# Patient Record
Sex: MALE | Employment: OTHER | URBAN - METROPOLITAN AREA
[De-identification: names, ages, dates, MRNs, and addresses within clinical notes are randomized per-mention and may not be internally consistent; named-entity substitution may affect disease eponyms.]

---

## 2015-06-23 LAB — COLONOSCOPY, EXTERNAL: NORMAL

## 2017-06-28 ENCOUNTER — OFFICE VISIT (OUTPATIENT)
Dept: SURGERY | Age: 67
End: 2017-06-28

## 2017-06-28 VITALS
WEIGHT: 153 LBS | DIASTOLIC BLOOD PRESSURE: 89 MMHG | BODY MASS INDEX: 23.19 KG/M2 | SYSTOLIC BLOOD PRESSURE: 129 MMHG | HEART RATE: 69 BPM | RESPIRATION RATE: 20 BRPM | HEIGHT: 68 IN | TEMPERATURE: 97.5 F

## 2017-06-28 DIAGNOSIS — K40.90 LEFT INGUINAL HERNIA: Primary | ICD-10-CM

## 2017-06-28 RX ORDER — INSULIN GLARGINE 100 [IU]/ML
25 INJECTION, SOLUTION SUBCUTANEOUS DAILY
COMMUNITY
End: 2017-09-24

## 2017-06-28 RX ORDER — INSULIN LISPRO 100 [IU]/ML
15 INJECTION, SOLUTION INTRAVENOUS; SUBCUTANEOUS
COMMUNITY
End: 2017-09-24

## 2017-06-28 NOTE — PROGRESS NOTES
Logan Barajas is a 79 y.o. male who presents today with   Chief Complaint   Patient presents with    Inguinal Hernia     Pt presents today c/o left inguinal hernia. Pt says he has 2 areas of herniation of the lower left side of abd/pelvis area. Pt has had hx of RIHR with right testicle removal in 2006. He is here for surgical intervention. 1. Have you been to the ER, urgent care clinic since your last visit? Hospitalized since your last visit? No    2. Have you seen or consulted any other health care providers outside of the [facility] since your last visit? Include any pap smears or colon screening.  No

## 2017-06-28 NOTE — MR AVS SNAPSHOT
Visit Information Date & Time Provider Department Dept. Phone Encounter #  
 6/28/2017  9:30 AM Neyda Stern [facility] [phone] [id number] Your Appointments 6/28/2017  9:30 AM  
New Patient with Donald Osorio MD  
[facility]) Appt Note: Left Inguinal Hernia/ PCP called in Dr. Ramila Nolasco; $45 cp/ pwk/ photo Id/ ins card. .../mjh  
 [address] [facility] [address]  
  
   
 [address] [place]per[place] [address]  
  
    
 7/24/2017  9:00 AM  
POST OP with Donald Osorio MD  
[address] [facility] Appt Note: 2 week post op [address] [place]  
  
   
 [address] [place] De Gasperi [address] Upcoming Health Maintenance Date Due Hepatitis C Screening 1950 DTaP/Tdap/Td series (1 - Tdap) 2/9/1971 FOBT Q 1 YEAR AGE 50-75 2/9/2000 ZOSTER VACCINE AGE 60> 2/9/2010 GLAUCOMA SCREENING Q2Y 2/9/2015 Pneumococcal 65+ Low/Medium Risk (1 of 2 - PCV13) 2/9/2015 MEDICARE YEARLY EXAM 2/9/2015 INFLUENZA AGE 9 TO ADULT 8/1/2017 Allergies as of 6/28/2017  Review Complete On: 8/15/2016 By: Romana Bah Severity Noted Reaction Type Reactions Fentanyl  08/15/2016    Other (comments) Blurred vision Neurontin [Gabapentin]  08/15/2016    Other (comments) Blurred Vision Penicillins  06/20/2012    Other (comments) Valium [Diazepam]  06/20/2012    Other (comments) Current Immunizations  Never Reviewed No immunizations on file. Not reviewed this visit Vitals BP Pulse Temp Resp Height(growth percentile) Weight(growth percentile)  129/89 (BP 1 Location: Right arm, BP Patient Position: At rest) 69 97.5 °F (36.4 °C) (Oral) 20 5' 8\" (1.727 m) 153 lb (69.4 kg) BMI Smoking Status 23.26 kg/m2 Former Smoker Vitals History BMI and BSA Data Body Mass Index Body Surface Area  
 23.26 kg/m 2 1.82 m 2 Preferred Pharmacy Pharmacy Name Phone [address] Maite Salas [phone] Your Updated Medication List  
  
   
This list is accurate as of: 6/28/17  9:08 AM.  Always use your most recent med list.  
  
  
  
  
 butalbital-acetaminophen-caffeine -40 mg per tablet Commonly known as:  ESGIC PLUS Take 2 Tabs by mouth every six (6) hours as needed for Headache.  
  
 cloNIDine HCl 0.1 mg tablet Commonly known as:  CATAPRES Take 0.1 mg by mouth. HumaLOG 100 unit/mL injection Generic drug:  insulin lispro  
by SubCUTAneous route. LANTUS 100 unit/mL injection Generic drug:  insulin glargine  
by SubCUTAneous route nightly. metFORMIN 500 mg tablet Commonly known as:  GLUCOPHAGE Take  by mouth two (2) times daily (with meals). OTHER Takes 2 medications for HTN, does not know the names  
  
 traMADol 50 mg tablet Commonly known as:  ULTRAM  
Take 50 mg by mouth every six (6) hours as needed. triamterene-hydroCHLOROthiazide 37.5-25 mg per tablet Commonly known as:  Drucilla Hones Take  by mouth daily. Patient Instructions If you have any questions or concerns about today's appointment, the verbal and/or written instructions you were given for follow up care, please call our office at [phone]. [facility] - [address], [address] 
 
[phone] office [phone] fax Judy Perez PATIENT PRE AND POST OPERATIVE INSTRUCTIONS [address] Before Surgery Instructions:  
1) You must have someone available to drive you to and from your procedure and stay with you for the first 24 hours. 2) It is very important that you have nothing to eat or drink after midnight the night before your surgery. This includes chewing gum or sucking on hard candy. Take only heart, blood pressure and cholesterol medications the morning of surgery with only a sip of water. 3) Please stop taking Plavix 10 days prior to your surgery. Stop taking Coumadin 5 days prior to your surgery. Stop taking all Aspirin or Aspirin containing products 7 days prior to your surgery. Stop taking Advil, Motrin, Aleve, and etc. 3 days prior to your surgery. 4) If you take any diabetic medications please consult with your primary care physician on how to take them on the day of your surgery 5) Please stop all Herbal products 2 weeks prior to your surgery. 6) Please arrive at the hospital 1 ½ hours prior to your surgery, unless you have been otherwise instructed. 7) Patients having an operation on their colon will be given a separate instruction sheet on their Bowel Prep. 8) For any pre-operative work up check in at the main entrance to [facility], and then go to Patient Registration. These studies are done on a walk in basis they are open from 7:00am to 5:00pm Monday through Friday. 9) Please wash your surgical site the morning of your surgery with soap and water. 10) If you are of child bearing age you will have pregnancy test done the morning of your surgery as soon as you arrive. After Surgery Instructions: You will need to be seen in the office for a follow-up visit 7-14 days after your surgery. Please call after you have had the procedure to make this appointment. Unless otherwise instructed, you may remove your outer bandage and shower 48 hours after your surgery. If you develop a fever greater than 101, have any significant drainage, bleeding, swelling and/or pus of the wound. Please call our office immediately. Surgery Date and Time:  Tuesday, July 11, 2017 at 10:15am 
 
Please check in at [facility], enter through the Emergency Room entrance and go up to the second floor. Please check in by 8:45am the day of your surgery. You may contact Fabio Garcia with any questions at [phone]. Introducing [facility] SERVICES! Trixie Mckeon introduces [employer] patient portal. Now you can access parts of your medical record, email your doctor's office, and request medication refills online. 1. In your internet browser, go to [email or web address] 2. Click on the First Time User? Click Here link in the Sign In box. You will see the New Member Sign Up page. 3. Enter your AetherPal Access Code exactly as it appears below. You will not need to use this code after youve completed the sign-up process. If you do not sign up before the expiration date, you must request a new code. · [employer]etherPal Access Code: [email or web address] Expires: 9/26/2017  8:46 AM 
 
4. Enter the last four digits of your Social Security Number (xxxx) and Date of Birth (mm/dd/yyyy) as indicated and click Submit. You will be taken to the next sign-up page. 5. Create a AetherPal ID. This will be your AetherPal login ID and cannot be changed, so think of one that is secure and easy to remember. 6. Create a AetherPal password. You can change your password at any time. 7. Enter your Password Reset Question and Answer. This can be used at a later time if you forget your password. 8. Enter your e-mail address. You will receive e-mail notification when new information is available in [address] 9. Click Sign Up. You can now view and download portions of your medical record. 10. Click the Download Summary menu link to download a portable copy of your medical information. If you have questions, please visit the Frequently Asked Questions section of the [employer] website.  Remember, [employer]etherPal is NOT to be used for urgent needs. For medical emergencies, dial 911. Now available from your iPhone and Android! Please provide this summary of care documentation to your next provider. Your primary care clinician is listed as MARCO CHAVEZ. If you have any questions after today's visit, please call [phone].

## 2017-06-28 NOTE — PATIENT INSTRUCTIONS
If you have any questions or concerns about today's appointment, the verbal and/or written instructions you were given for follow up care, please call our office at [phone]. [facility] - [address]    [phone] office  [phone] fax    . PATIENT PRE AND POST OPERATIVE INSTRUCTIONS     [address] [place] [address]     Before Surgery Instructions:   1) You must have someone available to drive you to and from your procedure and stay with you for the first 24 hours. 2) It is very important that you have nothing to eat or drink after midnight the night before your surgery. This includes chewing gum or sucking on hard candy. Take only heart, blood pressure and cholesterol medications the morning of surgery with only a sip of water. 3) Please stop taking Plavix 10 days prior to your surgery. Stop taking Coumadin 5 days prior to your surgery. Stop taking all Aspirin or Aspirin containing products 7 days prior to your surgery. Stop taking Advil, Motrin, Aleve, and etc. 3 days prior to your surgery. 4) If you take any diabetic medications please consult with your primary care physician on how to take them on the day of your surgery  5) Please stop all Herbal products 2 weeks prior to your surgery. 6) Please arrive at the hospital 1 ½ hours prior to your surgery, unless you have been otherwise instructed. 7) Patients having an operation on their colon will be given a separate instruction sheet on their Bowel Prep. 8) For any pre-operative work up check in at the main entrance to [address] [place] [address], and then go to Patient Registration. These studies are done on a walk in basis they are open from 7:00am to 5:00pm Monday through Friday. 9) Please wash your surgical site the morning of your surgery with soap and water.   10) If you are of child bearing age you will have pregnancy test done the morning of your surgery as soon as you arrive. After Surgery Instructions: You will need to be seen in the office for a follow-up visit 7-14 days after your surgery. Please call after you have had the procedure to make this appointment. Unless otherwise instructed, you may remove your outer bandage and shower 48 hours after your surgery. If you develop a fever greater than 101, have any significant drainage, bleeding, swelling and/or pus of the wound. Please call our office immediately. Surgery Date and Time:  Tuesday, July 11, 2017 at 10:15am    Please check in at [facility], enter through the Emergency Room entrance and go up to the second floor. Please check in by 8:45am the day of your surgery. You may contact Lisa Guerra with any questions at [phone].

## 2017-06-28 NOTE — PROGRESS NOTES
General Surgery Consult      Stella Warren  Admit date: (Not on file)    MRN: [id number]     : 1950     Age: 79 y.o. Attending Physician: Kristal Sam MD, FACS      History of Present Illness:     Stella Warren is a 79 y.o. male was referred for evaluation of left inguinal hernia. Symptoms were first noted a few months ago. Pain is dull, intermittent. The mass is  reducible. He does not have a history of incarceration or obstruction. The patient also gives a history of constipation. Contributing symptoms - Patient does not have chronic cough. Patient has a history of  previous hernia surgery. There are no active problems to display for this patient. Past Medical History:   Diagnosis Date    Diabetes ([address]     Hypertension       Past Surgical History:   Procedure Laterality Date    HX HERNIA REPAIR Right     Right inguinal hernia repair     HX ORTHOPAEDIC      HX OTHER SURGICAL      removed testicle      Social History   Substance Use Topics    Smoking status: Former Smoker    Smokeless tobacco: Never Used    Alcohol use No      History   Smoking Status    Former Smoker   Smokeless Tobacco    Never Used     Family History   Problem Relation Age of Onset    Diabetes Mother       Current Outpatient Prescriptions   Medication Sig    insulin glargine (LANTUS) 100 unit/mL injection by SubCUTAneous route nightly.  insulin lispro (HUMALOG) 100 unit/mL injection by SubCUTAneous route.  traMADol (ULTRAM) 50 mg tablet Take 50 mg by mouth every six (6) hours as needed.  triamterene-hydrochlorothiazide (MAXZIDE) 37.5-25 mg per tablet Take  by mouth daily.  cloNIDine (CATAPRES) 0.1 mg tablet Take 0.1 mg by mouth.  butalbital-acetaminophen-caffeine (ESGIC PLUS) -40 mg per tablet Take 2 Tabs by mouth every six (6) hours as needed for Headache.  metFORMIN (GLUCOPHAGE) 500 mg tablet Take  by mouth two (2) times daily (with meals).       OTHER Takes 2 medications for HTN, does not know the names      No current facility-administered medications for this visit. Allergies   Allergen Reactions    Fentanyl Other (comments)     Blurred vision    Neurontin [Gabapentin] Other (comments)     Blurred Vision      Penicillins Other (comments)    Valium [Diazepam] Other (comments)        Review of Systems:  Constitutional: negative  Eyes: negative  Ears, Nose, Mouth, Throat, and Face: negative  Respiratory: negative  Cardiovascular: negative  Gastrointestinal: positive for abdominal pain  Genitourinary:negative  Integument/Breast: negative  Hematologic/Lymphatic: negative  Musculoskeletal:negative  Neurological: negative  Behavioral/Psychiatric: negative    Objective:     Visit Vitals    /89 (BP 1 Location: Right arm, BP Patient Position: At rest)    Pulse 69    Temp 97.5 °F (36.4 °C) (Oral)    Resp 20    Ht 5' 8\" (1.727 m)    Wt 69.4 kg (153 lb)    BMI 23.26 kg/m2       Physical Exam:      General:  in no apparent distress   Eyes:  conjunctivae and sclerae normal, pupils equal, round, reactive to light   Throat & Neck: no erythema or exudates noted and neck supple and symmetrical; no palpable masses   Lungs:   clear to auscultation bilaterally   Heart:  Regular rate and rhythm   Abdomen:   rounded, soft, nontender, nondistended, no masses or organomegaly. Midline scar from previous gastric surgery. Left inguinal hernia, that is tender to palpation.     Extremities: extremities normal, atraumatic, no cyanosis or edema   Skin: Normal.       Imaging and Lab Review:     CBC:   Lab Results   Component Value Date/Time    WBC 4.1 12/03/2012 12:45 PM    RBC 4.91 12/03/2012 12:45 PM    HGB 12.0 12/03/2012 12:45 PM    HCT 36.6 12/03/2012 12:45 PM    PLATELET 82 28/52/8316 12:45 PM     BMP:   Lab Results   Component Value Date/Time    Glucose 136 12/03/2012 12:45 PM    Sodium 137 12/03/2012 12:45 PM    Potassium 4.3 12/03/2012 12:45 PM    Chloride 102 12/03/2012 12:45 PM    CO2 27 12/03/2012 12:45 PM    BUN 27 12/03/2012 12:45 PM    Creatinine 1.70 12/03/2012 12:45 PM    Calcium 9.5 12/03/2012 12:45 PM     CMP:  Lab Results   Component Value Date/Time    Glucose 136 12/03/2012 12:45 PM    Sodium 137 12/03/2012 12:45 PM    Potassium 4.3 12/03/2012 12:45 PM    Chloride 102 12/03/2012 12:45 PM    CO2 27 12/03/2012 12:45 PM    BUN 27 12/03/2012 12:45 PM    Creatinine 1.70 12/03/2012 12:45 PM    Calcium 9.5 12/03/2012 12:45 PM    Anion gap 8 12/03/2012 12:45 PM    BUN/Creatinine ratio 16 12/03/2012 12:45 PM    Alk. phosphatase 122 06/20/2010 08:00 PM    Protein, total 8.3 06/20/2010 08:00 PM    Albumin 4.1 06/20/2010 08:00 PM    Globulin 4.2 06/20/2010 08:00 PM    A-G Ratio 1.0 06/20/2010 08:00 PM       No results found for this or any previous visit (from the past 24 hour(s)). images and reports reviewed    Assessment:   Gypsy Reich is a 79 y.o. male is presenting with a picture of symptomatic left inguinal hernia. I Discussed the possibility of incarceration, strangulation, enlargement in size over time, and the risk of emergency surgery in the face of strangulation. I also discussed the use of prosthetic materials (mesh), including the risk of infection. Also discussed the risk of surgery including recurrence and the possible need for reoperation and removal of mesh if used, possibility of postoperative small bowel injury, obstruction or ileus, and the risks of general anesthetic. I explained to the the patient about the robotic hernia repair procedure. Plan:     1. Schedule for robotic left inguinal hernia repair with placement of mesh. 2. No heavy lifting for 2 weeks after the surgery (More than 15 pounds)  3. Avoid constipation by taking stool softener.      Please call me if you have any questions (cell phone: [phone])     Signed By: Krish Vizcarra MD     June 28, 2017

## 2017-07-10 ENCOUNTER — ANESTHESIA EVENT (OUTPATIENT)
Dept: SURGERY | Age: 67
End: 2017-07-10
Payer: MEDICARE

## 2017-07-10 RX ORDER — SERTRALINE HYDROCHLORIDE 50 MG/1
50 TABLET, FILM COATED ORAL DAILY
COMMUNITY
End: 2017-09-24

## 2017-07-10 RX ORDER — BACLOFEN 10 MG/1
10 TABLET ORAL
COMMUNITY
End: 2020-01-23

## 2017-07-11 ENCOUNTER — ANESTHESIA (OUTPATIENT)
Dept: SURGERY | Age: 67
End: 2017-07-11
Payer: MEDICARE

## 2017-07-11 ENCOUNTER — HOSPITAL ENCOUNTER (OUTPATIENT)
Age: 67
Setting detail: OUTPATIENT SURGERY
Discharge: HOME | End: 2017-07-11
Attending: SURGERY | Admitting: SURGERY
Payer: MEDICARE

## 2017-07-11 VITALS
TEMPERATURE: 97.4 F | HEART RATE: 60 BPM | BODY MASS INDEX: 23.49 KG/M2 | HEIGHT: 68 IN | WEIGHT: 155 LBS | DIASTOLIC BLOOD PRESSURE: 108 MMHG | OXYGEN SATURATION: 96 % | RESPIRATION RATE: 16 BRPM | SYSTOLIC BLOOD PRESSURE: 197 MMHG

## 2017-07-11 LAB
GLUCOSE BLD STRIP.AUTO-MCNC: 132 MG/DL (ref 70–110)
GLUCOSE BLD STRIP.AUTO-MCNC: 165 MG/DL (ref 70–110)

## 2017-07-11 PROCEDURE — 76210000017 HC OR PH I REC 1.5 TO 2 HR: Performed by: SURGERY

## 2017-07-11 PROCEDURE — 77030031139 HC SUT VCRL2 J&J -A: Performed by: SURGERY

## 2017-07-11 PROCEDURE — 74011000250 HC RX REV CODE- 250: Performed by: SURGERY

## 2017-07-11 PROCEDURE — 74011250636 HC RX REV CODE- 250/636: Performed by: NURSE ANESTHETIST, CERTIFIED REGISTERED

## 2017-07-11 PROCEDURE — 74011250637 HC RX REV CODE- 250/637: Performed by: NURSE ANESTHETIST, CERTIFIED REGISTERED

## 2017-07-11 PROCEDURE — 77030018842 HC SOL IRR SOD CL 9% BAXT -A: Performed by: SURGERY

## 2017-07-11 PROCEDURE — 82962 GLUCOSE BLOOD TEST: CPT

## 2017-07-11 PROCEDURE — 77030009848 HC PASSR SUT SET COOP -C: Performed by: SURGERY

## 2017-07-11 PROCEDURE — 76060000033 HC ANESTHESIA 1 TO 1.5 HR: Performed by: SURGERY

## 2017-07-11 PROCEDURE — 77030002933 HC SUT MCRYL J&J -A: Performed by: SURGERY

## 2017-07-11 PROCEDURE — 77030022704 HC SUT VLOC COVD -B: Performed by: SURGERY

## 2017-07-11 PROCEDURE — 74011250636 HC RX REV CODE- 250/636

## 2017-07-11 PROCEDURE — 74011000250 HC RX REV CODE- 250

## 2017-07-11 PROCEDURE — 76210000022 HC REC RM PH II 1.5 TO 2 HR: Performed by: SURGERY

## 2017-07-11 PROCEDURE — 77030011640 HC PAD GRND REM COVD -A: Performed by: SURGERY

## 2017-07-11 PROCEDURE — 77030010507 HC ADH SKN DERMBND J&J -B: Performed by: SURGERY

## 2017-07-11 PROCEDURE — C1781 MESH (IMPLANTABLE): HCPCS | Performed by: SURGERY

## 2017-07-11 PROCEDURE — 93005 ELECTROCARDIOGRAM TRACING: CPT

## 2017-07-11 PROCEDURE — 77030020703 HC SEAL CANN DISP INTU -B: Performed by: SURGERY

## 2017-07-11 PROCEDURE — 76010000934 HC OR TIME 1 TO 1.5HR INTENSV - TIER 2: Performed by: SURGERY

## 2017-07-11 PROCEDURE — 77030035277 HC OBTRTR BLDELSS DISP INTU -B: Performed by: SURGERY

## 2017-07-11 PROCEDURE — 74011000258 HC RX REV CODE- 258: Performed by: SURGERY

## 2017-07-11 PROCEDURE — 74011250636 HC RX REV CODE- 250/636: Performed by: SURGERY

## 2017-07-11 PROCEDURE — 77030032490 HC SLV COMPR SCD KNE COVD -B: Performed by: SURGERY

## 2017-07-11 DEVICE — LAPAROSCOPIC SELF-FIXATING MESH POLYESTER WITH POLYLACTIC ACID GRIPS AND COLLAGEN FILM
Type: IMPLANTABLE DEVICE | Site: INGUINAL | Status: FUNCTIONAL
Brand: PROGRIP

## 2017-07-11 RX ORDER — FENTANYL CITRATE 50 UG/ML
INJECTION, SOLUTION INTRAMUSCULAR; INTRAVENOUS AS NEEDED
Status: DISCONTINUED | OUTPATIENT
Start: 2017-07-11 | End: 2017-07-11 | Stop reason: HOSPADM

## 2017-07-11 RX ORDER — HYDROMORPHONE HYDROCHLORIDE 1 MG/ML
INJECTION, SOLUTION INTRAMUSCULAR; INTRAVENOUS; SUBCUTANEOUS
Status: DISCONTINUED
Start: 2017-07-11 | End: 2017-07-11 | Stop reason: HOSPADM

## 2017-07-11 RX ORDER — ONDANSETRON 2 MG/ML
4 INJECTION INTRAMUSCULAR; INTRAVENOUS ONCE
Status: DISCONTINUED | OUTPATIENT
Start: 2017-07-11 | End: 2017-07-11 | Stop reason: HOSPADM

## 2017-07-11 RX ORDER — FAMOTIDINE 20 MG/1
TABLET, FILM COATED ORAL
Status: DISCONTINUED
Start: 2017-07-11 | End: 2017-07-11 | Stop reason: HOSPADM

## 2017-07-11 RX ORDER — INSULIN LISPRO 100 [IU]/ML
INJECTION, SOLUTION INTRAVENOUS; SUBCUTANEOUS ONCE
Status: DISCONTINUED | OUTPATIENT
Start: 2017-07-12 | End: 2017-07-11 | Stop reason: HOSPADM

## 2017-07-11 RX ORDER — GLYCOPYRROLATE 0.2 MG/ML
INJECTION INTRAMUSCULAR; INTRAVENOUS AS NEEDED
Status: DISCONTINUED | OUTPATIENT
Start: 2017-07-11 | End: 2017-07-11 | Stop reason: HOSPADM

## 2017-07-11 RX ORDER — MIDAZOLAM HYDROCHLORIDE 1 MG/ML
INJECTION, SOLUTION INTRAMUSCULAR; INTRAVENOUS AS NEEDED
Status: DISCONTINUED | OUTPATIENT
Start: 2017-07-11 | End: 2017-07-11 | Stop reason: HOSPADM

## 2017-07-11 RX ORDER — SODIUM CHLORIDE 0.9 % (FLUSH) 0.9 %
5-10 SYRINGE (ML) INJECTION AS NEEDED
Status: DISCONTINUED | OUTPATIENT
Start: 2017-07-11 | End: 2017-07-11 | Stop reason: HOSPADM

## 2017-07-11 RX ORDER — KETOROLAC TROMETHAMINE 30 MG/ML
INJECTION, SOLUTION INTRAMUSCULAR; INTRAVENOUS AS NEEDED
Status: DISCONTINUED | OUTPATIENT
Start: 2017-07-11 | End: 2017-07-11 | Stop reason: HOSPADM

## 2017-07-11 RX ORDER — SODIUM CHLORIDE, SODIUM LACTATE, POTASSIUM CHLORIDE, CALCIUM CHLORIDE 600; 310; 30; 20 MG/100ML; MG/100ML; MG/100ML; MG/100ML
100 INJECTION, SOLUTION INTRAVENOUS CONTINUOUS
Status: DISCONTINUED | OUTPATIENT
Start: 2017-07-11 | End: 2017-07-11 | Stop reason: HOSPADM

## 2017-07-11 RX ORDER — DEXAMETHASONE SODIUM PHOSPHATE 4 MG/ML
INJECTION, SOLUTION INTRA-ARTICULAR; INTRALESIONAL; INTRAMUSCULAR; INTRAVENOUS; SOFT TISSUE AS NEEDED
Status: DISCONTINUED | OUTPATIENT
Start: 2017-07-11 | End: 2017-07-11 | Stop reason: HOSPADM

## 2017-07-11 RX ORDER — HYDROMORPHONE HYDROCHLORIDE 2 MG/ML
0.2 INJECTION, SOLUTION INTRAMUSCULAR; INTRAVENOUS; SUBCUTANEOUS AS NEEDED
Status: DISCONTINUED | OUTPATIENT
Start: 2017-07-11 | End: 2017-07-11 | Stop reason: HOSPADM

## 2017-07-11 RX ORDER — FAMOTIDINE 20 MG/1
20 TABLET, FILM COATED ORAL ONCE
Status: COMPLETED | OUTPATIENT
Start: 2017-07-12 | End: 2017-07-11

## 2017-07-11 RX ORDER — DEXTROSE MONOHYDRATE 25 G/50ML
25-50 INJECTION, SOLUTION INTRAVENOUS AS NEEDED
Status: DISCONTINUED | OUTPATIENT
Start: 2017-07-11 | End: 2017-07-11 | Stop reason: HOSPADM

## 2017-07-11 RX ORDER — LIDOCAINE HYDROCHLORIDE 20 MG/ML
INJECTION, SOLUTION EPIDURAL; INFILTRATION; INTRACAUDAL; PERINEURAL AS NEEDED
Status: DISCONTINUED | OUTPATIENT
Start: 2017-07-11 | End: 2017-07-11 | Stop reason: HOSPADM

## 2017-07-11 RX ORDER — DIPHENHYDRAMINE HYDROCHLORIDE 50 MG/ML
12.5 INJECTION, SOLUTION INTRAMUSCULAR; INTRAVENOUS
Status: DISCONTINUED | OUTPATIENT
Start: 2017-07-11 | End: 2017-07-11 | Stop reason: HOSPADM

## 2017-07-11 RX ORDER — HYDROMORPHONE HYDROCHLORIDE 2 MG/ML
0.5 INJECTION, SOLUTION INTRAMUSCULAR; INTRAVENOUS; SUBCUTANEOUS
Status: DISCONTINUED | OUTPATIENT
Start: 2017-07-11 | End: 2017-07-11 | Stop reason: HOSPADM

## 2017-07-11 RX ORDER — VECURONIUM BROMIDE FOR INJECTION 1 MG/ML
INJECTION, POWDER, LYOPHILIZED, FOR SOLUTION INTRAVENOUS AS NEEDED
Status: DISCONTINUED | OUTPATIENT
Start: 2017-07-11 | End: 2017-07-11 | Stop reason: HOSPADM

## 2017-07-11 RX ORDER — SODIUM CHLORIDE 0.9 % (FLUSH) 0.9 %
5-10 SYRINGE (ML) INJECTION EVERY 8 HOURS
Status: DISCONTINUED | OUTPATIENT
Start: 2017-07-11 | End: 2017-07-11 | Stop reason: HOSPADM

## 2017-07-11 RX ORDER — SODIUM CHLORIDE, SODIUM LACTATE, POTASSIUM CHLORIDE, CALCIUM CHLORIDE 600; 310; 30; 20 MG/100ML; MG/100ML; MG/100ML; MG/100ML
50 INJECTION, SOLUTION INTRAVENOUS CONTINUOUS
Status: DISCONTINUED | OUTPATIENT
Start: 2017-07-12 | End: 2017-07-11 | Stop reason: HOSPADM

## 2017-07-11 RX ORDER — SUCCINYLCHOLINE CHLORIDE 20 MG/ML
INJECTION INTRAMUSCULAR; INTRAVENOUS AS NEEDED
Status: DISCONTINUED | OUTPATIENT
Start: 2017-07-11 | End: 2017-07-11 | Stop reason: HOSPADM

## 2017-07-11 RX ORDER — NEOSTIGMINE METHYLSULFATE 5 MG/5 ML
SYRINGE (ML) INTRAVENOUS AS NEEDED
Status: DISCONTINUED | OUTPATIENT
Start: 2017-07-11 | End: 2017-07-11 | Stop reason: HOSPADM

## 2017-07-11 RX ORDER — PROPOFOL 10 MG/ML
INJECTION, EMULSION INTRAVENOUS AS NEEDED
Status: DISCONTINUED | OUTPATIENT
Start: 2017-07-11 | End: 2017-07-11 | Stop reason: HOSPADM

## 2017-07-11 RX ORDER — INSULIN LISPRO 100 [IU]/ML
INJECTION, SOLUTION INTRAVENOUS; SUBCUTANEOUS ONCE
Status: DISCONTINUED | OUTPATIENT
Start: 2017-07-11 | End: 2017-07-11 | Stop reason: HOSPADM

## 2017-07-11 RX ORDER — OXYCODONE AND ACETAMINOPHEN 5; 325 MG/1; MG/1
1 TABLET ORAL AS NEEDED
Status: DISCONTINUED | OUTPATIENT
Start: 2017-07-11 | End: 2017-07-11 | Stop reason: HOSPADM

## 2017-07-11 RX ORDER — MAGNESIUM SULFATE 100 %
4 CRYSTALS MISCELLANEOUS AS NEEDED
Status: DISCONTINUED | OUTPATIENT
Start: 2017-07-11 | End: 2017-07-11 | Stop reason: HOSPADM

## 2017-07-11 RX ORDER — OXYCODONE AND ACETAMINOPHEN 5; 325 MG/1; MG/1
1 TABLET ORAL
Qty: 24 TAB | Refills: 0 | Status: SHIPPED | OUTPATIENT
Start: 2017-07-11 | End: 2017-09-24

## 2017-07-11 RX ORDER — ONDANSETRON 2 MG/ML
INJECTION INTRAMUSCULAR; INTRAVENOUS AS NEEDED
Status: DISCONTINUED | OUTPATIENT
Start: 2017-07-11 | End: 2017-07-11 | Stop reason: HOSPADM

## 2017-07-11 RX ADMIN — MIDAZOLAM HYDROCHLORIDE 2 MG: 1 INJECTION, SOLUTION INTRAMUSCULAR; INTRAVENOUS at 08:50

## 2017-07-11 RX ADMIN — OXYCODONE HYDROCHLORIDE AND ACETAMINOPHEN 1 TABLET: 5; 325 TABLET ORAL at 12:16

## 2017-07-11 RX ADMIN — SODIUM CHLORIDE 600 MG: 900 INJECTION, SOLUTION INTRAVENOUS at 08:59

## 2017-07-11 RX ADMIN — HYDROMORPHONE HYDROCHLORIDE 0.5 MG: 2 INJECTION, SOLUTION INTRAMUSCULAR; INTRAVENOUS; SUBCUTANEOUS at 10:40

## 2017-07-11 RX ADMIN — Medication 3 MG: at 09:33

## 2017-07-11 RX ADMIN — FENTANYL CITRATE 50 MCG: 50 INJECTION, SOLUTION INTRAMUSCULAR; INTRAVENOUS at 08:56

## 2017-07-11 RX ADMIN — FAMOTIDINE 20 MG: 20 TABLET ORAL at 08:39

## 2017-07-11 RX ADMIN — VECURONIUM BROMIDE FOR INJECTION 3 MG: 1 INJECTION, POWDER, LYOPHILIZED, FOR SOLUTION INTRAVENOUS at 08:59

## 2017-07-11 RX ADMIN — HYDROMORPHONE HYDROCHLORIDE 0.5 MG: 2 INJECTION, SOLUTION INTRAMUSCULAR; INTRAVENOUS; SUBCUTANEOUS at 10:50

## 2017-07-11 RX ADMIN — LIDOCAINE HYDROCHLORIDE 60 MG: 20 INJECTION, SOLUTION EPIDURAL; INFILTRATION; INTRACAUDAL; PERINEURAL at 08:56

## 2017-07-11 RX ADMIN — SODIUM CHLORIDE, SODIUM LACTATE, POTASSIUM CHLORIDE, AND CALCIUM CHLORIDE: 600; 310; 30; 20 INJECTION, SOLUTION INTRAVENOUS at 08:37

## 2017-07-11 RX ADMIN — KETOROLAC TROMETHAMINE 15 MG: 30 INJECTION, SOLUTION INTRAMUSCULAR; INTRAVENOUS at 09:35

## 2017-07-11 RX ADMIN — DEXAMETHASONE SODIUM PHOSPHATE 4 MG: 4 INJECTION, SOLUTION INTRA-ARTICULAR; INTRALESIONAL; INTRAMUSCULAR; INTRAVENOUS; SOFT TISSUE at 09:33

## 2017-07-11 RX ADMIN — ONDANSETRON 4 MG: 2 INJECTION INTRAMUSCULAR; INTRAVENOUS at 09:33

## 2017-07-11 RX ADMIN — SUCCINYLCHOLINE CHLORIDE 100 MG: 20 INJECTION INTRAMUSCULAR; INTRAVENOUS at 08:56

## 2017-07-11 RX ADMIN — PROPOFOL 150 MG: 10 INJECTION, EMULSION INTRAVENOUS at 08:56

## 2017-07-11 RX ADMIN — PROPOFOL 50 MG: 10 INJECTION, EMULSION INTRAVENOUS at 09:38

## 2017-07-11 RX ADMIN — GLYCOPYRROLATE 0.4 MG: 0.2 INJECTION INTRAMUSCULAR; INTRAVENOUS at 09:33

## 2017-07-11 NOTE — ANESTHESIA PREPROCEDURE EVALUATION
Anesthetic History   No history of anesthetic complications            Review of Systems / Medical History  Patient summary reviewed and pertinent labs reviewed    Pulmonary  Within defined limits                 Neuro/Psych   Within defined limits           Cardiovascular    Hypertension: well controlled              Exercise tolerance: >4 METS     GI/Hepatic/Renal  Within defined limits              Endo/Other    Diabetes: well controlled, type 2         Other Findings   Comments:   Risk Factors for Postoperative nausea/vomiting:       History of postoperative nausea/vomiting? NO       Female? NO       Motion sickness? NO       Intended opioid administration for postoperative analgesia? YES      Smoking Abstinence  Current Smoker? NO  Elective Surgery? YES  Seen preoperatively by anesthesiologist or proxy prior to day of surgery? YES  Pt abstained from smoking 24 hours prior to anesthesia?  N/A           Physical Exam    Airway  Mallampati: II  TM Distance: 4 - 6 cm  Neck ROM: normal range of motion   Mouth opening: Normal     Cardiovascular    Rhythm: regular  Rate: normal         Dental  No notable dental hx       Pulmonary  Breath sounds clear to auscultation               Abdominal  GI exam deferred       Other Findings            Anesthetic Plan    ASA: 3  Anesthesia type: general          Induction: Intravenous  Anesthetic plan and risks discussed with: Patient

## 2017-07-11 NOTE — PERIOP NOTES
Patient sister took his jewerly:  2 silver tone necklaces; 2 silver tone bracelets; and 2 silver tone rings. He has 2 rings taped to his right middle finger. Camryn's sister has his eye glasses.

## 2017-07-11 NOTE — PROGRESS NOTES
Date of Surgery Update:  Liana Moreira was seen and examined. History and physical has been reviewed. The patient has been examined. There have been no significant clinical changes since the completion of the originally dated History and Physical. Will proceed with robotic left inguinal hernia repair with mesh.        Signed By: Joe Chávez MD     July 11, 2017 8:26 AM

## 2017-07-11 NOTE — ANESTHESIA POSTPROCEDURE EVALUATION
Post-Anesthesia Evaluation and Assessment    Patient: Michelle Price MRN: [id number]  SSN: [id number]    YOB: 1950  Age: 79 y.o. Sex: male      Data from PACU flowsheet    Cardiovascular Function/Vital Signs  Visit Vitals    BP (!) 168/96    Pulse 63    Temp 36.3 °C (97.4 °F)    Resp 12    Ht 5' 8\" (1.727 m)    Wt 70.3 kg (155 lb)    SpO2 97%    BMI 23.57 kg/m2       Patient is status post anesthesia    Nausea/Vomiting: controlled    Postoperative hydration reviewed and adequate. Pain:  Managed 5/10    Neurological Status: At baseline    Mental Status and Level of Consciousness: Alert and oriented     Pulmonary Status:   Adequate oxygenation and airway patent    Complications related to anesthesia: None    Post-anesthesia assessment completed.  No concerns    Signed By: Rubén Shane CRNA     July 11, 2017

## 2017-07-11 NOTE — IP AVS SNAPSHOT
[address] 
[phone] Patient: Hali Logan MRN: [id number] WTB:[id number] You are allergic to the following Allergen Reactions Fentanyl Other (comments) Blurred vision Neurontin (Gabapentin) Other (comments) Blurred Vision Penicillins Swelling Valium (Diazepam) Swelling Recent Documentation Height Weight BMI Smoking Status 1.727 m 70.3 kg 23.57 kg/m2 Former Smoker Emergency Contacts Name Discharge Info Relation Home Work Mobile Pili Villa  Parent [1] [phone] Richy Zamarripa  Child [2] [phone] About your hospitalization You were admitted on:  July 11, 2017 You last received care in the:  [facility] PHASE 2 RECOVERY You were discharged on:  July 11, 2017 Unit phone number:  [phone] Why you were hospitalized Your primary diagnosis was:  Not on File Providers Seen During Your Hospitalizations Provider Role Specialty Primary office phone Aidan Rosa MD Attending Provider Surgery [phone] Your Primary Care Physician (PCP) Primary Care Physician Office Phone Office Fax [place], [address] [facility] [phone] Follow-up Information Follow up With Details Comments Contact Info Kahlil Hancock MD   [address] [place] [address] 
[phone] Your Appointments Monday July 24, 2017  9:00 AM EDT  
POST OP with Aidan Rosa MD  
[address] [address]  
[phone] Current Discharge Medication List  
  
START taking these medications Dose & Instructions Dispensing Information Comments Morning Noon Evening Bedtime  
 oxyCODONE-acetaminophen 5-325 mg per tablet Commonly known as:  PERCOCET Your last dose was: Your next dose is:    
   
   
 Dose:  1 Tab Take 1 Tab by mouth every four (4) hours as needed for Pain. Max Daily Amount: 6 Tabs. Quantity:  24 Tab Refills:  0 CONTINUE these medications which have NOT CHANGED Dose & Instructions Dispensing Information Comments Morning Noon Evening Bedtime  
 baclofen 10 mg tablet Commonly known as:  LIORESAL Your last dose was: Your next dose is:    
   
   
 Dose:  10 mg Take 10 mg by mouth three (3) times daily as needed. Refills:  0  
     
   
   
   
  
 butalbital-acetaminophen-caffeine -40 mg per tablet Commonly known as:  ESGIC PLUS Your last dose was: Your next dose is:    
   
   
 Dose:  2 Tab Take 2 Tabs by mouth every six (6) hours as needed for Headache. Quantity:  20 Tab Refills:  0  
     
   
   
   
  
 cloNIDine HCl 0.1 mg tablet Commonly known as:  CATAPRES Your last dose was: Your next dose is:    
   
   
 Dose:  0.1 mg Take 0.1 mg by mouth. Refills:  0 HumaLOG 100 unit/mL injection Generic drug:  insulin lispro Your last dose was: Your next dose is:    
   
   
 Dose:  15 Units 15 Units by SubCUTAneous route three (3) times daily (after meals). Refills:  0  
     
   
   
   
  
 LANTUS 100 unit/mL injection Generic drug:  insulin glargine Your last dose was: Your next dose is:    
   
   
 Dose:  25 Units 25 Units by SubCUTAneous route daily. Refills:  0  
     
   
   
   
  
 sertraline 50 mg tablet Commonly known as:  ZOLOFT Your last dose was: Your next dose is:    
   
   
 Dose:  50 mg Take 50 mg by mouth daily. Refills:  0  
     
   
   
   
  
 traMADol 50 mg tablet Commonly known as:  ULTRAM  
   
Your last dose was:     
   
Your next dose is:    
   
   
 Dose:  50 mg  
 Take 50 mg by mouth every six (6) hours as needed. Refills:  0 Where to Get Your Medications Information on where to get these meds will be given to you by the nurse or doctor. ! Ask your nurse or doctor about these medications  
  oxyCODONE-acetaminophen 5-325 mg per tablet Discharge Instructions Instructions Following Ambulatory Surgery Patient: Cheryl Hinkle MRN: [id number]  SSN: [id number] YOB: 1950  Age: 79 y.o. Sex: male Activity · As tolerated, walking encourage, stairs are okay · Avoid strenuous activities - no lifting anything heavier than 15 pounds. · You may shower at home after 48 hours. Diet · Regular diet after nausea from the anesthetic has passed. Pain · Take pain medication as directed by your doctor ·  Call your doctor if pain is NOT relieved by medication Dressing Care · Remove outer dressing (if any) after 48 hours. Leave steri-strips (if any) in place until they fall off. After Anesthesia · For the first 24 hours: DO NOT Drive, Drink alcoholic beverages, or Make important decisions · Be aware of dizziness following anesthesia and while taking pain medication Call your doctor if 
· Excessive bleeding that does not stop after holding mild pressure over the area · Temperature of 101 degrees F or above · Redness,excessive swelling or bruising, and/or green or yellow, smelly discharge from incision · If nausea and vomiting continues Follow-Up Phone Calls · Call the office at [phone] to make your follow-up appointment Appointment date/time: 2 weeks after the surgery. Dr. Charley Alvarez cell phone number is [phone]. Please call me if you have any concerns or questions. Inguinal Hernia Repair: What to Expect at Home Your Recovery After surgery to repair a hernia, you're likely to have pain for a few days. You may also feel like you have the flu. And you may have a low fever and feel tired and nauseated. This is common. You should start to feel better after a few days. And you'll probably feel much better in 7 days. For a few weeks you may feel twinges or pulling in the groin area when you move. Men may have some bruising on the scrotum and along the penis. This is normal. 
This care sheet gives you a general idea about how long it will take for you to recover. But each person recovers at a different pace. Follow the steps below to get better as quickly as possible. How can you care for yourself at home? Activity · Rest when you feel tired. · You may shower 24 to 48 hours after surgery, if your doctor okays it. Pat the incision dry. Do not take a bath for the first 2 weeks, or until your doctor tells you it is okay. · Allow the area to heal. Don't move quickly or lift anything heavy until you are feeling better. · Be active. Walking is a good choice. · You most likely can return to light activity after 1 to 3 weeks, depending on the type of surgery you had. Diet · You can eat your normal diet. If your stomach is upset, try bland, low-fat foods like plain rice, broiled chicken, toast, and yogurt. · If your bowel movements are not regular right after surgery, try to avoid constipation and straining. Drink plenty of water. Your doctor may suggest fiber, a stool softener, or a mild laxative. Medicines · Be safe with medicines. Read and follow all instructions on the label. ¨ If the doctor gave you a prescription medicine for pain, take it as prescribed. ¨ If you are not taking a prescription pain medicine, ask your doctor if you can take an over-the-counter medicine. · Your doctor will tell you if and when you can restart your medicines. He or she will also give you instructions about taking any new medicines. Incision care · You will have a dressing over the cut (incision).  A dressing helps the cut heal and protects it. Your doctor will tell you how to take care of this. · If you have skin adhesive on the cut, leave it on until it falls off. Skin adhesive is also called liquid stitches or glue. · If you have strips of tape on the cut, leave the tape on for a week or until it falls off. · If you had stitches, your doctor will tell you when to come back to have them removed. · Wash the area daily with warm, soapy water, and pat it dry. Don't use hydrogen peroxide or alcohol. They can slow healing. Ice · Put ice or a cold pack on the area for 10 to 20 minutes at a time. Try to do this every 1 to 2 hours for the next 3 days (when you are awake) or until the swelling goes down. Put a thin cloth between the ice and your skin. Follow-up care is a key part of your treatment and safety. Be sure to make and go to all appointments, and call your doctor if you are having problems. It's also a good idea to know your test results and keep a list of the medicines you take. When should you call for help? Call 911 anytime you think you may need emergency care. For example, call if: 
· You passed out (lost consciousness). · You have severe trouble breathing. · You have symptoms of a blood clot in your lung (called a pulmonary embolism). These may include: 
¨ Sudden chest pain. ¨ Trouble breathing. ¨ Coughing up blood. Call your doctor now or seek immediate medical care if: 
· You have pain that does not get better after you take pain medicine. · You have loose stitches, or your incision comes open. · You are bleeding from the incision. · You have symptoms of infection, such as: 
¨ Increased pain, swelling, warmth, or redness. ¨ Red streaks leading from the incision. ¨ Pus draining from the incision. ¨ A fever. Watch closely for changes in your health, and be sure to contact your doctor if: 
· You do not have a bowel movement after taking a laxative. Where can you learn more? Go to http://kourtney-juna.info/. Enter A467 in the search box to learn more about \"Inguinal Hernia Repair: What to Expect at Home. \" Current as of: August 9, 2016 Content Version: 11.3 © 9828-4111 [employer]. Care instructions adapted under license by Anna Lozabai (which disclaims liability or warranty for this information). If you have questions about a medical condition or this instruction, always ask your healthcare professional. [place] [address] any warranty or liability for your use of this information. DISCHARGE SUMMARY from Nurse The following personal items are in your possession at time of discharge: 
 
Dental Appliances: None Visual Aid: Glasses PATIENT INSTRUCTIONS: 
 
After general anesthesia or intravenous sedation, for 24 hours or while taking prescription Narcotics: · Limit your activities · Do not drive and operate hazardous machinery · Do not make important personal or business decisions · Do  not drink alcoholic beverages · If you have not urinated within 8 hours after discharge, please contact your surgeon on call. Report the following to your surgeon: 
· Excessive pain, swelling, redness or odor of or around the surgical area · Temperature over 100.5 · Nausea and vomiting lasting longer than 4 hours or if unable to take medications · Any signs of decreased circulation or nerve impairment to extremity: change in color, persistent  numbness, tingling, coldness or increase pain · Any questions What to do at Home: 
Recommended activity:  
 
These are general instructions for a healthy lifestyle: No smoking/ No tobacco products/ Avoid exposure to second hand smoke Surgeon General's Warning:  Quitting smoking now greatly reduces serious risk to your health. Obesity, smoking, and sedentary lifestyle greatly increases your risk for illness A healthy diet, regular physical exercise & weight monitoring are important for maintaining a healthy lifestyle You may be retaining fluid if you have a history of heart failure or if you experience any of the following symptoms:  Weight gain of 3 pounds or more overnight or 5 pounds in a week, increased swelling in our hands or feet or shortness of breath while lying flat in bed. Please call your doctor as soon as you notice any of these symptoms; do not wait until your next office visit. Recognize signs and symptoms of STROKE: 
 
F-face looks uneven A-arms unable to move or move unevenly S-speech slurred or non-existent T-time-call 911 as soon as signs and symptoms begin-DO NOT go Back to bed or wait to see if you get better-TIME IS BRAIN. Warning Signs of HEART ATTACK Call 911 if you have these symptoms: 
? Chest discomfort. Most heart attacks involve discomfort in the center of the chest that lasts more than a few minutes, or that goes away and comes back. It can feel like uncomfortable pressure, squeezing, fullness, or pain. ? Discomfort in other areas of the upper body. Symptoms can include pain or discomfort in one or both arms, the back, neck, jaw, or stomach. ? Shortness of breath with or without chest discomfort. ? Other signs may include breaking out in a cold sweat, nausea, or lightheadedness. Don't wait more than five minutes to call [address]! Fast action can save your life. Calling 911 is almost always the fastest way to get lifesaving treatment. Emergency Medical Services staff can begin treatment when they arrive  up to an hour sooner than if someone gets to the hospital by car. The discharge information has been reviewed with the daughter. The daughter verbalized understanding. Discharge medications reviewed with the caregiver and appropriate educational materials and side effects teaching were provided. Patient armband removed and given to patient to take home. Patient was informed of the privacy risks if armband lost or stolen Discharge Orders None Introducing [facility]! Ervin Avila introduces "[employer]" patient portal. Now you can access parts of your medical record, email your doctor's office, and request medication refills online. 1. In your internet browser, go to [email or web address] 2. Click on the First Time User? Click Here link in the Sign In box. You will see the New Member Sign Up page. 3. Enter your "[employer]" Access Code exactly as it appears below. You will not need to use this code after youve completed the sign-up process. If you do not sign up before the expiration date, you must request a new code. · "[employer]" Access Code: [email or web address] Expires: 9/26/2017  8:46 AM 
 
4. Enter the last four digits of your Social Security Number (xxxx) and Date of Birth (mm/dd/yyyy) as indicated and click Submit. You will be taken to the next sign-up page. 5. Create a "[employer]" ID. This will be your "[employer]" login ID and cannot be changed, so think of one that is secure and easy to remember. 6. Create a "[employer]" password. You can change your password at any time. 7. Enter your Password Reset Question and Answer. This can be used at a later time if you forget your password. 8. Enter your e-mail address. You will receive e-mail notification when new information is available in [address]. 9. Click Sign Up. You can now view and download portions of your medical record. 10. Click the Download Summary menu link to download a portable copy of your medical information. If you have questions, please visit the Frequently Asked Questions section of the "[employer]" website. Remember, "[employer]" is NOT to be used for urgent needs. For medical emergencies, dial 911. Now available from your iPhone and Android! General Information Please provide this summary of care documentation to your next provider. Patient Signature:  ____________________________________________________________ Date:  ____________________________________________________________  
  
[employer] Provider Signature:  ____________________________________________________________ Date:  ____________________________________________________________

## 2017-07-11 NOTE — DISCHARGE INSTRUCTIONS
Instructions Following Ambulatory Surgery    Patient: Sonam Fuchs MRN: [id number]  SSN: [id number]    YOB: 1950  Age: 79 y.o. Sex: male      Activity  · As tolerated, walking encourage, stairs are okay  · Avoid strenuous activities - no lifting anything heavier than 15 pounds. · You may shower at home after 48 hours. Diet  · Regular diet after nausea from the anesthetic has passed. Pain  · Take pain medication as directed by your doctor  ·  Call your doctor if pain is NOT relieved by medication    Dressing Care  · Remove outer dressing (if any) after 48 hours. Leave steri-strips (if any) in place until they fall off. After Anesthesia  · For the first 24 hours: DO NOT Drive, Drink alcoholic beverages, or Make important decisions  · Be aware of dizziness following anesthesia and while taking pain medication    Call your doctor if  · Excessive bleeding that does not stop after holding mild pressure over the area  · Temperature of 101 degrees F or above  · Redness,excessive swelling or bruising, and/or green or yellow, smelly discharge from incision  · If nausea and vomiting continues    Follow-Up Phone Calls  · Call the office at [phone] to make your follow-up appointment    Appointment date/time: 2 weeks after the surgery. Dr. Grace Johnson cell phone number is [phone]. Please call me if you have any concerns or questions. Inguinal Hernia Repair: What to Expect at Home  Your Recovery    After surgery to repair a hernia, you're likely to have pain for a few days. You may also feel like you have the flu. And you may have a low fever and feel tired and nauseated. This is common. You should start to feel better after a few days. And you'll probably feel much better in 7 days. For a few weeks you may feel twinges or pulling in the groin area when you move. Men may have some bruising on the scrotum and along the penis.  This is normal.  This care sheet gives you a general idea about how long it will take for you to recover. But each person recovers at a different pace. Follow the steps below to get better as quickly as possible. How can you care for yourself at home? Activity  · Rest when you feel tired. · You may shower 24 to 48 hours after surgery, if your doctor okays it. Pat the incision dry. Do not take a bath for the first 2 weeks, or until your doctor tells you it is okay. · Allow the area to heal. Don't move quickly or lift anything heavy until you are feeling better. · Be active. Walking is a good choice. · You most likely can return to light activity after 1 to 3 weeks, depending on the type of surgery you had. Diet  · You can eat your normal diet. If your stomach is upset, try bland, low-fat foods like plain rice, broiled chicken, toast, and yogurt. · If your bowel movements are not regular right after surgery, try to avoid constipation and straining. Drink plenty of water. Your doctor may suggest fiber, a stool softener, or a mild laxative. Medicines  · Be safe with medicines. Read and follow all instructions on the label. ¨ If the doctor gave you a prescription medicine for pain, take it as prescribed. ¨ If you are not taking a prescription pain medicine, ask your doctor if you can take an over-the-counter medicine. · Your doctor will tell you if and when you can restart your medicines. He or she will also give you instructions about taking any new medicines. Incision care  · You will have a dressing over the cut (incision). A dressing helps the cut heal and protects it. Your doctor will tell you how to take care of this. · If you have skin adhesive on the cut, leave it on until it falls off. Skin adhesive is also called liquid stitches or glue. · If you have strips of tape on the cut, leave the tape on for a week or until it falls off. · If you had stitches, your doctor will tell you when to come back to have them removed.   · Wash the area daily with warm, soapy water, and pat it dry. Don't use hydrogen peroxide or alcohol. They can slow healing. Ice  · Put ice or a cold pack on the area for 10 to 20 minutes at a time. Try to do this every 1 to 2 hours for the next 3 days (when you are awake) or until the swelling goes down. Put a thin cloth between the ice and your skin. Follow-up care is a key part of your treatment and safety. Be sure to make and go to all appointments, and call your doctor if you are having problems. It's also a good idea to know your test results and keep a list of the medicines you take. When should you call for help? Call 911 anytime you think you may need emergency care. For example, call if:  · You passed out (lost consciousness). · You have severe trouble breathing. · You have symptoms of a blood clot in your lung (called a pulmonary embolism). These may include:  ¨ Sudden chest pain. ¨ Trouble breathing. ¨ Coughing up blood. Call your doctor now or seek immediate medical care if:  · You have pain that does not get better after you take pain medicine. · You have loose stitches, or your incision comes open. · You are bleeding from the incision. · You have symptoms of infection, such as:  ¨ Increased pain, swelling, warmth, or redness. ¨ Red streaks leading from the incision. ¨ Pus draining from the incision. ¨ A fever. Watch closely for changes in your health, and be sure to contact your doctor if:  · You do not have a bowel movement after taking a laxative. Where can you learn more? Go to http://kourtney-juan.info/. Enter M741 in the search box to learn more about \"Inguinal Hernia Repair: What to Expect at Home. \"  Current as of: August 9, 2016  Content Version: 11.3  © 4602-3920 [employer]. Care instructions adapted under license by [employer] (which disclaims liability or warranty for this information).  If you have questions about a medical condition or this instruction, always ask your healthcare professional. [place] [address] any warranty or liability for your use of this information. DISCHARGE SUMMARY from Nurse    The following personal items are in your possession at time of discharge:    Dental Appliances: None  Visual Aid: Glasses                            PATIENT INSTRUCTIONS:    After general anesthesia or intravenous sedation, for 24 hours or while taking prescription Narcotics:  · Limit your activities  · Do not drive and operate hazardous machinery  · Do not make important personal or business decisions  · Do  not drink alcoholic beverages  · If you have not urinated within 8 hours after discharge, please contact your surgeon on call. Report the following to your surgeon:  · Excessive pain, swelling, redness or odor of or around the surgical area  · Temperature over 100.5  · Nausea and vomiting lasting longer than 4 hours or if unable to take medications  · Any signs of decreased circulation or nerve impairment to extremity: change in color, persistent  numbness, tingling, coldness or increase pain  · Any questions        What to do at Home:  Recommended activity:     These are general instructions for a healthy lifestyle:    No smoking/ No tobacco products/ Avoid exposure to second hand smoke    Surgeon General's Warning:  Quitting smoking now greatly reduces serious risk to your health. Obesity, smoking, and sedentary lifestyle greatly increases your risk for illness    A healthy diet, regular physical exercise & weight monitoring are important for maintaining a healthy lifestyle    You may be retaining fluid if you have a history of heart failure or if you experience any of the following symptoms:  Weight gain of 3 pounds or more overnight or 5 pounds in a week, increased swelling in our hands or feet or shortness of breath while lying flat in bed.   Please call your doctor as soon as you notice any of these symptoms; do not wait until your next office visit. Recognize signs and symptoms of STROKE:    F-face looks uneven    A-arms unable to move or move unevenly    S-speech slurred or non-existent    T-time-call 911 as soon as signs and symptoms begin-DO NOT go       Back to bed or wait to see if you get better-TIME IS BRAIN. Warning Signs of HEART ATTACK     Call 911 if you have these symptoms:   Chest discomfort. Most heart attacks involve discomfort in the center of the chest that lasts more than a few minutes, or that goes away and comes back. It can feel like uncomfortable pressure, squeezing, fullness, or pain.  Discomfort in other areas of the upper body. Symptoms can include pain or discomfort in one or both arms, the back, neck, jaw, or stomach.  Shortness of breath with or without chest discomfort.  Other signs may include breaking out in a cold sweat, nausea, or lightheadedness. Don't wait more than five minutes to call 911 - MINUTES MATTER! Fast action can save your life. Calling 911 is almost always the fastest way to get lifesaving treatment. Emergency Medical Services staff can begin treatment when they arrive -- up to an hour sooner than if someone gets to the hospital by car. The discharge information has been reviewed with the daughter. The daughter verbalized understanding. Discharge medications reviewed with the caregiver and appropriate educational materials and side effects teaching were provided. Patient armband removed and given to patient to take home.   Patient was informed of the privacy risks if armband lost or stolen

## 2017-07-12 LAB
ATRIAL RATE: 63 BPM
CALCULATED P AXIS, ECG09: 61 DEGREES
CALCULATED R AXIS, ECG10: 32 DEGREES
CALCULATED T AXIS, ECG11: 35 DEGREES
DIAGNOSIS, 93000: NORMAL
P-R INTERVAL, ECG05: 156 MS
Q-T INTERVAL, ECG07: 420 MS
QRS DURATION, ECG06: 78 MS
QTC CALCULATION (BEZET), ECG08: 429 MS
VENTRICULAR RATE, ECG03: 63 BPM

## 2017-07-12 NOTE — OP NOTES
Efrain Bobby    Name:  Anette Mcgee  MR#:  [id number]  :  1950  Account #:  [de-identified]  Date of Adm:  2017  Date of Surgery:  2017      PREOPERATIVE DIAGNOSIS:  Left inguinal hernia. POSTOPERATIVE DIAGNOSIS:  Left indirect inguinal hernia. PROCEDURES PERFORMED:  Robotic repair of left indirect inguinal  hernia with placement of mesh. ESTIMATED BLOOD LOSS: minimal    SPECIMENS REMOVED:  None. ANESTHESIA:  General.    COMPLICATIONS:  None. DESCRIPTION OF PROCEDURE:  The patient was brought to the  operating room. Anesthesia was induced. Scrubbing and draping of the  abdomen was done in the usual manner. A timeout was performed. A  skin incision in the supraumbilical area was performed. Veress needle  was inserted. The abdomen was insufflated. An 8 mm port was  inserted. The abdomen was explored. There was a significant bowel  adhesion on the left upper quadrant. Another 8 mm port was inserted  on the right side of the abdominal wall. The camera was placed  through this one and then the 8 mm on the left side was placed under  visualization to make sure we did not injure the bowel. At this point,  exploration revealed a very small left indirect inguinal hernia. At this  point, the peritoneum was opened on the left groin. The preperitoneal  space was  dissected. The hernia sac was reduced and the inferior epigastric vessels and the  cord structure were identified and protected. A 15 x 10 ProGrip  Covidien mesh was placed. It was fixed with interrupted 2-0 Vicryl  sutures. Then, the peritoneum was closed on top of the mesh with 2-0  V-Loc suture. Hemostasis was secured. The instruments were  removed. The robot was undocked and the skin incisions were closed  with 4-0 Monocryl.         MD Shavonne Ya / Elaine Tobar  D:  2017   09:47  T:  2017   22:24  Job #:  [id number]

## 2017-07-19 ENCOUNTER — OFFICE VISIT (OUTPATIENT)
Dept: SURGERY | Age: 67
End: 2017-07-19

## 2017-07-19 VITALS
HEART RATE: 98 BPM | TEMPERATURE: 97.9 F | WEIGHT: 153 LBS | DIASTOLIC BLOOD PRESSURE: 89 MMHG | SYSTOLIC BLOOD PRESSURE: 139 MMHG | HEIGHT: 68 IN | BODY MASS INDEX: 23.19 KG/M2 | RESPIRATION RATE: 20 BRPM

## 2017-07-19 DIAGNOSIS — Z09 POSTOPERATIVE EXAMINATION: Primary | ICD-10-CM

## 2017-07-19 RX ORDER — OXYCODONE AND ACETAMINOPHEN 5; 325 MG/1; MG/1
1 TABLET ORAL
Qty: 24 TAB | Refills: 0 | Status: SHIPPED | OUTPATIENT
Start: 2017-07-19 | End: 2017-09-24

## 2017-07-19 NOTE — PROGRESS NOTES
Liana Moreira is a 79 y.o. male who presents today with   Chief Complaint   Patient presents with    Surgical Follow-up     S/p Robotic repair of Left indirect  inguinal hernia with placement of mesh 7/11/2017                1. Have you been to the ER, urgent care clinic since your last visit? Hospitalized since your last visit? No    2. Have you seen or consulted any other health care providers outside of the [address] [place] [address] since your last visit? Include any pap smears or colon screening.  No

## 2017-07-19 NOTE — PROGRESS NOTES
Patient seen and examined. Doing well. Photic regular diet. Has some bruising around the incisions and some pain. Abdomen is soft and nontender. No around the trocar sites.   Left groin area is normal.  Plan:  Continue with Percocet for one more time  Followup as needed

## 2017-07-19 NOTE — MR AVS SNAPSHOT
Visit Information Date & Time Provider Department Dept. Phone Encounter #  
 7/19/2017  9:30 AM Neyda Olivas [facility] [phone] [id number] Your Appointments 7/19/2017  9:30 AM  
POST OP with Kylah Warner MD  
[address] [facility]) Appt Note: Post op check up; $0 cp/ 90 day globe surgery 07-. ..[address] [facility] [facility] [address] [facility] [address] [place]  
  
    
 7/24/2017  9:00 AM  
POST OP with Kylah Warner MD  
[address] [facility]) Appt Note: 2 week post op [facility] [address] [id number]  
[phone] Upcoming Health Maintenance Date Due Hepatitis C Screening 1950 DTaP/Tdap/Td series (1 - Tdap) 2/9/1971 FOBT Q 1 YEAR AGE 50-75 2/9/2000 ZOSTER VACCINE AGE 60> 2/9/2010 GLAUCOMA SCREENING Q2Y 2/9/2015 Pneumococcal 65+ Low/Medium Risk (1 of 2 - PCV13) 2/9/2015 MEDICARE YEARLY EXAM 2/9/2015 INFLUENZA AGE 9 TO ADULT 8/1/2017 Allergies as of 7/19/2017  Review Complete On: 7/11/2017 By: Josh Sandoval Severity Noted Reaction Type Reactions Ibuprofen High 07/17/2017    Anaphylaxis Fentanyl  08/15/2016    Other (comments) Blurred vision Neurontin [Gabapentin]  08/15/2016    Other (comments) Blurred Vision Penicillins  06/20/2012    Swelling Valium [Diazepam]  06/20/2012    Swelling Current Immunizations  Never Reviewed No immunizations on file. Not reviewed this visit You Were Diagnosed With   
  
 Codes Comments Postoperative examination    -  Primary ICD-10-CM: P28 ICD-9-CM: V67.00 Vitals BP Pulse Temp Resp Height(growth percentile) Weight(growth percentile) 139/89 (BP 1 Location: Left arm, BP Patient Position: At rest) 98 97.9 °F (36.6 °C) (Oral) 20 5' 8\" (1.727 m) 153 lb (69.4 kg) BMI Smoking Status 23.26 kg/m2 Former Smoker Vitals History BMI and BSA Data Body Mass Index Body Surface Area  
 23.26 kg/m 2 1.82 m 2 [facility] [address] [phone] Your Updated Medication List  
  
   
This list is accurate as of: 7/19/17  8:42 AM.  Always use your most recent med list.  
  
  
  
  
 baclofen 10 mg tablet Commonly known as:  LIORESAL Take 10 mg by mouth three (3) times daily as needed. butalbital-acetaminophen-caffeine -40 mg per tablet Commonly known as:  ESGIC PLUS Take 2 Tabs by mouth every six (6) hours as needed for Headache.  
  
 cloNIDine HCl 0.1 mg tablet Commonly known as:  CATAPRES Take 0.1 mg by mouth. HumaLOG 100 unit/mL injection Generic drug:  insulin lispro 15 Units by SubCUTAneous route three (3) times daily (after meals). LANTUS 100 unit/mL injection Generic drug:  insulin glargine 25 Units by SubCUTAneous route daily. * oxyCODONE-acetaminophen 5-325 mg per tablet Commonly known as:  PERCOCET Take 1 Tab by mouth every four (4) hours as needed for Pain. Max Daily Amount: 6 Tabs. * oxyCODONE-acetaminophen 5-325 mg per tablet Commonly known as:  PERCOCET Take 1 Tab by mouth every four (4) hours as needed for Pain. Max Daily Amount: 6 Tabs. sertraline 50 mg tablet Commonly known as:  ZOLOFT Take 50 mg by mouth daily. traMADol 50 mg tablet Commonly known as:  ULTRAM  
Take 50 mg by mouth every six (6) hours as needed. * Notice: This list has 2 medication(s) that are the same as other medications prescribed for you. Read the directions carefully, and ask your doctor or other care provider to review them with you. Prescriptions Printed Refills  
 oxyCODONE-acetaminophen (PERCOCET) 5-325 mg per tablet 0 Sig: Take 1 Tab by mouth every four (4) hours as needed for Pain. Max Daily Amount: 6 Tabs. Class: Print Route: Oral  
  
Introducing [facility]! [employer] introduces [facility] patient portal. Now you can access parts of your medical record, email your doctor's office, and request medication refills online. 1. In your internet browser, go to [email or web address] 2. Click on the First Time User? Click Here link in the Sign In box. You will see the New Member Sign Up page. 3. Enter your [facility] Access Code exactly as it appears below. You will not need to use this code after youve completed the sign-up process. If you do not sign up before the expiration date, you must request a new code. · [facility] Access Code: [email or web address] Expires: 9/26/2017  8:46 AM 
 
4. Enter the last four digits of your Social Security Number (xxxx) and Date of Birth (mm/dd/yyyy) as indicated and click Submit. You will be taken to the next sign-up page. 5. Create a [facility] ID. This will be your [facility] login ID and cannot be changed, so think of one that is secure and easy to remember. 6. Create a [facility] password. You can change your password at any time. 7. Enter your Password Reset Question and Answer. This can be used at a later time if you forget your password. 8. Enter your e-mail address. You will receive e-mail notification when new information is available in [address] 9. Click Sign Up. You can now view and download portions of your medical record. 10. Click the Download Summary menu link to download a portable copy of your medical information. If you have questions, please visit the Frequently Asked Questions section of the [facility] website. Remember, [facility] is NOT to be used for urgent needs. For medical emergencies, dial 911. Now available from your iPhone and Android! Please provide this summary of care documentation to your next provider. Your primary care clinician is listed as MARCO CHAVEZ. If you have any questions after today's visit, please call [phone].

## 2017-09-24 ENCOUNTER — HOSPITAL ENCOUNTER (EMERGENCY)
Age: 67
Discharge: PSYCHIATRIC OR SUBSTANCE USE FACILITY | End: 2017-09-25
Attending: EMERGENCY MEDICINE
Payer: MEDICARE

## 2017-09-24 DIAGNOSIS — R45.851 SUICIDAL IDEATION: Primary | ICD-10-CM

## 2017-09-24 DIAGNOSIS — R03.0 ELEVATED BLOOD PRESSURE READING: ICD-10-CM

## 2017-09-24 DIAGNOSIS — R44.3 HALLUCINATION: ICD-10-CM

## 2017-09-24 LAB
AMPHET UR QL SCN: NEGATIVE
ANION GAP SERPL CALC-SCNC: 11 MMOL/L (ref 3–18)
APAP SERPL-MCNC: <2 UG/ML (ref 10–30)
BARBITURATES UR QL SCN: NEGATIVE
BASOPHILS # BLD: 0 K/UL (ref 0–0.06)
BASOPHILS NFR BLD: 0 % (ref 0–2)
BENZODIAZ UR QL: POSITIVE
BUN SERPL-MCNC: 42 MG/DL (ref 7–18)
BUN/CREAT SERPL: 24 (ref 12–20)
CALCIUM SERPL-MCNC: 9.5 MG/DL (ref 8.5–10.1)
CANNABINOIDS UR QL SCN: NEGATIVE
CHLORIDE SERPL-SCNC: 102 MMOL/L (ref 100–108)
CO2 SERPL-SCNC: 23 MMOL/L (ref 21–32)
COCAINE UR QL SCN: NEGATIVE
CREAT SERPL-MCNC: 1.76 MG/DL (ref 0.6–1.3)
DIFFERENTIAL METHOD BLD: ABNORMAL
EOSINOPHIL # BLD: 0 K/UL (ref 0–0.4)
EOSINOPHIL NFR BLD: 1 % (ref 0–5)
ERYTHROCYTE [DISTWIDTH] IN BLOOD BY AUTOMATED COUNT: 14.2 % (ref 11.6–14.5)
ETHANOL SERPL-MCNC: <3 MG/DL (ref 0–3)
GLUCOSE BLD STRIP.AUTO-MCNC: 223 MG/DL (ref 70–110)
GLUCOSE SERPL-MCNC: 164 MG/DL (ref 74–99)
HCT VFR BLD AUTO: 36.9 % (ref 36–48)
HDSCOM,HDSCOM: ABNORMAL
HGB BLD-MCNC: 12.3 G/DL (ref 13–16)
LYMPHOCYTES # BLD: 2.1 K/UL (ref 0.9–3.6)
LYMPHOCYTES NFR BLD: 39 % (ref 21–52)
MCH RBC QN AUTO: 23.8 PG (ref 24–34)
MCHC RBC AUTO-ENTMCNC: 33.3 G/DL (ref 31–37)
MCV RBC AUTO: 71.4 FL (ref 74–97)
METHADONE UR QL: NEGATIVE
MONOCYTES # BLD: 0.5 K/UL (ref 0.05–1.2)
MONOCYTES NFR BLD: 9 % (ref 3–10)
NEUTS SEG # BLD: 2.7 K/UL (ref 1.8–8)
NEUTS SEG NFR BLD: 51 % (ref 40–73)
OPIATES UR QL: NEGATIVE
PCP UR QL: NEGATIVE
PLATELET # BLD AUTO: 117 K/UL (ref 135–420)
POTASSIUM SERPL-SCNC: 4.4 MMOL/L (ref 3.5–5.5)
RBC # BLD AUTO: 5.17 M/UL (ref 4.7–5.5)
SALICYLATES SERPL-MCNC: <2.8 MG/DL (ref 2.8–20)
SODIUM SERPL-SCNC: 136 MMOL/L (ref 136–145)
WBC # BLD AUTO: 5.4 K/UL (ref 4.6–13.2)

## 2017-09-24 PROCEDURE — 99285 EMERGENCY DEPT VISIT HI MDM: CPT

## 2017-09-24 PROCEDURE — 74011250637 HC RX REV CODE- 250/637

## 2017-09-24 PROCEDURE — 80048 BASIC METABOLIC PNL TOTAL CA: CPT | Performed by: EMERGENCY MEDICINE

## 2017-09-24 PROCEDURE — 80307 DRUG TEST PRSMV CHEM ANLYZR: CPT | Performed by: NURSE PRACTITIONER

## 2017-09-24 PROCEDURE — 82962 GLUCOSE BLOOD TEST: CPT

## 2017-09-24 PROCEDURE — 80307 DRUG TEST PRSMV CHEM ANLYZR: CPT | Performed by: EMERGENCY MEDICINE

## 2017-09-24 PROCEDURE — 85025 COMPLETE CBC W/AUTO DIFF WBC: CPT | Performed by: EMERGENCY MEDICINE

## 2017-09-24 PROCEDURE — 74011250637 HC RX REV CODE- 250/637: Performed by: PHYSICIAN ASSISTANT

## 2017-09-24 PROCEDURE — 74011250637 HC RX REV CODE- 250/637: Performed by: NURSE PRACTITIONER

## 2017-09-24 PROCEDURE — 74011636637 HC RX REV CODE- 636/637: Performed by: NURSE PRACTITIONER

## 2017-09-24 RX ORDER — LISINOPRIL 20 MG/1
20 TABLET ORAL DAILY
COMMUNITY
End: 2020-03-05 | Stop reason: SDUPTHER

## 2017-09-24 RX ORDER — ACETAMINOPHEN 500 MG
500 TABLET ORAL
Status: COMPLETED | OUTPATIENT
Start: 2017-09-24 | End: 2017-09-24

## 2017-09-24 RX ORDER — MAGNESIUM SULFATE 100 %
16 CRYSTALS MISCELLANEOUS AS NEEDED
Status: DISCONTINUED | OUTPATIENT
Start: 2017-09-24 | End: 2017-09-25 | Stop reason: HOSPADM

## 2017-09-24 RX ORDER — AMLODIPINE BESYLATE 5 MG/1
10 TABLET ORAL DAILY
Status: DISCONTINUED | OUTPATIENT
Start: 2017-09-25 | End: 2017-09-25 | Stop reason: HOSPADM

## 2017-09-24 RX ORDER — TERAZOSIN 1 MG/1
1 CAPSULE ORAL
Status: DISCONTINUED | OUTPATIENT
Start: 2017-09-24 | End: 2017-09-25 | Stop reason: HOSPADM

## 2017-09-24 RX ORDER — INSULIN ASPART 100 [IU]/ML
15 INJECTION, SUSPENSION SUBCUTANEOUS
Status: DISCONTINUED | OUTPATIENT
Start: 2017-09-24 | End: 2017-09-24 | Stop reason: CLARIF

## 2017-09-24 RX ORDER — TRAMADOL HYDROCHLORIDE 50 MG/1
50 TABLET ORAL
Status: DISCONTINUED | OUTPATIENT
Start: 2017-09-24 | End: 2017-09-25 | Stop reason: HOSPADM

## 2017-09-24 RX ORDER — QUETIAPINE FUMARATE 25 MG/1
50 TABLET, FILM COATED ORAL
Status: DISCONTINUED | OUTPATIENT
Start: 2017-09-24 | End: 2017-09-25 | Stop reason: HOSPADM

## 2017-09-24 RX ORDER — BACLOFEN 10 MG/1
10 TABLET ORAL
Status: DISCONTINUED | OUTPATIENT
Start: 2017-09-24 | End: 2017-09-25 | Stop reason: HOSPADM

## 2017-09-24 RX ORDER — TRAMADOL HYDROCHLORIDE 50 MG/1
50 TABLET ORAL
COMMUNITY
End: 2020-01-07 | Stop reason: ALTCHOICE

## 2017-09-24 RX ORDER — HYDROCHLOROTHIAZIDE 25 MG/1
25 TABLET ORAL DAILY
Status: DISCONTINUED | OUTPATIENT
Start: 2017-09-25 | End: 2017-09-25 | Stop reason: HOSPADM

## 2017-09-24 RX ORDER — QUETIAPINE FUMARATE 25 MG/1
TABLET, FILM COATED ORAL
Status: COMPLETED
Start: 2017-09-24 | End: 2017-09-24

## 2017-09-24 RX ORDER — LISINOPRIL 20 MG/1
20 TABLET ORAL DAILY
Status: DISCONTINUED | OUTPATIENT
Start: 2017-09-25 | End: 2017-09-25 | Stop reason: HOSPADM

## 2017-09-24 RX ORDER — INSULIN ASPART 100 [IU]/ML
15 INJECTION, SUSPENSION SUBCUTANEOUS
COMMUNITY
End: 2020-06-23 | Stop reason: ALTCHOICE

## 2017-09-24 RX ORDER — INSULIN LISPRO 100 [IU]/ML
INJECTION, SOLUTION INTRAVENOUS; SUBCUTANEOUS
Status: DISCONTINUED | OUTPATIENT
Start: 2017-09-24 | End: 2017-09-25 | Stop reason: HOSPADM

## 2017-09-24 RX ORDER — DULOXETIN HYDROCHLORIDE 60 MG/1
60 CAPSULE, DELAYED RELEASE ORAL DAILY
COMMUNITY
End: 2020-02-06 | Stop reason: SDUPTHER

## 2017-09-24 RX ORDER — AMLODIPINE BESYLATE 10 MG/1
10 TABLET ORAL DAILY
COMMUNITY
End: 2020-01-23 | Stop reason: SDUPTHER

## 2017-09-24 RX ORDER — HYDROCHLOROTHIAZIDE 25 MG/1
25 TABLET ORAL DAILY
COMMUNITY
End: 2020-01-23

## 2017-09-24 RX ORDER — TRAMADOL HYDROCHLORIDE 50 MG/1
50 TABLET ORAL
Status: COMPLETED | OUTPATIENT
Start: 2017-09-24 | End: 2017-09-24

## 2017-09-24 RX ORDER — TERAZOSIN 1 MG/1
1 CAPSULE ORAL
COMMUNITY
End: 2020-03-05 | Stop reason: SDUPTHER

## 2017-09-24 RX ORDER — DULOXETIN HYDROCHLORIDE 60 MG/1
60 CAPSULE, DELAYED RELEASE ORAL DAILY
Status: DISCONTINUED | OUTPATIENT
Start: 2017-09-25 | End: 2017-09-25 | Stop reason: HOSPADM

## 2017-09-24 RX ADMIN — TERAZOSIN HYDROCHLORIDE 1 MG: 1 CAPSULE ORAL at 21:01

## 2017-09-24 RX ADMIN — QUETIAPINE FUMARATE 50 MG: 25 TABLET, FILM COATED ORAL at 21:00

## 2017-09-24 RX ADMIN — TRAMADOL HYDROCHLORIDE 50 MG: 50 TABLET, FILM COATED ORAL at 14:09

## 2017-09-24 RX ADMIN — TRAMADOL HYDROCHLORIDE 50 MG: 50 TABLET, FILM COATED ORAL at 20:36

## 2017-09-24 RX ADMIN — INSULIN LISPRO 4 UNITS: 100 INJECTION, SOLUTION INTRAVENOUS; SUBCUTANEOUS at 20:35

## 2017-09-24 RX ADMIN — BACLOFEN 10 MG: 10 TABLET ORAL at 23:04

## 2017-09-24 RX ADMIN — ACETAMINOPHEN 500 MG: 500 TABLET ORAL at 23:02

## 2017-09-24 NOTE — BSMART NOTE
Mr. Neo Almanza was seen for a crisis consult at the request of the [facility] medical staff due to presenting to the ER being suicidal.  Mr. Ashlee Cobb reports that he was at home when he felt that he was going over the \"deep end. \"  He reports that he lives with his 80year old mother and 36year old nephew and has difficulty getting along with them at the residence. There is a lot of verbal altercations but no violence. He called a crisis line and the crisis line sent the police to the home and he was brought to the ER by the police voluntarily. Mr. Ashlee Cobb reports that he has had depression and anxiety for many years. He reports that depression runs in his family as his father and mother both suffered from depression. There is no family history of suicide; however, one month ago, he took an overdose of about 20 pills including his blood pressure medication, diabetes medication and oxycodone. At that time, he was hearing voices telling him to overdose and he did so. He was taken to [facility] for the overdose and was transferred to [facility] for a one week psychiatric admission. That was his first and only psychiatric admission. He states that he did not follow up with any psychiatrist or doctor regarding his depression and anxiety since his admission one month ago. He states that tensions are \"high\" in the household and that he started hearing voices again that told him to hurt himself. Prior to last month's episode and his current episode, he had never heard voices before. He does have visual hallucinations of demons. He does report that since he has been in the ER, he has continued to hear some voices, specifically, they are telling him there is no need to be here and to get up and leave.  However, he reports that he knows better than to leave and wants to be here and go into the hospital.    Mr. Ashlee Cobb reports that he is  from his wife who lives in [place], [place]. He  from her on 10/31/2016 and that is when he came to live with his mother and nephew. He states that the strain of his marriage was making him suicidal at the time and that is why he left. He did not act on his suicidal thoughts last year. A current mental status reveals that Mr. Denver Reyes appears to be a well kept in hospital attire, he has some pressured speech and talks fast with an angry tone and affect, he is not currently oriented to date as he thinks it is October 2012, he is oriented to place, person and situation. He is goal directed with fair insight. He has auditory hallucinations with some delusional thoughts. Mr. Denver Reyes desires to be voluntarily psychiatrically hospitalized. Discussed his case with the attending ER physician and attempts will be made to find him a voluntary inpatient psychiatric hospital bed. Carie Hebert. Emilie Moya [place]  Crisis Intervention     ADDENDUM:  Patient's paperwork was faxed to [address] at [facility]. [address] became full before his paperwork could be considered. [facility] and they indicated that the patient was not appropriate for that unit. [address] the [address] are full and [facility] was full today. Patient understands that he will need to stay in the ER until a bed opens up tomorrow and he is o.k.with that decision. Staff aware of the full status of all the surrounding hospitals today and that patient will wait until a hospital opens up tomorrow. Carie Hebert.  Emilie Moya [place]  Crisis Intervention

## 2017-09-24 NOTE — ED TRIAGE NOTES
Patient reports he is seeing demons and is telling him he needs to end his life\"stop being a burden\".

## 2017-09-24 NOTE — CONSULTS
Name: Michelle Price  Date: 2017  Time: 2:49 PM via telepsychiatry  : 1950  Reason for consult: command [address] [place] [address] for self harm, recent OD   History of Present Illness: Michelle Price is a 79 y.o. male with recent psychiatric hospitalization after an overdose attempt presenting to the ED for command [facility] for self harm. Patient is both hearing and seeing demons telling him to hurt himself. He denies any commands for harm to others and has no desire to harm others. Patient does have a stressful situation at home and he wants to return to inpatient psychiatry for additional help. He has been taking cymbalta started at the the hospital as directed but says it has not helped with the hallucinations. He is not sleeping and has lost weight due to loss of appetite. SI/HI/Self harm/Violence: SI with command AVH & recent overdose attempt, no HI or past violence    Collateral: EMR, hospital staff: BEN Seo  Psychiatric History/Treatment History: inpatient psych for the first time ~3 weeks ago     Drug/Alcohol History: UDS: + benzo; denies any drug or alcohol use  Medical History:   Past Medical History:   Diagnosis Date    Diabetes ([facility] [address]     Hypertension      Medications & Freq:   Prior to Admission medications    Medication Sig Start Date End Date Taking? Authorizing Provider   insulin aspart protamine/insulin aspart (NOVOLOG MIX 70/30) 100 unit/mL (70-30) injection 15 Units by SubCUTAneous route three (3) times daily (with meals). Yes Phys Other, MD   lisinopril (PRINIVIL, ZESTRIL) 20 mg tablet Take 20 mg by mouth daily. Yes Phys Other, MD   DULoxetine (CYMBALTA) 60 mg capsule Take 60 mg by mouth daily. Yes Phys Other, MD   hydroCHLOROthiazide (HYDRODIURIL) 25 mg tablet Take 25 mg by mouth daily. Yes Phys Other, MD   amLODIPine (NORVASC) 10 mg tablet Take 10 mg by mouth daily. Yes Phys Other, MD   traMADol (ULTRAM) 50 mg tablet Take 50 mg by mouth three (3) times daily (with meals).    Yes Phys Other, MD   baclofen (LIORESAL) 10 mg tablet Take 10 mg by mouth three (3) times daily as needed. Historical Provider   butalbital-acetaminophen-caffeine (ESGIC PLUS) -40 mg per tablet Take 2 Tabs by mouth every six (6) hours as needed for Headache. 12/3/12   Hitesh Tello MD     Allergies: Allergies   Allergen Reactions    Ibuprofen Anaphylaxis    Fentanyl Other (comments)     Blurred vision    Neurontin [Gabapentin] Other (comments)     Blurred Vision      Penicillins Swelling    Valium [Diazepam] Swelling     Family Psych History/History of suicide: no suicide but both parents with depression  Family History   Problem Relation Age of Onset    Diabetes Mother      Social History:    Employment: on disability after a work injury in 2011   Living situation: with elderly mother   Stressors: family strife    Mental Status Exam:   Appearance and attire: appropriate to setting  Attitude and behavior: cooperative, polite  Speech: normal rate/volume/tone  Affect and mood: restricted to dysphoria, \"depressed\"  Association and thought processes: goal direceted  Thought content: SI: current thoughts to throw self down stairs; HI: denies  Perception: AVH: demons tell him to hurt himself; Delusions: paranoia  Sensorium and orientation: alert and oriented to situation  Insight and judgment: fair    Impression/Risk Assessment:   Mercy Maguire is a 79 y.o. man with depression seeking readmission to inpatient psychiatry due to ongoing command AVH for self harm. He does not feel safe outside the hospital with thoughts to throw himself down stairs. Patient tried to overdose ~3 weeks ago. No HI or past violence. He is amenable to medication management while waiting for placement. 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 Disposition: vol inpatient psychiatry  2.  Psychiatric medications: continue cymbalta 60mg daily, add seroquel 50mg nightly    The above were discussed with the patient and the referring provider; able parties stated understanding and agreement with the recommendations.

## 2017-09-24 NOTE — ED PROVIDER NOTES
HPI Comments: 1:02 PM   79 y.o. male presents to ED C/O SI, hallucinations. Patient has a HX of HTN, diabetes, hernia repair, chronic pain. patient arrived via police. CC of hearing demons telling him to end his life. patient had a suicide attempt 2 weeks ago after an argument with family member, admitted to  psych with PCP follow-up. I spoke to PCP Dr Vikas Sung, patient has followed up with her for depression, not seeing therapist.  Patient reports wanting a new living situation, believe home life causing stressors, is looking to live in assisted living because he is also having trouble keeping his home medications straight. Patient is a former smoker. Patient denies CP, SOB, abdominal pain. patient reports chronic back pain without change or injury  Patient denies any other symptoms or complaints. The history is provided by the patient. History limited by: NO language barrier. Past Medical History:   Diagnosis Date    Diabetes ([facility] [address]     Hypertension        Past Surgical History:   Procedure Laterality Date    HX HERNIA REPAIR Right     Right inguinal hernia repair 2006    HX HERNIA REPAIR  07/11/2017    Left indirect inguinal hernia repair    HX ORTHOPAEDIC  2012    Numerous surgeries on rt. foot    HX OTHER SURGICAL      removed testicle         Family History:   Problem Relation Age of Onset    Diabetes Mother        Social History     Social History    Marital status: UNKNOWN     Spouse name: N/A    Number of children: N/A    Years of education: N/A     Occupational History    Not on file. Social History Main Topics    Smoking status: Former Smoker     Quit date: 7/10/1997    Smokeless tobacco: Never Used    Alcohol use No    Drug use: No    Sexual activity: Not on file     Other Topics Concern    Not on file     Social History Narrative         ALLERGIES: Ibuprofen; Fentanyl; Neurontin [gabapentin];  Penicillins; and Valium [diazepam]    Review of Systems   Constitutional: Negative for activity change, appetite change, chills, fatigue and fever. HENT: Negative for congestion, ear pain, rhinorrhea and sore throat. Eyes: Negative for pain, discharge, redness and itching. Respiratory: Negative for cough, chest tightness, shortness of breath and wheezing. Cardiovascular: Negative for chest pain and palpitations. Gastrointestinal: Negative for abdominal pain, blood in stool, constipation, diarrhea, nausea and vomiting. Endocrine: Negative for polyuria. Genitourinary: Negative for discharge, dysuria, flank pain, hematuria, penile pain and testicular pain. Musculoskeletal: Positive for back pain. Negative for joint swelling and neck pain. Skin: Negative for rash and wound. Allergic/Immunologic: Negative for immunocompromised state. Neurological: Negative for dizziness, weakness, light-headedness, numbness and headaches. Hematological: Negative for adenopathy. Psychiatric/Behavioral: Positive for hallucinations and suicidal ideas. Negative for agitation and confusion. The patient is not nervous/anxious. Vitals:    09/24/17 1235 09/24/17 1307 09/24/17 1448   BP: (!) 122/92  (!) 137/91   Pulse: 99  82   Resp: 16  18   Temp: 98.6 °F (37 °C)  97.7 °F (36.5 °C)   SpO2: 98% 100%    Weight: 63.5 kg (140 lb)     Height: 5' 9\" (1.753 m)              Physical Exam   Constitutional: He is oriented to person, place, and time. He appears well-developed and well-nourished. No distress. Cardiovascular: Normal rate and regular rhythm. Pulmonary/Chest: Effort normal and breath sounds normal. No respiratory distress. He has no wheezes. He has no rales. Abdominal: Soft. Bowel sounds are normal. He exhibits no distension. There is no tenderness. There is no rebound and no guarding. Musculoskeletal: Normal range of motion. Neurological: He is alert and oriented to person, place, and time. He exhibits normal muscle tone.  Coordination normal.   Skin: Skin is warm and dry. He is not diaphoretic. Psychiatric: His speech is normal. He is actively hallucinating. He exhibits a depressed mood. He expresses suicidal ideation. He expresses suicidal plans. Nursing note and vitals reviewed. MDM  Number of Diagnoses or Management Options  Elevated blood pressure reading:   Hallucination:   Suicidal ideation:   Diagnosis management comments: 1:39 PM Discussed case with Dr Daquan Nails, PCP, she can see patient tomorrow if discharge is appropraite. 3:39 - Dr Michelle Strong agrees in patient admission appropriate, patient voluntary, sandra crisis worker is looking for bed placement    Progress - chronic renal failure consistent with previous, UDS positive for BZOT, negative salic and actmin  5:45 PM : Pt care transferred to [facility]  ,ED provider. History of patient complaint(s), available diagnostic reports and current treatment plan has been discussed thoroughly. Bedside rounding on patient occured : yes .   Intended disposition of patient : TBD  Pending diagnostics reports and/or labs (please list): crisis placement         Amount and/or Complexity of Data Reviewed  Clinical lab tests: ordered and reviewed      ED Course       Procedures                     RESULTS:    No orders to display       Labs Reviewed   CBC WITH AUTOMATED DIFF - Abnormal; Notable for the following:        Result Value    HGB 12.3 (*)     MCV 71.4 (*)     MCH 23.8 (*)     PLATELET 808 (*)     All other components within normal limits   METABOLIC PANEL, BASIC - Abnormal; Notable for the following:     Glucose 164 (*)     BUN 42 (*)     Creatinine 1.76 (*)     BUN/Creatinine ratio 24 (*)     GFR est AA 47 (*)     GFR est non-AA 39 (*)     All other components within normal limits   DRUG SCREEN, URINE - Abnormal; Notable for the following:     BENZODIAZEPINES POSITIVE (*)     All other components within normal limits   SALICYLATE - Abnormal; Notable for the following:     Salicylate level <5.0 (*)     All other components within normal limits   ACETAMINOPHEN - Abnormal; Notable for the following:     Acetaminophen level <2 (*)     All other components within normal limits   ETHYL ALCOHOL       Recent Results (from the past 12 hour(s))   CBC WITH AUTOMATED DIFF    Collection Time: 09/24/17 12:50 PM   Result Value Ref Range    WBC 5.4 4.6 - 13.2 K/uL    RBC 5.17 4.70 - 5.50 M/uL    HGB 12.3 (L) 13.0 - 16.0 g/dL    HCT 36.9 36.0 - 48.0 %    MCV 71.4 (L) 74.0 - 97.0 FL    MCH 23.8 (L) 24.0 - 34.0 PG    MCHC 33.3 31.0 - 37.0 g/dL    RDW 14.2 11.6 - 14.5 %    PLATELET 766 (L) 912 - 420 K/uL    NEUTROPHILS 51 40 - 73 %    LYMPHOCYTES 39 21 - 52 %    MONOCYTES 9 3 - 10 %    EOSINOPHILS 1 0 - 5 %    BASOPHILS 0 0 - 2 %    ABS. NEUTROPHILS 2.7 1.8 - 8.0 K/UL    ABS. LYMPHOCYTES 2.1 0.9 - 3.6 K/UL    ABS. MONOCYTES 0.5 0.05 - 1.2 K/UL    ABS. EOSINOPHILS 0.0 0.0 - 0.4 K/UL    ABS.  BASOPHILS 0.0 0.0 - 0.06 K/UL    DF AUTOMATED     METABOLIC PANEL, BASIC    Collection Time: 09/24/17 12:50 PM   Result Value Ref Range    Sodium 136 136 - 145 mmol/L    Potassium 4.4 3.5 - 5.5 mmol/L    Chloride 102 100 - 108 mmol/L    CO2 23 21 - 32 mmol/L    Anion gap 11 3.0 - 18 mmol/L    Glucose 164 (H) 74 - 99 mg/dL    BUN 42 (H) 7.0 - 18 MG/DL    Creatinine 1.76 (H) 0.6 - 1.3 MG/DL    BUN/Creatinine ratio 24 (H) 12 - 20      GFR est AA 47 (L) >60 ml/min/1.73m2    GFR est non-AA 39 (L) >60 ml/min/1.73m2    Calcium 9.5 8.5 - 10.1 MG/DL   ETHYL ALCOHOL    Collection Time: 09/24/17 12:50 PM   Result Value Ref Range    ALCOHOL(ETHYL),SERUM <3 0 - 3 MG/DL   DRUG SCREEN, URINE    Collection Time: 09/24/17 12:50 PM   Result Value Ref Range    BENZODIAZEPINES POSITIVE (A) NEG      BARBITURATES NEGATIVE  NEG      THC (TH-CANNABINOL) NEGATIVE  NEG      OPIATES NEGATIVE  NEG      PCP(PHENCYCLIDINE) NEGATIVE  NEG      COCAINE NEGATIVE  NEG      AMPHETAMINES NEGATIVE  NEG      METHADONE NEGATIVE       HDSCOM (NOTE)    SALICYLATE    Collection Time: 09/24/17 12:50 PM   Result Value Ref Range    Salicylate level <0.1 (L) 2.8 - 20.0 MG/DL   ACETAMINOPHEN    Collection Time: 09/24/17 12:50 PM   Result Value Ref Range    Acetaminophen level <2 (L) 10 - 30 ug/mL       PROGRESS NOTE:   1:02 PM   Initial assessment completed. Written by Huang HURT    10:20 PM  PT resting comfortably. Pending placement in AM.    10:41 PM  Pt  Has HA.  Will order Tylenol     3:00AM  Signed out to Dr. Sandra Umanzor pending placement

## 2017-09-24 NOTE — ED NOTES
[facility] EMERGENCY DEPT      79 y.o. male with noted past medical history who presents to the emergency department for suicidal ideations with plan to \"throw himself down the stairs\" with reported visual hallucinations, picked up by Police after patient called dispatch line and taken voluntarily to the hospital.     I performed a brief evaluation, including history and physical, of the patient here in triage and I have determined that pt will need further treatment and evaluation from the main side ER physician. I have placed initial orders to help in expediting patients care.      Erin Rodriguez DO

## 2017-09-25 VITALS
SYSTOLIC BLOOD PRESSURE: 134 MMHG | TEMPERATURE: 98.1 F | WEIGHT: 140 LBS | OXYGEN SATURATION: 100 % | HEART RATE: 91 BPM | HEIGHT: 69 IN | BODY MASS INDEX: 20.73 KG/M2 | DIASTOLIC BLOOD PRESSURE: 89 MMHG | RESPIRATION RATE: 18 BRPM

## 2017-09-25 LAB
GLUCOSE BLD STRIP.AUTO-MCNC: 210 MG/DL (ref 70–110)
GLUCOSE BLD STRIP.AUTO-MCNC: 380 MG/DL (ref 70–110)

## 2017-09-25 PROCEDURE — 74011250637 HC RX REV CODE- 250/637: Performed by: NURSE PRACTITIONER

## 2017-09-25 PROCEDURE — 82962 GLUCOSE BLOOD TEST: CPT

## 2017-09-25 PROCEDURE — 74011636637 HC RX REV CODE- 636/637: Performed by: NURSE PRACTITIONER

## 2017-09-25 RX ADMIN — INSULIN LISPRO 4 UNITS: 100 INJECTION, SOLUTION INTRAVENOUS; SUBCUTANEOUS at 09:37

## 2017-09-25 RX ADMIN — TRAMADOL HYDROCHLORIDE 50 MG: 50 TABLET, FILM COATED ORAL at 16:27

## 2017-09-25 RX ADMIN — INSULIN LISPRO 10 UNITS: 100 INJECTION, SOLUTION INTRAVENOUS; SUBCUTANEOUS at 16:27

## 2017-09-25 RX ADMIN — TRAMADOL HYDROCHLORIDE 50 MG: 50 TABLET, FILM COATED ORAL at 08:40

## 2017-09-25 RX ADMIN — DULOXETINE HYDROCHLORIDE 60 MG: 60 CAPSULE, DELAYED RELEASE ORAL at 08:40

## 2017-09-25 NOTE — PROGRESS NOTES
Spoke with Goldie Lui from [facility] and states will accept pt but will talk to pt first. Pt provided with phone and spoke with Goldie Lui.

## 2017-09-25 NOTE — PROGRESS NOTES
Pt accepted at [facility] by Dr Geoff Layne. Nursing to call report to [phone]. ED MD, ED RN and pt made aware.

## 2017-09-25 NOTE — ED NOTES
PT medicated per MAR. Blood pressure medications held due to current blood pressure. ED attending aware.

## 2017-09-25 NOTE — ED NOTES
TRANSFER - OUT REPORT:    Verbal report given to Adriane Dodd LPN(name) on Michelle Price  being transferred to [facility]) for routine progression of care       Report consisted of patients Situation, Background, Assessment and   Recommendations(SBAR). Information from the following report(s) SBAR, ED Summary and MAR was reviewed with the receiving nurse. Lines:       Opportunity for questions and clarification was provided.       Patient awaiting medical transport

## 2017-09-25 NOTE — PROGRESS NOTES
Referred to the General acute hospital at [facility] and spoke with Koko Noyola. Chart faxed.

## 2018-02-22 ENCOUNTER — HOSPITAL ENCOUNTER (EMERGENCY)
Age: 68
Discharge: PSYCHIATRIC OR SUBSTANCE USE FACILITY | End: 2018-02-24
Attending: EMERGENCY MEDICINE
Payer: MEDICARE

## 2018-02-22 ENCOUNTER — APPOINTMENT (OUTPATIENT)
Dept: GENERAL RADIOLOGY | Age: 68
End: 2018-02-22
Attending: NURSE PRACTITIONER
Payer: MEDICARE

## 2018-02-22 DIAGNOSIS — B34.9 VIRAL ILLNESS: ICD-10-CM

## 2018-02-22 DIAGNOSIS — R44.0 AUDITORY HALLUCINATIONS: Primary | ICD-10-CM

## 2018-02-22 DIAGNOSIS — R50.9 FEVER IN ADULT: ICD-10-CM

## 2018-02-22 DIAGNOSIS — R45.851 SUICIDAL IDEATION: ICD-10-CM

## 2018-02-22 LAB
ALBUMIN SERPL-MCNC: 4.3 G/DL (ref 3.4–5)
ALBUMIN/GLOB SERPL: 1.2 {RATIO} (ref 0.8–1.7)
ALP SERPL-CCNC: 100 U/L (ref 45–117)
ALT SERPL-CCNC: 23 U/L (ref 16–61)
AMPHET UR QL SCN: NEGATIVE
ANION GAP SERPL CALC-SCNC: 9 MMOL/L (ref 3–18)
ANION GAP SERPL CALC-SCNC: 9 MMOL/L (ref 3–18)
APPEARANCE UR: CLEAR
AST SERPL-CCNC: 20 U/L (ref 15–37)
BACTERIA URNS QL MICRO: NEGATIVE /HPF
BARBITURATES UR QL SCN: NEGATIVE
BASOPHILS # BLD: 0 K/UL (ref 0–0.06)
BASOPHILS # BLD: 0 K/UL (ref 0–0.06)
BASOPHILS NFR BLD: 0 % (ref 0–2)
BASOPHILS NFR BLD: 0 % (ref 0–2)
BENZODIAZ UR QL: NEGATIVE
BILIRUB SERPL-MCNC: 0.9 MG/DL (ref 0.2–1)
BILIRUB UR QL: NEGATIVE
BUN SERPL-MCNC: 15 MG/DL (ref 7–18)
BUN SERPL-MCNC: 16 MG/DL (ref 7–18)
BUN/CREAT SERPL: 12 (ref 12–20)
BUN/CREAT SERPL: 13 (ref 12–20)
CALCIUM SERPL-MCNC: 9.3 MG/DL (ref 8.5–10.1)
CALCIUM SERPL-MCNC: 9.6 MG/DL (ref 8.5–10.1)
CANNABINOIDS UR QL SCN: NEGATIVE
CHLORIDE SERPL-SCNC: 106 MMOL/L (ref 100–108)
CHLORIDE SERPL-SCNC: 106 MMOL/L (ref 100–108)
CO2 SERPL-SCNC: 23 MMOL/L (ref 21–32)
CO2 SERPL-SCNC: 24 MMOL/L (ref 21–32)
COCAINE UR QL SCN: NEGATIVE
COLOR UR: YELLOW
CREAT SERPL-MCNC: 1.22 MG/DL (ref 0.6–1.3)
CREAT SERPL-MCNC: 1.24 MG/DL (ref 0.6–1.3)
DIFFERENTIAL METHOD BLD: ABNORMAL
DIFFERENTIAL METHOD BLD: ABNORMAL
EOSINOPHIL # BLD: 0 K/UL (ref 0–0.4)
EOSINOPHIL # BLD: 0 K/UL (ref 0–0.4)
EOSINOPHIL NFR BLD: 0 % (ref 0–5)
EOSINOPHIL NFR BLD: 0 % (ref 0–5)
EPITH CASTS URNS QL MICRO: NORMAL /LPF (ref 0–5)
ERYTHROCYTE [DISTWIDTH] IN BLOOD BY AUTOMATED COUNT: 16.7 % (ref 11.6–14.5)
ERYTHROCYTE [DISTWIDTH] IN BLOOD BY AUTOMATED COUNT: 16.8 % (ref 11.6–14.5)
ETHANOL SERPL-MCNC: <3 MG/DL (ref 0–3)
FLUAV AG NPH QL IA: NEGATIVE
FLUBV AG NOSE QL IA: NEGATIVE
GLOBULIN SER CALC-MCNC: 3.6 G/DL (ref 2–4)
GLUCOSE SERPL-MCNC: 138 MG/DL (ref 74–99)
GLUCOSE SERPL-MCNC: 147 MG/DL (ref 74–99)
GLUCOSE UR STRIP.AUTO-MCNC: NEGATIVE MG/DL
HCT VFR BLD AUTO: 35.1 % (ref 36–48)
HCT VFR BLD AUTO: 36.1 % (ref 36–48)
HDSCOM,HDSCOM: NORMAL
HGB BLD-MCNC: 10.9 G/DL (ref 13–16)
HGB BLD-MCNC: 11.3 G/DL (ref 13–16)
HGB UR QL STRIP: NEGATIVE
KETONES UR QL STRIP.AUTO: NEGATIVE MG/DL
LACTATE BLD-SCNC: 0.8 MMOL/L (ref 0.4–2)
LEUKOCYTE ESTERASE UR QL STRIP.AUTO: ABNORMAL
LIPASE SERPL-CCNC: 124 U/L (ref 73–393)
LYMPHOCYTES # BLD: 0.5 K/UL (ref 0.9–3.6)
LYMPHOCYTES # BLD: 0.7 K/UL (ref 0.9–3.6)
LYMPHOCYTES NFR BLD: 7 % (ref 21–52)
LYMPHOCYTES NFR BLD: 9 % (ref 21–52)
MCH RBC QN AUTO: 20.7 PG (ref 24–34)
MCH RBC QN AUTO: 20.8 PG (ref 24–34)
MCHC RBC AUTO-ENTMCNC: 31.1 G/DL (ref 31–37)
MCHC RBC AUTO-ENTMCNC: 31.3 G/DL (ref 31–37)
MCV RBC AUTO: 66.5 FL (ref 74–97)
MCV RBC AUTO: 66.6 FL (ref 74–97)
METHADONE UR QL: NEGATIVE
MONOCYTES # BLD: 0.7 K/UL (ref 0.05–1.2)
MONOCYTES # BLD: 0.7 K/UL (ref 0.05–1.2)
MONOCYTES NFR BLD: 10 % (ref 3–10)
MONOCYTES NFR BLD: 10 % (ref 3–10)
NEUTS SEG # BLD: 5.5 K/UL (ref 1.8–8)
NEUTS SEG # BLD: 5.9 K/UL (ref 1.8–8)
NEUTS SEG NFR BLD: 81 % (ref 40–73)
NEUTS SEG NFR BLD: 83 % (ref 40–73)
NITRITE UR QL STRIP.AUTO: NEGATIVE
OPIATES UR QL: NEGATIVE
PCP UR QL: NEGATIVE
PH UR STRIP: 6 [PH] (ref 5–8)
PLATELET # BLD AUTO: 111 K/UL (ref 135–420)
PLATELET # BLD AUTO: 128 K/UL (ref 135–420)
POTASSIUM SERPL-SCNC: 3.8 MMOL/L (ref 3.5–5.5)
POTASSIUM SERPL-SCNC: 3.8 MMOL/L (ref 3.5–5.5)
PROT SERPL-MCNC: 7.9 G/DL (ref 6.4–8.2)
PROT UR STRIP-MCNC: ABNORMAL MG/DL
RBC # BLD AUTO: 5.27 M/UL (ref 4.7–5.5)
RBC # BLD AUTO: 5.43 M/UL (ref 4.7–5.5)
RBC #/AREA URNS HPF: NORMAL /HPF (ref 0–5)
SODIUM SERPL-SCNC: 138 MMOL/L (ref 136–145)
SODIUM SERPL-SCNC: 139 MMOL/L (ref 136–145)
SP GR UR REFRACTOMETRY: 1.02 (ref 1–1.03)
UROBILINOGEN UR QL STRIP.AUTO: 0.2 EU/DL (ref 0.2–1)
WBC # BLD AUTO: 6.7 K/UL (ref 4.6–13.2)
WBC # BLD AUTO: 7.3 K/UL (ref 4.6–13.2)
WBC URNS QL MICRO: NORMAL /HPF (ref 0–4)

## 2018-02-22 PROCEDURE — 81001 URINALYSIS AUTO W/SCOPE: CPT | Performed by: NURSE PRACTITIONER

## 2018-02-22 PROCEDURE — 83036 HEMOGLOBIN GLYCOSYLATED A1C: CPT

## 2018-02-22 PROCEDURE — 80307 DRUG TEST PRSMV CHEM ANLYZR: CPT | Performed by: NURSE PRACTITIONER

## 2018-02-22 PROCEDURE — 74011250637 HC RX REV CODE- 250/637: Performed by: NURSE PRACTITIONER

## 2018-02-22 PROCEDURE — 80053 COMPREHEN METABOLIC PANEL: CPT | Performed by: NURSE PRACTITIONER

## 2018-02-22 PROCEDURE — 93005 ELECTROCARDIOGRAM TRACING: CPT

## 2018-02-22 PROCEDURE — 99285 EMERGENCY DEPT VISIT HI MDM: CPT

## 2018-02-22 PROCEDURE — 80048 BASIC METABOLIC PNL TOTAL CA: CPT | Performed by: NURSE PRACTITIONER

## 2018-02-22 PROCEDURE — 83605 ASSAY OF LACTIC ACID: CPT

## 2018-02-22 PROCEDURE — 74011250636 HC RX REV CODE- 250/636: Performed by: NURSE PRACTITIONER

## 2018-02-22 PROCEDURE — 85025 COMPLETE CBC W/AUTO DIFF WBC: CPT | Performed by: NURSE PRACTITIONER

## 2018-02-22 PROCEDURE — 87804 INFLUENZA ASSAY W/OPTIC: CPT | Performed by: NURSE PRACTITIONER

## 2018-02-22 PROCEDURE — 87040 BLOOD CULTURE FOR BACTERIA: CPT | Performed by: NURSE PRACTITIONER

## 2018-02-22 PROCEDURE — 83690 ASSAY OF LIPASE: CPT | Performed by: NURSE PRACTITIONER

## 2018-02-22 PROCEDURE — 71045 X-RAY EXAM CHEST 1 VIEW: CPT

## 2018-02-22 PROCEDURE — 96361 HYDRATE IV INFUSION ADD-ON: CPT

## 2018-02-22 PROCEDURE — 96374 THER/PROPH/DIAG INJ IV PUSH: CPT

## 2018-02-22 PROCEDURE — 87086 URINE CULTURE/COLONY COUNT: CPT | Performed by: NURSE PRACTITIONER

## 2018-02-22 RX ORDER — ONDANSETRON 2 MG/ML
4 INJECTION INTRAMUSCULAR; INTRAVENOUS
Status: COMPLETED | OUTPATIENT
Start: 2018-02-22 | End: 2018-02-22

## 2018-02-22 RX ORDER — ACETAMINOPHEN 500 MG
1000 TABLET ORAL
Status: COMPLETED | OUTPATIENT
Start: 2018-02-22 | End: 2018-02-22

## 2018-02-22 RX ORDER — SODIUM CHLORIDE 0.9 % (FLUSH) 0.9 %
5-10 SYRINGE (ML) INJECTION AS NEEDED
Status: DISCONTINUED | OUTPATIENT
Start: 2018-02-22 | End: 2018-02-24 | Stop reason: HOSPADM

## 2018-02-22 RX ADMIN — ACETAMINOPHEN 1000 MG: 500 TABLET ORAL at 22:34

## 2018-02-22 RX ADMIN — ONDANSETRON 4 MG: 2 INJECTION INTRAMUSCULAR; INTRAVENOUS at 22:35

## 2018-02-22 RX ADMIN — SODIUM CHLORIDE 1000 ML: 900 INJECTION, SOLUTION INTRAVENOUS at 22:57

## 2018-02-23 LAB
EST. AVERAGE GLUCOSE BLD GHB EST-MCNC: 243 MG/DL
GLUCOSE BLD STRIP.AUTO-MCNC: 124 MG/DL (ref 70–110)
GLUCOSE BLD STRIP.AUTO-MCNC: 192 MG/DL (ref 70–110)
GLUCOSE BLD STRIP.AUTO-MCNC: 212 MG/DL (ref 70–110)
GLUCOSE BLD STRIP.AUTO-MCNC: 244 MG/DL (ref 70–110)
HBA1C MFR BLD: 10.1 % (ref 4.2–5.6)

## 2018-02-23 PROCEDURE — 74011250637 HC RX REV CODE- 250/637: Performed by: EMERGENCY MEDICINE

## 2018-02-23 PROCEDURE — 96361 HYDRATE IV INFUSION ADD-ON: CPT

## 2018-02-23 PROCEDURE — 74011636637 HC RX REV CODE- 636/637: Performed by: EMERGENCY MEDICINE

## 2018-02-23 PROCEDURE — 82962 GLUCOSE BLOOD TEST: CPT

## 2018-02-23 RX ORDER — INSULIN GLARGINE 100 [IU]/ML
10 INJECTION, SOLUTION SUBCUTANEOUS DAILY
Status: DISCONTINUED | OUTPATIENT
Start: 2018-02-23 | End: 2018-02-23

## 2018-02-23 RX ORDER — INSULIN GLARGINE 100 [IU]/ML
10 INJECTION, SOLUTION SUBCUTANEOUS DAILY
Status: DISCONTINUED | OUTPATIENT
Start: 2018-02-23 | End: 2018-02-24 | Stop reason: HOSPADM

## 2018-02-23 RX ORDER — ACETAMINOPHEN 500 MG
1000 TABLET ORAL
Status: DISCONTINUED | OUTPATIENT
Start: 2018-02-23 | End: 2018-02-24 | Stop reason: HOSPADM

## 2018-02-23 RX ORDER — DULOXETIN HYDROCHLORIDE 30 MG/1
30 CAPSULE, DELAYED RELEASE ORAL DAILY
Status: DISCONTINUED | OUTPATIENT
Start: 2018-02-23 | End: 2018-02-24 | Stop reason: HOSPADM

## 2018-02-23 RX ORDER — AMLODIPINE BESYLATE 5 MG/1
10 TABLET ORAL DAILY
Status: DISCONTINUED | OUTPATIENT
Start: 2018-02-23 | End: 2018-02-24 | Stop reason: HOSPADM

## 2018-02-23 RX ORDER — DEXTROSE 50 % IN WATER (D50W) INTRAVENOUS SYRINGE
25-50 AS NEEDED
Status: DISCONTINUED | OUTPATIENT
Start: 2018-02-23 | End: 2018-02-24 | Stop reason: HOSPADM

## 2018-02-23 RX ORDER — INSULIN LISPRO 100 [IU]/ML
INJECTION, SOLUTION INTRAVENOUS; SUBCUTANEOUS
Status: DISCONTINUED | OUTPATIENT
Start: 2018-02-23 | End: 2018-02-24 | Stop reason: HOSPADM

## 2018-02-23 RX ORDER — MAGNESIUM SULFATE 100 %
4 CRYSTALS MISCELLANEOUS AS NEEDED
Status: DISCONTINUED | OUTPATIENT
Start: 2018-02-23 | End: 2018-02-24 | Stop reason: HOSPADM

## 2018-02-23 RX ADMIN — INSULIN GLARGINE 10 UNITS: 100 INJECTION, SOLUTION SUBCUTANEOUS at 10:44

## 2018-02-23 RX ADMIN — ACETAMINOPHEN 1000 MG: 500 TABLET ORAL at 21:37

## 2018-02-23 RX ADMIN — AMLODIPINE BESYLATE 10 MG: 5 TABLET ORAL at 10:22

## 2018-02-23 RX ADMIN — ACETAMINOPHEN 1000 MG: 500 TABLET ORAL at 05:20

## 2018-02-23 RX ADMIN — INSULIN LISPRO 4 UNITS: 100 INJECTION, SOLUTION INTRAVENOUS; SUBCUTANEOUS at 23:19

## 2018-02-23 RX ADMIN — ACETAMINOPHEN 1000 MG: 500 TABLET ORAL at 12:58

## 2018-02-23 RX ADMIN — DULOXETINE 30 MG: 30 CAPSULE, DELAYED RELEASE ORAL at 09:31

## 2018-02-23 RX ADMIN — INSULIN LISPRO 2 UNITS: 100 INJECTION, SOLUTION INTRAVENOUS; SUBCUTANEOUS at 18:20

## 2018-02-23 NOTE — PROGRESS NOTES
Spoke with Robbie Guerrero, crisis worker at [facility] and confirmed that they are not able to accept pt at this time due to staffing,     Resident paged at [facility] ([phone]). Received a return call from Dr. Lavell Fallon and informed that they may have available male beds later today. Dr. Lavell Fallon requested information to be faxed to her at [phone]     Spoke with Mrs. Dar Medina at [address] behavioral health unit. Mrs. Dar Medina did not indicate if she has bed availability. However, she requested pt's information be faxed to her for review. Information was faxed to [phone]. Attempted to contact [facility]. Multiple calls made to the main unit number as well as the charge nurse's phone ([phone] and [phone]). No answer. Call placed to [facility] ([phone]) and informed that there are no available voluntary male beds. Call placed to [address] [place] [address] ([phone]), and informed there are currently no voluntary male beds available.              Rafia Bliss RN, BSN, [facility]  ED Outcomes Manager  Thursdays & Fridays   [phone] (phone)  [phone] (pager)

## 2018-02-23 NOTE — ED PROVIDER NOTES
HPI Comments: 9:47 PM   76 y.o. male presents to ED C/O hearing voices, sick. Patient has a HX of HTN, diabetes, hernia repair, testicle removal.  Patient reports he has been hearing voices for a few days and they are telling him to hurt himself, denies plan. Patient has a HX of auditory hallucinations, well controlled previously. Patient reports he ran out of his psych medication 4 days ago, thinks it was Cymbalta. patient denies HI. Patient reports he also started to feel sick this afternoon with body aches and fever, cough minimally productive for sputum. Patient reports diarrhea for a few days. Patient denies CP, SOB, dysuria. patient is a former smoker. Patient denies any other symptoms or complaints. The history is provided by the patient. History limited by: no lanuage barrier. Past Medical History:   Diagnosis Date    Diabetes ([facility] [address]     Hypertension        Past Surgical History:   Procedure Laterality Date    HX HERNIA REPAIR Right     Right inguinal hernia repair 2006    HX HERNIA REPAIR  07/11/2017    Left indirect inguinal hernia repair    HX ORTHOPAEDIC  2012    Numerous surgeries on rt. foot    HX OTHER SURGICAL      removed testicle         Family History:   Problem Relation Age of Onset    Diabetes Mother        Social History     Social History    Marital status: SINGLE     Spouse name: N/A    Number of children: N/A    Years of education: N/A     Occupational History    Not on file. Social History Main Topics    Smoking status: Former Smoker     Quit date: 7/10/1997    Smokeless tobacco: Never Used    Alcohol use No    Drug use: No    Sexual activity: Not on file     Other Topics Concern    Not on file     Social History Narrative         ALLERGIES: Ibuprofen; Fentanyl; Neurontin [gabapentin]; Penicillins; and Valium [diazepam]    Review of Systems   Constitutional: Positive for fatigue and fever. Negative for activity change, appetite change and chills. HENT: Negative for congestion, ear pain, rhinorrhea and sore throat. Eyes: Negative for pain, discharge, redness and itching. Respiratory: Positive for cough. Negative for chest tightness, shortness of breath and wheezing. Cardiovascular: Negative for chest pain and palpitations. Gastrointestinal: Positive for diarrhea. Negative for abdominal pain, blood in stool, constipation, nausea and vomiting. Endocrine: Negative for polyuria. Genitourinary: Negative for discharge, dysuria, flank pain, hematuria, penile pain and testicular pain. Musculoskeletal: Negative for back pain, joint swelling and neck pain. Skin: Negative for rash and wound. Allergic/Immunologic: Negative for immunocompromised state. Neurological: Negative for dizziness, weakness, light-headedness, numbness and headaches. Hematological: Negative for adenopathy. Psychiatric/Behavioral: Positive for hallucinations and suicidal ideas. Negative for agitation and confusion. The patient is not nervous/anxious. All other systems reviewed and are negative. Vitals:    02/23/18 0115 02/23/18 0145 02/23/18 0200 02/23/18 0215   BP: 115/70 127/77 124/79 138/89   Pulse: 82 76 74 75   Resp: 13 16 15 15   Temp:       SpO2: 98% 96% 96% 98%   Weight:       Height:                Physical Exam   Constitutional: He is oriented to person, place, and time. He appears well-developed and well-nourished. No distress. HENT:   Head: Atraumatic. Mouth/Throat: Oropharynx is clear and moist.   Eyes: Conjunctivae and EOM are normal.   Cardiovascular: Normal rate, regular rhythm and normal heart sounds. Pulmonary/Chest: Effort normal and breath sounds normal. No respiratory distress. He has no wheezes. He has no rales. No cough noted during exam   Abdominal: Soft. Bowel sounds are normal. There is generalized tenderness. Musculoskeletal: Normal range of motion. Neurological: He is alert and oriented to person, place, and time.  He exhibits normal muscle tone. Coordination normal.   Skin: Skin is warm and dry. No rash noted. He is not diaphoretic. No erythema. No pallor. Nursing note and vitals reviewed. MDM  Number of Diagnoses or Management Options  Auditory hallucinations:   Fever in adult:   Suicidal ideation:   Viral illness:   Diagnosis management comments: 10:07 PM Patient brought back by radiology department due to feeling faint when he stood up, they report he started feeling sick and faint when standing, assisted to seated position. Upon arrival EKG ordered     10:35 PM Although patient is being seen in department for hallucinations his vitals, heart rate greater than 90 and fever rule him in for evaluation for sepsis, protocol started. Patient's symptoms more consistent with viral illness but will start sepsis protocol. 10:53 PM Patient's lactic acid is less than 1, D/C fluid challenge, low clinical concern for systemic infection. Symptoms consistent with viral illness  12:09 AM negative UDS, negative flu, no evidence of UTI, POC lactic is 0.8, mild anemia, no elevated WBC, CMP and lipase are unremarkable, chest xray clear of acute cardiopulmonary abnormality,  vitals are now WNL, will consult psych for further evaluation. 1:02 AM [facility] called, informed of patient case and has agreed to evaluate the patient. CONSULT NOTE:   2:12 AM  I spoke with Dr Hinds Eye   Specialty: psych  Discussed pt's hx, disposition, and available diagnostic and imaging results. Reviewed care plans. Consulting physician agrees with plans as outlined. Recommends inpatient treatment. 2:12 AM Minerva Huitron with [facility] took patient information, no beds at this time. Suggest calling [facility] to see if they have beds  2:25 AM Spoke with Karlee Solders at [employer], no appropriate beds for patient at this time. 2:25 AM : Pt care transferred to Dr. Elder Man  ,ED provider.  History of patient complaint(s), available diagnostic reports and current treatment plan has been discussed thoroughly. Bedside rounding on patient occured : yes . Intended disposition of patient : TBD  Pending diagnostics reports and/or labs (please list): placement            Amount and/or Complexity of Data Reviewed  Clinical lab tests: ordered and reviewed          ED Course       Procedures        RESULTS:    XR CHEST PORT    (Results Pending)       Labs Reviewed   CBC WITH AUTOMATED DIFF - Abnormal; Notable for the following:        Result Value    HGB 11.3 (*)     MCV 66.5 (*)     MCH 20.8 (*)     RDW 16.8 (*)     PLATELET 090 (*)     NEUTROPHILS 83 (*)     LYMPHOCYTES 7 (*)     ABS. LYMPHOCYTES 0.5 (*)     All other components within normal limits   METABOLIC PANEL, BASIC - Abnormal; Notable for the following:     Glucose 147 (*)     GFR est non-AA 58 (*)     All other components within normal limits   URINALYSIS W/ RFLX MICROSCOPIC - Abnormal; Notable for the following:     Protein TRACE (*)     Leukocyte Esterase SMALL (*)     All other components within normal limits   METABOLIC PANEL, COMPREHENSIVE - Abnormal; Notable for the following:     Glucose 138 (*)     GFR est non-AA 59 (*)     All other components within normal limits   CBC WITH AUTOMATED DIFF - Abnormal; Notable for the following:     HGB 10.9 (*)     HCT 35.1 (*)     MCV 66.6 (*)     MCH 20.7 (*)     RDW 16.7 (*)     PLATELET 253 (*)     NEUTROPHILS 81 (*)     LYMPHOCYTES 9 (*)     ABS.  LYMPHOCYTES 0.7 (*)     All other components within normal limits   INFLUENZA A & B AG (RAPID TEST)   CULTURE, BLOOD   CULTURE, BLOOD   CULTURE, URINE   DRUG SCREEN, URINE   ETHYL ALCOHOL   LIPASE   URINE MICROSCOPIC ONLY   HEPATIC FUNCTION PANEL   LACTIC ACID   POC LACTIC ACID       Recent Results (from the past 12 hour(s))   CBC WITH AUTOMATED DIFF    Collection Time: 02/22/18  9:30 PM   Result Value Ref Range    WBC 6.7 4.6 - 13.2 K/uL    RBC 5.43 4.70 - 5.50 M/uL    HGB 11.3 (L) 13.0 - 16.0 g/dL    HCT 36.1 36.0 - 48.0 %    MCV 66.5 (L) 74.0 - 97.0 FL    MCH 20.8 (L) 24.0 - 34.0 PG    MCHC 31.3 31.0 - 37.0 g/dL    RDW 16.8 (H) 11.6 - 14.5 %    PLATELET 716 (L) 699 - 420 K/uL    NEUTROPHILS 83 (H) 40 - 73 %    LYMPHOCYTES 7 (L) 21 - 52 %    MONOCYTES 10 3 - 10 %    EOSINOPHILS 0 0 - 5 %    BASOPHILS 0 0 - 2 %    ABS. NEUTROPHILS 5.5 1.8 - 8.0 K/UL    ABS. LYMPHOCYTES 0.5 (L) 0.9 - 3.6 K/UL    ABS. MONOCYTES 0.7 0.05 - 1.2 K/UL    ABS. EOSINOPHILS 0.0 0.0 - 0.4 K/UL    ABS. BASOPHILS 0.0 0.0 - 0.06 K/UL    DF AUTOMATED     METABOLIC PANEL, BASIC    Collection Time: 02/22/18  9:30 PM   Result Value Ref Range    Sodium 138 136 - 145 mmol/L    Potassium 3.8 3.5 - 5.5 mmol/L    Chloride 106 100 - 108 mmol/L    CO2 23 21 - 32 mmol/L    Anion gap 9 3.0 - 18 mmol/L    Glucose 147 (H) 74 - 99 mg/dL    BUN 16 7.0 - 18 MG/DL    Creatinine 1.24 0.6 - 1.3 MG/DL    BUN/Creatinine ratio 13 12 - 20      GFR est AA >60 >60 ml/min/1.73m2    GFR est non-AA 58 (L) >60 ml/min/1.73m2    Calcium 9.6 8.5 - 10.1 MG/DL   ETHYL ALCOHOL    Collection Time: 02/22/18  9:30 PM   Result Value Ref Range    ALCOHOL(ETHYL),SERUM <3 0 - 3 MG/DL   EKG, 12 LEAD, INITIAL    Collection Time: 02/22/18 10:08 PM   Result Value Ref Range    Ventricular Rate 102 BPM    Atrial Rate 102 BPM    P-R Interval 166 ms    QRS Duration 82 ms    Q-T Interval 362 ms    QTC Calculation (Bezet) 471 ms    Calculated P Axis 59 degrees    Calculated R Axis 27 degrees    Calculated T Axis 40 degrees    Diagnosis       Sinus tachycardia with fusion complexes  Possible Left atrial enlargement  ST elevation, consider early repolarization, pericarditis, or injury  Abnormal ECG  When compared with ECG of 11-JUL-2017 08:10,  fusion complexes are now present  Vent.  rate has increased BY  39 BPM     METABOLIC PANEL, COMPREHENSIVE    Collection Time: 02/22/18 10:43 PM   Result Value Ref Range    Sodium 139 136 - 145 mmol/L    Potassium 3.8 3.5 - 5.5 mmol/L    Chloride 106 100 - 108 mmol/L    CO2 24 21 - 32 mmol/L    Anion gap 9 3.0 - 18 mmol/L    Glucose 138 (H) 74 - 99 mg/dL    BUN 15 7.0 - 18 MG/DL    Creatinine 1.22 0.6 - 1.3 MG/DL    BUN/Creatinine ratio 12 12 - 20      GFR est AA >60 >60 ml/min/1.73m2    GFR est non-AA 59 (L) >60 ml/min/1.73m2    Calcium 9.3 8.5 - 10.1 MG/DL    Bilirubin, total 0.9 0.2 - 1.0 MG/DL    ALT (SGPT) 23 16 - 61 U/L    AST (SGOT) 20 15 - 37 U/L    Alk. phosphatase 100 45 - 117 U/L    Protein, total 7.9 6.4 - 8.2 g/dL    Albumin 4.3 3.4 - 5.0 g/dL    Globulin 3.6 2.0 - 4.0 g/dL    A-G Ratio 1.2 0.8 - 1.7     CBC WITH AUTOMATED DIFF    Collection Time: 02/22/18 10:43 PM   Result Value Ref Range    WBC 7.3 4.6 - 13.2 K/uL    RBC 5.27 4.70 - 5.50 M/uL    HGB 10.9 (L) 13.0 - 16.0 g/dL    HCT 35.1 (L) 36.0 - 48.0 %    MCV 66.6 (L) 74.0 - 97.0 FL    MCH 20.7 (L) 24.0 - 34.0 PG    MCHC 31.1 31.0 - 37.0 g/dL    RDW 16.7 (H) 11.6 - 14.5 %    PLATELET 388 (L) 095 - 420 K/uL    NEUTROPHILS 81 (H) 40 - 73 %    LYMPHOCYTES 9 (L) 21 - 52 %    MONOCYTES 10 3 - 10 %    EOSINOPHILS 0 0 - 5 %    BASOPHILS 0 0 - 2 %    ABS. NEUTROPHILS 5.9 1.8 - 8.0 K/UL    ABS. LYMPHOCYTES 0.7 (L) 0.9 - 3.6 K/UL    ABS. MONOCYTES 0.7 0.05 - 1.2 K/UL    ABS. EOSINOPHILS 0.0 0.0 - 0.4 K/UL    ABS.  BASOPHILS 0.0 0.0 - 0.06 K/UL    DF AUTOMATED     LIPASE    Collection Time: 02/22/18 10:43 PM   Result Value Ref Range    Lipase 124 73 - 393 U/L   POC LACTIC ACID    Collection Time: 02/22/18 10:49 PM   Result Value Ref Range    Lactic Acid (POC) 0.8 0.4 - 2.0 mmol/L   INFLUENZA A & B AG (RAPID TEST)    Collection Time: 02/22/18 10:52 PM   Result Value Ref Range    Influenza A Antigen NEGATIVE  NEG      Influenza B Antigen NEGATIVE  NEG     DRUG SCREEN, URINE    Collection Time: 02/22/18 11:17 PM   Result Value Ref Range    BENZODIAZEPINES NEGATIVE  NEG      BARBITURATES NEGATIVE  NEG      THC (TH-CANNABINOL) NEGATIVE  NEG      OPIATES NEGATIVE  NEG      PCP(PHENCYCLIDINE) NEGATIVE  NEG      COCAINE NEGATIVE  NEG      AMPHETAMINES NEGATIVE  NEG METHADONE NEGATIVE  NEG      HDSCOM (NOTE)    URINALYSIS W/ RFLX MICROSCOPIC    Collection Time: 02/22/18 11:17 PM   Result Value Ref Range    Color YELLOW      Appearance CLEAR      Specific gravity 1.021 1.005 - 1.030      pH (UA) 6.0 5.0 - 8.0      Protein TRACE (A) NEG mg/dL    Glucose NEGATIVE  NEG mg/dL    Ketone NEGATIVE  NEG mg/dL    Bilirubin NEGATIVE  NEG      Blood NEGATIVE  NEG      Urobilinogen 0.2 0.2 - 1.0 EU/dL    Nitrites NEGATIVE  NEG      Leukocyte Esterase SMALL (A) NEG     URINE MICROSCOPIC ONLY    Collection Time: 02/22/18 11:17 PM   Result Value Ref Range    WBC 4 to 10 0 - 4 /hpf    RBC NONE 0 - 5 /hpf    Epithelial cells 2+ 0 - 5 /lpf    Bacteria NEGATIVE  NEG /hpf       PROGRESS NOTE:   9:47 PM   Initial assessment completed.   Written by Stew HURT

## 2018-02-23 NOTE — ED TRIAGE NOTES
Alert male arrives to ED c/o auditory hallucinations. Pt reports voices are telling him to harm self, pt denies plan. Pt denies HI.

## 2018-02-23 NOTE — ED NOTES
Pt aware need for UA, unable to provide at this time. Pt also c/o headache and generalized discomfort, 5/10 aching pain, constant. Reports he was recently discharged from [facility] for psych, felt okay then became stressed out again, then auditory hallucinations began.

## 2018-02-23 NOTE — ED NOTES
Verbal shift change report given to Braxton Hope (oncoming nurse) by Liban Arroyo RN (offgoing nurse). Report included the following information SBAR and MAR.

## 2018-02-23 NOTE — ED NOTES
7:00 AM :Pt care assumed from Dr. Carolyn Baez , ED provider. Pt complaint(s), current treatment plan, progression and available diagnostic results have been discussed thoroughly. Rounding occurred: yes  Intended Disposition: TBD   Pending diagnostic reports and/or labs (please list): Awaiting placement     BP meds and insulin ordered (dietician entered insulin orders as pt's usual med Novolog 70/30 not available). 5:00 PM : Pt care transferred to Dr. Jona Perez  ,ED provider. History of patient complaint(s), available diagnostic reports and current treatment plan has been discussed thoroughly. Bedside rounding on patient occured : yes . Intended disposition of patient : TBD  Pending diagnostics reports and/or labs (please list): Awaiting Placement     Scribe Attestation     5 Nelli Burr acting as a scribe for and in the presence of Werner Ornelas MD      February 23, 2018 at 7:00 AM       Provider Attestation:      I personally performed the services described in the documentation, reviewed the documentation, as recorded by the scribe in my presence, and it accurately and completely records my words and actions.  February 23, 2018 at 7:00 AM - Werner Ornelas MD

## 2018-02-23 NOTE — ED NOTES
5:05 PM :Pt care assumed from Dr. Michelle Garcia , ED provider. Pt complaint(s), current treatment plan, progression and available diagnostic results have been discussed thoroughly. Rounding occurred: yes  Intended Disposition: ADMIT/TRANSFER  Pending diagnostic reports and/or labs (please list):pending placement    Scribe Alcides Rosasgage acting as a scribe for and in the presence of Rony Kwon MD      February 23, 2018 at 5:06 PM       Provider Attestation:      I personally performed the services described in the documentation, reviewed the documentation, as recorded by the scribe in my presence, and it accurately and completely records my words and actions. February 23, 2018 at 5:06 PM - Rony Kwon MD        7:41 PM : Pt care transferred to Dr. Deedee Harvey  ,ED provider. History of patient complaint(s), available diagnostic reports and current treatment plan has been discussed thoroughly. Bedside rounding on patient occured : yes .   Intended disposition of patient : Transfer

## 2018-02-23 NOTE — ED NOTES
Dr. Laila Ornelas notified patients current BS is 212. Verbal order with read back to give Lantus insulin at this time and humalog at 1130 with meals but to give patient a snack at this time.  Medication time will be adjusted at this time

## 2018-02-23 NOTE — ED NOTES
Pt found in waiting room in wheelchair stating he had accidentally urinated all on himself. Pt taken to bathroom to change into paper scrubs and belongings secured at this time.

## 2018-02-23 NOTE — PROGRESS NOTES
Follow up call placed to [facility] to inquire about bed availability. Spoke with Mrs. Stormy Dorsey who has informed me that they are declining pt at this time as they \"feel he is too medical\" at this time. Follow up call placed to [facility] and spoke with psych resident, Dr. Raj Gannon. Per Dr. Raj Gannon, he has no available male voluntary beds at this time. Call placed to [phone] [facility], [facility] [place], and [facility]'s admissions unit ([phone]). I spoke with Raul Felix, crisis worker and provided her with pt's MRN and need for a voluntary bed. Raul Felix stated that she will review pt as soon as she can (3 people to assess in her own ED), and call back with availability. Provided Jessica with main ED's number ([phone]), in the event that a bed comes available after hours. Call placed to [place] [facility] unit ([phone]). Spoke with charge nurse Mortimer Prime to inquire about bed availability. Mortimer Prime is requesting documents to be faxed to [phone]. Documents faxed.           Yolis Dawkins RN, BSN, [facility]  ED Outcomes Manager  Thursdays & Fridays   [phone] (phone)  [phone] (pager)

## 2018-02-23 NOTE — ED NOTES
Assume care of patient, patient resting at this time. Patient is waiting for placement at a crisis center.   Purposeful rounding completed:    Side rails up x 2:  YES  Bed in low position and wheels locked: YES  Call bell within reach: YES  Comfort addressed: YES    Toileting needs addressed: YES  Plan of care reviewed/updated with patient and or family members: YES  IV site assessed: NA  Pain assessed and addressed: YES, 0

## 2018-02-24 VITALS
TEMPERATURE: 98.2 F | BODY MASS INDEX: 25.18 KG/M2 | WEIGHT: 170 LBS | HEIGHT: 69 IN | SYSTOLIC BLOOD PRESSURE: 152 MMHG | RESPIRATION RATE: 12 BRPM | HEART RATE: 81 BPM | DIASTOLIC BLOOD PRESSURE: 87 MMHG | OXYGEN SATURATION: 100 %

## 2018-02-24 LAB
ATRIAL RATE: 102 BPM
BACTERIA SPEC CULT: NORMAL
CALCULATED P AXIS, ECG09: 59 DEGREES
CALCULATED R AXIS, ECG10: 27 DEGREES
CALCULATED T AXIS, ECG11: 40 DEGREES
DIAGNOSIS, 93000: NORMAL
GLUCOSE BLD STRIP.AUTO-MCNC: 167 MG/DL (ref 70–110)
GLUCOSE BLD STRIP.AUTO-MCNC: 181 MG/DL (ref 70–110)
GLUCOSE BLD STRIP.AUTO-MCNC: 198 MG/DL (ref 70–110)
P-R INTERVAL, ECG05: 166 MS
Q-T INTERVAL, ECG07: 362 MS
QRS DURATION, ECG06: 82 MS
QTC CALCULATION (BEZET), ECG08: 471 MS
SERVICE CMNT-IMP: NORMAL
VENTRICULAR RATE, ECG03: 102 BPM

## 2018-02-24 PROCEDURE — 82962 GLUCOSE BLOOD TEST: CPT

## 2018-02-24 PROCEDURE — 74011636637 HC RX REV CODE- 636/637: Performed by: EMERGENCY MEDICINE

## 2018-02-24 PROCEDURE — 74011250637 HC RX REV CODE- 250/637

## 2018-02-24 PROCEDURE — 74011250637 HC RX REV CODE- 250/637: Performed by: EMERGENCY MEDICINE

## 2018-02-24 RX ORDER — ACETAMINOPHEN 325 MG/1
975 TABLET ORAL
Status: COMPLETED | OUTPATIENT
Start: 2018-02-24 | End: 2018-02-24

## 2018-02-24 RX ORDER — ACETAMINOPHEN 325 MG/1
TABLET ORAL
Status: COMPLETED
Start: 2018-02-24 | End: 2018-02-24

## 2018-02-24 RX ADMIN — ACETAMINOPHEN 975 MG: 325 TABLET, FILM COATED ORAL at 06:49

## 2018-02-24 RX ADMIN — ACETAMINOPHEN 975 MG: 325 TABLET ORAL at 06:49

## 2018-02-24 RX ADMIN — ACETAMINOPHEN 1000 MG: 500 TABLET ORAL at 10:49

## 2018-02-24 RX ADMIN — DULOXETINE 30 MG: 30 CAPSULE, DELAYED RELEASE ORAL at 10:49

## 2018-02-24 RX ADMIN — INSULIN GLARGINE 10 UNITS: 100 INJECTION, SOLUTION SUBCUTANEOUS at 10:50

## 2018-02-24 RX ADMIN — INSULIN LISPRO 2 UNITS: 100 INJECTION, SOLUTION INTRAVENOUS; SUBCUTANEOUS at 14:55

## 2018-02-24 RX ADMIN — INSULIN LISPRO 2 UNITS: 100 INJECTION, SOLUTION INTRAVENOUS; SUBCUTANEOUS at 10:51

## 2018-02-24 RX ADMIN — AMLODIPINE BESYLATE 10 MG: 5 TABLET ORAL at 10:56

## 2018-02-24 NOTE — PROGRESS NOTES
Spoke with Milena Farley from [facility] [phone] to request voluntary placement and he will give us a call back. He asked me to confirm with patient if he would have a place to return too once d/c'd if accepted. Spoke with patient and he confirmed his address on the facesheet as correct and plans to return to his home once discharged from [facility] if accepted at any facility.

## 2018-02-24 NOTE — ROUTINE PROCESS
TRANSFER - OUT REPORT:    Verbal report given to [facility], JULIAN (name) on Chucho Lagunas  being transferred to [facility] (unit) for routine progression of care       Report consisted of patients Situation, Background, Assessment and   Recommendations(SBAR). Information from the following report(s) SBAR, ED Summary and MAR was reviewed with the receiving nurse. Lines:       Opportunity for questions and clarification was provided.       Patient awaiting transport

## 2018-02-24 NOTE — ED NOTES
Received patient report from Newton Zamora., RN at this time. Assuming care of patient at this time.  Sitter at bedside

## 2018-02-24 NOTE — PROGRESS NOTES
Jorge Mathis called back from [facility] and stated that there is no bed available at this time.

## 2018-02-24 NOTE — PROGRESS NOTES
Waiting on [address] [facility] [address] Miguelangel Pardo to  Review patient, called and left main ED number to call [phone] to contact us if will accept patient. to 76    Josefina/ marc for [facility] called back to state that patient has an accepting bed. Dr. Kenia Hsu will be the accepting Physician and he will be going to the ITP unit and to call report to [phone]. Dr. Cuate Darby, patient's nurse and patient made aware of accepting bed. ED will setup transportation.

## 2018-02-24 NOTE — PROGRESS NOTES
Spoke with Maki from [facility] and she stated that she will not have a bed d/t the fact she had to take someone from their ED.

## 2018-02-24 NOTE — PROGRESS NOTES
Contacted [facility] and spoke with Bandar Short and she stated that there is no male beds available.

## 2018-02-24 NOTE — PROGRESS NOTES
Spoke with Wyatt Guy from [address] [place] [address] [phone] and is requesting information to be faxed for review to [phone]. Information faxed as requested.

## 2018-02-24 NOTE — PROGRESS NOTES
Spoke with Ariadna Phelps from [facility] and she stated that she is unable to accept any patients outside of they're hospital at this time.

## 2018-02-24 NOTE — ED NOTES
Bedside shift report completed with TORIBIO RN  Safety measures were reviewed  Activity level, PO status and vital sign trends reviewed   Patient and or family participated in handoff   Clinical quality measures reviewed

## 2018-02-24 NOTE — PROGRESS NOTES
Spoke with Day at [facility], she requested info to be faxed to [phone]. Info faxed as requested and she will call back.

## 2018-02-24 NOTE — PROGRESS NOTES
Spoke with Arcenio Julian from [address] [place] [address] and she stated that she received the the faxed info from yesterday and with call me back to see if they can accept.

## 2018-02-24 NOTE — ED NOTES
Verbal shift change report given to Pauline Fuchs (oncoming nurse) by Fay Buchanan (offgoing nurse). Report included the following information SBAR, ED Summary and MAR.

## 2018-02-24 NOTE — ED NOTES
7:00 PM :Pt care assumed from Dr. Rhonda Tang , ED provider. Pt complaint(s), current treatment plan, progression and available diagnostic results have been discussed thoroughly. Rounding occurred: yes  Intended Disposition: ADMIT   Pending diagnostic reports and/or labs (please list): came in voluntary and is waiting for placement. It is now the end of my shift, I am still awaiting placement. Patient will be signed out to the oncoming physician Dr. Rhonda Tang at 0700. Disposition:    Pending      Portions of this chart were created with Dragon medical speech to text program.   Unrecognized errors may be present. Scribe Attestation     Adele Carballo acting as a scribe for and in the presence of Efren Weinstein MD      February 23, 2018 at 7:55 PM       Provider Attestation:      I personally performed the services described in the documentation, reviewed the documentation, as recorded by the scribe in my presence, and it accurately and completely records my words and actions.  February 23, 2018 at 7:55 PM - Efren Weinstein MD

## 2018-02-24 NOTE — PROGRESS NOTES
Spoke with Patricia Vazquez from [facility] to see if staffing issues have been resolved, she will review and let us know.

## 2018-02-24 NOTE — PROGRESS NOTES
Called [facility] and asked to reconsider referral and they requested me to refax the information that was sent yesterday to [phone]. Information faxed as requested.

## 2018-02-24 NOTE — ANCILLARY DISCHARGE INSTRUCTIONS
Call made to [facility]AN[facility] Coronado for an update on patient's  faxed information, they have not had a chance to look at it. Please call back and ask for Neville Orr in a couple of hours, she has a large stack to go through.

## 2018-02-24 NOTE — ED NOTES
Patient using phone and states he is trying to call his PCP on cell phone to have her verify trazadone prescription

## 2018-03-01 LAB
BACTERIA SPEC CULT: NORMAL
BACTERIA SPEC CULT: NORMAL
SERVICE CMNT-IMP: NORMAL
SERVICE CMNT-IMP: NORMAL

## 2019-12-25 ENCOUNTER — HOSPITAL ENCOUNTER (EMERGENCY)
Age: 69
Discharge: HOME | End: 2019-12-25
Attending: EMERGENCY MEDICINE | Admitting: EMERGENCY MEDICINE
Payer: MEDICARE

## 2019-12-25 VITALS
RESPIRATION RATE: 16 BRPM | HEART RATE: 97 BPM | OXYGEN SATURATION: 100 % | HEIGHT: 69 IN | SYSTOLIC BLOOD PRESSURE: 138 MMHG | BODY MASS INDEX: 17.77 KG/M2 | DIASTOLIC BLOOD PRESSURE: 86 MMHG | WEIGHT: 120 LBS | TEMPERATURE: 98.9 F

## 2019-12-25 DIAGNOSIS — R19.7 NAUSEA VOMITING AND DIARRHEA: ICD-10-CM

## 2019-12-25 DIAGNOSIS — R11.2 NAUSEA VOMITING AND DIARRHEA: ICD-10-CM

## 2019-12-25 DIAGNOSIS — R10.9 ABDOMINAL CRAMPING: Primary | ICD-10-CM

## 2019-12-25 LAB
ALBUMIN SERPL-MCNC: 3.8 G/DL (ref 3.4–5)
ALBUMIN/GLOB SERPL: 1.1 {RATIO} (ref 0.8–1.7)
ALP SERPL-CCNC: 114 U/L (ref 45–117)
ALT SERPL-CCNC: 54 U/L (ref 16–61)
AMPHET UR QL SCN: NEGATIVE
ANION GAP SERPL CALC-SCNC: 6 MMOL/L (ref 3–18)
APPEARANCE UR: CLEAR
AST SERPL-CCNC: 36 U/L (ref 10–38)
BACTERIA URNS QL MICRO: NEGATIVE /HPF
BARBITURATES UR QL SCN: NEGATIVE
BASOPHILS # BLD: 0 K/UL (ref 0–0.1)
BASOPHILS NFR BLD: 0 % (ref 0–2)
BENZODIAZ UR QL: NEGATIVE
BILIRUB SERPL-MCNC: 0.4 MG/DL (ref 0.2–1)
BILIRUB UR QL: NEGATIVE
BUN SERPL-MCNC: 10 MG/DL (ref 7–18)
BUN/CREAT SERPL: 9 (ref 12–20)
CALCIUM SERPL-MCNC: 8.7 MG/DL (ref 8.5–10.1)
CANNABINOIDS UR QL SCN: POSITIVE
CHLORIDE SERPL-SCNC: 102 MMOL/L (ref 100–111)
CO2 SERPL-SCNC: 28 MMOL/L (ref 21–32)
COCAINE UR QL SCN: NEGATIVE
COLOR UR: YELLOW
CREAT SERPL-MCNC: 1.08 MG/DL (ref 0.6–1.3)
DIFFERENTIAL METHOD BLD: ABNORMAL
EOSINOPHIL # BLD: 0 K/UL (ref 0–0.4)
EOSINOPHIL NFR BLD: 0 % (ref 0–5)
EPITH CASTS URNS QL MICRO: NORMAL /LPF (ref 0–5)
ERYTHROCYTE [DISTWIDTH] IN BLOOD BY AUTOMATED COUNT: 14.2 % (ref 11.6–14.5)
ETHANOL SERPL-MCNC: <3 MG/DL (ref 0–3)
GLOBULIN SER CALC-MCNC: 3.4 G/DL (ref 2–4)
GLUCOSE SERPL-MCNC: 227 MG/DL (ref 74–99)
GLUCOSE UR STRIP.AUTO-MCNC: NEGATIVE MG/DL
HCT VFR BLD AUTO: 38.5 % (ref 36–48)
HDSCOM,HDSCOM: ABNORMAL
HGB BLD-MCNC: 12.3 G/DL (ref 13–16)
HGB UR QL STRIP: NEGATIVE
KETONES UR QL STRIP.AUTO: NEGATIVE MG/DL
LEUKOCYTE ESTERASE UR QL STRIP.AUTO: ABNORMAL
LIPASE SERPL-CCNC: 94 U/L (ref 73–393)
LYMPHOCYTES # BLD: 1 K/UL (ref 0.9–3.6)
LYMPHOCYTES NFR BLD: 19 % (ref 21–52)
MCH RBC QN AUTO: 24.1 PG (ref 24–34)
MCHC RBC AUTO-ENTMCNC: 31.9 G/DL (ref 31–37)
MCV RBC AUTO: 75.3 FL (ref 74–97)
METHADONE UR QL: NEGATIVE
MONOCYTES # BLD: 0.5 K/UL (ref 0.05–1.2)
MONOCYTES NFR BLD: 9 % (ref 3–10)
NEUTS SEG # BLD: 3.5 K/UL (ref 1.8–8)
NEUTS SEG NFR BLD: 72 % (ref 40–73)
NITRITE UR QL STRIP.AUTO: NEGATIVE
OPIATES UR QL: NEGATIVE
PCP UR QL: NEGATIVE
PH UR STRIP: 6 [PH] (ref 5–8)
PLATELET # BLD AUTO: 94 K/UL (ref 135–420)
POTASSIUM SERPL-SCNC: 3.7 MMOL/L (ref 3.5–5.5)
PROT SERPL-MCNC: 7.2 G/DL (ref 6.4–8.2)
PROT UR STRIP-MCNC: NEGATIVE MG/DL
RBC # BLD AUTO: 5.11 M/UL (ref 4.7–5.5)
RBC #/AREA URNS HPF: NORMAL /HPF (ref 0–5)
SODIUM SERPL-SCNC: 136 MMOL/L (ref 136–145)
SP GR UR REFRACTOMETRY: 1.01 (ref 1–1.03)
UROBILINOGEN UR QL STRIP.AUTO: 0.2 EU/DL (ref 0.2–1)
WBC # BLD AUTO: 4.9 K/UL (ref 4.6–13.2)
WBC URNS QL MICRO: NORMAL /HPF (ref 0–4)

## 2019-12-25 PROCEDURE — 80307 DRUG TEST PRSMV CHEM ANLYZR: CPT

## 2019-12-25 PROCEDURE — 83690 ASSAY OF LIPASE: CPT

## 2019-12-25 PROCEDURE — 74011000250 HC RX REV CODE- 250: Performed by: EMERGENCY MEDICINE

## 2019-12-25 PROCEDURE — 81001 URINALYSIS AUTO W/SCOPE: CPT

## 2019-12-25 PROCEDURE — 74011250636 HC RX REV CODE- 250/636: Performed by: EMERGENCY MEDICINE

## 2019-12-25 PROCEDURE — 96375 TX/PRO/DX INJ NEW DRUG ADDON: CPT

## 2019-12-25 PROCEDURE — 74011250637 HC RX REV CODE- 250/637: Performed by: EMERGENCY MEDICINE

## 2019-12-25 PROCEDURE — 80053 COMPREHEN METABOLIC PANEL: CPT

## 2019-12-25 PROCEDURE — 99284 EMERGENCY DEPT VISIT MOD MDM: CPT

## 2019-12-25 PROCEDURE — 96374 THER/PROPH/DIAG INJ IV PUSH: CPT

## 2019-12-25 PROCEDURE — 85025 COMPLETE CBC W/AUTO DIFF WBC: CPT

## 2019-12-25 RX ORDER — ONDANSETRON 4 MG/1
TABLET, ORALLY DISINTEGRATING ORAL
Qty: 10 TAB | Refills: 0 | Status: SHIPPED | OUTPATIENT
Start: 2019-12-25 | End: 2020-01-07 | Stop reason: ALTCHOICE

## 2019-12-25 RX ORDER — LORAZEPAM 2 MG/ML
1 INJECTION INTRAMUSCULAR
Status: COMPLETED | OUTPATIENT
Start: 2019-12-25 | End: 2019-12-25

## 2019-12-25 RX ORDER — DIPHENHYDRAMINE HYDROCHLORIDE 50 MG/ML
25 INJECTION, SOLUTION INTRAMUSCULAR; INTRAVENOUS
Status: COMPLETED | OUTPATIENT
Start: 2019-12-25 | End: 2019-12-25

## 2019-12-25 RX ORDER — FAMOTIDINE 10 MG/ML
20 INJECTION INTRAVENOUS
Status: COMPLETED | OUTPATIENT
Start: 2019-12-25 | End: 2019-12-25

## 2019-12-25 RX ORDER — ONDANSETRON 2 MG/ML
4 INJECTION INTRAMUSCULAR; INTRAVENOUS
Status: COMPLETED | OUTPATIENT
Start: 2019-12-25 | End: 2019-12-25

## 2019-12-25 RX ORDER — HALOPERIDOL 5 MG/ML
2 INJECTION INTRAMUSCULAR
Status: COMPLETED | OUTPATIENT
Start: 2019-12-25 | End: 2019-12-25

## 2019-12-25 RX ADMIN — FAMOTIDINE 20 MG: 10 INJECTION, SOLUTION INTRAVENOUS at 05:30

## 2019-12-25 RX ADMIN — LORAZEPAM 1 MG: 2 INJECTION, SOLUTION INTRAMUSCULAR; INTRAVENOUS at 05:58

## 2019-12-25 RX ADMIN — HALOPERIDOL LACTATE 2 MG: 5 INJECTION, SOLUTION INTRAMUSCULAR at 05:50

## 2019-12-25 RX ADMIN — DIPHENHYDRAMINE HYDROCHLORIDE 25 MG: 50 INJECTION, SOLUTION INTRAMUSCULAR; INTRAVENOUS at 05:48

## 2019-12-25 RX ADMIN — LIDOCAINE HYDROCHLORIDE 40 ML: 20 SOLUTION ORAL; TOPICAL at 05:46

## 2019-12-25 RX ADMIN — SODIUM CHLORIDE 1000 ML: 900 INJECTION, SOLUTION INTRAVENOUS at 05:29

## 2019-12-25 RX ADMIN — ONDANSETRON 4 MG: 2 INJECTION INTRAMUSCULAR; INTRAVENOUS at 05:30

## 2019-12-25 NOTE — ED TRIAGE NOTES
Abdominal pain with n/v/d began around midnight. Patient reports several people in his apartment building that attended a [employer] dinner 2 days ago became ill as well.

## 2019-12-25 NOTE — ED NOTES
Pt is ambulatory to the bathroom without assist. He states he is ready to go home when he is supposed to, and he has a key to his home.

## 2019-12-25 NOTE — ED PROVIDER NOTES
HPI     69M c/o moderate abd cramping associated with n/v/d onset midnight with hx of sick contacts in apartment building as well after a [employer] dinner 2 nights ago.      Past Medical History:   Diagnosis Date    Arthropathy     Balanitis     Candida infection     recurrent     Diabetes ([facility] [address] 07/14/2013    Drug abuse ([facility] [address]     H/O Heroin addiction     ED (erectile dysfunction)     H/O epididymitis 2012    left    H/O: pneumonia 2011    Hep C w/o coma, chronic ([facility] [address] 01/2015    Hypertension     Microcytic anemia 05/29/2012    Pancytopenia ([facility] [address]     Status post partial gastrectomy 1990    Stomach ulcer    Uncircumcised male        Past Surgical History:   Procedure Laterality Date    HX CATARACT REMOVAL      HX ENDOSCOPY  06/23/2015    [address], EGD W/ BIOPSY , COLONOSCOPY , Surgeon: Lionel Kessler MD    HX GASTRECTOMY  1990    partial     HX HERNIA REPAIR Right     Right inguinal hernia repair 2006    HX HERNIA REPAIR  07/11/2017    Left indirect inguinal hernia repair    HX ORCHIECTOMY Left 2013    HX ORTHOPAEDIC  2012    Numerous surgeries on rt. foot    HX OTHER SURGICAL      removed testicle         Family History:   Problem Relation Age of Onset    Diabetes Mother     Hypertension Mother     Cancer Mother     Arthritis-osteo Maternal Grandmother     Diabetes Maternal Grandmother     Hypertension Maternal Grandmother     Diabetes Maternal Grandfather     Breast Cancer Daughter        Social History     Socioeconomic History    Marital status: SINGLE     Spouse name: Not on file    Number of children: Not on file    Years of education: Not on file    Highest education level: Not on file   Occupational History    Not on file   Social Needs    Financial resource strain: Not on file    Food insecurity:     Worry: Not on file     Inability: Not on file    Transportation needs:     Medical: Not on file     Non-medical: Not on file   Tobacco Use    Smoking status: Former Smoker Years: 1.00     Types: Cigarettes     Last attempt to quit: 2011     Years since quittin.5    Smokeless tobacco: Never Used   Substance and Sexual Activity    Alcohol use: No    Drug use: No     Comment: H/O Heroin addiction Abstinent    Sexual activity: Not on file   Lifestyle    Physical activity:     Days per week: Not on file     Minutes per session: Not on file    Stress: Not on file   Relationships    Social connections:     Talks on phone: Not on file     Gets together: Not on file     Attends [employer] service: Not on file     Active member of club or organization: Not on file     Attends meetings of clubs or organizations: Not on file     Relationship status: Not on file    Intimate partner violence:     Fear of current or ex partner: Not on file     Emotionally abused: Not on file     Physically abused: Not on file     Forced sexual activity: Not on file   Other Topics Concern    Not on file   Social History Narrative    Not on file         ALLERGIES: Ibuprofen; Fentanyl; Neurontin [gabapentin]; Penicillins; and Valium [diazepam]    Review of Systems   Constitutional: Negative for chills and fever. HENT: Negative for ear pain and sore throat. Eyes: Negative for pain and visual disturbance. Respiratory: Negative for cough and shortness of breath. Cardiovascular: Negative for chest pain and palpitations. Gastrointestinal: Positive for abdominal pain, diarrhea, nausea and vomiting. Genitourinary: Negative for flank pain. Musculoskeletal: Negative for back pain and neck pain. Neurological: Negative for syncope and headaches. Psychiatric/Behavioral: Negative for agitation. The patient is not nervous/anxious.         Vitals:    19 0630 19 0700 19 0830 19 0900   BP: (!) 152/91 (!) 175/95 (!) 167/103 148/90   Pulse:       Resp:       Temp:       SpO2: 100% 100% 99% 100%   Weight:       Height:                Physical Exam  Vitals signs and nursing note reviewed. Constitutional:       General: He is not in acute distress. Appearance: He is well-developed and normal weight. He is not ill-appearing, toxic-appearing or diaphoretic. HENT:      Head: Normocephalic and atraumatic. Eyes:      General: No scleral icterus. Pupils: Pupils are equal, round, and reactive to light. Neck:      Musculoskeletal: Neck supple. Trachea: No tracheal deviation. Cardiovascular:      Rate and Rhythm: Normal rate and regular rhythm. Heart sounds: No murmur. Pulmonary:      Effort: Pulmonary effort is normal. No respiratory distress. Breath sounds: Normal breath sounds. Abdominal:      General: Abdomen is flat. Palpations: Abdomen is soft. Tenderness: There is no tenderness. Musculoskeletal: Normal range of motion. General: No deformity. Skin:     General: Skin is warm and dry. Capillary Refill: Capillary refill takes less than 2 seconds. Findings: No rash. Neurological:      General: No focal deficit present. Mental Status: He is alert and oriented to person, place, and time.       Comments: No gross neuro deficit        Labs Reviewed   CBC WITH AUTOMATED DIFF - Abnormal; Notable for the following components:       Result Value    HGB 12.3 (*)     PLATELET 94 (*)     LYMPHOCYTES 19 (*)     All other components within normal limits   METABOLIC PANEL, COMPREHENSIVE - Abnormal; Notable for the following components:    Glucose 227 (*)     BUN/Creatinine ratio 9 (*)     All other components within normal limits   URINALYSIS W/ RFLX MICROSCOPIC - Abnormal; Notable for the following components:    Leukocyte Esterase TRACE (*)     All other components within normal limits   DRUG SCREEN, URINE - Abnormal; Notable for the following components:    THC (TH-CANNABINOL) POSITIVE (*)     All other components within normal limits   ETHYL ALCOHOL   LIPASE   URINE MICROSCOPIC ONLY     No orders to display       MDM  ED Course as of Dec 25 1013   Wed Dec 25, 2019   8625 To sedated for evaluation at this time. Abdominal exam is benign. [KG]      ED Course User Index  [KG] Gabriela DO Veronica     On reevaluation the patient is now awake. He does complain of abdominal cramping but his exam is benign. His work-up showed no significant abnormalities. Chart review shows a recent presentation with a negative work-up at a [facility] ED 6 days ago. Documentation states: \"Reviewed pt has multiple visits for similar complaints recently - is often somewhat combative and uncooperative with reassuring exam and negative CT scan\". I suspect malingering. Pt offered zofran Rx for home. She may be having nausea to marijuana use or gastroparesis. No episodes of n/v/d in ED. Patient has no new complaints, changes, or physical findings. Diagnostic studies if preformed were reviewed with the patient and/or family. All questions and concerns were addressed. Care plan was outlined, including follow-up with PCP/specialist and return precautions were discussed. Patient is felt to be stable for discharge at this time. ICD-10-CM ICD-9-CM    1. Abdominal cramping R10.9 789.00    2.  Nausea vomiting and diarrhea R11.2 787.91     R19.7 787.01      Dispo: Home     Procedures

## 2019-12-25 NOTE — ED NOTES
Pt was joking with staff, pt medicated with zofran and pepcid, pt now rolling in the bed yelling screaming he is in pain.

## 2019-12-25 NOTE — DISCHARGE INSTRUCTIONS

## 2020-01-07 ENCOUNTER — HOSPITAL ENCOUNTER (EMERGENCY)
Age: 70
Discharge: HOME | End: 2020-01-07
Attending: EMERGENCY MEDICINE | Admitting: EMERGENCY MEDICINE
Payer: MEDICARE

## 2020-01-07 VITALS
RESPIRATION RATE: 16 BRPM | HEART RATE: 68 BPM | HEIGHT: 69 IN | DIASTOLIC BLOOD PRESSURE: 90 MMHG | WEIGHT: 130 LBS | TEMPERATURE: 98 F | OXYGEN SATURATION: 100 % | BODY MASS INDEX: 19.26 KG/M2 | SYSTOLIC BLOOD PRESSURE: 154 MMHG

## 2020-01-07 DIAGNOSIS — R11.2 NON-INTRACTABLE VOMITING WITH NAUSEA, UNSPECIFIED VOMITING TYPE: ICD-10-CM

## 2020-01-07 DIAGNOSIS — M54.9 CHRONIC BILATERAL BACK PAIN, UNSPECIFIED BACK LOCATION: ICD-10-CM

## 2020-01-07 DIAGNOSIS — R19.7 DIARRHEA, UNSPECIFIED TYPE: ICD-10-CM

## 2020-01-07 DIAGNOSIS — R10.13 ABDOMINAL PAIN, EPIGASTRIC: Primary | ICD-10-CM

## 2020-01-07 DIAGNOSIS — R51.9 NONINTRACTABLE HEADACHE, UNSPECIFIED CHRONICITY PATTERN, UNSPECIFIED HEADACHE TYPE: ICD-10-CM

## 2020-01-07 DIAGNOSIS — G89.29 CHRONIC BILATERAL BACK PAIN, UNSPECIFIED BACK LOCATION: ICD-10-CM

## 2020-01-07 LAB
ALBUMIN SERPL-MCNC: 3.7 G/DL (ref 3.4–5)
ALBUMIN/GLOB SERPL: 1.1 {RATIO} (ref 0.8–1.7)
ALP SERPL-CCNC: 113 U/L (ref 45–117)
ALT SERPL-CCNC: 43 U/L (ref 16–61)
ANION GAP SERPL CALC-SCNC: 6 MMOL/L (ref 3–18)
AST SERPL-CCNC: 28 U/L (ref 10–38)
BASOPHILS # BLD: 0 K/UL (ref 0–0.1)
BASOPHILS NFR BLD: 0 % (ref 0–2)
BILIRUB SERPL-MCNC: 0.3 MG/DL (ref 0.2–1)
BUN SERPL-MCNC: 14 MG/DL (ref 7–18)
BUN/CREAT SERPL: 13 (ref 12–20)
CALCIUM SERPL-MCNC: 8.9 MG/DL (ref 8.5–10.1)
CHLORIDE SERPL-SCNC: 106 MMOL/L (ref 100–111)
CO2 SERPL-SCNC: 26 MMOL/L (ref 21–32)
CREAT SERPL-MCNC: 1.09 MG/DL (ref 0.6–1.3)
DIFFERENTIAL METHOD BLD: ABNORMAL
EOSINOPHIL # BLD: 0 K/UL (ref 0–0.4)
EOSINOPHIL NFR BLD: 0 % (ref 0–5)
ERYTHROCYTE [DISTWIDTH] IN BLOOD BY AUTOMATED COUNT: 14.1 % (ref 11.6–14.5)
GLOBULIN SER CALC-MCNC: 3.3 G/DL (ref 2–4)
GLUCOSE SERPL-MCNC: 313 MG/DL (ref 74–99)
HCT VFR BLD AUTO: 38.4 % (ref 36–48)
HGB BLD-MCNC: 12.3 G/DL (ref 13–16)
LIPASE SERPL-CCNC: 95 U/L (ref 73–393)
LYMPHOCYTES # BLD: 1.3 K/UL (ref 0.9–3.6)
LYMPHOCYTES NFR BLD: 28 % (ref 21–52)
MCH RBC QN AUTO: 24.1 PG (ref 24–34)
MCHC RBC AUTO-ENTMCNC: 32 G/DL (ref 31–37)
MCV RBC AUTO: 75.1 FL (ref 74–97)
MONOCYTES # BLD: 0.3 K/UL (ref 0.05–1.2)
MONOCYTES NFR BLD: 5 % (ref 3–10)
NEUTS SEG # BLD: 3 K/UL (ref 1.8–8)
NEUTS SEG NFR BLD: 67 % (ref 40–73)
PLATELET # BLD AUTO: 119 K/UL (ref 135–420)
POTASSIUM SERPL-SCNC: 4.1 MMOL/L (ref 3.5–5.5)
PROT SERPL-MCNC: 7 G/DL (ref 6.4–8.2)
RBC # BLD AUTO: 5.11 M/UL (ref 4.7–5.5)
SODIUM SERPL-SCNC: 138 MMOL/L (ref 136–145)
WBC # BLD AUTO: 4.6 K/UL (ref 4.6–13.2)

## 2020-01-07 PROCEDURE — 80053 COMPREHEN METABOLIC PANEL: CPT

## 2020-01-07 PROCEDURE — 85025 COMPLETE CBC W/AUTO DIFF WBC: CPT

## 2020-01-07 PROCEDURE — 74011250637 HC RX REV CODE- 250/637: Performed by: EMERGENCY MEDICINE

## 2020-01-07 PROCEDURE — 74011000250 HC RX REV CODE- 250: Performed by: EMERGENCY MEDICINE

## 2020-01-07 PROCEDURE — 99284 EMERGENCY DEPT VISIT MOD MDM: CPT

## 2020-01-07 PROCEDURE — 83690 ASSAY OF LIPASE: CPT

## 2020-01-07 RX ORDER — DICYCLOMINE HYDROCHLORIDE 10 MG/1
10 CAPSULE ORAL
Status: COMPLETED | OUTPATIENT
Start: 2020-01-07 | End: 2020-01-07

## 2020-01-07 RX ORDER — FAMOTIDINE 20 MG/1
20 TABLET, FILM COATED ORAL 2 TIMES DAILY
Qty: 20 TAB | Refills: 0 | Status: SHIPPED | OUTPATIENT
Start: 2020-01-07 | End: 2020-01-23 | Stop reason: ALTCHOICE

## 2020-01-07 RX ORDER — ACETAMINOPHEN 325 MG/1
650 TABLET ORAL
Qty: 20 TAB | Refills: 0 | Status: SHIPPED | OUTPATIENT
Start: 2020-01-07 | End: 2021-12-17

## 2020-01-07 RX ORDER — DICYCLOMINE HYDROCHLORIDE 10 MG/1
10 CAPSULE ORAL 3 TIMES DAILY
Qty: 12 CAP | Refills: 0 | Status: SHIPPED | OUTPATIENT
Start: 2020-01-07 | End: 2020-01-23

## 2020-01-07 RX ORDER — ONDANSETRON 4 MG/1
4 TABLET, ORALLY DISINTEGRATING ORAL
Status: COMPLETED | OUTPATIENT
Start: 2020-01-07 | End: 2020-01-07

## 2020-01-07 RX ORDER — FAMOTIDINE 20 MG/1
20 TABLET, FILM COATED ORAL
Status: COMPLETED | OUTPATIENT
Start: 2020-01-07 | End: 2020-01-07

## 2020-01-07 RX ORDER — ACETAMINOPHEN 500 MG
1000 TABLET ORAL
Status: COMPLETED | OUTPATIENT
Start: 2020-01-07 | End: 2020-01-07

## 2020-01-07 RX ADMIN — ONDANSETRON 4 MG: 4 TABLET, ORALLY DISINTEGRATING ORAL at 06:45

## 2020-01-07 RX ADMIN — FAMOTIDINE 20 MG: 20 TABLET ORAL at 06:44

## 2020-01-07 RX ADMIN — DICYCLOMINE HYDROCHLORIDE 10 MG: 10 CAPSULE ORAL at 06:45

## 2020-01-07 RX ADMIN — LIDOCAINE HYDROCHLORIDE 40 ML: 20 SOLUTION ORAL; TOPICAL at 05:15

## 2020-01-07 RX ADMIN — ACETAMINOPHEN 1000 MG: 500 TABLET, FILM COATED ORAL at 06:44

## 2020-01-07 NOTE — DISCHARGE INSTRUCTIONS
Patient Education        Abdominal Pain: Care Instructions  Your Care Instructions    Abdominal pain has many possible causes. Some aren't serious and get better on their own in a few days. Others need more testing and treatment. If your pain continues or gets worse, you need to be rechecked and may need more tests to find out what is wrong. You may need surgery to correct the problem. Don't ignore new symptoms, such as fever, nausea and vomiting, urination problems, pain that gets worse, and dizziness. These may be signs of a more serious problem. Your doctor may have recommended a follow-up visit in the next 8 to 12 hours. If you are not getting better, you may need more tests or treatment. The doctor has checked you carefully, but problems can develop later. If you notice any problems or new symptoms, get medical treatment right away. Follow-up care is a key part of your treatment and safety. Be sure to make and go to all appointments, and call your doctor if you are having problems. It's also a good idea to know your test results and keep a list of the medicines you take. How can you care for yourself at home? · Rest until you feel better. · To prevent dehydration, drink plenty of fluids, enough so that your urine is light yellow or clear like water. Choose water and other caffeine-free clear liquids until you feel better. If you have kidney, heart, or liver disease and have to limit fluids, talk with your doctor before you increase the amount of fluids you drink. · If your stomach is upset, eat mild foods, such as rice, dry toast or crackers, bananas, and applesauce. Try eating several small meals instead of two or three large ones. · Wait until 48 hours after all symptoms have gone away before you have spicy foods, alcohol, and drinks that contain caffeine. · Do not eat foods that are high in fat. · Avoid anti-inflammatory medicines such as aspirin, ibuprofen (Advil, Motrin), and naproxen (Aleve). These can cause stomach upset. Talk to your doctor if you take daily aspirin for another health problem. When should you call for help? Call 911 anytime you think you may need emergency care. For example, call if:    · You passed out (lost consciousness).     · You pass maroon or very bloody stools.     · You vomit blood or what looks like coffee grounds.     · You have new, severe belly pain.    Call your doctor now or seek immediate medical care if:    · Your pain gets worse, especially if it becomes focused in one area of your belly.     · You have a new or higher fever.     · Your stools are black and look like tar, or they have streaks of blood.     · You have unexpected vaginal bleeding.     · You have symptoms of a urinary tract infection. These may include:  ? Pain when you urinate. ? Urinating more often than usual.  ? Blood in your urine.     · You are dizzy or lightheaded, or you feel like you may faint.    Watch closely for changes in your health, and be sure to contact your doctor if:    · You are not getting better after 1 day (24 hours). Where can you learn more? Go to http://kourtney-juan.info/. Enter B188 in the search box to learn more about \"Abdominal Pain: Care Instructions. \"  Current as of: June 26, 2019  Content Version: 12.2  © 8530-3018 [employer]. Care instructions adapted under license by [employer] (which disclaims liability or warranty for this information). If you have questions about a medical condition or this instruction, always ask your healthcare professional. [place] [address] any warranty or liability for your use of this information. Patient Education        Back Pain: Care Instructions  Your Care Instructions    Back pain has many possible causes. It is often related to problems with muscles and ligaments of the back. It may also be related to problems with the nerves, discs, or bones of the back. Moving, lifting, standing, sitting, or sleeping in an awkward way can strain the back. Sometimes you don't notice the injury until later. Arthritis is another common cause of back pain. Although it may hurt a lot, back pain usually improves on its own within several weeks. Most people recover in 12 weeks or less. Using good home treatment and being careful not to stress your back can help you feel better sooner. Follow-up care is a key part of your treatment and safety. Be sure to make and go to all appointments, and call your doctor if you are having problems. It's also a good idea to know your test results and keep a list of the medicines you take. How can you care for yourself at home? · Sit or lie in positions that are most comfortable and reduce your pain. Try one of these positions when you lie down:  ? Lie on your back with your knees bent and supported by large pillows. ? Lie on the floor with your legs on the seat of a sofa or chair. ? Lie on your side with your knees and hips bent and a pillow between your legs. ? Lie on your stomach if it does not make pain worse. · Do not sit up in bed, and avoid soft couches and twisted positions. Bed rest can help relieve pain at first, but it delays healing. Avoid bed rest after the first day of back pain. · Change positions every 30 minutes. If you must sit for long periods of time, take breaks from sitting. Get up and walk around, or lie in a comfortable position. · Try using a heating pad on a low or medium setting for 15 to 20 minutes every 2 or 3 hours. Try a warm shower in place of one session with the heating pad. · You can also try an ice pack for 10 to 15 minutes every 2 to 3 hours. Put a thin cloth between the ice pack and your skin. · Take pain medicines exactly as directed. ? If the doctor gave you a prescription medicine for pain, take it as prescribed.   ? If you are not taking a prescription pain medicine, ask your doctor if you can take an over-the-counter medicine. · Take short walks several times a day. You can start with 5 to 10 minutes, 3 or 4 times a day, and work up to longer walks. Walk on level surfaces and avoid hills and stairs until your back is better. · Return to work and other activities as soon as you can. Continued rest without activity is usually not good for your back. · To prevent future back pain, do exercises to stretch and strengthen your back and stomach. Learn how to use good posture, safe lifting techniques, and proper body mechanics. When should you call for help? Call your doctor now or seek immediate medical care if:    · You have new or worsening numbness in your legs.     · You have new or worsening weakness in your legs. (This could make it hard to stand up.)     · You lose control of your bladder or bowels.    Watch closely for changes in your health, and be sure to contact your doctor if:    · You have a fever, lose weight, or don't feel well.     · You do not get better as expected. Where can you learn more? Go to http://kourtney-juan.info/. Enter Y991 in the search box to learn more about \"Back Pain: Care Instructions. \"  Current as of: June 26, 2019  Content Version: 12.2  © 6537-1352 [employer]. Care instructions adapted under license by [employer] (which disclaims liability or warranty for this information). If you have questions about a medical condition or this instruction, always ask your healthcare professional. [place] [address] any warranty or liability for your use of this information. Patient Education        Diarrhea: Care Instructions  Your Care Instructions    Diarrhea is loose, watery stools (bowel movements). The exact cause is often hard to find. Sometimes diarrhea is your body's way of getting rid of what caused an upset stomach. Viruses, food poisoning, and many medicines can cause diarrhea.  Some people get diarrhea in response to emotional stress, anxiety, or certain foods. Almost everyone has diarrhea now and then. It usually isn't serious, and your stools will return to normal soon. The important thing to do is replace the fluids you have lost, so you can prevent dehydration. The doctor has checked you carefully, but problems can develop later. If you notice any problems or new symptoms, get medical treatment right away. Follow-up care is a key part of your treatment and safety. Be sure to make and go to all appointments, and call your doctor if you are having problems. It's also a good idea to know your test results and keep a list of the medicines you take. How can you care for yourself at home? · Watch for signs of dehydration, which means your body has lost too much water. Dehydration is a serious condition and should be treated right away. Signs of dehydration are:  ? Increasing thirst and dry eyes and mouth. ? Feeling faint or lightheaded. ? A smaller amount of urine than normal.  · To prevent dehydration, drink plenty of fluids. Choose water and other caffeine-free clear liquids until you feel better. If you have kidney, heart, or liver disease and have to limit fluids, talk with your doctor before you increase the amount of fluids you drink. · Begin eating small amounts of mild foods the next day, if you feel like it. ? Try yogurt that has live cultures of Lactobacillus. (Check the label.)  ? Avoid spicy foods, fruits, alcohol, and caffeine until 48 hours after all symptoms are gone. ? Avoid chewing gum that contains sorbitol. ? Avoid dairy products (except for yogurt with Lactobacillus) while you have diarrhea and for 3 days after symptoms are gone. · The doctor may recommend that you take over-the-counter medicine, such as loperamide (Imodium), if you still have diarrhea after 6 hours. Read and follow all instructions on the label.  Do not use this medicine if you have bloody diarrhea, a high fever, or other signs of serious illness. Call your doctor if you think you are having a problem with your medicine. When should you call for help? Call 911 anytime you think you may need emergency care. For example, call if:    · You passed out (lost consciousness).     · Your stools are maroon or very bloody.    Call your doctor now or seek immediate medical care if:    · You are dizzy or lightheaded, or you feel like you may faint.     · Your stools are black and look like tar, or they have streaks of blood.     · You have new or worse belly pain.     · You have symptoms of dehydration, such as:  ? Dry eyes and a dry mouth. ? Passing only a little dark urine. ? Feeling thirstier than usual.     · You have a new or higher fever.    Watch closely for changes in your health, and be sure to contact your doctor if:    · Your diarrhea is getting worse.     · You see pus in the diarrhea.     · You are not getting better after 2 days (48 hours). Where can you learn more? Go to http://kourtney-juan.info/. Enter O244 in the search box to learn more about \"Diarrhea: Care Instructions. \"  Current as of: June 26, 2019  Content Version: 12.2  © 0379-9190 [employer]. Care instructions adapted under license by [employer] (which disclaims liability or warranty for this information). If you have questions about a medical condition or this instruction, always ask your healthcare professional. [place] [address] any warranty or liability for your use of this information. Patient Education        Headache: Care Instructions  Your Care Instructions    Headaches have many possible causes. Most headaches aren't a sign of a more serious problem, and they will get better on their own. Home treatment may help you feel better faster. The doctor has checked you carefully, but problems can develop later.  If you notice any problems or new symptoms, get medical treatment right away.  Follow-up care is a key part of your treatment and safety. Be sure to make and go to all appointments, and call your doctor if you are having problems. It's also a good idea to know your test results and keep a list of the medicines you take. How can you care for yourself at home? · Do not drive if you have taken a prescription pain medicine. · Rest in a quiet, dark room until your headache is gone. Close your eyes and try to relax or go to sleep. Don't watch TV or read. · Put a cold, moist cloth or cold pack on the painful area for 10 to 20 minutes at a time. Put a thin cloth between the cold pack and your skin. · Use a warm, moist towel or a heating pad set on low to relax tight shoulder and neck muscles. · Have someone gently massage your neck and shoulders. · Take pain medicines exactly as directed. ? If the doctor gave you a prescription medicine for pain, take it as prescribed. ? If you are not taking a prescription pain medicine, ask your doctor if you can take an over-the-counter medicine. · Be careful not to take pain medicine more often than the instructions allow, because you may get worse or more frequent headaches when the medicine wears off. · Do not ignore new symptoms that occur with a headache, such as a fever, weakness or numbness, vision changes, or confusion. These may be signs of a more serious problem. To prevent headaches  · Keep a headache diary so you can figure out what triggers your headaches. Avoiding triggers may help you prevent headaches. Record when each headache began, how long it lasted, and what the pain was like (throbbing, aching, stabbing, or dull). Write down any other symptoms you had with the headache, such as nausea, flashing lights or dark spots, or sensitivity to bright light or loud noise. Note if the headache occurred near your period.  List anything that might have triggered the headache, such as certain foods (chocolate, cheese, wine) or odors, smoke, bright light, stress, or lack of sleep. · Find healthy ways to deal with stress. Headaches are most common during or right after stressful times. Take time to relax before and after you do something that has caused a headache in the past.  · Try to keep your muscles relaxed by keeping good posture. Check your jaw, face, neck, and shoulder muscles for tension, and try relaxing them. When sitting at a desk, change positions often, and stretch for 30 seconds each hour. · Get plenty of sleep and exercise. · Eat regularly and well. Long periods without food can trigger a headache. · Treat yourself to a massage. Some people find that regular massages are very helpful in relieving tension. · Limit caffeine by not drinking too much coffee, tea, or soda. But don't quit caffeine suddenly, because that can also give you headaches. · Reduce eyestrain from computers by blinking frequently and looking away from the computer screen every so often. Make sure you have proper eyewear and that your monitor is set up properly, about an arm's length away. · Seek help if you have depression or anxiety. Your headaches may be linked to these conditions. Treatment can both prevent headaches and help with symptoms of anxiety or depression. When should you call for help? Call 911 anytime you think you may need emergency care. For example, call if:    · You have signs of a stroke. These may include:  ? Sudden numbness, paralysis, or weakness in your face, arm, or leg, especially on only one side of your body. ? Sudden vision changes. ? Sudden trouble speaking. ? Sudden confusion or trouble understanding simple statements. ? Sudden problems with walking or balance. ? A sudden, severe headache that is different from past headaches.    Call your doctor now or seek immediate medical care if:    · You have a new or worse headache.     · Your headache gets much worse. Where can you learn more?   Go to http://kourtney-juan.info/. Enter M271 in the search box to learn more about \"Headache: Care Instructions. \"  Current as of: March 28, 2019  Content Version: 12.2  © 5633-2558 [employer]. Care instructions adapted under license by [employer] (which disclaims liability or warranty for this information). If you have questions about a medical condition or this instruction, always ask your healthcare professional. [place] [address] any warranty or liability for your use of this information. Patient Education        Nausea and Vomiting: Care Instructions  Your Care Instructions    When you are nauseated, you may feel weak and sweaty and notice a lot of saliva in your mouth. Nausea often leads to vomiting. Most of the time you do not need to worry about nausea and vomiting, but they can be signs of other illnesses. Two common causes of nausea and vomiting are stomach flu and food poisoning. Nausea and vomiting from viral stomach flu will usually start to improve within 24 hours. Nausea and vomiting from food poisoning may last from 12 to 48 hours. The doctor has checked you carefully, but problems can develop later. If you notice any problems or new symptoms, get medical treatment right away. Follow-up care is a key part of your treatment and safety. Be sure to make and go to all appointments, and call your doctor if you are having problems. It's also a good idea to know your test results and keep a list of the medicines you take. How can you care for yourself at home? · To prevent dehydration, drink plenty of fluids, enough so that your urine is light yellow or clear like water. Choose water and other caffeine-free clear liquids until you feel better. If you have kidney, heart, or liver disease and have to limit fluids, talk with your doctor before you increase the amount of fluids you drink. · Rest in bed until you feel better.   · When you are able to eat, try clear soups, mild foods, and liquids until all symptoms are gone for 12 to 48 hours. Other good choices include dry toast, crackers, cooked cereal, and gelatin dessert, such as Jell-O. When should you call for help? Call 911 anytime you think you may need emergency care. For example, call if:    · You passed out (lost consciousness).    Call your doctor now or seek immediate medical care if:    · You have symptoms of dehydration, such as:  ? Dry eyes and a dry mouth. ? Passing only a little dark urine. ? Feeling thirstier than usual.     · You have new or worsening belly pain.     · You have a new or higher fever.     · You vomit blood or what looks like coffee grounds.    Watch closely for changes in your health, and be sure to contact your doctor if:    · You have ongoing nausea and vomiting.     · Your vomiting is getting worse.     · Your vomiting lasts longer than 2 days.     · You are not getting better as expected. Where can you learn more? Go to http://kourtney-juan.info/. Enter 25 [id number] in the search box to learn more about \"Nausea and Vomiting: Care Instructions. \"  Current as of: June 26, 2019  Content Version: 12.2  © 1353-9250 [employer]. Care instructions adapted under license by [employer] (which disclaims liability or warranty for this information). If you have questions about a medical condition or this instruction, always ask your healthcare professional. [place] [address] any warranty or liability for your use of this information.

## 2020-01-07 NOTE — ED TRIAGE NOTES
Patient ate fried greasy food 2000 started having abdominal cramps, nausea, vomiting  and diarrhea @0000

## 2020-01-07 NOTE — ED NOTES
Patient moaning stating having abdominal cramping. Patient when asked questions stop showing signs of discomfort - speaking calmly  Showing no s/s of pain.

## 2020-01-07 NOTE — ED PROVIDER NOTES
Date: 1/7/2020  Patient Name: Anna Florez    History of Presenting Illness     Chief Complaint   Patient presents with    Nausea    Vomiting    Diarrhea       History Provided By: Patient      HPI/Chief Complaint: (Context):who presents with chief complaint of abdominal pain diffuse intermittent cramps started midnight. Ear clogged patient ate some greasy, fried food patient states he does not settle with his abdomen. Patient states he has same reaction before by eating fried shrimp and fish. Patient states he does have back pain and headache as well  Patient denies any fever chills  Patient did have vomiting and loose stool last night nonbloody  Patient states he does have history of ulcers in the stomach but has not seen any bloody or black stool  Pain is been intermittent and crampy in the abdomen similar to prior episodes when he is these meals that does not settle with him. No radiation of symptoms patient does have frontal headache these headaches come with the other pains as well no focal arm or leg weakness no blurry vision noted chest pain no exertional chest pain or shortness of breath no problem pain no focal arm or leg weakness or rash    Patient symptoms are moderate  Patient's he has not had any vomiting or diarrhea in the emergency department.     ---  Except for blood pressure is elevated 158/90Only triage note is reviewed  Patient's MELISA at 3  Patient's nausea vomiting diarrhea  Patient's vitals are stable in the emergency department PA  Patient is with abdominal pain nausea vomiting diarrhea  Patient's pain scale is 10    Patient's allergy to ibuprofen and Neurontin penicillin Valium is appreciated Home medication include Norvasc insulin is appreciated  Past medical history hypertension diabetes are appreciated  Surgical history is including orthopedic surgery hernia left and right side surgery gastrectomy  Social history includes former smoker no alcohol marijuana use is present  Patient's prior visits are reviewed and patient with multiple visits for marijuana use abdominal pain diagnosis and DKA diagnosis as well          PCP: Naga Marroquin MD    Current Outpatient Medications   Medication Sig Dispense Refill    alum-mag hydroxide-simeth-lidocaine Take 40 mL by mouth every six (6) hours. 300 mL 0    famotidine (PEPCID) 20 mg tablet Take 1 Tab by mouth two (2) times a day. 20 Tab 0    dicyclomine (BENTYL) 10 mg capsule Take 1 Cap by mouth three (3) times daily. 12 Cap 0    acetaminophen (TYLENOL) 325 mg tablet Take 2 Tabs by mouth every six (6) hours as needed for Pain. 20 Tab 0    insulin aspart protamine/insulin aspart (NOVOLOG MIX 70/30) 100 unit/mL (70-30) injection 15 Units by SubCUTAneous route three (3) times daily (with meals).  lisinopril (PRINIVIL, ZESTRIL) 20 mg tablet Take 20 mg by mouth daily.  hydroCHLOROthiazide (HYDRODIURIL) 25 mg tablet Take 25 mg by mouth daily.  amLODIPine (NORVASC) 10 mg tablet Take 10 mg by mouth daily.  DULoxetine (CYMBALTA) 60 mg capsule Take 60 mg by mouth daily.  terazosin (HYTRIN) 1 mg capsule Take 1 mg by mouth nightly.  baclofen (LIORESAL) 10 mg tablet Take 10 mg by mouth three (3) times daily as needed.          Past History     Past Medical History:  Past Medical History:   Diagnosis Date    Arthropathy     Balanitis     Candida infection     recurrent     Diabetes ([facility] [address] 07/14/2013    Drug abuse ([facility] [address]     H/O Heroin addiction     ED (erectile dysfunction)     H/O epididymitis 2012    left    H/O: pneumonia 2011    Hep C w/o coma, chronic ([address] 01/2015    Hypertension     Microcytic anemia 05/29/2012    Pancytopenia ([facility] [address]     Status post partial gastrectomy 1990    Stomach ulcer    Uncircumcised male        Past Surgical History:  Past Surgical History:   Procedure Laterality Date    HX CATARACT REMOVAL      HX ENDOSCOPY  06/23/2015    [facility], EGD W/ BIOPSY , COLONOSCOPY , Surgeon: Cathy Levi MD    HX GASTRECTOMY  1990    partial     HX HERNIA REPAIR Right     Right inguinal hernia repair 2006    HX HERNIA REPAIR  2017    Left indirect inguinal hernia repair    HX ORCHIECTOMY Left 2013    HX ORTHOPAEDIC  2012    Numerous surgeries on rt. foot    HX OTHER SURGICAL      removed testicle       Family History:  Family History   Problem Relation Age of Onset    Diabetes Mother     Hypertension Mother     Cancer Mother     Arthritis-osteo Maternal Grandmother     Diabetes Maternal Grandmother     Hypertension Maternal Grandmother     Diabetes Maternal Grandfather     Breast Cancer Daughter        Social History:  Social History     Tobacco Use    Smoking status: Former Smoker     Packs/day: 0.25     Years: 1.00     Pack years: 0.25     Types: Cigarettes     Last attempt to quit: 2011     Years since quittin.6    Smokeless tobacco: Never Used   Substance Use Topics    Alcohol use: No    Drug use: Yes     Types: Marijuana     Comment: H/O Heroin addiction Abstinent       Allergies: Allergies   Allergen Reactions    Ibuprofen Anaphylaxis    Fentanyl Other (comments)     Blurred vision    Neurontin [Gabapentin] Other (comments)     Blurred Vision      Penicillins Swelling    Valium [Diazepam] Swelling         Review of Systems   Review of Systems   Constitutional: Negative for activity change, fatigue and fever. HENT: Negative for congestion and rhinorrhea. Eyes: Negative for visual disturbance. Respiratory: Negative for shortness of breath. Cardiovascular: Negative for chest pain and palpitations. Gastrointestinal: Positive for abdominal pain, diarrhea, nausea and vomiting. Genitourinary: Negative for dysuria and hematuria. Musculoskeletal: Positive for back pain. Skin: Negative for rash. Neurological: Positive for headaches. Negative for dizziness, weakness and light-headedness. Psychiatric/Behavioral: Negative for agitation.    All other systems reviewed and are negative. Physical Exam     Physical Exam  Constitutional:       Appearance: He is well-developed. HENT:      Head: Normocephalic and atraumatic. Eyes:      Conjunctiva/sclera: Conjunctivae normal.      Pupils: Pupils are equal, round, and reactive to light. Neck:      Musculoskeletal: Normal range of motion and neck supple. Cardiovascular:      Rate and Rhythm: Normal rate and regular rhythm. Pulmonary:      Effort: Pulmonary effort is normal.      Breath sounds: Normal breath sounds. Abdominal:      General: Abdomen is flat. Bowel sounds are normal. There is no distension or abdominal bruit. There are no signs of injury. Palpations: Abdomen is soft. There is no shifting dullness, fluid wave, hepatomegaly, splenomegaly, mass or pulsatile mass. Tenderness: There is no tenderness. Musculoskeletal: Normal range of motion. Lymphadenopathy:      Cervical: No cervical adenopathy. Skin:     General: Skin is warm. Neurological:      Mental Status: He is alert. Medical Decision Making   I am the first provider for this patient. I reviewed the vital signs, available nursing notes, past medical history, past surgical history, family history and social history. Provider Notes (Medical Decision Making): Well-appearing patient with abdominal pain vomiting diarrhea headache and back pain acute on chronic symptoms symptomatic relief be provided I will check patient's basic labs as well  Patient has not any vomiting or diarrhea in the emergency room    Vital Signs-Reviewed the patient's vital signs.     Pulse Oximetry Analysis -100%, room air, normal    Vitals:    01/07/20 0500 01/07/20 0502 01/07/20 0650   BP: (!) 170/101 (!) 158/98 154/90   Pulse:  66 68   Resp:  16 16   Temp:  98.3 °F (36.8 °C) 98 °F (36.7 °C)   SpO2: 100% 100% 100%   Weight:  59 kg (130 lb)    Height:  5' 9\" (1.753 m)        Records Reviewed: Nursing Notes    Discharge Note:    The pt is ready for discharge. The pt's signs, symptoms, diagnosis, and discharge instructions have been discussed and pt has conveyed their understanding. The pt is to follow up as recommended or return to ER should their symptoms worsen. Plan has been discussed and pt is in agreement. Patient no distress in the emergency department well-appearing no abdominal pain nurse discussed all the information with the patient as well and patient is comfortable with discharge and follow-up    Diagnostic Study Results     Labs -     Recent Results (from the past 12 hour(s))   CBC WITH AUTOMATED DIFF    Collection Time: 01/07/20  5:38 AM   Result Value Ref Range    WBC 4.6 4.6 - 13.2 K/uL    RBC 5.11 4.70 - 5.50 M/uL    HGB 12.3 (L) 13.0 - 16.0 g/dL    HCT 38.4 36.0 - 48.0 %    MCV 75.1 74.0 - 97.0 FL    MCH 24.1 24.0 - 34.0 PG    MCHC 32.0 31.0 - 37.0 g/dL    RDW 14.1 11.6 - 14.5 %    PLATELET 233 (L) 662 - 420 K/uL    NEUTROPHILS 67 40 - 73 %    LYMPHOCYTES 28 21 - 52 %    MONOCYTES 5 3 - 10 %    EOSINOPHILS 0 0 - 5 %    BASOPHILS 0 0 - 2 %    ABS. NEUTROPHILS 3.0 1.8 - 8.0 K/UL    ABS. LYMPHOCYTES 1.3 0.9 - 3.6 K/UL    ABS. MONOCYTES 0.3 0.05 - 1.2 K/UL    ABS. EOSINOPHILS 0.0 0.0 - 0.4 K/UL    ABS. BASOPHILS 0.0 0.0 - 0.1 K/UL    DF AUTOMATED     METABOLIC PANEL, COMPREHENSIVE    Collection Time: 01/07/20  5:38 AM   Result Value Ref Range    Sodium 138 136 - 145 mmol/L    Potassium 4.1 3.5 - 5.5 mmol/L    Chloride 106 100 - 111 mmol/L    CO2 26 21 - 32 mmol/L    Anion gap 6 3.0 - 18 mmol/L    Glucose 313 (H) 74 - 99 mg/dL    BUN 14 7.0 - 18 MG/DL    Creatinine 1.09 0.6 - 1.3 MG/DL    BUN/Creatinine ratio 13 12 - 20      GFR est AA >60 >60 ml/min/1.73m2    GFR est non-AA >60 >60 ml/min/1.73m2    Calcium 8.9 8.5 - 10.1 MG/DL    Bilirubin, total 0.3 0.2 - 1.0 MG/DL    ALT (SGPT) 43 16 - 61 U/L    AST (SGOT) 28 10 - 38 U/L    Alk.  phosphatase 113 45 - 117 U/L    Protein, total 7.0 6.4 - 8.2 g/dL    Albumin 3.7 3.4 - 5.0 g/dL Globulin 3.3 2.0 - 4.0 g/dL    A-G Ratio 1.1 0.8 - 1.7     LIPASE    Collection Time: 01/07/20  5:38 AM   Result Value Ref Range    Lipase 95 73 - 393 U/L       Radiologic Studies -   No orders to display     CT Results  (Last 48 hours)    None        CXR Results  (Last 48 hours)    None          Discharge     Clinical Impression:   1. Abdominal pain, epigastric    2. Non-intractable vomiting with nausea, unspecified vomiting type    3. Diarrhea, unspecified type    4. Nonintractable headache, unspecified chronicity pattern, unspecified headache type    5. Chronic bilateral back pain, unspecified back location        Disposition:  Home    It should be noted that I will be the provider of record for this patient  Rachel Turner MD      Follow-up Information     Follow up With Specialties Details Why Contact Info    Nabila Mota MD Geriatric Medicine, Internal Medicine Call in 1 day Follow Up From Emergency Department [address] [address]  [phone]      [facility] EMERGENCY DEPT Emergency Medicine  If symptoms worsen [address] [place] [address]    Adolph De Oliveira MD Gastroenterology, Internal Medicine Call in 1 day Follow Up From Emergency Department [address] [place] [address]  [phone]            Discharge Medication List as of 1/7/2020  6:35 AM      START taking these medications    Details   alum-mag hydroxide-simeth-lidocaine Take 40 mL by mouth every six (6) hours. , Print, Disp-300 mL, R-0      famotidine (PEPCID) 20 mg tablet Take 1 Tab by mouth two (2) times a day., Print, Disp-20 Tab, R-0      dicyclomine (BENTYL) 10 mg capsule Take 1 Cap by mouth three (3) times daily. , Print, Disp-12 Cap, R-0      acetaminophen (TYLENOL) 325 mg tablet Take 2 Tabs by mouth every six (6) hours as needed for Pain., Print, Disp-20 Tab, R-0         CONTINUE these medications which have NOT CHANGED    Details   insulin aspart protamine/insulin aspart (NOVOLOG MIX 70/30) 100 unit/mL (70-30) injection 15 Units by SubCUTAneous route three (3) times daily (with meals). , Historical Med      lisinopril (PRINIVIL, ZESTRIL) 20 mg tablet Take 20 mg by mouth daily. , Historical Med      hydroCHLOROthiazide (HYDRODIURIL) 25 mg tablet Take 25 mg by mouth daily. , Historical Med      amLODIPine (NORVASC) 10 mg tablet Take 10 mg by mouth daily. , Historical Med      DULoxetine (CYMBALTA) 60 mg capsule Take 60 mg by mouth daily. , Historical Med      terazosin (HYTRIN) 1 mg capsule Take 1 mg by mouth nightly., Historical Med      baclofen (LIORESAL) 10 mg tablet Take 10 mg by mouth three (3) times daily as needed., Historical Med         STOP taking these medications       ondansetron (ZOFRAN ODT) 4 mg disintegrating tablet Comments:   Reason for Stopping:         traMADol (ULTRAM) 50 mg tablet Comments:   Reason for Stopping:         butalbital-acetaminophen-caffeine (ESGIC PLUS) -40 mg per tablet Comments:   Reason for Stopping:

## 2020-01-07 NOTE — ED NOTES
Patient speaking/yelling on phone with girlfriend, patient showing no signs or symptoms of pain or distress.

## 2020-01-23 ENCOUNTER — OFFICE VISIT (OUTPATIENT)
Dept: FAMILY MEDICINE CLINIC | Age: 70
End: 2020-01-23

## 2020-01-23 VITALS
OXYGEN SATURATION: 98 % | RESPIRATION RATE: 14 BRPM | HEIGHT: 69 IN | TEMPERATURE: 97.8 F | HEART RATE: 80 BPM | BODY MASS INDEX: 19.55 KG/M2 | WEIGHT: 132 LBS | SYSTOLIC BLOOD PRESSURE: 147 MMHG | DIASTOLIC BLOOD PRESSURE: 94 MMHG

## 2020-01-23 DIAGNOSIS — Z76.89 ENCOUNTER TO ESTABLISH CARE: ICD-10-CM

## 2020-01-23 DIAGNOSIS — Z79.4 DIABETES MELLITUS, TYPE II, INSULIN DEPENDENT (HCC): Primary | ICD-10-CM

## 2020-01-23 DIAGNOSIS — I10 ESSENTIAL HYPERTENSION WITH GOAL BLOOD PRESSURE LESS THAN 130/80: ICD-10-CM

## 2020-01-23 DIAGNOSIS — F43.21 INSOMNIA SECONDARY TO SITUATIONAL DEPRESSION: ICD-10-CM

## 2020-01-23 DIAGNOSIS — E11.9 DIABETES MELLITUS, TYPE II, INSULIN DEPENDENT (HCC): Primary | ICD-10-CM

## 2020-01-23 DIAGNOSIS — F51.05 INSOMNIA SECONDARY TO SITUATIONAL DEPRESSION: ICD-10-CM

## 2020-01-23 DIAGNOSIS — K21.9 GASTROESOPHAGEAL REFLUX DISEASE, ESOPHAGITIS PRESENCE NOT SPECIFIED: ICD-10-CM

## 2020-01-23 RX ORDER — GLIMEPIRIDE 2 MG/1
2 TABLET ORAL 2 TIMES DAILY
Qty: 180 TAB | Refills: 1 | Status: CANCELLED | OUTPATIENT
Start: 2020-01-23

## 2020-01-23 RX ORDER — INSULIN GLARGINE 100 [IU]/ML
10 INJECTION, SOLUTION SUBCUTANEOUS
COMMUNITY
End: 2020-01-23 | Stop reason: SDUPTHER

## 2020-01-23 RX ORDER — TRAZODONE HYDROCHLORIDE 100 MG/1
100 TABLET ORAL
Qty: 30 TAB | Refills: 5 | Status: SHIPPED | OUTPATIENT
Start: 2020-01-23 | End: 2020-12-10 | Stop reason: ALTCHOICE

## 2020-01-23 RX ORDER — BLOOD-GLUCOSE METER
EACH MISCELLANEOUS
COMMUNITY
End: 2021-01-29 | Stop reason: SDUPTHER

## 2020-01-23 RX ORDER — RANITIDINE 300 MG/1
300 TABLET ORAL DAILY
Qty: 90 TAB | Refills: 2 | Status: SHIPPED | OUTPATIENT
Start: 2020-01-23 | End: 2020-05-01 | Stop reason: RX

## 2020-01-23 RX ORDER — LANCETS
EACH MISCELLANEOUS
COMMUNITY
End: 2021-01-29 | Stop reason: SDUPTHER

## 2020-01-23 RX ORDER — INSULIN LISPRO 100 [IU]/ML
7 INJECTION, SOLUTION INTRAVENOUS; SUBCUTANEOUS
COMMUNITY
End: 2020-06-23 | Stop reason: SDUPTHER

## 2020-01-23 RX ORDER — GLIMEPIRIDE 4 MG/1
4 TABLET ORAL
Qty: 30 TAB | Refills: 5 | Status: SHIPPED | OUTPATIENT
Start: 2020-01-23 | End: 2020-03-05 | Stop reason: SDUPTHER

## 2020-01-23 RX ORDER — PEN NEEDLE, DIABETIC 31 GX3/16"
NEEDLE, DISPOSABLE MISCELLANEOUS
COMMUNITY
End: 2020-06-29 | Stop reason: SDUPTHER

## 2020-01-23 RX ORDER — ESOMEPRAZOLE MAGNESIUM 40 MG/1
40 CAPSULE, DELAYED RELEASE ORAL
COMMUNITY
Start: 2020-01-13 | End: 2020-01-23 | Stop reason: ALTCHOICE

## 2020-01-23 RX ORDER — OMEPRAZOLE 20 MG/1
40 TABLET, DELAYED RELEASE ORAL
COMMUNITY
Start: 2019-04-30 | End: 2020-01-23 | Stop reason: ALTCHOICE

## 2020-01-23 RX ORDER — ONDANSETRON 4 MG/1
4 TABLET, ORALLY DISINTEGRATING ORAL
COMMUNITY
Start: 2020-01-13 | End: 2020-06-23 | Stop reason: SDUPTHER

## 2020-01-23 RX ORDER — AMLODIPINE BESYLATE 10 MG/1
10 TABLET ORAL DAILY
Qty: 90 TAB | Refills: 1 | Status: SHIPPED | OUTPATIENT
Start: 2020-01-23 | End: 2020-03-05 | Stop reason: SDUPTHER

## 2020-01-23 RX ORDER — INSULIN GLARGINE 100 [IU]/ML
13 INJECTION, SOLUTION SUBCUTANEOUS
Qty: 5 PEN | Refills: 1 | Status: SHIPPED | OUTPATIENT
Start: 2020-01-23 | End: 2020-06-23 | Stop reason: SDUPTHER

## 2020-01-23 RX ORDER — GLIMEPIRIDE 2 MG/1
2 TABLET ORAL 2 TIMES DAILY
COMMUNITY
End: 2020-01-23 | Stop reason: ALTCHOICE

## 2020-01-23 NOTE — PROGRESS NOTES
Mick Reynolds    CC: [facility]    HPI:     HTN:  -Not on previously prescribed amlodipine  -Denies any side effects or issues with blood pressure medication  -Has been checking blood pressure at home. No log brought in for review  -Reports home BP has been in 170s/100s      DMII:  -Taking diabetic medications prescribed  -Denies any side effects or issues with diabetic medication  -Checks blood sugar at home.   No log brought in for review   -Reports home FBS is in 150s-170s      GERD:  -Taking PPI for issue  -Denies any side effects or issues with medication  -Reports medication works well to control symptoms      Depression:  -Has been an issue for the past 3 to 4 years  -States that issue has been worsening  -Currently not seeing a mental health provider  -Issue is associated with poor sleep and anxiety      ROS: Positive items marked in RED  CON: fever, chills  Cardiovascular: palpitations, CP  Resp: SOB, cough  GI: nausea, vomiting, diarrhea  : dysuria, hematuria      Past Medical History:   Diagnosis Date    Arthritis     Arthropathy     Balanitis     Candida infection     recurrent     Chronic pain     Depression     Diabetes mellitus, type II ([facility] [address] 07/14/2013    Drug abuse ([facility] [address]     H/O Heroin addiction     ED (erectile dysfunction)     H/O epididymitis 2012    left    H/O: pneumonia 2011    Hep C w/o coma, chronic ([facility] [address] 01/2015    Hypertension     Microcytic anemia 05/29/2012    Pancytopenia ([facility] [address]     Status post partial gastrectomy 1990    Stomach ulcer    Uncircumcised male        Past Surgical History:   Procedure Laterality Date    HX ENDOSCOPY  06/23/2015    [facility], EGD W/ BIOPSY , COLONOSCOPY , Surgeon: Alberto Henderson MD    HX GASTRECTOMY  1990    partial     HX HEENT      HX HERNIA REPAIR Right     Right inguinal hernia repair 2006    HX HERNIA REPAIR  07/11/2017    Left indirect inguinal hernia repair    HX ORCHIECTOMY Left 2013    HX ORTHOPAEDIC  2012    Numerous surgeries on rt. foot    HX OTHER SURGICAL      removed testicle       Family History   Problem Relation Age of Onset    Diabetes Mother     Hypertension Mother     Cancer Mother     Arthritis-osteo Maternal Grandmother     Diabetes Maternal Grandmother     Hypertension Maternal Grandmother     Diabetes Maternal Grandfather     Breast Cancer Daughter        Social History     Socioeconomic History    Marital status:      Spouse name: Not on file    Number of children: Not on file    Years of education: Not on file    Highest education level: Not on file   Tobacco Use    Smoking status: Former Smoker     Packs/day: 0.25     Years: 1.00     Pack years: 0.25     Types: Cigarettes     Last attempt to quit: 2011     Years since quittin.6    Smokeless tobacco: Never Used   Substance and Sexual Activity    Alcohol use: No    Drug use: Yes     Types: Marijuana     Comment: H/O Heroin addiction Abstinent    Sexual activity: Yes     Partners: Female       Allergies   Allergen Reactions    Ibuprofen Anaphylaxis    Fentanyl Other (comments)     Blurred vision    Neurontin [Gabapentin] Other (comments)     Blurred Vision      Penicillins Swelling    Valium [Diazepam] Swelling         Current Outpatient Medications:     esomeprazole (NEXIUM) 40 mg capsule, Take 40 mg by mouth., Disp: , Rfl:     omeprazole (PRILOSEC OTC) 20 mg tablet, Take 40 mg by mouth., Disp: , Rfl:     ondansetron (ZOFRAN ODT) 4 mg disintegrating tablet, Take 4 mg by mouth., Disp: , Rfl:     glimepiride (AMARYL) 2 mg tablet, Take 2 mg by mouth two (2) times a day., Disp: , Rfl:     insulin glargine (LANTUS SOLOSTAR U-100 INSULIN) 100 unit/mL (3 mL) inpn, 10 Units nightly., Disp: , Rfl:     insulin lispro (HUMALOG KWIKPEN INSULIN) 100 unit/mL kwikpen, Humalog KwikPen (U-100) Insulin 100 unit/mL subcutaneous, Disp: , Rfl:     Insulin Needles, Disposable, (BD ULTRA-FINE MINI PEN NEEDLE) 31 gauge x 3/16\" ndle, BD Ultra-Fine Mini Pen Needle 31 gauge x 3/16\", Disp: , Rfl:     lancets (ACCU-CHEK SOFTCLIX LANCETS) misc, Accu-Chek Softclix Lancets, Disp: , Rfl:     Blood-Glucose Meter (TRUE METRIX GLUCOSE METER) misc, Test blood sugar twice daily, Disp: , Rfl:     glucose blood VI test strips (TRUE METRIX GLUCOSE TEST STRIP) strip, Test blood sugar twice daily, Disp: , Rfl:     famotidine (PEPCID) 20 mg tablet, Take 1 Tab by mouth two (2) times a day., Disp: 20 Tab, Rfl: 0    insulin aspart protamine/insulin aspart (NOVOLOG MIX 70/30) 100 unit/mL (70-30) injection, 15 Units by SubCUTAneous route three (3) times daily (with meals). , Disp: , Rfl:     DULoxetine (CYMBALTA) 60 mg capsule, Take 60 mg by mouth daily. , Disp: , Rfl:     amLODIPine (NORVASC) 10 mg tablet, Take 10 mg by mouth daily. , Disp: , Rfl:     acetaminophen (TYLENOL) 325 mg tablet, Take 2 Tabs by mouth every six (6) hours as needed for Pain., Disp: 20 Tab, Rfl: 0    lisinopril (PRINIVIL, ZESTRIL) 20 mg tablet, Take 20 mg by mouth daily. , Disp: , Rfl:     terazosin (HYTRIN) 1 mg capsule, Take 1 mg by mouth nightly., Disp: , Rfl:     Physical Exam:      BP (!) 147/94   Pulse 80   Temp 97.8 °F (36.6 °C) (Oral)   Resp 14   Ht 5' 9\" (1.753 m)   Wt 132 lb (59.9 kg)   SpO2 98%   BMI 19.49 kg/m²     General:  WD, WN, NAD, conversant  Eyes: sclera clear bilaterally, no discharge noted, eyelids normal in appearance  HENT: NCAT  Lungs: CTAB, normal respiratory effort and rate  CV: RRR, no MRGs  ABD: soft, non-tender, non-distended, normal bowel sounds  Skin: normal temperature, turgor, color, and texture  Psych: alert and oriented to person, place and situation, normal affect  Neuro: speech normal, moving all extremities      Assessment/Plan     HTN, inadequately controlled:  -BP not at goal of less than 130/80   -Likely not at goal due to not being on previously prescribed blood pressure medication regimen  -Resumed prior amlodipine regimen  -HGBA1c, CBC, CMP, fasting lipid panel, and urine microalbumin/creatinine ordered  -Follow-up in 1 month      DMII, inadequately controlled:  -FBS not at goal of less than 130  -Lantus dose increased to 13 units   -Patient counseled on proper Lantus titration  -HGBA1c, CBC, CMP, fasting lipid panel, and urine microalbumin/creatinine ordered  -Follow-up in 1 month      Insomnia 2/2 Depression and Anxiety, inadequately controlled:  -Will start on trazodone regimen  -Follow-up in 1 month       GERD:  -Discontinued prior PPI regimen  -Started on ranitidine regimen  -Follow-up in 1 month        Aniceto Loomis MD  1/23/2020, 3:16 PM

## 2020-01-23 NOTE — PROGRESS NOTES
1. Have you been to the ER, urgent care clinic since your last visit? Hospitalized since your last visit? Yes When: [facility] 1-13-20 for abdominal pain    2. Have you seen or consulted any other health care providers outside of the [address] [place] [address] since your last visit? Include any pap smears or colon screening.  No

## 2020-02-28 LAB
ALBUMIN SERPL-MCNC: 4.7 G/DL (ref 3.8–4.8)
ALBUMIN/CREAT UR: 19 MG/G CREAT (ref 0–29)
ALBUMIN/GLOB SERPL: 1.7 {RATIO} (ref 1.2–2.2)
ALP SERPL-CCNC: 118 IU/L (ref 39–117)
ALT SERPL-CCNC: 27 IU/L (ref 0–44)
AST SERPL-CCNC: 24 IU/L (ref 0–40)
BILIRUB SERPL-MCNC: 0.4 MG/DL (ref 0–1.2)
BUN SERPL-MCNC: 24 MG/DL (ref 8–27)
BUN/CREAT SERPL: 18 (ref 10–24)
CALCIUM SERPL-MCNC: 10.1 MG/DL (ref 8.6–10.2)
CHLORIDE SERPL-SCNC: 101 MMOL/L (ref 96–106)
CHOLEST SERPL-MCNC: 132 MG/DL (ref 100–199)
CO2 SERPL-SCNC: 21 MMOL/L (ref 20–29)
CREAT SERPL-MCNC: 1.33 MG/DL (ref 0.76–1.27)
CREAT UR-MCNC: 78.1 MG/DL
ERYTHROCYTE [DISTWIDTH] IN BLOOD BY AUTOMATED COUNT: 14.7 % (ref 11.6–15.4)
EST. AVERAGE GLUCOSE BLD GHB EST-MCNC: 226 MG/DL
GLOBULIN SER CALC-MCNC: 2.7 G/DL (ref 1.5–4.5)
GLUCOSE SERPL-MCNC: 148 MG/DL (ref 65–99)
HBA1C MFR BLD: 9.5 % (ref 4.8–5.6)
HCT VFR BLD AUTO: 37.2 % (ref 37.5–51)
HDLC SERPL-MCNC: 62 MG/DL
HGB BLD-MCNC: 11.9 G/DL (ref 13–17.7)
INTERPRETATION, 910389: NORMAL
INTERPRETATION: NORMAL
LDLC SERPL CALC-MCNC: 62 MG/DL (ref 0–99)
Lab: NORMAL
MCH RBC QN AUTO: 24.3 PG (ref 26.6–33)
MCHC RBC AUTO-ENTMCNC: 32 G/DL (ref 31.5–35.7)
MCV RBC AUTO: 76 FL (ref 79–97)
MICROALBUMIN UR-MCNC: 15.2 UG/ML
PDF IMAGE, 910387: NORMAL
PLATELET # BLD AUTO: 192 X10E3/UL (ref 150–450)
POTASSIUM SERPL-SCNC: 4.4 MMOL/L (ref 3.5–5.2)
PROT SERPL-MCNC: 7.4 G/DL (ref 6–8.5)
RBC # BLD AUTO: 4.89 X10E6/UL (ref 4.14–5.8)
SODIUM SERPL-SCNC: 138 MMOL/L (ref 134–144)
TRIGL SERPL-MCNC: 40 MG/DL (ref 0–149)
VLDLC SERPL CALC-MCNC: 8 MG/DL (ref 5–40)
WBC # BLD AUTO: 7.1 X10E3/UL (ref 3.4–10.8)

## 2020-03-05 ENCOUNTER — OFFICE VISIT (OUTPATIENT)
Dept: FAMILY MEDICINE CLINIC | Age: 70
End: 2020-03-05

## 2020-03-05 VITALS
HEIGHT: 69 IN | WEIGHT: 142 LBS | DIASTOLIC BLOOD PRESSURE: 67 MMHG | RESPIRATION RATE: 14 BRPM | SYSTOLIC BLOOD PRESSURE: 108 MMHG | OXYGEN SATURATION: 97 % | TEMPERATURE: 97.6 F | BODY MASS INDEX: 21.03 KG/M2 | HEART RATE: 79 BPM

## 2020-03-05 DIAGNOSIS — K21.9 GASTROESOPHAGEAL REFLUX DISEASE, ESOPHAGITIS PRESENCE NOT SPECIFIED: ICD-10-CM

## 2020-03-05 DIAGNOSIS — F43.21 INSOMNIA SECONDARY TO SITUATIONAL DEPRESSION: ICD-10-CM

## 2020-03-05 DIAGNOSIS — E11.9 DIABETES MELLITUS, TYPE II, INSULIN DEPENDENT (HCC): ICD-10-CM

## 2020-03-05 DIAGNOSIS — Z79.4 DIABETES MELLITUS, TYPE II, INSULIN DEPENDENT (HCC): ICD-10-CM

## 2020-03-05 DIAGNOSIS — F51.05 INSOMNIA SECONDARY TO SITUATIONAL DEPRESSION: ICD-10-CM

## 2020-03-05 DIAGNOSIS — R79.89 ELEVATED SERUM CREATININE: ICD-10-CM

## 2020-03-05 DIAGNOSIS — I10 ESSENTIAL HYPERTENSION WITH GOAL BLOOD PRESSURE LESS THAN 130/80: Primary | ICD-10-CM

## 2020-03-05 RX ORDER — DULOXETIN HYDROCHLORIDE 60 MG/1
60 CAPSULE, DELAYED RELEASE ORAL DAILY
Qty: 90 CAP | Refills: 2 | Status: SHIPPED | OUTPATIENT
Start: 2020-03-05 | End: 2020-12-10 | Stop reason: ALTCHOICE

## 2020-03-05 RX ORDER — TERAZOSIN 1 MG/1
1 CAPSULE ORAL
Qty: 90 CAP | Refills: 1 | Status: SHIPPED | OUTPATIENT
Start: 2020-03-05 | End: 2020-09-17 | Stop reason: SDUPTHER

## 2020-03-05 RX ORDER — AMLODIPINE BESYLATE 10 MG/1
10 TABLET ORAL DAILY
Qty: 90 TAB | Refills: 1 | Status: SHIPPED | OUTPATIENT
Start: 2020-03-05 | End: 2020-12-28 | Stop reason: SDUPTHER

## 2020-03-05 RX ORDER — GLIMEPIRIDE 4 MG/1
4 TABLET ORAL
Qty: 90 TAB | Refills: 2 | Status: SHIPPED | OUTPATIENT
Start: 2020-03-05 | End: 2021-03-08 | Stop reason: SDUPTHER

## 2020-03-05 RX ORDER — LISINOPRIL 20 MG/1
20 TABLET ORAL DAILY
Qty: 90 TAB | Refills: 1 | Status: SHIPPED | OUTPATIENT
Start: 2020-03-05 | End: 2020-08-13 | Stop reason: SDUPTHER

## 2020-03-05 NOTE — PROGRESS NOTES
1. Have you been to the ER, urgent care clinic since your last visit? Hospitalized since your last visit? Yes When: 2-25-20 [facility] for abdominal pain    2. Have you seen or consulted any other health care providers outside of the [address] [place] [address] since your last visit? Include any pap smears or colon screening.  No

## 2020-03-05 NOTE — PATIENT INSTRUCTIONS
Acute Kidney Injury: Care Instructions  Your Care Instructions    Acute kidney injury (BENJAMIN) is a sudden decrease in kidney function. This can happen over a period of hours, days or, in some cases, weeks. BENJAMIN used to be called acute renal failure, but kidney failure doesn't always happen with BENJAMIN. Common causes of BENJAMIN are dehydration, blood loss, and medicines. When BENJAMIN happens, the kidneys have trouble removing waste and excess fluids from the body. The waste and fluids build up and become harmful. Kidney function may return to normal if the cause of BENJAMIN is treated quickly. Your chance of a full recovery depends on what caused the problem, how quickly the cause was treated, and what other medical problems you have. A machine may be used to help your kidneys remove waste and fluids for a short period of time. This is called dialysis. Follow-up care is a key part of your treatment and safety. Be sure to make and go to all appointments, and call your doctor if you are having problems. It's also a good idea to know your test results and keep a list of the medicines you take. How can you care for yourself at home? · Talk to your doctor about how much fluid you should drink. · Eat a balanced diet. Talk to your doctor or a dietitian about what type of diet may be best for you. You may need to limit sodium, potassium, and phosphorus. · If you need dialysis, follow the instructions and schedule for dialysis that your doctor gives you. · Do not smoke. Smoking can make your condition worse. If you need help quitting, talk to your doctor about stop-smoking programs and medicines. These can increase your chances of quitting for good. · Do not drink alcohol. · Review all of your medicines with your doctor. Do not take any medicines, including nonsteroidal anti-inflammatory drugs (NSAIDs) such as ibuprofen (Advil, Motrin) or naproxen (Aleve), unless your doctor says it is safe for you to do so.   · Make sure that anyone treating you for any health problem knows that you have had BENJAMIN. When should you call for help? Call 911 anytime you think you may need emergency care. For example, call if:    · You passed out (lost consciousness).    Call your doctor now or seek immediate medical care if:    · You have new or worse nausea and vomiting.     · You have much less urine than normal, or you have no urine.     · You are feeling confused or cannot think clearly.     · You have new or more blood in your urine.     · You have new swelling.     · You are dizzy or lightheaded, or feel like you may faint.    Watch closely for changes in your health, and be sure to contact your doctor if:    · You do not get better as expected. Where can you learn more? Go to http://kourtney-juan.info/. Enter S675 in the search box to learn more about \"Acute Kidney Injury: Care Instructions. \"  Current as of: October 31, 2018  Content Version: 12.2  © 8198-0362 [employer]. Care instructions adapted under license by [employer] (which disclaims liability or warranty for this information). If you have questions about a medical condition or this instruction, always ask your healthcare professional. [address] any warranty or liability for your use of this information.

## 2020-03-13 LAB
BUN SERPL-MCNC: 19 MG/DL (ref 8–27)
BUN/CREAT SERPL: 14 (ref 10–24)
CALCIUM SERPL-MCNC: 9.2 MG/DL (ref 8.6–10.2)
CHLORIDE SERPL-SCNC: 105 MMOL/L (ref 96–106)
CO2 SERPL-SCNC: 24 MMOL/L (ref 20–29)
CREAT SERPL-MCNC: 1.36 MG/DL (ref 0.76–1.27)
CREAT UR-MCNC: 192.5 MG/DL
GLUCOSE SERPL-MCNC: 58 MG/DL (ref 65–99)
INTERPRETATION: NORMAL
POTASSIUM SERPL-SCNC: 4.5 MMOL/L (ref 3.5–5.2)
SODIUM SERPL-SCNC: 143 MMOL/L (ref 134–144)

## 2020-03-14 LAB
SODIUM 24H UR-SCNC: 50 MMOL/L
SPECIMEN STATUS REPORT, ROLRST: NORMAL

## 2020-03-24 PROBLEM — E11.9 DIABETES MELLITUS, TYPE II, INSULIN DEPENDENT (HCC): Status: ACTIVE | Noted: 2020-03-24

## 2020-03-24 PROBLEM — Z79.4 DIABETES MELLITUS, TYPE II, INSULIN DEPENDENT (HCC): Status: ACTIVE | Noted: 2020-03-24

## 2020-03-24 PROBLEM — I10 ESSENTIAL HYPERTENSION WITH GOAL BLOOD PRESSURE LESS THAN 130/80: Status: ACTIVE | Noted: 2020-03-24

## 2020-03-24 PROBLEM — K21.9 GASTROESOPHAGEAL REFLUX DISEASE: Status: ACTIVE | Noted: 2020-03-24

## 2020-03-24 NOTE — PROGRESS NOTES
[facility] Santos [facility]    CC: Follow-up for Chronic Disease Management    HPI:     HTN:  -Got requested lab work  -Taking BP medication as prescribed  -Denies any side effects or issues with blood pressure medication  -Has been checking blood pressure at home. No log brought in for review  -Reports home BP has been in 120s/80s  -Diet is unchanged since last visit  -Not following any regular exercise regimen        DMII:  -Got requested lab work  -Taking diabetic medications prescribed  -Denies any side effects or issues with diabetic medication  -Checks blood sugar at home.   No log brought in for review   -Reports home FBS of 89  -Diet is unchanged since last visit  -Not following any regular exercise regimen        GERD:  -Taking ranitidine as prescribed  -Denies any side effects or issues with medication  -Reports medication works well to control symptoms        Insomnia:  -Currently not seeing a mental health provider  -Taking trazodone as prescribed  -Denies any side effects or issues with the medication  -Reports medication helps with his sleep and mood       ROS: Positive items marked in RED  CON: fever, chills  Cardiovascular: palpitations, CP  Resp: SOB, cough  GI: nausea, vomiting, diarrhea  : dysuria, hematuria      Past Medical History:   Diagnosis Date    Arthritis     Arthropathy     Balanitis     Candida infection     recurrent     Chronic pain     Depression     Diabetes mellitus, [facility] [address] 07/14/2013    Drug abuse ([facility] [address]     H/O Heroin addiction     ED (erectile dysfunction)     H/O epididymitis 2012    left    H/O: pneumonia 2011    Hep C w/o coma, chronic ([address] 01/2015    Hypertension     Microcytic anemia 05/29/2012    Pancytopenia ([facility] [address]     Status post partial gastrectomy 1990    Stomach ulcer    Uncircumcised male        Past Surgical History:   Procedure Laterality Date    HX ENDOSCOPY  06/23/2015    [facility], EGD W/ BIOPSY , COLONOSCOPY , Surgeon: Fredrick Ace, MD    HX GASTRECTOMY  1990    partial     HX HEENT      HX HERNIA REPAIR Right     Right inguinal hernia repair 2006    HX HERNIA REPAIR  2017    Left indirect inguinal hernia repair    HX ORCHIECTOMY Left 2013    HX ORTHOPAEDIC  2012    Numerous surgeries on rt. foot    HX OTHER SURGICAL      removed testicle       Family History   Problem Relation Age of Onset    Diabetes Mother     Hypertension Mother     Cancer Mother     Arthritis-osteo Maternal Grandmother     Diabetes Maternal Grandmother     Hypertension Maternal Grandmother     Diabetes Maternal Grandfather     Breast Cancer Daughter        Social History     Socioeconomic History    Marital status:      Spouse name: Not on file    Number of children: Not on file    Years of education: Not on file    Highest education level: Not on file   Tobacco Use    Smoking status: Former Smoker     Packs/day: 0.25     Years: 1.00     Pack years: 0.25     Types: Cigarettes     Last attempt to quit: 2011     Years since quittin.8    Smokeless tobacco: Never Used   Substance and Sexual Activity    Alcohol use: No    Drug use: Yes     Types: Marijuana     Comment: H/O Heroin addiction Abstinent    Sexual activity: Yes     Partners: Female       Allergies   Allergen Reactions    Ibuprofen Anaphylaxis    Fentanyl Other (comments)     Blurred vision    Neurontin [Gabapentin] Other (comments)     Blurred Vision      Penicillins Swelling    Valium [Diazepam] Swelling         Current Outpatient Medications:     amLODIPine (NORVASC) 10 mg tablet, Take 1 Tab by mouth daily. , Disp: 90 Tab, Rfl: 1    glimepiride (AMARYL) 4 mg tablet, Take 1 Tab by mouth every morning., Disp: 90 Tab, Rfl: 2    DULoxetine (CYMBALTA) 60 mg capsule, Take 1 Cap by mouth daily. , Disp: 90 Cap, Rfl: 2    lisinopriL (PRINIVIL, ZESTRIL) 20 mg tablet, Take 1 Tab by mouth daily. , Disp: 90 Tab, Rfl: 1    terazosin (HYTRIN) 1 mg capsule, Take 1 Cap by mouth nightly., Disp: 90 Cap, Rfl: 1    Insulin Needles, Disposable, (BD ULTRA-FINE MINI PEN NEEDLE) 31 gauge x 3/16\" ndle, BD Ultra-Fine Mini Pen Needle 31 gauge x 3/16\", Disp: , Rfl:     lancets (ACCU-CHEK SOFTCLIX LANCETS) misc, Accu-Chek Softclix Lancets, Disp: , Rfl:     Blood-Glucose Meter (TRUE METRIX GLUCOSE METER) misc, Test blood sugar twice daily, Disp: , Rfl:     glucose blood VI test strips (TRUE METRIX GLUCOSE TEST STRIP) strip, Test blood sugar twice daily, Disp: , Rfl:     traZODone (DESYREL) 100 mg tablet, Take 1 Tab by mouth nightly., Disp: 30 Tab, Rfl: 5    raNITIdine (ZANTAC) 300 mg tab, Take 1 Tab by mouth daily. , Disp: 90 Tab, Rfl: 2    insulin glargine (LANTUS SOLOSTAR U-100 INSULIN) 100 unit/mL (3 mL) inpn, 13 Units by SubCUTAneous route nightly., Disp: 5 Pen, Rfl: 1    insulin aspart protamine/insulin aspart (NOVOLOG MIX 70/30) 100 unit/mL (70-30) injection, 15 Units by SubCUTAneous route three (3) times daily (with meals). , Disp: , Rfl:     ondansetron (ZOFRAN ODT) 4 mg disintegrating tablet, Take 4 mg by mouth., Disp: , Rfl:     insulin lispro (HUMALOG KWIKPEN INSULIN) 100 unit/mL kwikpen, Humalog KwikPen (U-100) Insulin 100 unit/mL subcutaneous, Disp: , Rfl:     acetaminophen (TYLENOL) 325 mg tablet, Take 2 Tabs by mouth every six (6) hours as needed for Pain., Disp: 20 Tab, Rfl: 0    Physical Exam:      /67   Pulse 79   Temp 97.6 °F (36.4 °C) (Oral)   Resp 14   Ht 5' 9\" (1.753 m)   Wt 142 lb (64.4 kg)   SpO2 97%   BMI 20.97 kg/m²     General:  WD, WN, NAD, conversant  Eyes: sclera clear bilaterally, no discharge noted, eyelids normal in appearance  HENT: NCAT  Lungs: CTAB, normal respiratory effort and rate  CV: RRR, no MRGs  ABD: soft, non-tender, non-distended, normal bowel sounds  Skin: normal temperature, turgor, color, and texture  Psych: alert and oriented to person, place and situation, normal affect  Neuro: speech normal, moving all extremities      Results for Mckinley Pimentel (MRN [id number]):   Ref. Range 2/27/2020 09:00   WBC Latest Ref Range: 3.4 - 10.8 x10E3/uL 7.1   RBC Latest Ref Range: 4.14 - 5.80 x10E6/uL 4.89   HGB Latest Ref Range: 13.0 - 17.7 g/dL 11.9 (L)   HCT Latest Ref Range: 37.5 - 51.0 % 37.2 (L)   MCV Latest Ref Range: 79 - 97 fL 76 (L)   MCH Latest Ref Range: 26.6 - 33.0 pg 24.3 (L)   MCHC Latest Ref Range: 31.5 - 35.7 g/dL 32.0   RDW Latest Ref Range: 11.6 - 15.4 % 14.7   PLATELET Latest Ref Range: 150 - 450 x10E3/uL 192   Sodium Latest Ref Range: 134 - 144 mmol/L 138   Potassium Latest Ref Range: 3.5 - 5.2 mmol/L 4.4   Chloride Latest Ref Range: 96 - 106 mmol/L 101   CO2 Latest Ref Range: 20 - 29 mmol/L 21   Glucose Latest Ref Range: 65 - 99 mg/dL 148 (H)   BUN Latest Ref Range: 8 - 27 mg/dL 24   Creatinine Latest Ref Range: 0.76 - 1.27 mg/dL 1.33 (H)   BUN/Creatinine ratio Latest Ref Range: 10 - 24  18   Calcium Latest Ref Range: 8.6 - 10.2 mg/dL 10.1   GFR est non-AA Latest Ref Range: >59 mL/min/1.73 54 (L)   GFR est AA Latest Ref Range: >59 mL/min/1.73 62   Bilirubin, total Latest Ref Range: 0.0 - 1.2 mg/dL 0.4   Protein, total Latest Ref Range: 6.0 - 8.5 g/dL 7.4   Albumin Latest Ref Range: 3.8 - 4.8 g/dL 4.7   A-G Ratio Latest Ref Range: 1.2 - 2.2  1.7   ALT (SGPT) Latest Ref Range: 0 - 44 IU/L 27   AST Latest Ref Range: 0 - 40 IU/L 24   Alk. phosphatase Latest Ref Range: 39 - 117 IU/L 118 (H)   Triglyceride Latest Ref Range: 0 - 149 mg/dL 40   Cholesterol, total Latest Ref Range: 100 - 199 mg/dL 132   HDL Cholesterol Latest Ref Range: >39 mg/dL 62   VLDL, calculated Latest Ref Range: 5 - 40 mg/dL 8   LDL, calculated Latest Ref Range: 0 - 99 mg/dL 62   Hemoglobin A1c, (calculated) Latest Ref Range: 4.8 - 5.6 % 9.5 (H)   Estimated average glucose Latest Units: mg/dL 226   Creatinine, urine Latest Ref Range: Not Estab. mg/dL 78.1   Microalbumin, urine Latest Ref Range: Not Estab. ug/mL 15.2   Microalbumin/Creat.  Ratio Latest Ref Range: 0 - 29 mg/g creat 19       Assessment/Plan     Elevated Creatinine:  -Unclear what his baseline creatinine is given notable creatinine fluctuations in local EMR  -Suspect that he does not have an BENJAMIN, but has developed chronic kidney disease  -BMP, urine sodium, and urine creatinine ordered  -Need to review [facility] records to clarify baseline creatinine  -Handout given on BENJAMIN care  -Follow-up in 1 month      HTN,  well controlled:  -BP at goal of less than 130/80   -Will continue current BP medication regimen  -Follow-up in 1 month        DMII, improving:  -FBS at goal of less than 130  -Glimepiride dose adjusted to 4 mg daily  -Advised patient to bring blood sugar log to next appointment  -Follow-up in 1 month      Insomnia 2/2 Depression,  improving:  -Will continue current trazodone regimen  -Follow-up in 1 month         GERD, well controlled:  -Will continue current ranitidine regimen  -Follow-up in 1 month        Hannah Redmond MD  3/5/2020

## 2020-05-01 RX ORDER — FAMOTIDINE 40 MG/1
40 TABLET, FILM COATED ORAL DAILY
Qty: 90 TAB | Refills: 1 | Status: SHIPPED | OUTPATIENT
Start: 2020-05-01 | End: 2020-12-14 | Stop reason: SDUPTHER

## 2020-05-01 NOTE — TELEPHONE ENCOUNTER
Needs replacement H2 blocker for ranitidine. Received fax from [facility] Famotidine is what they have available.

## 2020-06-23 ENCOUNTER — OFFICE VISIT (OUTPATIENT)
Dept: FAMILY MEDICINE CLINIC | Age: 70
End: 2020-06-23

## 2020-06-23 VITALS
BODY MASS INDEX: 21.03 KG/M2 | SYSTOLIC BLOOD PRESSURE: 103 MMHG | TEMPERATURE: 98 F | OXYGEN SATURATION: 98 % | HEIGHT: 69 IN | DIASTOLIC BLOOD PRESSURE: 69 MMHG | HEART RATE: 79 BPM | WEIGHT: 142 LBS | RESPIRATION RATE: 16 BRPM

## 2020-06-23 DIAGNOSIS — R11.15 CYCLIC VOMITING SYNDROME: ICD-10-CM

## 2020-06-23 DIAGNOSIS — S81.801A: ICD-10-CM

## 2020-06-23 DIAGNOSIS — Z79.4 DIABETES MELLITUS, TYPE II, INSULIN DEPENDENT (HCC): Primary | ICD-10-CM

## 2020-06-23 DIAGNOSIS — Z90.3 S/P PARTIAL GASTRECTOMY: ICD-10-CM

## 2020-06-23 DIAGNOSIS — S84.01XA: ICD-10-CM

## 2020-06-23 DIAGNOSIS — E11.9 DIABETES MELLITUS, TYPE II, INSULIN DEPENDENT (HCC): Primary | ICD-10-CM

## 2020-06-23 LAB — HBA1C MFR BLD HPLC: 6.4 %

## 2020-06-23 RX ORDER — INSULIN GLARGINE 100 [IU]/ML
13 INJECTION, SOLUTION SUBCUTANEOUS
Qty: 5 PEN | Refills: 1 | Status: SHIPPED | OUTPATIENT
Start: 2020-06-23 | End: 2021-04-30 | Stop reason: SDUPTHER

## 2020-06-23 RX ORDER — INSULIN LISPRO 100 [IU]/ML
7 INJECTION, SOLUTION INTRAVENOUS; SUBCUTANEOUS
Status: CANCELLED | OUTPATIENT
Start: 2020-06-23

## 2020-06-23 RX ORDER — INSULIN LISPRO 100 [IU]/ML
7 INJECTION, SOLUTION INTRAVENOUS; SUBCUTANEOUS
Qty: 1 PEN | Refills: 5 | Status: SHIPPED | OUTPATIENT
Start: 2020-06-23 | End: 2021-03-11 | Stop reason: SDUPTHER

## 2020-06-23 RX ORDER — ONDANSETRON 4 MG/1
4 TABLET, ORALLY DISINTEGRATING ORAL
Qty: 90 TAB | Refills: 1 | Status: SHIPPED | OUTPATIENT
Start: 2020-06-23 | End: 2020-12-28 | Stop reason: SDUPTHER

## 2020-06-23 NOTE — PROGRESS NOTES
Maira Blair [facility]    CC: Abdominal Pain    HPI:       Abdominal Pain:  - Timing/onset: Issue for 15 years  - Duration: Pain can last an hour. Seems to occur roughly every 3 months or so  - Location: Periumbilical  - Quality: Sharp  - Severity: 10/10  - Context: History of stomach cancer removal prior to him developing the issue  - Progression/Course: Worsening  - Associated Symptoms/signs: Nausea and vomiting      DMII:  -Taking diabetic medication as prescribed  -Denies any side effects or issues with his diet medication  -Has been checking blood sugar at home  -Reports that his nonfasting blood sugar has been in 140s-160s and he thinks his fasting has been in the 140s      Neuropathy:  -Issue is of right foot  -Issue is secondary to nerve damage from having his foot crushed in 2011 and having multiple surgeries  -First surgery was an ORIF of the right foot and then a surgery to have the hardware removed.   Reports having had two tarsal tunnel release surgeries in the same foot      ROS: Positive items marked in RED  CON: fever, chills  Cardiovascular: palpitations, CP  Resp: SOB, cough  GI: nausea, vomiting, diarrhea  : dysuria, hematuria      Past Medical History:   Diagnosis Date    Arthritis     Arthropathy     Balanitis     Candida infection     recurrent     Chronic pain     Depression     Diabetes mellitus, type II ([address] 07/14/2013    Drug abuse ([facility] [address]     H/O Heroin addiction     ED (erectile dysfunction)     H/O epididymitis 2012    left    H/O: pneumonia 2011    Hep C w/o coma, chronic ([address] 01/2015    Hypertension     Microcytic anemia 05/29/2012    Pancytopenia ([address]     Status post partial gastrectomy 1990    Stomach ulcer    Uncircumcised male        Past Surgical History:   Procedure Laterality Date    HX ENDOSCOPY  06/23/2015    [facility], EGD W/ BIOPSY , COLONOSCOPY , Surgeon: Aisha Ron MD    HX GASTRECTOMY  1990    partial     HX HEENT      HX HERNIA REPAIR Right     Right inguinal hernia repair 2006    HX HERNIA REPAIR  2017    Left indirect inguinal hernia repair    HX OPEN REDUCTION INTERNAL FIXATION      x 2    HX ORCHIECTOMY Left 2013    HX ORTHOPAEDIC  2012    Numerous surgeries on rt. foot    HX OTHER SURGICAL      removed testicle       Family History   Problem Relation Age of Onset    Diabetes Mother     Hypertension Mother     Cancer Mother     Arthritis-osteo Maternal Grandmother     Diabetes Maternal Grandmother     Hypertension Maternal Grandmother     Diabetes Maternal Grandfather     Breast Cancer Daughter        Social History     Tobacco Use    Smoking status: Former Smoker     Packs/day: 0.25     Years: 1.00     Pack years: 0.25     Types: Cigarettes     Last attempt to quit: 2011     Years since quittin.0    Smokeless tobacco: Never Used   Substance Use Topics    Alcohol use: No    Drug use: Yes     Types: Marijuana     Comment: H/O Heroin addiction Abstinent       Allergies   Allergen Reactions    Ibuprofen Anaphylaxis    Fentanyl Other (comments)     Blurred vision    Neurontin [Gabapentin] Other (comments)     Blurred Vision      Penicillins Swelling    Valium [Diazepam] Swelling         Current Outpatient Medications:     famotidine (PEPCID) 40 mg tablet, Take 1 Tab by mouth daily. , Disp: 90 Tab, Rfl: 1    amLODIPine (NORVASC) 10 mg tablet, Take 1 Tab by mouth daily. , Disp: 90 Tab, Rfl: 1    glimepiride (AMARYL) 4 mg tablet, Take 1 Tab by mouth every morning., Disp: 90 Tab, Rfl: 2    DULoxetine (CYMBALTA) 60 mg capsule, Take 1 Cap by mouth daily. , Disp: 90 Cap, Rfl: 2    lisinopriL (PRINIVIL, ZESTRIL) 20 mg tablet, Take 1 Tab by mouth daily. , Disp: 90 Tab, Rfl: 1    terazosin (HYTRIN) 1 mg capsule, Take 1 Cap by mouth nightly., Disp: 90 Cap, Rfl: 1    ondansetron (ZOFRAN ODT) 4 mg disintegrating tablet, Take 4 mg by mouth., Disp: , Rfl:     insulin lispro (HUMALOG KWIKPEN INSULIN) 100 unit/mL kwikpen, 7 Units by SubCUTAneous route Before breakfast, lunch, and dinner., Disp: , Rfl:     Insulin Needles, Disposable, (BD ULTRA-FINE MINI PEN NEEDLE) 31 gauge x 3/16\" ndle, BD Ultra-Fine Mini Pen Needle 31 gauge x 3/16\", Disp: , Rfl:     lancets (ACCU-CHEK SOFTCLIX LANCETS) misc, Accu-Chek Softclix Lancets, Disp: , Rfl:     Blood-Glucose Meter (TRUE METRIX GLUCOSE METER) misc, Test blood sugar twice daily, Disp: , Rfl:     glucose blood VI test strips (TRUE METRIX GLUCOSE TEST STRIP) strip, Test blood sugar twice daily, Disp: , Rfl:     traZODone (DESYREL) 100 mg tablet, Take 1 Tab by mouth nightly. (Patient taking differently: Take 100 mg by mouth nightly as needed.), Disp: 30 Tab, Rfl: 5    insulin glargine (LANTUS SOLOSTAR U-100 INSULIN) 100 unit/mL (3 mL) inpn, 13 Units by SubCUTAneous route nightly., Disp: 5 Pen, Rfl: 1    acetaminophen (TYLENOL) 325 mg tablet, Take 2 Tabs by mouth every six (6) hours as needed for Pain., Disp: 20 Tab, Rfl: 0    insulin aspart protamine/insulin aspart (NOVOLOG MIX 70/30) 100 unit/mL (70-30) injection, 15 Units by SubCUTAneous route three (3) times daily (with meals). , Disp: , Rfl:     Physical Exam:      /69   Pulse 79   Temp 98 °F (36.7 °C) (Oral)   Resp 16   Ht 5' 9\" (1.753 m)   Wt 142 lb (64.4 kg)   SpO2 98%   BMI 20.97 kg/m²     General:  WD, WN, NAD, conversant  Eyes: sclera clear bilaterally, no discharge noted, eyelids normal in appearance  HENT: NCAT  Lungs: CTAB, normal respiratory effort and rate  CV: RRR, no MRGs  ABD: soft, non-tender, non-distended, normal bowel sounds  Ext: no peripheral edema or digital cyanosis noted  Skin: normal temperature, turgor, color, and texture, medial side of right ankle with notable surgical scar, surgical scars noted on abdomen. Psych: alert and oriented to person, place and situation, normal affect  Neuro: speech normal, moving all extremities    Results for Tk Constantino (MRN [id number]):   Ref.  Range 6/23/2020 14:50   Hemoglobin A1c (POC) Latest Units: % 6.4       Assessment/Plan     DMII, well controlled:  -Hemoglobin A1c at goal of less than 7%  -Will continue current diabetic medication regimen  -Follow-up in 3 months      Cyclic Vomiting Syndrome S/P Partial Gastrectomy:  -Started on Zofran regimen  -Referred to GI specialist for further evaluation/recommendations  -Follow-up in 3 months      Right Posterior Tibial Nerve Injury   -Referred to podiatry  -Follow-up in 3 months        Stefany Matthew MD  6/23/2020, 2:51 PM

## 2020-06-23 NOTE — PROGRESS NOTES
1. Have you been to the ER, urgent care clinic since your last visit? Hospitalized since your last visit? Yes When: 6-8-20 [facility] for Abdominal Pain    2. Have you seen or consulted any other health care providers outside of the [address] [place] [address] since your last visit? Include any pap smears or colon screening.  No

## 2020-06-29 RX ORDER — PEN NEEDLE, DIABETIC 31 GX3/16"
NEEDLE, DISPOSABLE MISCELLANEOUS
Qty: 200 PEN NEEDLE | Refills: 1 | Status: SHIPPED | OUTPATIENT
Start: 2020-06-29 | End: 2020-08-27 | Stop reason: SDUPTHER

## 2020-07-01 ENCOUNTER — VIRTUAL VISIT (OUTPATIENT)
Dept: FAMILY MEDICINE CLINIC | Age: 70
End: 2020-07-01

## 2020-07-01 DIAGNOSIS — Z00.00 MEDICARE ANNUAL WELLNESS VISIT, INITIAL: ICD-10-CM

## 2020-07-01 DIAGNOSIS — Z71.89 ADVANCED DIRECTIVES, COUNSELING/DISCUSSION: ICD-10-CM

## 2020-07-01 NOTE — ACP (ADVANCE CARE PLANNING)
Advance Care Planning Advance Care Planning (ACP) Physician/NP/PA (Provider) Laurel Colón Date of ACP Conversation: 7/1/2020 Conversation Conducted with:   {Discussion Participants:44241::\"Patient with Decision Making Capacity\"} Health Care Decision Maker: 
 
Current Designated Health Care Decision Maker:  
(If there is a valid [place] named in the [facility] Makers\" box in the ACP activity, but it is not visible above, be sure to open that field and then select the health care decision maker relationship (ie \"primary\") in the blank space to the right of the name.) Note: Assess and validate information in current ACP documents, as indicated. If no Authorized Decision Maker has previously been identified, then patient chooses [place]: \"Who would you like to name as your primary health care decision-maker? \" Name: ***   Relationship: *** Phone number: *** \"Can this person be reached easily? \" {YES/NO:36947} \"Who would you like to name as your back-up decision maker? \" Name: ***  Relationship: *** Phone number: *** \"Can this person be reached easily? \" {YES/NO:49496} Note: If the relationship of these Decision-Makers to the patient does NOT follow your state's Next of Kin hierarchy, recommend that patient complete ACP document that meets state-specific requirements to allow them to act on the patient's behalf when appropriate. Care Preferences: Hospitalization: \"If your health worsens and it becomes clear that your chance of recovery is unlikely, what would your preference be regarding hospitalization? \" 
{hospitalization preference:93740} Ventilation: \"If you were in your present state of health and suddenly became very ill and were unable to breathe on your own, what would your preference be about the use of a ventilator (breathing machine) if it was available to you? \"   
{ventilator preference:28511} \"If your health worsens and it becomes clear that your chance of recovery is unlikely, what would your preference be about the use of a ventilator (breathing machine) if it was available to you? \"  
{ventilator pref follow up question:41277} Resuscitation: \"CPR works best to restart the heart when there is a sudden event, like a heart attack, in someone who is otherwise healthy. Unfortunately, CPR does not typically restart the heart for people who have serious health conditions or who are very sick. \" \"In the event your heart stopped as a result of an underlying serious health condition, would you want attempts to be made to restart your heart (answer \"yes\" for attempt to resuscitate) or would you prefer a natural death (answer \"no\" for do not attempt to resuscitate)? \"  
{CPR preference:93165} NOTE: If the patient has a valid advance directive AND provides care preference(s) that are inconsistent with that prior directive, advise the patient to consider either: creating a new advance directive that complies with state-specific requirements; or, if that is not possible, orally revoking that prior directive in accordance with state-specific requirements, which must be documented in the EHR. Conversation Outcomes / Follow-Up Plan:  
{ACP; outcomes:76434} Length of Voluntary ACP Conversation in minutes:  {TIME; ACP time 16 min - 1 hour:40904::\"<16 minutes (Non-Billable)\"} Wang Bills MD

## 2020-07-01 NOTE — PROGRESS NOTES
1st attempt-9:39 am- No answer. Left message to return call for check in prior to virtual appointment at 10:15am        1. Have you been to the ER, urgent care clinic since your last visit? Hospitalized since your last visit? No    2. Have you seen or consulted any other health care providers outside of the [address] [place] [address] since your last visit? Include any pap smears or colon screening.  No

## 2020-08-13 DIAGNOSIS — I10 ESSENTIAL HYPERTENSION WITH GOAL BLOOD PRESSURE LESS THAN 130/80: ICD-10-CM

## 2020-08-17 RX ORDER — LISINOPRIL 20 MG/1
20 TABLET ORAL DAILY
Qty: 90 TAB | Refills: 1 | Status: SHIPPED | OUTPATIENT
Start: 2020-08-17 | End: 2020-12-28 | Stop reason: SDUPTHER

## 2020-08-28 RX ORDER — PEN NEEDLE, DIABETIC 31 GX3/16"
NEEDLE, DISPOSABLE MISCELLANEOUS
Qty: 200 PEN NEEDLE | Refills: 1 | Status: SHIPPED | OUTPATIENT
Start: 2020-08-28 | End: 2020-09-11 | Stop reason: CLARIF

## 2020-09-13 RX ORDER — PEN NEEDLE, DIABETIC 31 GX3/16"
NEEDLE, DISPOSABLE MISCELLANEOUS
Qty: 150 PEN NEEDLE | Refills: 2 | Status: SHIPPED | OUTPATIENT
Start: 2020-09-13 | End: 2021-10-07 | Stop reason: SDUPTHER

## 2020-09-17 DIAGNOSIS — I10 ESSENTIAL HYPERTENSION WITH GOAL BLOOD PRESSURE LESS THAN 130/80: ICD-10-CM

## 2020-09-17 RX ORDER — TERAZOSIN 1 MG/1
1 CAPSULE ORAL
Qty: 90 CAP | Refills: 1 | Status: SHIPPED | OUTPATIENT
Start: 2020-09-17 | End: 2021-04-30 | Stop reason: SDUPTHER

## 2020-12-10 ENCOUNTER — PATIENT OUTREACH (OUTPATIENT)
Dept: CASE MANAGEMENT | Age: 70
End: 2020-12-10

## 2020-12-10 NOTE — PROGRESS NOTES
Complex Case Management      Date/Time:  12/10/2020 1:53 PM    Method of communication with patient:phone    [address] [facility] ([facility]) contacted the patient by telephone to perform Ambulatory Care Coordination. Verified name and  (PHI) with patient as identifiers. Provided introduction to self, and explanation of the Ambulatory Care Manager's role. Reviewed most recent clinic visit w/ patient who verbalized understanding. Patient given an opportunity to ask questions. Top Challenges reviewed with the patient   1. Hx of DM2, HTN, Hep C, Depression, Arthritis  2. Upon contact, pt requested assistance w/ refill on medications and appt sched w/ PCP. 3. Assisted w/ appt, office stated they will call him for medication refills. 4. Pt states no other issues at this time. The patient agrees to contact the PCP office or the [facility] for questions related to their healthcare. Provided contact information for future reference. Disease Specific:   N/A    Home Health Active: No    DME Active: No    Barriers to care? Utilization of services    Advance Care Planning:   Does patient have an Advance Directive:  not on file; education provided     Medication(s):   Medication reconciliation was performed with patient, who verbalizes understanding of administration of home medications. There were no barriers to obtaining medications identified at this time. Referral to Pharm D needed: no     Current Outpatient Medications   Medication Sig    terazosin (HYTRIN) 1 mg capsule Take 1 Cap by mouth nightly.  Insulin Needles, Disposable, 31 gauge x 3/16\" ndle Use 1 new pen needle each time to inject insulin subcutaneously 4 times daily    lisinopriL (PRINIVIL, ZESTRIL) 20 mg tablet Take 1 Tab by mouth daily.  ondansetron (ZOFRAN ODT) 4 mg disintegrating tablet Take 1 Tab by mouth every eight (8) hours as needed for Nausea or Vomiting.     insulin glargine (Lantus Solostar U-100 Insulin) 100 unit/mL (3 mL) inpn 13 Units by SubCUTAneous route nightly.  insulin lispro (HumaLOG KwikPen Insulin) 100 unit/mL kwikpen 7 Units by SubCUTAneous route Before breakfast, lunch, and dinner.  famotidine (PEPCID) 40 mg tablet Take 1 Tab by mouth daily.  amLODIPine (NORVASC) 10 mg tablet Take 1 Tab by mouth daily.  glimepiride (AMARYL) 4 mg tablet Take 1 Tab by mouth every morning.  DULoxetine (CYMBALTA) 60 mg capsule Take 1 Cap by mouth daily.  lancets (ACCU-CHEK SOFTCLIX LANCETS) misc Accu-Chek Softclix Lancets    Blood-Glucose Meter (TRUE METRIX GLUCOSE METER) misc Test blood sugar twice daily    glucose blood VI test strips (TRUE METRIX GLUCOSE TEST STRIP) strip Test blood sugar twice daily    traZODone (DESYREL) 100 mg tablet Take 1 Tab by mouth nightly. (Patient taking differently: Take 100 mg by mouth nightly as needed.)    acetaminophen (TYLENOL) 325 mg tablet Take 2 Tabs by mouth every six (6) hours as needed for Pain. No current facility-administered medications for this visit.         [facility] follow up appointment(s):   Future Appointments   Date Time Provider Devika Martini   12/28/2020  1:00 PM BEN Ledbetter AMB        Non-[facility] follow up appointment(s):     Goals Addressed                 This Visit's Progress     Attends follow up appointments on schedule        12/10/20 Patient will attend all scheduled appointments through 3/10/20       Knowledge and adherence of prescribed medication (ie. action, side effects, missed dose, etc.).        12/10/20 Pt will take all medications prescribed to be evaluated on each outreach through 3/10/20       Prepare patients and caregivers for end of life decisions (ie. need for hospice, pain management, symptom relief, advance directives etc.)        12/10/20 Pt will complete an ACP and have it scanned into their EMR by 3/10/20

## 2020-12-28 ENCOUNTER — VIRTUAL VISIT (OUTPATIENT)
Dept: FAMILY MEDICINE CLINIC | Age: 70
End: 2020-12-28
Payer: MEDICARE

## 2020-12-28 DIAGNOSIS — I10 ESSENTIAL HYPERTENSION WITH GOAL BLOOD PRESSURE LESS THAN 130/80: ICD-10-CM

## 2020-12-28 DIAGNOSIS — H91.93 DECREASED HEARING OF BOTH EARS: ICD-10-CM

## 2020-12-28 DIAGNOSIS — Z00.00 MEDICARE ANNUAL WELLNESS VISIT, INITIAL: Primary | ICD-10-CM

## 2020-12-28 DIAGNOSIS — R11.15 CYCLIC VOMITING SYNDROME: ICD-10-CM

## 2020-12-28 DIAGNOSIS — F32.2 SEVERE DEPRESSION (HCC): ICD-10-CM

## 2020-12-28 DIAGNOSIS — K21.9 GASTROESOPHAGEAL REFLUX DISEASE, UNSPECIFIED WHETHER ESOPHAGITIS PRESENT: ICD-10-CM

## 2020-12-28 DIAGNOSIS — E11.9 DIABETES MELLITUS, TYPE II, INSULIN DEPENDENT (HCC): ICD-10-CM

## 2020-12-28 DIAGNOSIS — F02.80 DEMENTIA ASSOCIATED WITH OTHER UNDERLYING DISEASE WITHOUT BEHAVIORAL DISTURBANCE (HCC): ICD-10-CM

## 2020-12-28 DIAGNOSIS — Z79.4 DIABETES MELLITUS, TYPE II, INSULIN DEPENDENT (HCC): ICD-10-CM

## 2020-12-28 PROBLEM — G57.50 TARSAL TUNNEL SYNDROME: Status: ACTIVE | Noted: 2020-12-28

## 2020-12-28 PROCEDURE — G9717 DOC PT DX DEP/BP F/U NT REQ: HCPCS | Performed by: NURSE PRACTITIONER

## 2020-12-28 PROCEDURE — G8427 DOCREV CUR MEDS BY ELIG CLIN: HCPCS | Performed by: NURSE PRACTITIONER

## 2020-12-28 PROCEDURE — 3017F COLORECTAL CA SCREEN DOC REV: CPT | Performed by: NURSE PRACTITIONER

## 2020-12-28 PROCEDURE — G8756 NO BP MEASURE DOC: HCPCS | Performed by: NURSE PRACTITIONER

## 2020-12-28 PROCEDURE — 2022F DILAT RTA XM EVC RTNOPTHY: CPT | Performed by: NURSE PRACTITIONER

## 2020-12-28 PROCEDURE — 99442 PR PHYS/QHP TELEPHONE EVALUATION 11-20 MIN: CPT | Performed by: NURSE PRACTITIONER

## 2020-12-28 PROCEDURE — G8420 CALC BMI NORM PARAMETERS: HCPCS | Performed by: NURSE PRACTITIONER

## 2020-12-28 PROCEDURE — G0438 PPPS, INITIAL VISIT: HCPCS | Performed by: NURSE PRACTITIONER

## 2020-12-28 PROCEDURE — 3044F HG A1C LEVEL LT 7.0%: CPT | Performed by: NURSE PRACTITIONER

## 2020-12-28 PROCEDURE — G8536 NO DOC ELDER MAL SCRN: HCPCS | Performed by: NURSE PRACTITIONER

## 2020-12-28 PROCEDURE — 1101F PT FALLS ASSESS-DOCD LE1/YR: CPT | Performed by: NURSE PRACTITIONER

## 2020-12-28 RX ORDER — ONDANSETRON 4 MG/1
4 TABLET, ORALLY DISINTEGRATING ORAL
Qty: 90 TAB | Refills: 1 | Status: SHIPPED | OUTPATIENT
Start: 2020-12-28 | End: 2021-01-28 | Stop reason: SDUPTHER

## 2020-12-28 RX ORDER — LISINOPRIL 20 MG/1
20 TABLET ORAL DAILY
Qty: 90 TAB | Refills: 1 | Status: SHIPPED | OUTPATIENT
Start: 2020-12-28 | End: 2021-05-03

## 2020-12-28 RX ORDER — AMLODIPINE BESYLATE 10 MG/1
10 TABLET ORAL DAILY
Qty: 90 TAB | Refills: 1 | Status: SHIPPED | OUTPATIENT
Start: 2020-12-28 | End: 2021-03-08 | Stop reason: SDUPTHER

## 2020-12-28 RX ORDER — FAMOTIDINE 40 MG/1
40 TABLET, FILM COATED ORAL DAILY
Qty: 30 TAB | Refills: 0 | Status: SHIPPED | OUTPATIENT
Start: 2020-12-28 | End: 2021-03-04 | Stop reason: SDUPTHER
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
## 2020-12-28 NOTE — ASSESSMENT & PLAN NOTE
Stable, based on history, physical exam and review of pertinent labs, studies and medications; meds reconciled; continue current treatment plan. Key Antihyperglycemic Medications             insulin glargine (Lantus Solostar U-100 Insulin) 100 unit/mL (3 mL) inpn (Taking) 13 Units by SubCUTAneous route nightly. insulin lispro (HumaLOG KwikPen Insulin) 100 unit/mL kwikpen (Taking) 7 Units by SubCUTAneous route Before breakfast, lunch, and dinner. glimepiride (AMARYL) 4 mg tablet (Taking) Take 1 Tab by mouth every morning. Other Key Diabetic Medications             lisinopriL (PRINIVIL, ZESTRIL) 20 mg tablet (Taking) Take 1 Tab by mouth daily.         Lab Results   Component Value Date/Time    Hemoglobin A1c 9.5 02/27/2020 09:00 AM    Hemoglobin A1c (POC) 6.4 06/23/2020 02:50 PM    Glucose 58 03/12/2020 09:27 AM    Creatinine 1.36 03/12/2020 09:27 AM    Microalb/Creat ratio (ug/mg creat.) 19 02/27/2020 09:00 AM    Cholesterol, total 132 02/27/2020 09:00 AM    HDL Cholesterol 62 02/27/2020 09:00 AM    LDL, calculated 62 02/27/2020 09:00 AM    Triglyceride 40 02/27/2020 09:00 AM     Diabetic Foot and Eye Exam HM Status   Topic Date Due    Diabetic Foot Care  02/09/1960    Eye Exam  02/09/1960
Stable, based on history, physical exam and review of pertinent labs, studies and medications; meds reconciled; continue current treatment plan. Key Psychotherapeutic Meds     Patient is on no psychotherapeutic meds. Other [address] [place] [address]     The patient is on no other behavioral health meds.         Lab Results   Component Value Date/Time    Sodium 143 03/12/2020 09:27 AM    Creatinine 1.36 03/12/2020 09:27 AM    WBC 7.1 02/27/2020 09:00 AM    ALT (SGPT) 27 02/27/2020 09:00 AM
Home

## 2020-12-28 NOTE — PATIENT INSTRUCTIONS
Medicare Wellness Visit, Male The best way to live healthy is to have a lifestyle where you eat a well-balanced diet, exercise regularly, limit alcohol use, and quit all forms of tobacco/nicotine, if applicable. Regular preventive services are another way to keep healthy. Preventive services (vaccines, screening tests, monitoring & exams) can help personalize your care plan, which helps you manage your own care. Screening tests can find health problems at the earliest stages, when they are easiest to treat. [facility] follows the current, evidence-based guidelines published by the [facility] ([facility]) when recommending preventive services for our patients. Because we follow these guidelines, sometimes recommendations change over time as research supports it. (For example, a prostate screening blood test is no longer routinely recommended for men with no symptoms). Of course, you and your doctor may decide to screen more often for some diseases, based on your risk and co-morbidities (chronic disease you are already diagnosed with). Preventive services for you include: - Medicare offers their members a free annual wellness visit, which is time for you and your primary care provider to discuss and plan for your preventive service needs. Take advantage of this benefit every year! 
-All adults over age 72 should receive the recommended pneumonia vaccines. Current USPSTF guidelines recommend a series of two vaccines for the best pneumonia protection.  
-All adults should have a flu vaccine yearly and tetanus vaccine every 10 years. 
-All adults age 48 and older should receive the shingles vaccines (series of two vaccines). -All adults age 38-68 who are overweight should have a diabetes screening test once every three years. -Other screening tests & preventive services for persons with diabetes include: an eye exam to screen for diabetic retinopathy, a kidney function test, a foot exam, and stricter control over your cholesterol.  
-Cardiovascular screening for adults with routine risk involves an electrocardiogram (ECG) at intervals determined by the provider.  
-Colorectal cancer screening should be done for adults age 54-65 with no increased risk factors for colorectal cancer. There are a number of acceptable methods of screening for this type of cancer. Each test has its own benefits and drawbacks. Discuss with your provider what is most appropriate for you during your annual wellness visit. The different tests include: colonoscopy (considered the best screening method), a fecal occult blood test, a fecal DNA test, and sigmoidoscopy. 
-All adults born between [facility][1] should be screened once for Hepatitis C. 
-An Abdominal Aortic Aneurysm (AAA) Screening is recommended for men age 73-68 who has ever smoked in their lifetime. Here is a list of your current Health Maintenance items (your personalized list of preventive services) with a due date: 
Health Maintenance Due Topic Date Due  
 Hepatitis C Test  [address] [facility]  02/09/1960 [facility] Eye Exam  02/09/1960  Colorectal Screening  02/09/2000  Glaucoma Screening   02/09/2015  AAA Screening  02/09/2015

## 2020-12-28 NOTE — PROGRESS NOTES
Chief Complaint   Patient presents with   [facility].     he stated when they made appt- he did not have a smart phone    Hypertension     126/78    Diabetes     glucose 126    Annual Wellness Visit     is due           1. Have you been to the ER, urgent care clinic since your last visit? Hospitalized since your last visit?no    2. Have you seen or consulted any other health care providers outside of the [address] [place] [address] since your last visit? Include any pap smears or colon screening.  No

## 2020-12-29 ENCOUNTER — PATIENT OUTREACH (OUTPATIENT)
Dept: CASE MANAGEMENT | Age: 70
End: 2020-12-29

## 2020-12-29 NOTE — PROGRESS NOTES
Patient attended appointments with his PCP, BEN Vasques Share on 12/28/20, Nurse Navigator reviewed EMR and will follow up on next scheduled outreach.

## 2021-01-29 ENCOUNTER — TELEPHONE (OUTPATIENT)
Dept: FAMILY MEDICINE CLINIC | Age: 71
End: 2021-01-29

## 2021-01-29 RX ORDER — CALCIUM CITRATE/VITAMIN D3 200MG-6.25
TABLET ORAL
Qty: 100 STRIP | Refills: 3 | Status: SHIPPED | OUTPATIENT
Start: 2021-01-29 | End: 2021-02-02 | Stop reason: SDUPTHER

## 2021-01-29 RX ORDER — LANCETS
EACH MISCELLANEOUS
Qty: 100 EACH | Refills: 3 | Status: SHIPPED | OUTPATIENT
Start: 2021-01-29 | End: 2021-02-02 | Stop reason: SDUPTHER

## 2021-01-29 RX ORDER — OMEPRAZOLE 20 MG/1
40 TABLET, DELAYED RELEASE ORAL DAILY
Qty: 180 TAB | Refills: 0 | Status: SHIPPED | OUTPATIENT
Start: 2021-01-29 | End: 2021-02-02 | Stop reason: SDUPTHER

## 2021-01-29 RX ORDER — BLOOD-GLUCOSE METER
EACH MISCELLANEOUS
Qty: 1 EACH | Refills: 0 | Status: SHIPPED | OUTPATIENT
Start: 2021-01-29 | End: 2021-02-02 | Stop reason: SDUPTHER

## 2021-01-29 NOTE — TELEPHONE ENCOUNTER
Received a follow up call from Libby Crowell with Mr. Yasmeen Beard on the line to follow up with a physical therapy order. Patient was released from [facility] on 1/26/21. Patient also stated he lost his glucose meter and need a replace Blood-Glucose Meter (TRUE METRIX GLUCOSE METER) misc along with the strips  glucose blood VI test strips (TRUE METRIX GLUCOSE TEST STRIP) strip.  Patient is requesting a call back at [phone] Also upon his discharge from the hospital the doctor ordered him to continue to take

## 2021-02-02 RX ORDER — BLOOD-GLUCOSE METER
EACH MISCELLANEOUS
Qty: 1 EACH | Refills: 0 | Status: SHIPPED | OUTPATIENT
Start: 2021-02-02 | End: 2021-06-30

## 2021-02-02 RX ORDER — CALCIUM CITRATE/VITAMIN D3 200MG-6.25
TABLET ORAL
Qty: 100 STRIP | Refills: 3 | Status: SHIPPED | OUTPATIENT
Start: 2021-02-02 | End: 2021-05-06 | Stop reason: SDUPTHER

## 2021-02-02 RX ORDER — LANCETS
EACH MISCELLANEOUS
Qty: 100 EACH | Refills: 3 | Status: SHIPPED | OUTPATIENT
Start: 2021-02-02 | End: 2021-03-08

## 2021-02-02 RX ORDER — OMEPRAZOLE 20 MG/1
40 TABLET, DELAYED RELEASE ORAL DAILY
Qty: 180 TAB | Refills: 0 | Status: SHIPPED | OUTPATIENT
Start: 2021-02-02 | End: 2021-04-12 | Stop reason: SDUPTHER

## 2021-02-02 NOTE — TELEPHONE ENCOUNTER
Received a follow up call from Libby Crowell for patient Joe Manriquez is requesting the following medications to be sent to [address] mail delivery.  omeprazole (PRILOSEC OTC) 20 mg tablet, lancets (Accu-Chek Softclix Lancets) misc, glucose blood VI test strips (True Metrix Glucose Test Strip) strip, and Blood-Glucose Meter (True Metrix Glucose Meter) misc

## 2021-02-04 ENCOUNTER — VIRTUAL VISIT (OUTPATIENT)
Dept: FAMILY MEDICINE CLINIC | Age: 71
End: 2021-02-04
Payer: MEDICARE

## 2021-02-04 DIAGNOSIS — F02.80 DEMENTIA ASSOCIATED WITH OTHER UNDERLYING DISEASE WITHOUT BEHAVIORAL DISTURBANCE (HCC): ICD-10-CM

## 2021-02-04 DIAGNOSIS — F19.11 HISTORY OF DRUG ABUSE (HCC): ICD-10-CM

## 2021-02-04 DIAGNOSIS — R26.9 ABNORMAL GAIT: ICD-10-CM

## 2021-02-04 DIAGNOSIS — F20.0 PARANOID SCHIZOPHRENIA, CHRONIC CONDITION WITH ACUTE EXACERBATION (HCC): ICD-10-CM

## 2021-02-04 DIAGNOSIS — E11.9 DIABETES MELLITUS, TYPE II, INSULIN DEPENDENT (HCC): ICD-10-CM

## 2021-02-04 DIAGNOSIS — R41.3 MEMORY DIFFICULTIES: Primary | ICD-10-CM

## 2021-02-04 DIAGNOSIS — Z79.4 DIABETES MELLITUS, TYPE II, INSULIN DEPENDENT (HCC): ICD-10-CM

## 2021-02-04 PROCEDURE — 99443 PR PHYS/QHP TELEPHONE EVALUATION 21-30 MIN: CPT | Performed by: NURSE PRACTITIONER

## 2021-02-04 NOTE — PROGRESS NOTES
[address] [place] [address]               [phone]    Italo Goldberg is a 79 y.o. male, evaluated via audio-only technology on 2/4/2021 for Hospital Follow Up (1/30/21 [place] - has camera but states never has done virtual - would prefer Telephone )  . Assessment & Plan:   Diagnoses and all orders for this visit:    1. Memory difficulties  -     REFERRAL TO NEUROPSYCHOLOGY  We actually discussed this at his last visit in December 2020, he was asked to come into the office for further evaluation  However no appointment was made  After a review of the records I found in 2018 he was admitted to [facility] for paranoid schizophrenia, this could definitely be a contributing factor to his complaints of memory difficulties and his inability to manage his medications properly  Will refer to neuropsych for further evaluation of possible dementia    2. Dementia associated with other underlying disease without behavioral disturbance ([facility]  -     [facility] he was diagnosed with dementia when he was being cared for through [facility] [place]  We will refer him to home health for PT/OT/nursing/MSW evaluation/medication management/home safety check    3. Diabetes mellitus, type II, insulin dependent ([facility]  -     [facility]  There are discussion it is quite apparent he is completely missed managing his diabetes, he seems to have absolutely no concept of what he needs to do in relationship of his blood sugars and his insulin  This is resulting in several emergency room visits revealing hyperglycemia, referred to home health for further evaluation of potential needs that could be addressed to help him in this situation    4. Abnormal gait  -     REFERRAL TO HOME HEALTH  Refer to home health to evaluate for PT OT    5.  History of drug abuse ([facility]  Has a past history of drug abuse, his drug of choice at that time was heroin, he is no longer using that however, he does admit to regular cannabis use    6. Paranoid schizophrenia, chronic condition with acute exacerbation [facility])  Assessment & Plan:  Uncontrolled, based on history, physical exam and review of pertinent labs, studies and medications; meds reconciled; changes to treatment plan as per orders. The best I can find through a chart review he is currently not being cared for by a psychiatrist and he is not on any antipsychotic medications  Await evaluation of neuropsych for treatment options    Follow-up and Dispositions    · Return in about 4 weeks (around 3/4/2021) for DM, dementia, home health care, 30 min, office only. 712  Subjective:   Silvano Norwood is a 79 y.o. male who was seen for   Hospital Follow Up (1/30/21 [place] - has camera but states never has done virtual - would prefer Telephone )      Hospital  Admitted for nausea and vomiting and hyperglycemia  Was hospitalized from 1/17/2021 to 1/26/2021  Originally went to the emergency department for abdominal pain accompanied by nausea and vomiting  Per the ER note this is not his first visit for the same complaints, his symptoms are thought to be related to cannabis hyperemesis syndrome  Testing done while hospitalized:  CT abdomen and pelvis revealed mild colitis  PVL mesentery's were patent  Evaluated by general surgery, no interventions advised  Treated with ciprofloxacin and Flagyl empirically for 5 days  He was on a PCA pump for pain control until he was able to tolerate p.o.   He was found to have microcytic anemia, he is due for follow-up colonoscopy, EGD and colonoscopy done in 2015 showed multiple 5 to 10 mm colonic polyps, tubular adenoma on path review  Benign prostatic hypertrophy  CT showed prostate a megaly, continue Terazosin  Diabetes  A1c of 10, resumed home insulin on discharge    On January 30, 2021 he again went to the emergency department with the same complaints of nausea vomiting abdominal pain and weakness  At that time he told them he misplaced his glucometer and had not been able to check his glucose in several days, blood sugar during the emergency room visit was 333  Negative for EtOH however urine drug screen positive for cannabinoids  Eventually his pain resolved after receiving 1 L of LR, 4 mg IV morphine and 4 mg IV Zofran  He was discharged home with instructions to discontinue smoking marijuana    During today's visit admits to complete mismanagement of his diabetes, if his sugar feels low he takes glucose tablet and then check his glucose and will cover with SSI  He has not had any strips to check his glucose  Blood glucose at home were 200-225  Takes insulin when he can remember to do it  Takes glimepiride every morning    Repeatedly states he needs help, he cannot remember anything, has no education  He thinks he told Dr. Sathya Billings about his memory loss  He was tested at [facility] and told he had onset of dementia  He does not drive  He is not on any medications for dementia    Urinary incontinence  Urinary frequency 3-5 times a night  Drips urine  Has been ongoing for over a year      Prior to Admission medications    Medication Sig Start Date End Date Taking? Authorizing Provider   omeprazole (PRILOSEC OTC) 20 mg tablet Take 2 Tabs by mouth daily. 2/2/21  Yes Benitez Brown NP   lancets (Accu-Chek Softclix Lancets) misc Accu-Chek Softclix Lancets 2/2/21  Yes Benitez Brown NP   glucose blood VI test strips (True Metrix Glucose Test Strip) strip Test blood sugar twice daily 2/2/21  Yes Benitez Brown NP   Blood-Glucose Meter (True Metrix Glucose Meter) misc Test blood sugar twice daily 2/2/21  Yes Benitez Brown NP   ondansetron (ZOFRAN ODT) 4 mg disintegrating tablet Take 1 Tab by mouth every eight (8) hours as needed for Nausea or Vomiting. 1/28/21  Yes Benitez Brown NP   lisinopriL (PRINIVIL, ZESTRIL) 20 mg tablet Take 1 Tab by mouth daily. 12/28/20  Yes Juan C Hammond NP   amLODIPine (Norvasc) 10 mg tablet Take 1 Tab by mouth daily. 12/28/20  Yes Juan C Hammond NP   famotidine (PEPCID) 40 mg tablet Take 1 Tab by mouth daily. 12/28/20  Yes Juan C Hammond NP   terazosin (HYTRIN) 1 mg capsule Take 1 Cap by mouth nightly. 9/17/20  Yes Teresa Garcia MD   Insulin Needles, Disposable, 31 gauge x 3/16\" ndle Use 1 new pen needle each time to inject insulin subcutaneously 4 times daily 9/13/20  Yes Teresa Garcia MD   insulin glargine (Lantus Solostar U-100 Insulin) 100 unit/mL (3 mL) inpn 13 Units by SubCUTAneous route nightly. 6/23/20  Yes Teresa Garcia MD   insulin lispro (HumaLOG KwikPen Insulin) 100 unit/mL kwikpen 7 Units by SubCUTAneous route Before breakfast, lunch, and dinner. 6/23/20  Yes Teresa Garcia MD   glimepiride (AMARYL) 4 mg tablet Take 1 Tab by mouth every morning. 3/5/20  Yes Teresa Garcia MD   acetaminophen (TYLENOL) 325 mg tablet Take 2 Tabs by mouth every six (6) hours as needed for Pain.  1/7/20   Andie Parisi MD     Allergies   Allergen Reactions    Ibuprofen Anaphylaxis    Fentanyl Other (comments)     Blurred vision    Neurontin [Gabapentin] Other (comments)     Blurred Vision      Penicillins Swelling    Valium [Diazepam] Swelling       Patient Active Problem List   Diagnosis Code    Essential hypertension with goal blood pressure less than 130/80 I10    Diabetes mellitus, type II, insulin dependent (HCC) E11.9, Z79.4    Gastroesophageal reflux disease K21.9    BPH (benign prostatic hyperplasia) N40.0    Low back pain M54.5    Microcytic anemia D50.9    NS (nuclear sclerosis) H25.10    Severe depression (HCC) F32.2    Tarsal tunnel syndrome G57.50    Dementia associated with other underlying disease without behavioral disturbance (HCC) F02.80    Status post partial gastrectomy Z90.3    History of hepatitis C Z86.19    History of drug abuse ([facility] F19.11    Paranoid schizophrenia, chronic condition [facility] F20.0     Past Surgical History:   Procedure Laterality Date    HX ENDOSCOPY  2015    [facility], EGD W/ BIOPSY , COLONOSCOPY , Surgeon: Celi Navarro MD    HX GASTRECTOMY  1990    partial     HX HEENT      HX HERNIA REPAIR Right     Right inguinal hernia repair 2006    HX HERNIA REPAIR  2017    Left indirect inguinal hernia repair    HX OPEN REDUCTION INTERNAL FIXATION      x 2    HX ORCHIECTOMY Left 2013    HX ORTHOPAEDIC  2012    Numerous surgeries on rt. foot    HX OTHER SURGICAL      removed testicle     Family History   Problem Relation Age of Onset    Diabetes Mother     Hypertension Mother     Cancer Mother     Arthritis-osteo Maternal Grandmother     Diabetes Maternal Grandmother     Hypertension Maternal Grandmother     Diabetes Maternal Grandfather     Breast Cancer Daughter      Social History     Tobacco Use    Smoking status: Former Smoker     Packs/day: 0.25     Years: 1.00     Pack years: 0.25     Types: Cigarettes     Quit date: 2011     Years since quittin.7    Smokeless tobacco: Never Used   Substance Use Topics    Alcohol use: No       ROS  As stated in HPI, otherwise all others negative. Objective: There were no vitals taken for this visit. General: alert, cooperative, no distress   Mental  status: normal mood, behavior, speech, dress, motor activity, and thought processes, able to follow commands   HENT: NCAT   Neck: no visualized mass   Resp: no respiratory distress   Neuro: no gross deficits   Skin: no discoloration or lesions of concern on visible areas   Psychiatric: normal affect, consistent with stated mood, no evidence of hallucinations     Additional exam findings: We discussed the expected course, resolution and complications of the diagnosis(es) in detail. Medication risks, benefits, costs, interactions, and alternatives were discussed as indicated.   I advised him to contact the office if his condition worsens, changes or fails to improve as anticipated. He expressed understanding with the diagnosis(es) and plan. Brook Ledy, who was evaluated through a patient-initiated, synchronous (real-time) audio only encounter, and/or her healthcare decision maker, is aware that it is a billable service, with coverage as determined by his insurance carrier. He provided verbal consent to proceed: Yes. He has not had a related appointment within my department in the past 7 days or scheduled within the next 24 hours. Total Time: minutes: 21-30 minutes    An After Visit Summary was printed and given to the patient. All diagnosis have been discussed with the patient and all of the patient's questions have been answered. Follow-up and Dispositions    · Return in about 4 weeks (around 3/4/2021) for DM, dementia, home health care, 30 min, office only. Troy Loaiza [facility] [id number] [address]   [address]

## 2021-02-04 NOTE — PROGRESS NOTES
Chief Complaint   Patient presents with   [facility] Follow Up     1/30/21 [place] - has camera but states never has done virtual - would prefer Telephone          1. Have you been to the ER, urgent care clinic since your last visit? Hospitalized since your last visit? Yes 1/30/21 for     2. Have you seen or consulted any other health care providers outside of the [address] [place] [address] since your last visit? Include any pap smears or colon screening. Has not gastro, audiology    Chief Complaint   Patient presents with   [facility] Follow Up     1/30/21 [place] - has camera but states never has done virtual          3 most recent PHQ Screens 2/4/2021   Little interest or pleasure in doing things Several days   Feeling down, depressed, irritable, or hopeless Nearly every day   Total Score PHQ 2 4     Fall Risk Assessment, last 12 mths 2/4/2021   Able to walk? Yes   Fall in past 12 months? 0   Do you feel unsteady? 0   Are you worried about falling 0   Number of falls in past 12 months -   Fall with injury? -               Learning Assessment 12/28/2020   PRIMARY LEARNER Patient   HIGHEST LEVEL OF EDUCATION - PRIMARY LEARNER  GRADUATED HIGH SCHOOL OR GED   BARRIERS PRIMARY LEARNER NONE   CO-LEARNER CAREGIVER No   PRIMARY LANGUAGE ENGLISH   LEARNER PREFERENCE PRIMARY LISTENING   ANSWERED BY Tate Plummer   RELATIONSHIP SELF     There were no vitals taken for this visit.

## 2021-02-05 ENCOUNTER — PATIENT OUTREACH (OUTPATIENT)
Dept: CASE MANAGEMENT | Age: 71
End: 2021-02-05

## 2021-02-05 ENCOUNTER — HOME HEALTH ADMISSION (OUTPATIENT)
Dept: HOME HEALTH SERVICES | Facility: HOME HEALTH | Age: 71
End: 2021-02-05

## 2021-02-08 PROBLEM — F20.0 PARANOID SCHIZOPHRENIA, CHRONIC CONDITION WITH ACUTE EXACERBATION (HCC): Status: ACTIVE | Noted: 2021-02-08

## 2021-02-09 NOTE — PROGRESS NOTES
Roger Nails is a 79 y.o. male, evaluated via audio-only technology on 2/4/2021 for Hospital Follow Up (1/30/21 [place] - has camera but states never has done virtual - would prefer Telephone ) Yogesh Gutierrez Assessment & Plan:  
{A/P PLUS DISPO CTGV:64758} 12 Subjective:  
Hospital Follow Up (1/30/21 [place] - has camera but states never has done virtual - would prefer Telephone ) Prior to Admission medications Medication Sig Start Date End Date Taking? Authorizing Provider  
omeprazole (PRILOSEC OTC) 20 mg tablet Take 2 Tabs by mouth daily. 2/2/21  Yes Rd King NP  
lancets (Accu-Chek Softclix Lancets) misc Accu-Chek Softclix Lancets 2/2/21  Yes Rd King NP  
glucose blood VI test strips (True Metrix Glucose Test Strip) strip Test blood sugar twice daily 2/2/21  Yes Rd King NP Blood-Glucose Meter (True Metrix Glucose Meter) misc Test blood sugar twice daily 2/2/21  Yes Rd King NP  
ondansetron (ZOFRAN ODT) 4 mg disintegrating tablet Take 1 Tab by mouth every eight (8) hours as needed for Nausea or Vomiting. 1/28/21  Yes Rd King NP  
lisinopriL (PRINIVIL, ZESTRIL) 20 mg tablet Take 1 Tab by mouth daily. 12/28/20  Yes Rd King NP  
amLODIPine (Norvasc) 10 mg tablet Take 1 Tab by mouth daily. 12/28/20  Yes Rd King NP  
famotidine (PEPCID) 40 mg tablet Take 1 Tab by mouth daily. 12/28/20  Yes Rd King NP  
terazosin (HYTRIN) 1 mg capsule Take 1 Cap by mouth nightly. 9/17/20  Yes Jonathan Zepeda MD  
Insulin Needles, Disposable, 31 gauge x 3/16\" ndle Use 1 new pen needle each time to inject insulin subcutaneously 4 times daily 9/13/20  Yes Jonathan Zepeda MD  
insulin glargine (Lantus Solostar U-100 Insulin) 100 unit/mL (3 mL) inpn 13 Units by SubCUTAneous route nightly.  6/23/20  Yes Jonathan Zepeda MD  
 insulin lispro (HumaLOG KwikPen Insulin) 100 unit/mL kwikpen 7 Units by SubCUTAneous route Before breakfast, lunch, and dinner. 6/23/20  Yes Adria Vela MD  
glimepiride (AMARYL) 4 mg tablet Take 1 Tab by mouth every morning. 3/5/20  Yes Adria Vela MD  
acetaminophen (TYLENOL) 325 mg tablet Take 2 Tabs by mouth every six (6) hours as needed for Pain. 1/7/20   Karel Paula MD  
 
{History SmartLink choices - disappears if left unselected (Optional):46874} ROS No flowsheet data found. Chuy Domingo, who was evaluated through a patient-initiated, synchronous (real-time) audio only encounter, and/or her healthcare decision maker, is aware that it is a billable service, with coverage as determined by his insurance carrier. He provided verbal consent to proceed: {YES/NO/NA-Consent obtained within past 12 months:19078::\"Yes\"}. He has not had a related appointment within my department in the past 7 days or scheduled within the next 24 hours. Total Time: {minutes:93163::\"11-20 minutes\"} Evelia Baires NP

## 2021-02-09 NOTE — ASSESSMENT & PLAN NOTE
Uncontrolled, based on history, physical exam and review of pertinent labs, studies and medications; meds reconciled; changes to treatment plan as per orders.

## 2021-02-19 ENCOUNTER — PATIENT OUTREACH (OUTPATIENT)
Dept: CASE MANAGEMENT | Age: 71
End: 2021-02-19

## 2021-02-19 NOTE — PROGRESS NOTES
Complex Case Management      Date/Time:  2021 1:53 PM    Method of communication with patient:phone    [facility] ([facility]) contacted the patient by telephone to perform Ambulatory Care Coordination. Verified name and  (PHI) with patient as identifiers. Provided introduction to self, and explanation of the Ambulatory Care Manager's role. Reviewed most recent clinic visit w/ patient who verbalized understanding. Patient given an opportunity to ask questions. Top Challenges reviewed with the patient   1. Hx of DM2, HTN, Hep C, Depression, Arthritis  2. Pt states knows of upcoming PCP visit. 3. Pt states no other issues at this time. The patient agrees to contact the PCP office or the [facility] for questions related to their healthcare. Provided contact information for future reference. Disease Specific:   N/A    Home Health Active: No    DME Active: No    Barriers to care? Utilization of services    Advance Care Planning:   Does patient have an Advance Directive:  not on file; education provided     Medication(s):   Medication reconciliation was performed with patient, who verbalizes understanding of administration of home medications. There were no barriers to obtaining medications identified at this time. Referral to Pharm D needed: no     Current Outpatient Medications   Medication Sig    omeprazole (PRILOSEC OTC) 20 mg tablet Take 2 Tabs by mouth daily.  lancets (Accu-Chek Softclix Lancets) misc Accu-Chek Softclix Lancets    glucose blood VI test strips (True Metrix Glucose Test Strip) strip Test blood sugar twice daily    Blood-Glucose Meter (True Metrix Glucose Meter) misc Test blood sugar twice daily    ondansetron (ZOFRAN ODT) 4 mg disintegrating tablet Take 1 Tab by mouth every eight (8) hours as needed for Nausea or Vomiting.  lisinopriL (PRINIVIL, ZESTRIL) 20 mg tablet Take 1 Tab by mouth daily.     amLODIPine (Norvasc) 10 mg tablet Take 1 Tab by mouth daily.  famotidine (PEPCID) 40 mg tablet Take 1 Tab by mouth daily.  terazosin (HYTRIN) 1 mg capsule Take 1 Cap by mouth nightly.  Insulin Needles, Disposable, 31 gauge x 3/16\" ndle Use 1 new pen needle each time to inject insulin subcutaneously 4 times daily    insulin glargine (Lantus Solostar U-100 Insulin) 100 unit/mL (3 mL) inpn 13 Units by SubCUTAneous route nightly.  insulin lispro (HumaLOG KwikPen Insulin) 100 unit/mL kwikpen 7 Units by SubCUTAneous route Before breakfast, lunch, and dinner.  glimepiride (AMARYL) 4 mg tablet Take 1 Tab by mouth every morning.  acetaminophen (TYLENOL) 325 mg tablet Take 2 Tabs by mouth every six (6) hours as needed for Pain. No current facility-administered medications for this visit. [facility] follow up appointment(s):   Future Appointments   Date Time Provider Devika Haley   3/8/2021  3:00 PM Payal Logan NP AMA [facility]        Non-[facility] follow up appointment(s):     Goals Addressed                 This Visit's Progress     Attends follow up appointments on schedule   On track     12/10/20 Patient will attend all scheduled appointments through 3/10/20       Knowledge and adherence of prescribed medication (ie. action, side effects, missed dose, etc.).    On track     12/10/20 Pt will take all medications prescribed to be evaluated on each outreach through 3/10/20       Prepare patients and caregivers for end of life decisions (ie. need for hospice, pain management, symptom relief, advance directives etc.)   On track     12/10/20 Pt will complete an ACP and have it scanned into their EMR by 3/10/20

## 2021-02-22 NOTE — BRIEF OP NOTE
BRIEF OPERATIVE NOTE    Date of Procedure: 7/11/2017   Preoperative Diagnosis: left inguinal hernia  k40.90  Postoperative Diagnosis: * No post-op diagnosis entered *    Procedure(s):  davinci repair of left inguinal hernia with mesh ROBOTIC ASSISTED  Surgeon(s) and Role:     * Osiris Cooper MD - Primary         Assistant Staff:       Surgical Staff:  Circ-1: Jason Sue RN  Scrub Tech-1: [facility]  Surg Asst-1: Lela Strickland  Event Time In   Incision Start 5168   Incision Close      Anesthesia: General   Estimated Blood Loss: Minimal  Specimens: * No specimens in log *   Findings: Small indirect inguinal hernia   Complications: none  Implants:   Implant Name Type Inv.  Item Serial No.  Lot No. LRB No. Used Action   MESH ABSORB SURG PROGRIP 10X15 --  - [id number]   MESH ABSORB SURG PROGRIP 10X15 --    COVIDIEN  SURGICAL [id number] Left 1 Implanted 71 yo F with past medical history of  Asthma (Dx after 9/11 attack) HTN, DM, HLD, L hip arthritis, Bipolar Disorder recently admitted for UTI with E. Coli bacteremia that presents from [facility] due to hypoxia, admitted for management of COVID infection (+ 2/21)                 73 yo F with past medical history of  Asthma (Dx after 9/11 attack) HTN, DM, HLD, L hip arthritis, Bipolar Disorder recently admitted for UTI with E. Coli bacteremia that presented from U due to hypoxia, admitted to [facility] for management of COVID infection (+ 2/21), now stepped up to tele 2/2 additional O2 requirements

## 2021-03-04 DIAGNOSIS — K21.9 GASTROESOPHAGEAL REFLUX DISEASE, UNSPECIFIED WHETHER ESOPHAGITIS PRESENT: ICD-10-CM

## 2021-03-04 RX ORDER — FAMOTIDINE 40 MG/1
40 TABLET, FILM COATED ORAL DAILY
Qty: 30 TAB | Refills: 0 | Status: SHIPPED | OUTPATIENT
Start: 2021-03-04 | End: 2021-04-01

## 2021-03-08 ENCOUNTER — OFFICE VISIT (OUTPATIENT)
Dept: FAMILY MEDICINE CLINIC | Age: 71
End: 2021-03-08
Payer: MEDICARE

## 2021-03-08 VITALS
BODY MASS INDEX: 22.22 KG/M2 | HEART RATE: 84 BPM | HEIGHT: 69 IN | WEIGHT: 150 LBS | TEMPERATURE: 98 F | RESPIRATION RATE: 14 BRPM | SYSTOLIC BLOOD PRESSURE: 125 MMHG | OXYGEN SATURATION: 98 % | DIASTOLIC BLOOD PRESSURE: 74 MMHG

## 2021-03-08 DIAGNOSIS — Z79.4 DIABETES MELLITUS, TYPE II, INSULIN DEPENDENT (HCC): ICD-10-CM

## 2021-03-08 DIAGNOSIS — F32.2 SEVERE DEPRESSION (HCC): ICD-10-CM

## 2021-03-08 DIAGNOSIS — E11.9 DIABETES MELLITUS, TYPE II, INSULIN DEPENDENT (HCC): ICD-10-CM

## 2021-03-08 DIAGNOSIS — I10 ESSENTIAL HYPERTENSION WITH GOAL BLOOD PRESSURE LESS THAN 130/80: Primary | ICD-10-CM

## 2021-03-08 DIAGNOSIS — F02.80 DEMENTIA ASSOCIATED WITH OTHER UNDERLYING DISEASE WITHOUT BEHAVIORAL DISTURBANCE (HCC): ICD-10-CM

## 2021-03-08 DIAGNOSIS — F20.0 PARANOID SCHIZOPHRENIA, CHRONIC CONDITION WITH ACUTE EXACERBATION (HCC): ICD-10-CM

## 2021-03-08 DIAGNOSIS — D50.9 MICROCYTIC ANEMIA: ICD-10-CM

## 2021-03-08 DIAGNOSIS — E55.9 VITAMIN D DEFICIENCY: ICD-10-CM

## 2021-03-08 DIAGNOSIS — F19.11 HISTORY OF DRUG ABUSE (HCC): ICD-10-CM

## 2021-03-08 PROCEDURE — G8420 CALC BMI NORM PARAMETERS: HCPCS | Performed by: NURSE PRACTITIONER

## 2021-03-08 PROCEDURE — G8427 DOCREV CUR MEDS BY ELIG CLIN: HCPCS | Performed by: NURSE PRACTITIONER

## 2021-03-08 PROCEDURE — 1101F PT FALLS ASSESS-DOCD LE1/YR: CPT | Performed by: NURSE PRACTITIONER

## 2021-03-08 PROCEDURE — G8536 NO DOC ELDER MAL SCRN: HCPCS | Performed by: NURSE PRACTITIONER

## 2021-03-08 PROCEDURE — 2022F DILAT RTA XM EVC RTNOPTHY: CPT | Performed by: NURSE PRACTITIONER

## 2021-03-08 PROCEDURE — 3052F HG A1C>EQUAL 8.0%<EQUAL 9.0%: CPT | Performed by: NURSE PRACTITIONER

## 2021-03-08 PROCEDURE — 99214 OFFICE O/P EST MOD 30 MIN: CPT | Performed by: NURSE PRACTITIONER

## 2021-03-08 PROCEDURE — G8754 DIAS BP LESS 90: HCPCS | Performed by: NURSE PRACTITIONER

## 2021-03-08 PROCEDURE — G9717 DOC PT DX DEP/BP F/U NT REQ: HCPCS | Performed by: NURSE PRACTITIONER

## 2021-03-08 PROCEDURE — 3017F COLORECTAL CA SCREEN DOC REV: CPT | Performed by: NURSE PRACTITIONER

## 2021-03-08 PROCEDURE — G8752 SYS BP LESS 140: HCPCS | Performed by: NURSE PRACTITIONER

## 2021-03-08 RX ORDER — CALCIUM CITRATE/VITAMIN D3 200MG-6.25
TABLET ORAL
COMMUNITY
End: 2021-03-08

## 2021-03-08 RX ORDER — BLOOD-GLUCOSE METER
EACH MISCELLANEOUS
COMMUNITY
End: 2021-03-08

## 2021-03-08 RX ORDER — LANCETS 33 GAUGE
EACH MISCELLANEOUS
COMMUNITY
End: 2021-03-11 | Stop reason: SDUPTHER

## 2021-03-08 RX ORDER — DULOXETIN HYDROCHLORIDE 60 MG/1
CAPSULE, DELAYED RELEASE ORAL
COMMUNITY
Start: 2020-12-14 | End: 2021-03-30 | Stop reason: SDUPTHER

## 2021-03-08 RX ORDER — AMLODIPINE BESYLATE 10 MG/1
10 TABLET ORAL DAILY
Qty: 90 TAB | Refills: 1 | Status: SHIPPED | OUTPATIENT
Start: 2021-03-08 | End: 2021-05-03

## 2021-03-08 NOTE — PROGRESS NOTES
[address] [place] [address] [place] [address]               [phone]      Porter Zendejas is a 70 y.o. male and presents with Diabetes (Insulin is too expensive- 160$ - has been out x 1 mo- checked yesterday 80), Dementia, Other (wants referral to podiatry- Rt foot pain since Job injury ), and Other (Has a - assigned by MedicaidGenice Burgess)       Assessment/Plan:    Diagnoses and all orders for this visit:    1. Essential hypertension with goal blood pressure less than 130/80  -     amLODIPine (Norvasc) 10 mg tablet; Take 1 Tab by mouth daily.  -     METABOLIC PANEL, COMPREHENSIVE; Future  Endorses medication compliance  Blood pressure stable, continue same medications  Refills provided  Follow-up labs today    2. Diabetes mellitus, type II, insulin dependent ([facility]  -     HEMOGLOBIN A1C WITH EAG; Future  -     MICROALBUMIN, UR, RAND W/ MICROALB/CREAT RATIO; Future  -     LIPID PANEL; Future  Has not been taking his insulin states it is too expensive, he did not contact me to inform me of this, he is continue to take the glimepiride  Home glucose readings are between 150 and 200, denies signs and symptoms of hyper or hypoglycemia  Follow-up labs today    3. Microcytic anemia  -     CBC WITH AUTOMATED DIFF; Future  Follow-up labs today    4. Vitamin D deficiency  -     VITAMIN D, 25 HYDROXY; Future  Follow-up labs today    5. History of drug abuse [facility])  Assessment & Plan:  Stable, based on history, physical exam and review of pertinent labs, studies and medications; meds reconciled; continue current treatment plan. 6. Paranoid schizophrenia, chronic condition with acute exacerbation ([facility]  -     REFERRAL TO PSYCHIATRY  Referral to psychiatry for evaluation and management of his schizophrenia    7. Severe depression (HCC)  -     REFERRAL TO PSYCHIATRY    8.  Dementia associated with other underlying disease without behavioral disturbance ([facility] [address]  -     REFERRAL TO NEUROPSYCHOLOGY  Will place another referral to neuropsychology, previously referred to Dr. Radha Crouch however he states the office is too far away with something closer    Other orders  -     CBC WITH AUTOMATED DIFF  -     METABOLIC PANEL, COMPREHENSIVE  -     LIPID PANEL  -     MICROALBUMIN, UR, RAND W/ MICROALB/CREAT RATIO  -     CVD REPORT  -     HEMOGLOBIN A1C WITH EAG  -     DIABETES PATIENT EDUCATION  -     VITAMIN D, 25 HYDROXY      Follow-up and Dispositions    · Return in about 3 months (around 6/8/2021) for DM, DM foot, HLD, HTN, 15 min, office only. Subjective:    Hypertension:   Patient reports taking medications as instructed. yes   Medication side effects noted. no  Headache upon wakening. no   Home BP monitoring in range of 347/01'N systolic. Do you experience chest pain/pressure or SOB with exertion? no  Maintain a low salt diet? yes  Key CAD CHF Meds             amLODIPine (Norvasc) 10 mg tablet (Taking) Take 1 Tab by mouth daily. lisinopriL (PRINIVIL, ZESTRIL) 20 mg tablet (Taking) Take 1 Tab by mouth daily. terazosin (HYTRIN) 1 mg capsule (Taking) Take 1 Cap by mouth nightly.         DMII-   Patient reports medication compliance states he has not had his Lantus for about a month, however, a review of the chart reveals his last prescripton ran out 12/2020. states the cost is too high, is taking glimepiride   Diabetic diet compliance most of the time  Patient monitors blood sugars regularly Daily   Reports am fasting sugars range Brought meter to visit, AM glucose run 150-200   Denies hypoglycemic episodes yes  Denies polyuria, polydipsia, paraesthesia, vision changes? no, endorses frequent urination     Diabetic Foot and Eye Exam HM Status   Topic Date Due    Diabetic Foot Care  Never done    Eye Exam  Never done     Hemoglobin A1c   Date Value Ref Range Status   03/08/2021 8.6 (H) 4.8 - 5.6 % Final     Comment:              Prediabetes: 5.7 - 6.4           Diabetes: >6.4 Glycemic control for adults with diabetes: <7.0     ]  Creatinine, urine   Date Value Ref Range Status   03/08/2021 113.1 Not Estab. mg/dL Final     Microalb/Creat ratio (ug/mg creat.)   Date Value Ref Range Status   03/08/2021 24 0 - 29 mg/g creat Final     Comment:                            Normal:                0 -  29                         Moderately increased: 30 - 300                         Severely increased:       >300     02/27/2020 19 0 - 29 mg/g creat Final     Comment:                            Normal:                0 -  29                         Moderately increased: 30 - 300                         Severely increased:       >300                **Please note reference interval change**       Key Antihyperglycemic Medications             glimepiride (AMARYL) 4 mg tablet (Taking/Discontinued) Take 1 Tab by mouth every morning. insulin glargine (Lantus Solostar U-100 Insulin) 100 unit/mL (3 mL) inpn 13 Units by SubCUTAneous route nightly. insulin lispro (HumaLOG KwikPen Insulin) 100 unit/mL kwikpen (Discontinued) 7 Units by SubCUTAneous route Before breakfast, lunch, and dinner. ROS:     ROS  As stated in HPI, otherwise all others negative. The problem list was updated as a part of today's visit.   Patient Active Problem List   Diagnosis Code    Essential hypertension with goal blood pressure less than 130/80 I10    Diabetes mellitus, type II, insulin dependent (HCC) E11.9, Z79.4    Gastroesophageal reflux disease K21.9    BPH (benign prostatic hyperplasia) N40.0    Low back pain M54.5    Microcytic anemia D50.9    NS (nuclear sclerosis) H25.10    Severe depression (HCC) F32.2    Tarsal tunnel syndrome G57.50    Dementia associated with other underlying disease without behavioral disturbance (HCC) F02.80    Status post partial gastrectomy Z90.3    History of hepatitis C Z86.19    History of drug abuse ([facility] F19.11    Paranoid schizophrenia, chronic condition with acute exacerbation (HCC) F20.0       The PSH,  were reviewed. SH:  Social History     Tobacco Use    Smoking status: Former Smoker     Packs/day: 0.25     Years: 1.00     Pack years: 0.25     Types: Cigarettes     Quit date: 2011     Years since quittin.8    Smokeless tobacco: Never Used   Substance Use Topics    Alcohol use: No    Drug use: Yes     Types: Marijuana     Comment: H/O Heroin addiction Abstinent       Medications/Allergies:  Current Outpatient Medications on File Prior to Visit   Medication Sig Dispense Refill    famotidine (PEPCID) 40 mg tablet Take 1 Tab by mouth daily. 30 Tab 0    glucose blood VI test strips (True Metrix Glucose Test Strip) strip Test blood sugar twice daily 100 Strip 3    Blood-Glucose Meter (True Metrix Glucose Meter) misc Test blood sugar twice daily 1 Each 0    ondansetron (ZOFRAN ODT) 4 mg disintegrating tablet Take 1 Tab by mouth every eight (8) hours as needed for Nausea or Vomiting. 90 Tab 0    lisinopriL (PRINIVIL, ZESTRIL) 20 mg tablet Take 1 Tab by mouth daily. 90 Tab 1    terazosin (HYTRIN) 1 mg capsule Take 1 Cap by mouth nightly. 90 Cap 1    acetaminophen (TYLENOL) 325 mg tablet Take 2 Tabs by mouth every six (6) hours as needed for Pain. 20 Tab 0    DULoxetine (CYMBALTA) 60 mg capsule TAKE ONE CAPSULE BY MOUTH EVERY DAY      omeprazole (PRILOSEC OTC) 20 mg tablet Take 2 Tabs by mouth daily. 180 Tab 0    Insulin Needles, Disposable, 31 gauge x 3/16\" ndle Use 1 new pen needle each time to inject insulin subcutaneously 4 times daily 150 Pen Needle 2    insulin glargine (Lantus Solostar U-100 Insulin) 100 unit/mL (3 mL) inpn 13 Units by SubCUTAneous route nightly. 5 Pen 1     No current facility-administered medications on file prior to visit.          Allergies   Allergen Reactions    Ibuprofen Anaphylaxis    Fentanyl Other (comments)     Blurred vision    Neurontin [Gabapentin] Other (comments)     Blurred Vision      Penicillins Swelling    Valium [Diazepam] Swelling       Objective:  Visit Vitals  /74   Pulse 84   Temp 98 °F (36.7 °C) (Oral)   Resp 14   Ht 5' 9\" (1.753 m)   Wt 150 lb (68 kg)   SpO2 98%   BMI 22.15 kg/m²    Body mass index is 22.15 kg/m². Physical assessment  Physical Exam  Vitals signs and nursing note reviewed. Eyes:      Conjunctiva/sclera: Conjunctivae normal.      Pupils: Pupils are equal, round, and reactive to light. Neck:      Musculoskeletal: Normal range of motion. Vascular: No JVD. Cardiovascular:      Rate and Rhythm: Normal rate and regular rhythm. Heart sounds: Normal heart sounds. No murmur. No friction rub. No gallop. Pulmonary:      Effort: Pulmonary effort is normal.      Breath sounds: Normal breath sounds. Musculoskeletal: Normal range of motion. Skin:     General: Skin is warm and dry. Neurological:      Mental Status: He is alert and oriented to person, place, and time.    Psychiatric:         Mood and Affect: Affect normal.         Cognition and Memory: Memory normal.         Judgment: Judgment normal.           Labwork and Ancillary Studies:    CBC w/Diff  Lab Results   Component Value Date/Time    WBC 6.2 03/08/2021 03:57 AM    HGB 12.2 (L) 03/08/2021 03:57 AM    PLATELET 647 52/04/5200 03:57 AM         Basic Metabolic Profile  Lab Results   Component Value Date/Time    Sodium 142 03/08/2021 03:57 AM    Potassium 4.2 03/08/2021 03:57 AM    Chloride 105 03/08/2021 03:57 AM    CO2 23 03/08/2021 03:57 AM    Anion gap 6 01/07/2020 05:38 AM    Glucose 261 (H) 03/08/2021 03:57 AM    BUN 13 03/08/2021 03:57 AM    Creatinine 1.20 03/08/2021 03:57 AM    BUN/Creatinine ratio 11 03/08/2021 03:57 AM    GFR est AA 70 03/08/2021 03:57 AM    GFR est non-AA 60 03/08/2021 03:57 AM    Calcium 9.6 03/08/2021 03:57 AM        Cholesterol  Lab Results   Component Value Date/Time    Cholesterol, total 127 03/08/2021 03:57 AM    HDL Cholesterol 50 03/08/2021 03:57 AM    LDL, calculated 63 03/08/2021 03:57 AM    LDL, calculated 62 02/27/2020 09:00 AM    Triglyceride 69 03/08/2021 03:57 AM       Health Maintenance:   Health Maintenance   Topic Date Due    Foot Exam Q1  Never done    Eye Exam Retinal or Dilated  Never done    COVID-19 Vaccine (1) Never done    Colorectal Cancer Screening Combo  Never done    GLAUCOMA SCREENING Q2Y  Never done    AAA Screening 73-67 YO Male Smoking Patients  Never done    DTaP/Tdap/Td series (1 - Tdap) 07/01/2021 (Originally 2/9/1971)    Shingrix Vaccine Age 50> (1 of 2) 07/16/2021 (Originally 2/9/2000)    Medicare Yearly Exam  12/29/2021    A1C test (Diabetic or Prediabetic)  03/08/2022    MICROALBUMIN Q1  03/08/2022    Lipid Screen  03/08/2022    Flu Vaccine  Completed    Pneumococcal 65+ years  Completed       I have discussed the diagnosis with the patient and the intended plan as seen in the above orders. The patient has received an After-Visit Summary and questions were answered concerning future plans. An After Visit Summary was printed and given to the patient. All diagnosis have been discussed with the patient and all of the patient's questions have been answered. Follow-up and Dispositions    · Return in about 3 months (around 6/8/2021) for DM, DM foot, HLD, HTN, 15 min, office only. Kingsley [place], LAUREN-BC  [address]  [address]

## 2021-03-08 NOTE — PROGRESS NOTES
Chief Complaint   Patient presents with    Diabetes     Insulin is too expensive- 160$ - has been out x 1 mo- checked yesterday 80    Dementia    Other     wants referral to podiatry- Rt foot pain since Job injury     Other     Has a - assigned by MedicaidRajinder Tomlinson       1. Have you been to the ER, urgent care clinic since your last visit? Hospitalized since your last visit? [facility] stomach pain x 2 weeks     2. Have you seen or consulted any other health care providers outside of the [address] [place] [address] since your last visit? Include any pap smears or colon screening. No    .  Chief Complaint   Patient presents with    Diabetes     Insulin is too expensive- 160$ - has been out x 1 mo- checked yesterday 80    Dementia    Other     wants referral to podiatry- Rt foot pain since Job injury     Other     Has a - assigned by MedicaidRajinder Tomlinson       3 most recent [address] 3/8/2021   Little interest or pleasure in doing things Several days   Feeling down, depressed, irritable, or hopeless Several days   Total Score PHQ 2 2     Fall Risk Assessment, last 12 mths 3/8/2021   Able to walk? Yes   Fall in past 12 months? 0   Do you feel unsteady? 0   Are you worried about falling 0   Number of falls in past 12 months -   Fall with injury?  -               Learning Assessment 12/28/2020   PRIMARY LEARNER Patient   HIGHEST LEVEL OF EDUCATION - PRIMARY LEARNER  GRADUATED HIGH SCHOOL OR GED   BARRIERS PRIMARY LEARNER NONE   CO-LEARNER CAREGIVER No   PRIMARY LANGUAGE ENGLISH   LEARNER PREFERENCE PRIMARY LISTENING   ANSWERED BY Sanjuana Cerna   RELATIONSHIP SELF     Visit Vitals  /74   Pulse 84   Temp 98 °F (36.7 °C) (Oral)   Resp 14   Ht 5' 9\" (1.753 m)   Wt 150 lb (68 kg)   SpO2 98%   BMI 22.15 kg/m²

## 2021-03-08 NOTE — PATIENT INSTRUCTIONS
Check glucose/sugar once a day: sometimes check first thing in the morning, sometimes check before lunch or dinner, sometimes check two hours after a meal

## 2021-03-09 ENCOUNTER — TELEPHONE (OUTPATIENT)
Dept: FAMILY MEDICINE CLINIC | Age: 71
End: 2021-03-09

## 2021-03-09 RX ORDER — GLIMEPIRIDE 4 MG/1
4 TABLET ORAL
Qty: 90 TAB | Refills: 2 | Status: SHIPPED
Start: 2021-03-09 | End: 2021-05-27

## 2021-03-09 NOTE — TELEPHONE ENCOUNTER
Received a call from [facility] [employer] [place] requesting patient Rosas Her glimepiride (AMARYL) 4 mg tablet be refilled today because patient pill pack is due to be sent out tomorrow.

## 2021-03-11 DIAGNOSIS — Z79.4 DIABETES MELLITUS, TYPE II, INSULIN DEPENDENT (HCC): ICD-10-CM

## 2021-03-11 DIAGNOSIS — E11.9 DIABETES MELLITUS, TYPE II, INSULIN DEPENDENT (HCC): ICD-10-CM

## 2021-03-11 LAB
25(OH)D3+25(OH)D2 SERPL-MCNC: 29.2 NG/ML (ref 30–100)
ALBUMIN SERPL-MCNC: 4.9 G/DL (ref 3.7–4.7)
ALBUMIN/CREAT UR: 24 MG/G CREAT (ref 0–29)
ALBUMIN/GLOB SERPL: 1.8 {RATIO} (ref 1.2–2.2)
ALP SERPL-CCNC: 134 IU/L (ref 39–117)
ALT SERPL-CCNC: 48 IU/L (ref 0–44)
AST SERPL-CCNC: 43 IU/L (ref 0–40)
BASOPHILS # BLD AUTO: 0 X10E3/UL (ref 0–0.2)
BASOPHILS NFR BLD AUTO: 0 %
BILIRUB SERPL-MCNC: <0.2 MG/DL (ref 0–1.2)
BUN SERPL-MCNC: 13 MG/DL (ref 8–27)
BUN/CREAT SERPL: 11 (ref 10–24)
CALCIUM SERPL-MCNC: 9.6 MG/DL (ref 8.6–10.2)
CHLORIDE SERPL-SCNC: 105 MMOL/L (ref 96–106)
CHOLEST SERPL-MCNC: 127 MG/DL (ref 100–199)
CO2 SERPL-SCNC: 23 MMOL/L (ref 20–29)
CREAT SERPL-MCNC: 1.2 MG/DL (ref 0.76–1.27)
CREAT UR-MCNC: 113.1 MG/DL
EOSINOPHIL # BLD AUTO: 0.1 X10E3/UL (ref 0–0.4)
EOSINOPHIL NFR BLD AUTO: 1 %
ERYTHROCYTE [DISTWIDTH] IN BLOOD BY AUTOMATED COUNT: 14.6 % (ref 11.6–15.4)
EST. AVERAGE GLUCOSE BLD GHB EST-MCNC: 200 MG/DL
GLOBULIN SER CALC-MCNC: 2.8 G/DL (ref 1.5–4.5)
GLUCOSE SERPL-MCNC: 261 MG/DL (ref 65–99)
HBA1C MFR BLD: 8.6 % (ref 4.8–5.6)
HCT VFR BLD AUTO: 39.4 % (ref 37.5–51)
HDLC SERPL-MCNC: 50 MG/DL
HGB BLD-MCNC: 12.2 G/DL (ref 13–17.7)
IMM GRANULOCYTES # BLD AUTO: 0 X10E3/UL (ref 0–0.1)
IMM GRANULOCYTES NFR BLD AUTO: 0 %
IMP & REVIEW OF LAB RESULTS: NORMAL
LDLC SERPL CALC-MCNC: 63 MG/DL (ref 0–99)
LYMPHOCYTES # BLD AUTO: 2.4 X10E3/UL (ref 0.7–3.1)
LYMPHOCYTES NFR BLD AUTO: 38 %
Lab: NORMAL
MCH RBC QN AUTO: 23.9 PG (ref 26.6–33)
MCHC RBC AUTO-ENTMCNC: 31 G/DL (ref 31.5–35.7)
MCV RBC AUTO: 77 FL (ref 79–97)
MICROALBUMIN UR-MCNC: 27.7 UG/ML
MONOCYTES # BLD AUTO: 0.6 X10E3/UL (ref 0.1–0.9)
MONOCYTES NFR BLD AUTO: 10 %
NEUTROPHILS # BLD AUTO: 3.2 X10E3/UL (ref 1.4–7)
NEUTROPHILS NFR BLD AUTO: 51 %
PLATELET # BLD AUTO: 183 X10E3/UL (ref 150–450)
POTASSIUM SERPL-SCNC: 4.2 MMOL/L (ref 3.5–5.2)
PROT SERPL-MCNC: 7.7 G/DL (ref 6–8.5)
RBC # BLD AUTO: 5.1 X10E6/UL (ref 4.14–5.8)
SODIUM SERPL-SCNC: 142 MMOL/L (ref 134–144)
TRIGL SERPL-MCNC: 69 MG/DL (ref 0–149)
VLDLC SERPL CALC-MCNC: 14 MG/DL (ref 5–40)
WBC # BLD AUTO: 6.2 X10E3/UL (ref 3.4–10.8)

## 2021-03-11 RX ORDER — INSULIN LISPRO 100 [IU]/ML
7 INJECTION, SOLUTION INTRAVENOUS; SUBCUTANEOUS
Qty: 1 PEN | Refills: 5 | Status: SHIPPED | OUTPATIENT
Start: 2021-03-11 | End: 2021-05-27

## 2021-03-11 RX ORDER — LANCETS 33 GAUGE
EACH MISCELLANEOUS
Qty: 100 LANCET | Refills: 3 | Status: SHIPPED | OUTPATIENT
Start: 2021-03-11 | End: 2021-03-16 | Stop reason: SDUPTHER

## 2021-03-11 NOTE — TELEPHONE ENCOUNTER
Received a call from patient Alana Rodriguez stating he has not had any lancets (TRUEplus Lancets) 33 gauge misc in over a month.

## 2021-03-12 ENCOUNTER — PATIENT OUTREACH (OUTPATIENT)
Dept: CASE MANAGEMENT | Age: 71
End: 2021-03-12

## 2021-03-12 NOTE — PROGRESS NOTES
Patient attended appointments with his PCP, OH Gonzales NP on 3/8/21, Nurse Navigator reviewed EMR and will follow up on next scheduled outreach.

## 2021-03-12 NOTE — PROGRESS NOTES
Patient has graduated from the Complex Case Management  program on 3/12/21. Patient's symptoms are stable at this time. Patient/family has the ability to self-manage. Care management goals have been completed at this time. No further ACM follow up scheduled. Goals Addressed                 This Visit's Progress     COMPLETED: Attends follow up appointments on schedule        12/10/20 Patient will attend all scheduled appointments through 3/10/20       COMPLETED: Knowledge and adherence of prescribed medication (ie. action, side effects, missed dose, etc.).        12/10/20 Pt will take all medications prescribed to be evaluated on each outreach through 3/10/20       COMPLETED: Prepare patients and caregivers for end of life decisions (ie. need for hospice, pain management, symptom relief, advance directives etc.)        12/10/20 Pt will complete an ACP and have it scanned into their EMR by 3/10/20            Pt has nurse navigator's contact information for any further questions, concerns, or needs.   Patients upcoming visits:    Future Appointments   Date Time Provider Devika Haley   6/8/2021 11:00 AM BEN Mayer AMB

## 2021-03-16 RX ORDER — LANCETS 33 GAUGE
EACH MISCELLANEOUS
Qty: 100 LANCET | Refills: 3 | Status: SHIPPED | OUTPATIENT
Start: 2021-03-16 | End: 2021-12-17

## 2021-03-25 RX ORDER — INSULIN ASPART 100 [IU]/ML
INJECTION, SOLUTION INTRAVENOUS; SUBCUTANEOUS
Qty: 6 ADJUSTABLE DOSE PRE-FILLED PEN SYRINGE | Refills: 1 | Status: SHIPPED | OUTPATIENT
Start: 2021-03-25 | End: 2021-06-08

## 2021-03-25 NOTE — TELEPHONE ENCOUNTER
Next Appt  3/30/21 - Flavio Dunbar, NP - HR JEFF MARIANO ASSSALOME Hernandez Yesterday (9:26 AM)        Received a call from [facility] stating insulin lispro (HumaLOG KwikPen Insulin) 100 unit/mL kwikpen is not covered by patient insurance but the insurance will cover the [employer].  Pharmacy phone number [phone]

## 2021-03-30 ENCOUNTER — OFFICE VISIT (OUTPATIENT)
Dept: FAMILY MEDICINE CLINIC | Age: 71
End: 2021-03-30
Payer: MEDICARE

## 2021-03-30 VITALS
WEIGHT: 150 LBS | BODY MASS INDEX: 22.22 KG/M2 | SYSTOLIC BLOOD PRESSURE: 100 MMHG | DIASTOLIC BLOOD PRESSURE: 65 MMHG | OXYGEN SATURATION: 100 % | TEMPERATURE: 97.3 F | HEIGHT: 69 IN | HEART RATE: 82 BPM

## 2021-03-30 DIAGNOSIS — F02.80 DEMENTIA ASSOCIATED WITH OTHER UNDERLYING DISEASE WITHOUT BEHAVIORAL DISTURBANCE (HCC): Primary | ICD-10-CM

## 2021-03-30 DIAGNOSIS — K21.9 GASTROESOPHAGEAL REFLUX DISEASE, UNSPECIFIED WHETHER ESOPHAGITIS PRESENT: ICD-10-CM

## 2021-03-30 DIAGNOSIS — F32.2 SEVERE DEPRESSION (HCC): ICD-10-CM

## 2021-03-30 PROCEDURE — 99215 OFFICE O/P EST HI 40 MIN: CPT | Performed by: NURSE PRACTITIONER

## 2021-03-30 PROCEDURE — G9717 DOC PT DX DEP/BP F/U NT REQ: HCPCS | Performed by: NURSE PRACTITIONER

## 2021-03-30 PROCEDURE — 3017F COLORECTAL CA SCREEN DOC REV: CPT | Performed by: NURSE PRACTITIONER

## 2021-03-30 PROCEDURE — G8420 CALC BMI NORM PARAMETERS: HCPCS | Performed by: NURSE PRACTITIONER

## 2021-03-30 PROCEDURE — 1101F PT FALLS ASSESS-DOCD LE1/YR: CPT | Performed by: NURSE PRACTITIONER

## 2021-03-30 PROCEDURE — G8752 SYS BP LESS 140: HCPCS | Performed by: NURSE PRACTITIONER

## 2021-03-30 PROCEDURE — G8754 DIAS BP LESS 90: HCPCS | Performed by: NURSE PRACTITIONER

## 2021-03-30 PROCEDURE — G8427 DOCREV CUR MEDS BY ELIG CLIN: HCPCS | Performed by: NURSE PRACTITIONER

## 2021-03-30 PROCEDURE — G8536 NO DOC ELDER MAL SCRN: HCPCS | Performed by: NURSE PRACTITIONER

## 2021-03-30 RX ORDER — DULOXETIN HYDROCHLORIDE 30 MG/1
CAPSULE, DELAYED RELEASE ORAL
COMMUNITY
Start: 2021-03-26 | End: 2021-07-14 | Stop reason: ALTCHOICE

## 2021-03-30 NOTE — PATIENT INSTRUCTIONS
Please call to make an appointment to evaluate your memory loss.      [facility]  Place of Service  Contact Information     Phone Appt Phone Fax Address   [phone] Not available [phone] [address] [place] [id number]

## 2021-03-30 NOTE — PROGRESS NOTES
Chief Complaint   Patient presents with    Follow Up Chronic Condition      fasting, /73 checked at home     Other     feeling anxious and depressed    Abdominal Pain     started last night 8/10      1. Have you been to the ER, urgent care clinic since your last visit? Hospitalized since your last visit? ER [facility] in February/21 for abdominal pain    2. Have you seen or consulted any other health care providers outside of the [address] [place] [address] since your last visit? Include any pap smears or colon screening. yes, Psychiatrist last week.       Health Maintenance Due   Topic Date Due    Foot Exam Q1  Never done    Eye Exam Retinal or Dilated  Never done    COVID-19 Vaccine (1) Never done    Colorectal Cancer Screening Combo  Never done    AAA Screening 73-67 YO Male Smoking Patients  Never done

## 2021-03-30 NOTE — PROGRESS NOTES
[address] [place] [address]               [phone]      Stacy Bates is a 70 y.o. male and presents with Follow Up Chronic Condition ( fasting, /73 checked at home ), Other (feeling anxious and depressed), and Abdominal Pain (started last night 8/10 )       Assessment/Plan:    Diagnoses and all orders for this visit:    1. Dementia associated with other underlying disease without behavioral disturbance ([facility] [address]    2. Severe depression ([facility] [address]    his depression is chronic and I feel exacerbated by his dementia. Will assist him with setting up an appointment with neuropsych for a dementia evaluation. I spent 49 minutes with this patient on this visit coordinating care, assisting with calling [facility] and finding out the providers he has spoken too, calling  to set up and appointment for evaluation for home services and calling a home care service company. Assisted patient with setting up appointment with neuropsych evaluation. Follow-up and Dispositions    · Return if symptoms worsen or fail to improve. Subjective:    Depression  PHQ 20  Found a psychiatrist? Received a phone call Friday or Saturday. He told the provider he would not remember any of the conversation. The person on the phone told him they would send the notes to [facility]  The call came through [facility], Mr Cecil Sampson called while in the office. Visit on 3/26 Dr. Adri Jackson, psychiatrist, counselor Courtney Johnson. All calls go through [facility], [phone]. He was recommended by [facility] to call them. He was started on cymbalta. Has started taking once a day. Denies SI, last time he had a thought was several months ago    Follow up with neuropsychology? Has not received a phone call. Would like personal care assistance at home. Has a flier from [employer] [facility]. Others in his building use his services. Spoke with Mrs. Armida Reyes.    He lives in [facility]  He forgot he put something in the oven and burned the contents  Cooking on top of the stove and forgot, the pot boiled over  1st he needs covid screening. Does not matter which screening he gets. The rapid screen is OK. First he needs evaluation from . Called [facility] in the office [phone]. Mr. Belkis Alcaraz left a message for a screening appointment            ROS:     ROS  As stated in HPI, otherwise all others negative. The problem list was updated as a part of today's visit. Patient Active Problem List   Diagnosis Code    Essential hypertension with goal blood pressure less than 130/80 I10    Diabetes mellitus, type II, insulin dependent (HCC) E11.9, Z79.4    Gastroesophageal reflux disease K21.9    BPH (benign prostatic hyperplasia) N40.0    Low back pain M54.5    Microcytic anemia D50.9    NS (nuclear sclerosis) H25.10    Severe depression (HCC) F32.2    Tarsal tunnel syndrome G57.50    Dementia associated with other underlying disease without behavioral disturbance (HCC) F02.80    Status post partial gastrectomy Z90.3    History of hepatitis C Z86.19    History of drug abuse ([facility] [address] F19.11    Paranoid schizophrenia, chronic condition with acute exacerbation (HCC) F20.0       The PSH, FH were reviewed.       SH:  Social History     Tobacco Use    Smoking status: Former Smoker     Packs/day: 0.25     Years: 1.00     Pack years: 0.25     Types: Cigarettes     Quit date: 2011     Years since quittin.8    Smokeless tobacco: Never Used   Substance Use Topics    Alcohol use: No    Drug use: Yes     Types: Marijuana     Comment: H/O Heroin addiction Abstinent       Medications/Allergies:  Current Outpatient Medications on File Prior to Visit   Medication Sig Dispense Refill    insulin aspart U-100 (NOVOLOG) 100 unit/mL (3 mL) inpn Inject 7 units subcutaneously daily before breakfast, lunch and dinner 6 Adjustable Dose Pre-filled Pen Syringe 1    lancets (TRUEplus Lancets) 33 gauge misc TRUEplus Lancets 33 gauge 100 Lancet 3    insulin lispro (HumaLOG KwikPen Insulin) 100 unit/mL kwikpen 7 Units by SubCUTAneous route Before breakfast, lunch, and dinner. 1 Pen 5    glimepiride (AMARYL) 4 mg tablet Take 1 Tab by mouth every morning. 90 Tab 2    amLODIPine (Norvasc) 10 mg tablet Take 1 Tab by mouth daily. 90 Tab 1    famotidine (PEPCID) 40 mg tablet Take 1 Tab by mouth daily. 30 Tab 0    omeprazole (PRILOSEC OTC) 20 mg tablet Take 2 Tabs by mouth daily. 180 Tab 0    glucose blood VI test strips (True Metrix Glucose Test Strip) strip Test blood sugar twice daily 100 Strip 3    Blood-Glucose Meter (True Metrix Glucose Meter) misc Test blood sugar twice daily 1 Each 0    ondansetron (ZOFRAN ODT) 4 mg disintegrating tablet Take 1 Tab by mouth every eight (8) hours as needed for Nausea or Vomiting. 90 Tab 0    lisinopriL (PRINIVIL, ZESTRIL) 20 mg tablet Take 1 Tab by mouth daily. 90 Tab 1    terazosin (HYTRIN) 1 mg capsule Take 1 Cap by mouth nightly. 90 Cap 1    Insulin Needles, Disposable, 31 gauge x 3/16\" ndle Use 1 new pen needle each time to inject insulin subcutaneously 4 times daily 150 Pen Needle 2    insulin glargine (Lantus Solostar U-100 Insulin) 100 unit/mL (3 mL) inpn 13 Units by SubCUTAneous route nightly. 5 Pen 1    acetaminophen (TYLENOL) 325 mg tablet Take 2 Tabs by mouth every six (6) hours as needed for Pain. 20 Tab 0    DULoxetine (CYMBALTA) 30 mg capsule TAKE ONE CAPSULE BY MOUTH EVERY DAY      [DISCONTINUED] DULoxetine (CYMBALTA) 60 mg capsule TAKE ONE CAPSULE BY MOUTH EVERY DAY       No current facility-administered medications on file prior to visit.          Allergies   Allergen Reactions    Ibuprofen Anaphylaxis    Fentanyl Other (comments)     Blurred vision    Neurontin [Gabapentin] Other (comments)     Blurred Vision      Penicillins Swelling    Valium [Diazepam] Swelling       Objective:  Visit Vitals  /65   Pulse 82   Temp 97.3 °F (36.3 °C) (Oral)   Ht 5' 9\" (1.753 m)   Wt 150 lb (68 kg)   SpO2 100%   BMI 22.15 kg/m²    Body mass index is 22.15 kg/m². Physical assessment  Physical Exam  Vitals signs and nursing note reviewed. Eyes:      Conjunctiva/sclera: Conjunctivae normal.      Pupils: Pupils are equal, round, and reactive to light. Neck:      Musculoskeletal: Normal range of motion. Vascular: No JVD. Cardiovascular:      Rate and Rhythm: Normal rate and regular rhythm. Heart sounds: Normal heart sounds. No murmur. No friction rub. No gallop. Pulmonary:      Effort: Pulmonary effort is normal.      Breath sounds: Normal breath sounds. Musculoskeletal: Normal range of motion. Comments: Ambulates with rolling walker   Skin:     General: Skin is warm and dry. Neurological:      Mental Status: He is alert and oriented to person, place, and time.    Psychiatric:         Mood and Affect: Affect normal.         Cognition and Memory: Memory normal.         Judgment: Judgment normal.           Labwork and Ancillary Studies:    CBC w/Diff  Lab Results   Component Value Date/Time    WBC 6.2 03/08/2021 03:57 AM    HGB 12.2 (L) 03/08/2021 03:57 AM    PLATELET 063 46/20/4999 03:57 AM         Basic Metabolic Profile  Lab Results   Component Value Date/Time    Sodium 142 03/08/2021 03:57 AM    Potassium 4.2 03/08/2021 03:57 AM    Chloride 105 03/08/2021 03:57 AM    CO2 23 03/08/2021 03:57 AM    Anion gap 6 01/07/2020 05:38 AM    Glucose 261 (H) 03/08/2021 03:57 AM    BUN 13 03/08/2021 03:57 AM    Creatinine 1.20 03/08/2021 03:57 AM    BUN/Creatinine ratio 11 03/08/2021 03:57 AM    GFR est AA 70 03/08/2021 03:57 AM    GFR est non-AA 60 03/08/2021 03:57 AM    Calcium 9.6 03/08/2021 03:57 AM        Cholesterol  Lab Results   Component Value Date/Time    Cholesterol, total 127 03/08/2021 03:57 AM    HDL Cholesterol 50 03/08/2021 03:57 AM    LDL, calculated 63 03/08/2021 03:57 AM    LDL, calculated 62 02/27/2020 09:00 AM Triglyceride 69 03/08/2021 03:57 AM       Health Maintenance:   Health Maintenance   Topic Date Due   • Foot Exam Q1  Never done   • Eye Exam Retinal or Dilated  Never done   • COVID-19 Vaccine (1) Never done   • Colorectal Cancer Screening Combo  Never done   • AAA Screening 65-76 YO Male Smoking Patients  Never done   • DTaP/Tdap/Td series (1 - Tdap) 07/01/2021 (Originally 2/9/1971)   • Shingrix Vaccine Age 50> (1 of 2) 07/16/2021 (Originally 2/9/2000)   • Medicare Yearly Exam  12/29/2021   • A1C test (Diabetic or Prediabetic)  03/08/2022   • MICROALBUMIN Q1  03/08/2022   • Lipid Screen  03/08/2022   • Flu Vaccine  Completed   • Pneumococcal 65+ years  Completed       I have discussed the diagnosis with the patient and the intended plan as seen in the above orders.  The patient has received an After-Visit Summary and questions were answered concerning future plans.     An After Visit Summary was printed and given to the patient.    All diagnosis have been discussed with the patient and all of the patient's questions have been answered.     Follow-up and Dispositions    · Return if symptoms worsen or fail to improve.           Beverley Martinez, [facility]  [address] [id number]

## 2021-03-31 ENCOUNTER — TELEPHONE (OUTPATIENT)
Dept: FAMILY MEDICINE CLINIC | Age: 71
End: 2021-03-31

## 2021-04-01 RX ORDER — FAMOTIDINE 40 MG/1
TABLET, FILM COATED ORAL
Qty: 30 TAB | Refills: 0 | Status: SHIPPED | OUTPATIENT
Start: 2021-04-01 | End: 2021-05-03

## 2021-04-12 RX ORDER — OMEPRAZOLE 20 MG/1
CAPSULE, DELAYED RELEASE ORAL
Qty: 180 CAP | Refills: 0 | Status: SHIPPED | OUTPATIENT
Start: 2021-04-12 | End: 2021-07-05

## 2021-04-15 ENCOUNTER — TELEPHONE (OUTPATIENT)
Dept: FAMILY MEDICINE CLINIC | Age: 71
End: 2021-04-15

## 2021-04-15 DIAGNOSIS — F02.80 DEMENTIA ASSOCIATED WITH OTHER UNDERLYING DISEASE WITHOUT BEHAVIORAL DISTURBANCE (HCC): Primary | ICD-10-CM

## 2021-04-15 NOTE — TELEPHONE ENCOUNTER
[facility] Nurse, Raza Cortés, called to check the status of [facility] referral ... she is requesting assisted living aid .    Call her back at [phone] ext [phone]

## 2021-04-19 NOTE — TELEPHONE ENCOUNTER
Called alt number given [phone] and spoke with [place]. Patient states he need home health care to help with medications and other things. Katina Sun informed me the request is for home health to assess his needs and abilities.

## 2021-04-23 NOTE — PROGRESS NOTES
His A1C is still elevated, please find out if he has taken metformin in the past, did he have an intolerance? He is currently on insulin and amaryl.

## 2021-04-30 ENCOUNTER — TELEPHONE (OUTPATIENT)
Dept: FAMILY MEDICINE CLINIC | Age: 71
End: 2021-04-30

## 2021-04-30 DIAGNOSIS — I10 ESSENTIAL HYPERTENSION WITH GOAL BLOOD PRESSURE LESS THAN 130/80: ICD-10-CM

## 2021-04-30 DIAGNOSIS — Z79.4 DIABETES MELLITUS, TYPE II, INSULIN DEPENDENT (HCC): ICD-10-CM

## 2021-04-30 DIAGNOSIS — M54.50 CHRONIC MIDLINE LOW BACK PAIN WITHOUT SCIATICA: Primary | ICD-10-CM

## 2021-04-30 DIAGNOSIS — G89.29 CHRONIC MIDLINE LOW BACK PAIN WITHOUT SCIATICA: Primary | ICD-10-CM

## 2021-04-30 DIAGNOSIS — E11.9 DIABETES MELLITUS, TYPE II, INSULIN DEPENDENT (HCC): ICD-10-CM

## 2021-04-30 NOTE — TELEPHONE ENCOUNTER
Received a call from Pati Barnes with [employer] calling on behalf of patient Paloma Mckeon. Pati Barnes stated  Mr Paloma Mckeon is requesting a prescription for a rollator walker with a seat to be sent to [employer]. Pati Barnes stated if you need to reach out to her she could be reached at [phone] ext [phone]. Mr Paloma Mckeon is also requesting a return call back at [phone] with updates.

## 2021-05-03 DIAGNOSIS — I10 ESSENTIAL HYPERTENSION WITH GOAL BLOOD PRESSURE LESS THAN 130/80: ICD-10-CM

## 2021-05-03 RX ORDER — TERAZOSIN 1 MG/1
1 CAPSULE ORAL
Qty: 90 CAP | Refills: 1 | Status: SHIPPED | OUTPATIENT
Start: 2021-05-03 | End: 2021-07-14 | Stop reason: ALTCHOICE

## 2021-05-03 RX ORDER — AMLODIPINE BESYLATE 10 MG/1
TABLET ORAL
Qty: 90 TAB | Refills: 1 | Status: SHIPPED | OUTPATIENT
Start: 2021-05-03 | End: 2021-10-07 | Stop reason: SDUPTHER

## 2021-05-03 RX ORDER — LISINOPRIL 20 MG/1
TABLET ORAL
Qty: 90 TAB | Refills: 1 | Status: SHIPPED | OUTPATIENT
Start: 2021-05-03 | End: 2021-10-07 | Stop reason: SDUPTHER

## 2021-05-03 RX ORDER — INSULIN GLARGINE 100 [IU]/ML
13 INJECTION, SOLUTION SUBCUTANEOUS
Qty: 5 PEN | Refills: 1 | Status: SHIPPED | OUTPATIENT
Start: 2021-05-03 | End: 2021-05-25 | Stop reason: SDUPTHER

## 2021-05-06 DIAGNOSIS — Z79.4 DIABETES MELLITUS, TYPE II, INSULIN DEPENDENT (HCC): Primary | ICD-10-CM

## 2021-05-06 DIAGNOSIS — E11.9 DIABETES MELLITUS, TYPE II, INSULIN DEPENDENT (HCC): Primary | ICD-10-CM

## 2021-05-06 RX ORDER — CALCIUM CITRATE/VITAMIN D3 200MG-6.25
TABLET ORAL
Qty: 100 STRIP | Refills: 3 | Status: SHIPPED | OUTPATIENT
Start: 2021-05-06 | End: 2021-05-07 | Stop reason: SDUPTHER

## 2021-05-06 RX ORDER — CALCIUM CITRATE/VITAMIN D3 200MG-6.25
TABLET ORAL
Qty: 100 STRIP | Refills: 3 | Status: CANCELLED | OUTPATIENT
Start: 2021-05-06

## 2021-05-06 RX ORDER — METFORMIN HYDROCHLORIDE 500 MG/1
500 TABLET, EXTENDED RELEASE ORAL
Qty: 180 TAB | Refills: 0 | Status: SHIPPED | OUTPATIENT
Start: 2021-05-06 | End: 2021-05-07 | Stop reason: SDUPTHER

## 2021-05-06 NOTE — PROGRESS NOTES
Please let him know I am starting him on metformin 500mg two times a day with meals and he should take vitamin d3 1000units once a day available over the counter.

## 2021-05-07 DIAGNOSIS — E11.9 DIABETES MELLITUS, TYPE II, INSULIN DEPENDENT (HCC): ICD-10-CM

## 2021-05-07 DIAGNOSIS — Z79.4 DIABETES MELLITUS, TYPE II, INSULIN DEPENDENT (HCC): ICD-10-CM

## 2021-05-07 RX ORDER — METFORMIN HYDROCHLORIDE 500 MG/1
500 TABLET, EXTENDED RELEASE ORAL
Qty: 180 TAB | Refills: 0 | Status: SHIPPED
Start: 2021-05-07 | End: 2021-05-21

## 2021-05-07 RX ORDER — CALCIUM CITRATE/VITAMIN D3 200MG-6.25
TABLET ORAL
Qty: 100 STRIP | Refills: 3 | Status: SHIPPED | OUTPATIENT
Start: 2021-05-07 | End: 2021-05-11 | Stop reason: CLARIF

## 2021-05-07 NOTE — TELEPHONE ENCOUNTER
Patient has changed insurances and is no longer with [employer].  Please resend both prescriptions to [id number] [facility]

## 2021-05-11 ENCOUNTER — TELEPHONE (OUTPATIENT)
Dept: FAMILY MEDICINE CLINIC | Age: 71
End: 2021-05-11

## 2021-05-11 DIAGNOSIS — M54.50 CHRONIC MIDLINE LOW BACK PAIN WITHOUT SCIATICA: ICD-10-CM

## 2021-05-11 DIAGNOSIS — F02.80 DEMENTIA ASSOCIATED WITH OTHER UNDERLYING DISEASE WITHOUT BEHAVIORAL DISTURBANCE (HCC): ICD-10-CM

## 2021-05-11 DIAGNOSIS — R26.9 ABNORMAL GAIT: ICD-10-CM

## 2021-05-11 DIAGNOSIS — Z79.4 DIABETES MELLITUS, TYPE II, INSULIN DEPENDENT (HCC): Primary | ICD-10-CM

## 2021-05-11 DIAGNOSIS — E11.9 DIABETES MELLITUS, TYPE II, INSULIN DEPENDENT (HCC): Primary | ICD-10-CM

## 2021-05-11 DIAGNOSIS — G89.29 CHRONIC MIDLINE LOW BACK PAIN WITHOUT SCIATICA: ICD-10-CM

## 2021-05-11 RX ORDER — BLOOD SUGAR DIAGNOSTIC
STRIP MISCELLANEOUS
Qty: 300 STRIP | Refills: 3 | Status: SHIPPED | OUTPATIENT
Start: 2021-05-11 | End: 2021-06-30 | Stop reason: SDUPTHER

## 2021-05-11 NOTE — TELEPHONE ENCOUNTER
Called mr godwin to clarify. He clarified he has the [address] he just got his metformin today and will start tomorrow  We talked about: if he missed a dose of medication, do not double dose. Gave him name and number of neuropsye Dr. Raya Mcgrath, [phone]. Will place orders for shower chair and bedside commode    All diagnosis have been discussed with the patient and all of the patient's questions have been answered.

## 2021-05-17 ENCOUNTER — TELEPHONE (OUTPATIENT)
Dept: FAMILY MEDICINE CLINIC | Age: 71
End: 2021-05-17

## 2021-05-17 DIAGNOSIS — N30.00 ACUTE CYSTITIS WITHOUT HEMATURIA: Primary | ICD-10-CM

## 2021-05-17 RX ORDER — SULFAMETHOXAZOLE AND TRIMETHOPRIM 800; 160 MG/1; MG/1
1 TABLET ORAL 2 TIMES DAILY
Qty: 6 TAB | Refills: 0 | Status: SHIPPED | OUTPATIENT
Start: 2021-05-17 | End: 2021-05-20

## 2021-05-17 NOTE — TELEPHONE ENCOUNTER
Returned call. He is unable to tolerate metformin due to excessive diarrhea. Stop metformin, increase insulin to 14 units a day, continue 7 units before meals  Change ceftin to bactrim for uti: diagnosed in ED.    Keep f/u appt for 6/8/21    He verbalized understanding of the above instrucetions

## 2021-05-17 NOTE — TELEPHONE ENCOUNTER
Patient went to ER for an allergic reaction to   metFORMIN ER (GLUCOPHAGE XR) 500 mg     The ER doctor informed him to stop taking this medication. Please advise. Patient couldn't do a virtual and did have enough time to call a ride.  Please advise

## 2021-05-21 ENCOUNTER — TELEPHONE (OUTPATIENT)
Dept: FAMILY MEDICINE CLINIC | Age: 71
End: 2021-05-21

## 2021-05-21 NOTE — TELEPHONE ENCOUNTER
Received a call from [facility] a  with Sheree Calero stating patient Mable Carpio need a referral for a endocrinologist for diabetes education. Shaista stated the home health nurse told patient Mable Carpio he may be taking to much insulin because he has been having episodes of hypoglycemia. Shaista stated patient was last seen in the emergency room at [facility] for hypoglycemic on 5/14/21. Patient Mable Carpio is requesting a return call back at [phone].

## 2021-05-21 NOTE — TELEPHONE ENCOUNTER
Returned call. Blood sugar was fine all last week. Yesterday AM his glucose was 73, he called [facility] nurse line and was informed to eat. He was making breakfast and drank some orange juice. He rechecked after OJ and glucose went down to 63. He ate breakfast. Rechecked 30 min after glucose 127. Taking glimepiride 4mg and lantus 13units and 7 units before each meal.   Checking his blood sugar three times a day. Instructed to only continue the glargine 13units a day. Continue glimepiride. Stop the aspart 7 units AC. Advised him to check his glucose at leas 2 times a day and call if it is running too high. He verbalized understanding. Has neuropsych visit scheduled 6/15/21. Dr. Zeny Moon.

## 2021-05-25 DIAGNOSIS — E11.9 DIABETES MELLITUS, TYPE II, INSULIN DEPENDENT (HCC): ICD-10-CM

## 2021-05-25 DIAGNOSIS — Z79.4 DIABETES MELLITUS, TYPE II, INSULIN DEPENDENT (HCC): ICD-10-CM

## 2021-05-25 RX ORDER — INSULIN GLARGINE 100 [IU]/ML
13 INJECTION, SOLUTION SUBCUTANEOUS
Qty: 5 PEN | Refills: 1 | Status: SHIPPED | OUTPATIENT
Start: 2021-05-25 | End: 2021-05-27 | Stop reason: SDUPTHER

## 2021-05-27 ENCOUNTER — TELEPHONE (OUTPATIENT)
Dept: FAMILY MEDICINE CLINIC | Age: 71
End: 2021-05-27

## 2021-05-27 DIAGNOSIS — Z79.4 DIABETES MELLITUS, TYPE II, INSULIN DEPENDENT (HCC): Primary | ICD-10-CM

## 2021-05-27 DIAGNOSIS — E11.9 DIABETES MELLITUS, TYPE II, INSULIN DEPENDENT (HCC): Primary | ICD-10-CM

## 2021-05-27 RX ORDER — INSULIN GLARGINE 100 [IU]/ML
10 INJECTION, SOLUTION SUBCUTANEOUS
Qty: 5 PEN | Refills: 1 | Status: SHIPPED | OUTPATIENT
Start: 2021-05-27 | End: 2021-09-21

## 2021-05-27 NOTE — TELEPHONE ENCOUNTER
Mr. Jase Mckenzie called me to f/u on his diabetes. Informed to stop the metformin. He did stop the aspart insulin. He continued the glargine 13units QHS and glimepiride 4mg a day. He continues to have low blood sugars in the morning. Yesterday his morning glucose was 73 and 1-2 hours after eating it was 240. Instructions given and he was asked to write this down:  Stop glimepiride. Decrease glargine to 10units  Start januvia 50mg once a day.      Called [facility] pharmacy, spoke with Tamara Pappas    Informed Mr. Jase Mckenzie to call the on-call physician over the weekend if he has any more problems with his blood sugar, he verbalized understanding and is in agreement with the above plan

## 2021-06-08 ENCOUNTER — OFFICE VISIT (OUTPATIENT)
Dept: FAMILY MEDICINE CLINIC | Age: 71
End: 2021-06-08
Payer: COMMERCIAL

## 2021-06-08 VITALS
SYSTOLIC BLOOD PRESSURE: 124 MMHG | TEMPERATURE: 98.4 F | OXYGEN SATURATION: 100 % | BODY MASS INDEX: 21.86 KG/M2 | HEART RATE: 84 BPM | DIASTOLIC BLOOD PRESSURE: 84 MMHG | WEIGHT: 148 LBS

## 2021-06-08 DIAGNOSIS — E11.9 DIABETES MELLITUS, TYPE II, INSULIN DEPENDENT (HCC): Primary | ICD-10-CM

## 2021-06-08 DIAGNOSIS — N40.1 BENIGN PROSTATIC HYPERPLASIA WITH URINARY FREQUENCY: ICD-10-CM

## 2021-06-08 DIAGNOSIS — Z13.31 POSITIVE DEPRESSION SCREENING: ICD-10-CM

## 2021-06-08 DIAGNOSIS — I10 ESSENTIAL HYPERTENSION WITH GOAL BLOOD PRESSURE LESS THAN 130/80: ICD-10-CM

## 2021-06-08 DIAGNOSIS — F02.80 DEMENTIA ASSOCIATED WITH OTHER UNDERLYING DISEASE WITHOUT BEHAVIORAL DISTURBANCE (HCC): ICD-10-CM

## 2021-06-08 DIAGNOSIS — Z79.4 DIABETES MELLITUS, TYPE II, INSULIN DEPENDENT (HCC): Primary | ICD-10-CM

## 2021-06-08 DIAGNOSIS — R35.0 BENIGN PROSTATIC HYPERPLASIA WITH URINARY FREQUENCY: ICD-10-CM

## 2021-06-08 PROCEDURE — 99214 OFFICE O/P EST MOD 30 MIN: CPT | Performed by: NURSE PRACTITIONER

## 2021-06-08 PROCEDURE — 3052F HG A1C>EQUAL 8.0%<EQUAL 9.0%: CPT | Performed by: NURSE PRACTITIONER

## 2021-06-08 NOTE — PROGRESS NOTES
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 Have you been to the ER, urgent care clinic since your last visit? Hospitalized since your last visit? Yes Patient states he was in [address] general 2 weeks ago  severe side affect from metformin    2. Have you seen or consulted any other health care providers outside of the [address] [place] [address] since your last visit? Include any pap smears or colon screening.  No     Health Maintenance Due   Topic Date Due    Foot Exam Q1  Never done    Eye Exam Retinal or Dilated  Never done    Colorectal Cancer Screening Combo  Never done    AAA Screening 73-69 YO Male Smoking Patients  Never done

## 2021-06-08 NOTE — PROGRESS NOTES
[place]               [address]               [phone]      Mina Nunes is a 70 y.o. male and presents with No chief complaint on file. Assessment/Plan:    Diagnoses and all orders for this visit:    1. Diabetes mellitus, type II, insulin dependent ([facility] [address]  He endorses his medication compliance and checks his blood sugars regularly he has not had any more hypoglycemic episodes however at times his blood sugar does get as high as 240  Since the last visit in March he was started on Metformin however he had an intolerance to this and ended up in the emergency department with dehydration secondary to diarrhea, I subsequently stopped the Metformin and increased the insulin however I received a phone call that he was having problems with his blood sugar going low therefore I stop the short acting insulin and continued with the Lantus and glimepiride, he contacted me and stated he continued to have hypoglycemia therefore I discontinued the glimepiride and started him on Januvia and advised him to continue with the Lantus insulin, he had verbalized understanding and this is his current regimen at this time, unfortunately it is too soon today to recheck his A1c we will check with his next visit  2. Essential hypertension with goal blood pressure less than 130/80  Endorses medication compliance, labs with next visit, blood pressure stable continue same medications  3. Benign prostatic hyperplasia with urinary frequency  -     REFERRAL TO UROLOGY  Endorses frequent urination, this is a problem even when his blood sugar is controlled, has a history of BPH, will refer to urology  4.  Dementia associated with other underlying disease without behavioral disturbance ([facility] [address]  He is extremely concerned about this diagnosis, states he feels like his memory is getting worse, I do believe some of his memory issues or his perceived memory issues is related to his severe anxiety however the anxiety could be related to his dementia, he is to see a neuropsychologist on [address] 15, await notes from that visit  5. Positive depression screening  Depression screen positive, PHQ 9 Score: 18, C-SSRS completed and additional evaluation and assessment performed  He had a breakdown in the office waiting room today, states it was due to his personal care provider not showing up however the owner of the company came instead, his ride to the office never showed up however the personal care provider gave him a ride to and from his office visit today in addition to waiting for his visit to start in general.  He was brought back to the examination room prematurely where I sat down with him and gave him emotional support while he was able to calm himself back down we subsequently proceeded and completed the visit. Follow-up and Dispositions    · Return in about 3 months (around 9/8/2021) for DM, HLD, HTN, 30 min, office only. Subjective:    Frequent urination  Goes all night long and throughout the day  Has lessened since getting his bp under control    Anxiety  Break down in waiting room  Was impatient waiting for his visit and got upset  transport did not pick him up, caregiver drove him to visit and will pick him up  0700 caregiver did not show up  Owner came over and helped him, [facility],  branch [phone]  Started personal care yesterday, approved for 56 hours  Spent 15 min calming him down    Memory problems  He is concerned and feels like his memory is getting worse  He called dr. Arvin Taylor, they are not taking new patients  Has appointment with Joey Lambert on 6/15/21    DMII-   Patient reports medication compliance Daily  Diabetic diet compliance most of the time  Patient monitors blood sugars regularly TID   Reports am fasting sugars range before meals or 2 hours after lunch and at bedtime: 139 this am, as high as 240, avg 130   Denies hypoglycemic episodes yes  Denies polyuria, polydipsia, paraesthesia, vision changes? yes     Diabetic Foot and Eye Exam HM Status   Topic Date Due    Diabetic Foot Care  Never done    Eye Exam  Never done     Hemoglobin A1c   Date Value Ref Range Status   03/08/2021 8.6 (H) 4.8 - 5.6 % Final     Comment:              Prediabetes: 5.7 - 6.4           Diabetes: >6.4           Glycemic control for adults with diabetes: <7.0     ]  Creatinine, urine   Date Value Ref Range Status   03/08/2021 113.1 Not Estab. mg/dL Final     Microalb/Creat ratio (ug/mg creat.)   Date Value Ref Range Status   03/08/2021 24 0 - 29 mg/g creat Final     Comment:                            Normal:                0 -  29                         Moderately increased: 30 - 300                         Severely increased:       >300     02/27/2020 19 0 - 29 mg/g creat Final     Comment:                            Normal:                0 -  29                         Moderately increased: 30 - 300                         Severely increased:       >300                **Please note reference interval change**       Key Antihyperglycemic Medications             SITagliptin (JANUVIA) 50 mg tablet (Taking) Take 1 Tablet by mouth daily. insulin glargine (Lantus Solostar U-100 Insulin) 100 unit/mL (3 mL) inpn (Taking) 10 Units by SubCUTAneous route nightly. Hypertension:   Patient reports taking medications as instructed. yes   Medication side effects noted. no  Headache upon wakening. no   Home BP monitoring in range of 779'K systolic. Do you experience chest pain/pressure or SOB with exertion? no  Maintain a low salt diet? yes  Key CAD CHF Meds             terazosin (HYTRIN) 1 mg capsule (Taking) Take 1 Cap by mouth nightly. amLODIPine (NORVASC) 10 mg tablet (Taking) TAKE ONE TABLET BY MOUTH EVERY DAY    lisinopriL (PRINIVIL, ZESTRIL) 20 mg tablet (Taking) TAKE ONE TABLET BY MOUTH EVERY DAY            ROS:     ROS  As stated in HPI, otherwise all others negative. The problem list was updated as a part of today's visit. Patient Active Problem List   Diagnosis Code    Essential hypertension with goal blood pressure less than 130/80 I10    Diabetes mellitus, type II, insulin dependent (HCC) E11.9, Z79.4    Gastroesophageal reflux disease K21.9    BPH (benign prostatic hyperplasia) N40.0    Low back pain M54.5    Microcytic anemia D50.9    NS (nuclear sclerosis) H25.10    Severe depression (HCC) F32.2    Tarsal tunnel syndrome G57.50    Dementia associated with other underlying disease without behavioral disturbance (HCC) F02.80    Status post partial gastrectomy Z90.3    History of hepatitis C Z86.19    History of drug abuse ([facility] [address] F19.11    Paranoid schizophrenia, chronic condition with acute exacerbation (HCC) F20.0       The PSH, FH were reviewed. SH:  Social History     Tobacco Use    Smoking status: Former Smoker     Packs/day: 0.25     Years: 1.00     Pack years: 0.25     Types: Cigarettes     Quit date: 5/24/2011     Years since quitting: 10.0    Smokeless tobacco: Never Used   Vaping Use    Vaping Use: Never used   Substance Use Topics    Alcohol use: No    Drug use: Yes     Types: Marijuana     Comment: H/O Heroin addiction Abstinent       Medications/Allergies:  Current Outpatient Medications on File Prior to Visit   Medication Sig Dispense Refill    SITagliptin (JANUVIA) 50 mg tablet Take 1 Tablet by mouth daily. 90 Tablet 0    insulin glargine (Lantus Solostar U-100 Insulin) 100 unit/mL (3 mL) inpn 10 Units by SubCUTAneous route nightly. 5 Pen 1    glucose blood VI test strips (Accu-Chek Laurel Plus test strp) strip Check glucose three times a day, uses insulin 300 Strip 3    famotidine (PEPCID) 40 mg tablet TAKE ONE TABLET BY MOUTH EVERY DAY 90 Tab 0    terazosin (HYTRIN) 1 mg capsule Take 1 Cap by mouth nightly.  90 Cap 1    amLODIPine (NORVASC) 10 mg tablet TAKE ONE TABLET BY MOUTH EVERY DAY 90 Tab 1    lisinopriL (PRINIVIL, ZESTRIL) 20 mg tablet TAKE ONE TABLET BY MOUTH EVERY DAY 90 Tab 1    omeprazole (PRILOSEC) 20 mg capsule TAKE 2 CAPSULES EVERY  Cap 0    DULoxetine (CYMBALTA) 30 mg capsule TAKE ONE CAPSULE BY MOUTH EVERY DAY      lancets (TRUEplus Lancets) 33 gauge misc TRUEplus Lancets 33 gauge 100 Lancet 3    Blood-Glucose Meter (True Metrix Glucose Meter) misc Test blood sugar twice daily 1 Each 0    ondansetron (ZOFRAN ODT) 4 mg disintegrating tablet Take 1 Tab by mouth every eight (8) hours as needed for Nausea or Vomiting. 90 Tab 0    Insulin Needles, Disposable, 31 gauge x 3/16\" ndle Use 1 new pen needle each time to inject insulin subcutaneously 4 times daily 150 Pen Needle 2    acetaminophen (TYLENOL) 325 mg tablet Take 2 Tabs by mouth every six (6) hours as needed for Pain. 20 Tab 0     No current facility-administered medications on file prior to visit. Allergies   Allergen Reactions    Ibuprofen Anaphylaxis    Fentanyl Other (comments)     Blurred vision    Metformin Diarrhea    Neurontin [Gabapentin] Other (comments)     Blurred Vision      Penicillins Swelling    Valium [Diazepam] Swelling       Objective:  Visit Vitals  /84   Pulse 84   Temp 98.4 °F (36.9 °C) (Temporal)   Wt 148 lb (67.1 kg)   SpO2 100%   BMI 21.86 kg/m²    Body mass index is 21.86 kg/m². Physical assessment  Physical Exam  Vitals and nursing note reviewed. Eyes:      Conjunctiva/sclera: Conjunctivae normal.      Pupils: Pupils are equal, round, and reactive to light. Neck:      Vascular: No JVD. Cardiovascular:      Rate and Rhythm: Normal rate and regular rhythm. Heart sounds: Normal heart sounds. No murmur heard. No friction rub. No gallop. Pulmonary:      Effort: Pulmonary effort is normal.      Breath sounds: Normal breath sounds. Musculoskeletal:         General: Normal range of motion. Cervical back: Normal range of motion. Skin:     General: Skin is warm and dry.    Neurological: Mental Status: He is alert and oriented to person, place, and time. Psychiatric:         Attention and Perception: Attention normal.         Mood and Affect: Mood is anxious. Affect is tearful. Speech: Speech normal.         Behavior: Behavior normal. Behavior is cooperative.          Cognition and Memory: Memory normal.         Judgment: Judgment normal.      Comments: He was initially brought back to the patient room due to being tearful and shaking all over however after I spoke with him for approximately 15 minutes he was able to relax and calm down           Labwork and Ancillary Studies:    CBC w/Diff  Lab Results   Component Value Date/Time    WBC 6.2 03/08/2021 03:57 AM    HGB 12.2 (L) 03/08/2021 03:57 AM    PLATELET 011 48/20/7053 03:57 AM         Basic Metabolic Profile  Lab Results   Component Value Date/Time    Sodium 142 03/08/2021 03:57 AM    Potassium 4.2 03/08/2021 03:57 AM    Chloride 105 03/08/2021 03:57 AM    CO2 23 03/08/2021 03:57 AM    Anion gap 6 01/07/2020 05:38 AM    Glucose 261 (H) 03/08/2021 03:57 AM    BUN 13 03/08/2021 03:57 AM    Creatinine 1.20 03/08/2021 03:57 AM    BUN/Creatinine ratio 11 03/08/2021 03:57 AM    GFR est AA 70 03/08/2021 03:57 AM    GFR est non-AA 60 03/08/2021 03:57 AM    Calcium 9.6 03/08/2021 03:57 AM        Cholesterol  Lab Results   Component Value Date/Time    Cholesterol, total 127 03/08/2021 03:57 AM    HDL Cholesterol 50 03/08/2021 03:57 AM    LDL, calculated 63 03/08/2021 03:57 AM    LDL, calculated 62 02/27/2020 09:00 AM    Triglyceride 69 03/08/2021 03:57 AM       Health Maintenance:   Health Maintenance   Topic Date Due    Foot Exam Q1  Never done    Eye Exam Retinal or Dilated  Never done    Colorectal Cancer Screening Combo  Never done    AAA Screening 73-69 YO Male Smoking Patients  Never done    DTaP/Tdap/Td series (1 - Tdap) 07/01/2021 (Originally 2/9/1971)    Shingrix Vaccine Age 50> (1 of 2) 07/16/2021 (Originally 2/9/2000)   [employer] Medicare Yearly Exam  12/29/2021    A1C test (Diabetic or Prediabetic)  03/08/2022    MICROALBUMIN Q1  03/08/2022    Lipid Screen  03/08/2022    Flu Vaccine  Completed    COVID-19 Vaccine  Completed    Pneumococcal 65+ years  Completed       I have discussed the diagnosis with the patient and the intended plan as seen in the above orders. The patient has received an After-Visit Summary and questions were answered concerning future plans. An After Visit Summary was printed and given to the patient. All diagnosis have been discussed with the patient and all of the patient's questions have been answered. Follow-up and Dispositions    · Return in about 3 months (around 9/8/2021) for DM, HLD, HTN, 30 min, office only. Flor Farrell, [facility]  [address] [facility]   [address] [facility] [address]  [id number]

## 2021-06-30 ENCOUNTER — TELEPHONE (OUTPATIENT)
Dept: FAMILY MEDICINE CLINIC | Age: 71
End: 2021-06-30

## 2021-06-30 DIAGNOSIS — E11.9 DIABETES MELLITUS, TYPE II, INSULIN DEPENDENT (HCC): ICD-10-CM

## 2021-06-30 DIAGNOSIS — Z79.4 DIABETES MELLITUS, TYPE II, INSULIN DEPENDENT (HCC): ICD-10-CM

## 2021-06-30 RX ORDER — BLOOD SUGAR DIAGNOSTIC
STRIP MISCELLANEOUS
Qty: 300 STRIP | Refills: 3 | Status: SHIPPED
Start: 2021-06-30 | End: 2021-12-17

## 2021-06-30 RX ORDER — BLOOD-GLUCOSE METER
EACH MISCELLANEOUS
Qty: 1 EACH | Refills: 0 | Status: SHIPPED
Start: 2021-06-30 | End: 2021-12-17

## 2021-06-30 NOTE — TELEPHONE ENCOUNTER
Spoke with patient's insurance company Ms. Niesha Gomez from [employer] cross [employer] state's \" Kyle Pedraza is complaining about an referral that was sent to the neurologist and he can only have Accucheck and One Touch strip and and lancets because that is what his insurance take. He is allowed to have 100 strips and lancet's per month. \" Ms. Niesha Gomez was advise to wait until NP Joel Tapia comes back to inform him of his new referral because  the patient had an reffral to the Neurologist back in march but the patient declined due to lack of transportation.

## 2021-06-30 NOTE — TELEPHONE ENCOUNTER
Will look into his referral requests, now that he has transportation it will be easier to get him in with a neuropsychologist to evaluate his dementia.   Prescription was sent for Accu-Chek test strips refills and an order for a new Accu-Chek Laurel plus machine to the pharmacy on file

## 2021-07-05 RX ORDER — OMEPRAZOLE 20 MG/1
CAPSULE, DELAYED RELEASE ORAL
Qty: 180 CAPSULE | Refills: 0 | Status: SHIPPED | OUTPATIENT
Start: 2021-07-05 | End: 2021-10-07 | Stop reason: SDUPTHER

## 2021-08-04 ENCOUNTER — TELEPHONE (OUTPATIENT)
Dept: FAMILY MEDICINE CLINIC | Age: 71
End: 2021-08-04

## 2021-08-09 ENCOUNTER — PATIENT OUTREACH (OUTPATIENT)
Dept: CASE MANAGEMENT | Age: 71
End: 2021-08-09

## 2021-08-09 NOTE — PROGRESS NOTES
.  Complex Case Management      Date/Time:  2021 3:17 PM    Method of communication with patient:phone    [address] [facility] ([facility]) contacted the patient by telephone to perform Ambulatory Care Coordination. Verified name and  (PHI) with patient as identifiers. Provided introduction to self, and explanation of the Ambulatory Care Manager's role. Patient asked [facility] to also speak with his caregiver Ms Franchesca Kent ( [facility]), both were on speaker phone. [facility] reviewed emergency contacts and updated as Mother, Lauren Farrar is now , removed. Daughter Ruth Fernandes is his POA. Lives in [place]. Contact # [phone]. Patient voicing if he calls she will be there. His Caregiver has been changed to Franchesca Kent  since 3 weeks ago and she has his verbal permission to hear and discuss his health issues. Reviewed most recent clinic visit w/ patient who verbalized understanding. Patient given an opportunity to ask questions. Patient requesting to have his Nurse Practitioner Aziza Arias have someone to help him with his stuttering . It is getting worst and it is starting to frustrate him. Making him short,angry with people he is trying to talk to, like his girlfriend. He is getting more forgetful as well. He has fallen several times also. This has been going on for about 6 weeks or more. Denies headache, numbness in extremities. No blurred vision or facial dropping. Sometimes he knows what he want to say but it don't come out like he inattend. [facility] said maybe a speech therapist may help. Today's B/P 138/90  Blood glucose 173 this morning . [facility] reviewed and reconcile al medications. Patient verbalizing he gives himself his insulin 10 units Lantuss every night before bedtime. He eats a snack at night, last night crackles and soda, dinner fried chicken,coeslaw mash potatoes and orange soda. ( Last HGA1c 8.6 on 3/8/2021) Patient also takes Januvia 50 mg every morning.  ACM reviewed some diet changes ( no sodas, not so much starches) do leg lifts when sitting and do stretches. Patient requested a neuropsychology office closer to his home as he has to get medical transport. Referral to [facility] has been placed. Care giver Roro Celeste voicing she called [facility] concerning the appointment as they said they had not received the fax H&P. Ms Andra Barbosa called back after contacting patients PCP office. They had received the information just waiting on which provider will be assigned. Maybe after seeing them  SLP can be consulted. Top Challenges reviewed with the patient   1. Diabetes>>> Educate on diet,exercise and continued medication compliance. 2. Stuttering, falls ( No injury),more forgetful>>>Patient requesting assistance ? SLP  >>> ACM will update PCP     The patient agrees to contact the PCP office or the [address] [place] [address] for questions related to their healthcare. Provided contact information for future reference. Disease Specific:   N/A    Home Health Active: No    DME Active: Yes    Barriers to care? lack of knowledge about disease    Advance Care Planning:   Does patient have an Advance Directive:  not on file; education provided     Medication(s):   Medication reconciliation was performed with Patient and care giver, who verbalizes understanding of administration of home medications. There were no barriers to obtaining medications identified at this time. Referral to Pharm D needed: no     Current Outpatient Medications   Medication Sig    tamsulosin (FLOMAX) 0.4 mg capsule Take 1 Capsule by mouth daily (after dinner).  omeprazole (PRILOSEC) 20 mg capsule TAKE TWO CAPSULES BY MOUTH EVERY DAY    glucose blood VI test strips (Accu-Chek Laurel Plus test strp) strip Check glucose three times a day, uses insulin    Blood-Glucose Meter (Accu-Chek Laurel Plus Meter) misc Check glucose three times a day, uses insulin    SITagliptin (JANUVIA) 50 mg tablet Take 1 Tablet by mouth daily.     insulin glargine (Lantus Solostar U-100 Insulin) 100 unit/mL (3 mL) inpn 10 Units by SubCUTAneous route nightly.  famotidine (PEPCID) 40 mg tablet TAKE ONE TABLET BY MOUTH EVERY DAY    amLODIPine (NORVASC) 10 mg tablet TAKE ONE TABLET BY MOUTH EVERY DAY    lisinopriL (PRINIVIL, ZESTRIL) 20 mg tablet TAKE ONE TABLET BY MOUTH EVERY DAY    lancets (TRUEplus Lancets) 33 gauge misc TRUEplus Lancets 33 gauge    ondansetron (ZOFRAN ODT) 4 mg disintegrating tablet Take 1 Tab by mouth every eight (8) hours as needed for Nausea or Vomiting.  Insulin Needles, Disposable, 31 gauge x 3/16\" ndle Use 1 new pen needle each time to inject insulin subcutaneously 4 times daily    acetaminophen (TYLENOL) 325 mg tablet Take 2 Tabs by mouth every six (6) hours as needed for Pain. No current facility-administered medications for this visit. [facility] follow up appointment(s):   Future Appointments   Date Time Provider Devika Haley   9/2/2021 10:15 AM MD Leila Parker   9/21/2021  1:45 PM BEN Crum BS AMB        Non-[facility] follow up appointment(s): Referral to [facility] and [address] [place] [address] ( Neuropsychology Division   . Goals        Diabetes     Knowledge and adherence of medication (ie. action, side effects, missed dose, etc.)      Patient will be compliant with all medications        To decrease or maintain patient's biometrics:  HgA1c < 7 mg/dL, /80 or less. LDL of less than 100 and/or less than 70 in a patient with CAD. Patient will get A1C <7 as it is 8.6 ( 3/8/2021)  Patent will get b/p at baseline 130/80 ( current 138/90)  Diet and exercise       Understands and monitors blood sugar levels      Patient will check blood sugars daily and PRN         General     Follows diet recommendations and achieves/maintains goal weight      Self-schedules and keeps appointments       Patient will keep all providers appointments.

## 2021-08-10 PROBLEM — H43.822 VITREOMACULAR ADHESION, LEFT EYE: Status: ACTIVE | Noted: 2021-08-10

## 2021-08-10 PROBLEM — B19.20 VIRAL HEPATITIS C: Status: ACTIVE | Noted: 2021-08-10

## 2021-08-10 PROBLEM — H43.811 POSTERIOR VITREOUS DETACHMENT, RIGHT EYE: Status: ACTIVE | Noted: 2021-08-10

## 2021-08-10 PROBLEM — I10 HYPERTENSIVE DISORDER: Status: ACTIVE | Noted: 2021-08-10

## 2021-08-10 RX ORDER — LEVOFLOXACIN 750 MG/1
1 TABLET ORAL AS DIRECTED
COMMUNITY
Start: 2021-07-15 | End: 2021-09-13 | Stop reason: ALTCHOICE

## 2021-08-16 ENCOUNTER — PATIENT OUTREACH (OUTPATIENT)
Dept: CASE MANAGEMENT | Age: 71
End: 2021-08-16

## 2021-08-16 ENCOUNTER — TELEPHONE (OUTPATIENT)
Dept: FAMILY MEDICINE CLINIC | Age: 71
End: 2021-08-16

## 2021-08-16 DIAGNOSIS — E11.9 DIABETES MELLITUS, TYPE II, INSULIN DEPENDENT (HCC): Primary | ICD-10-CM

## 2021-08-16 DIAGNOSIS — Z79.4 DIABETES MELLITUS, TYPE II, INSULIN DEPENDENT (HCC): Primary | ICD-10-CM

## 2021-08-16 NOTE — TELEPHONE ENCOUNTER
Returned call due to elevated blood glucose. He continues to have an aide come to the house but she does not know anything about what foods are high in sugar. Patient states he was drinking regular soda and did not know he was not supposed to, he now will drink water. States he saw Dr. Jeronimo Kunz with psychiatry. States he has appointment with neuropsychology on 8/27/21. Increase lantus to 12 units a day. Increase januvia to 100mg a day. Referral to diabetic diet teaching placed. Reminded him his follow up is 9/21/21. He verbalized understanding and is in agreement with the plan of care. All diagnosis have been discussed with the patient and all of the patient's questions have been answered.

## 2021-08-16 NOTE — PROGRESS NOTES
.Complex Case Management  Follow-Up       Date/Time:   8/16/2021  9:39AM       Patient called ACM concerned about elevated blood sugar     Patient reported:   He has called the 24 hour hot line but was placed on hold. What do they asked you to call and put you on hold? ACM side Mondays calls from the weekends,not sure. Patient then said he took his blood sugar 8:30 it was 310 before eating and when he finished around 9:10 it went up to 451. He got so frustrated that he told his care giver CHARLI Rouse and she went to his note book got ACM number and called it. His blood sugar Friday morning 243, Sat 183 did not take it on yesterday Sunday. Patient was asked what did he have for snack last night and di he take his Lantus as prescribed. Patient voiced he fell asleep about 9:00 and he took his insulin at 8:30 pm. He did not eat any snack. He did drink sodas during the day. ACM reminded him that he is not to drink any regular sodas. Said he forgot. He has not had any formal  Diabetic education. ACM instructed patient about taking blood sugars 2 hours after meals not 15 or 20 minutes. Some foods are converting to sugar. ACM will route message to PCP Ade Carey NP, she may have to re-evaluate his diabetic medications ( Lantus 10 units at bedtime and Januva 50 units daily) with last HGBA1C @ 8.6 ( 3/8/2021) May not be enough to keep diabetes in check. DM Diet:  Portion Control  No sodas, juices  No concentrated sweets ( Cookies, candies,cakes)  More Vegetable than starch and   ACM instructed patient to call her with his blood sugar results before lunch ( 1:00pm) Increase his water intake, voicing urine is dark yellow. Pertinent concerns:See above       Specialist appointments since last outreach?  No   If so, specialist and date: N/A    Next PCP Appointment: 9/21/2021    Medications:     New medications since last outreach: No  Does patient need refills on any medications: no   Medication changes since last outreach (dose adjustments or discontinued meds): no      Home Health company: [employer]    Barriers to care? Education for diabetes  . Goals Addressed                 This Visit's Progress       Diabetes     Knowledge and adherence of medication (ie. action, side effects, missed dose, etc.)   On track     Patient will be compliant with all medications        Understands and monitors blood sugar levels   On track     Patient will check blood sugars daily and PRN         General     Follows diet recommendations and achieves/maintains goal weight        8/16/2021  Patient will maintain ADA diet            Patient reminded that there are physicians on call 24 hours a day / 7 days a week (M-F 5pm to 8am and from Friday 5pm until Monday 8a for the weekend) should the patient have questions or concerns.

## 2021-08-17 ENCOUNTER — PATIENT OUTREACH (OUTPATIENT)
Dept: CASE MANAGEMENT | Age: 71
End: 2021-08-17

## 2021-08-17 NOTE — PROGRESS NOTES
Patient called [facility] to give update for blood sugar and neuropsychology appointment for  8/27/21. ACM had to return call. Patient asked his PCA( Ms CHARLI Perera ) to speak with ACM over speaker phone for he could listen. Update: 1. His PCP Luiza Link NP called him on yesterday concerning ACM note to her. She increased his Lantus to 12 units and Januvia to 100 mg daily. He did take the 12 units Lantus last night. Blood sugar 183 this morning. 2. His PCA Ms Gabrielle Perera cleaned out patient pantry. Throw away all sodas,replaced with water and flavored packs. Fresh vegetables instead of can  3. [facility]  called appointment has been made for 8/27 @ 9:30 AM. PCA has given information to patients daughter as she will meet him there. 4. Medical transport will be called on Monday since appoint is Friday. AC gave instructions to monitor blood sugar consistently for the next 2 weeks and record above 200 call ACM otherwise ACM will call patient for update on 8/31. Patient verbalizing understanding.

## 2021-08-18 DIAGNOSIS — Z79.4 DIABETES MELLITUS, TYPE II, INSULIN DEPENDENT (HCC): Primary | ICD-10-CM

## 2021-08-18 DIAGNOSIS — E11.9 DIABETES MELLITUS, TYPE II, INSULIN DEPENDENT (HCC): Primary | ICD-10-CM

## 2021-08-23 ENCOUNTER — OFFICE VISIT (OUTPATIENT)
Dept: FAMILY MEDICINE CLINIC | Age: 71
End: 2021-08-23

## 2021-08-23 ENCOUNTER — PATIENT OUTREACH (OUTPATIENT)
Dept: CASE MANAGEMENT | Age: 71
End: 2021-08-23

## 2021-08-23 DIAGNOSIS — Z79.4 DIABETES MELLITUS, TYPE II, INSULIN DEPENDENT (HCC): Primary | ICD-10-CM

## 2021-08-23 DIAGNOSIS — E11.9 DIABETES MELLITUS, TYPE II, INSULIN DEPENDENT (HCC): Primary | ICD-10-CM

## 2021-08-23 NOTE — PROGRESS NOTES
Patient and PCA, Ms Sebastian Mathur called [facility] with a question. Dose his insurance pay for transportation to the store ( Grocery store,pharmacy)? . It was told to him by someone. He called his transport company and she said it depends on his insurance. [facility] said that no insurance pays for transportation other than to physician office,medical appointments like x-rays. Not to any stores, but call the back of his insurance card and asked them, but don't get his hopes up  Patient voicing he has an appointment with a Sara Figueroa diabetic doctor, did not get her last name. AC looked at appointment for today. Patient is Scheduled an 11:00 PM appointment with Carmelina Calderon PhD. PCA asked for her direct extension AC said it looks like she works out of the [employer] gave [phone] the office number instructed to ask for Carmelina Calderon by name and they may transfer the call if she is there at the time, or ask Her today for her contact number before leaving. B/S Friday [phone],Sat,[phone]. EMIGDIO,248 today 208. No sodas did eat peanut butter crackers and Gatorade and milk for his snacks. [facility] will call patient 8/31/2021 as planned for update. Patient continues with 12 units Lantus and 100 mg Januvia.

## 2021-08-23 NOTE — PATIENT INSTRUCTIONS
Your Visit Summary:   - Limit portion sizes of foods with a lot of sugar and carbs  - Keep a log of what you had for dinner and compare that to your morning blood sugar readings to see how the foods you are eating are impacting your blood sugars    Your blood sugar goals:  - Fasting (first thing in the morning)  blood sugar: 80 - 130   - 1 to 2 hours after a meal: less than 180     If you experience symptoms of low blood sugar (example: less than 70):  - Confirm low reading by checking your blood sugar.   - Then treat with 15 grams of carbohydrates (one-half cup of juice or regular soda, or 4-5 glucose tablets). - Wait 15 minutes to recheck blood sugar.   - Then eat a protein containing meal/snack to prevent another low blood sugar episode. (example: peanut butter + crackers)    Nutrition:  - When reviewing a nutrition label, focus on the serving size, total calories, fat (and type of fats), total carbohydrates, sugar (and amount of added sugar), amount of fiber (good for your digestive), and amount of protein. Refer to your nutrition label guide for more information.  - For a meal : max 45 - 60 grams of carbohydrates  - For a snack: max 15 grams of carbohydrates  - Reduce amount of saturated and trans fat. Consider more unsaturated fat options as they are better for your heart health.    - Have at least 1 serving of lean fat protein with each meal.    - Increase fiber intake slowly to prevent constipation.   - Substitute fruit juices for the whole fruit    Low carb snack ideas (15 grams total carb or less):     String cheese or babybel with 6 crackers   4 peanut butter crackers   3 cups of popcorn   1 cup raw vegetables with hummus or ranch dip (just need to watch how much dip you use)   Nuts   2 rice cakes   Celery with peanut butter or cream cheese   String cheese with 1 serving of fruit   Greek yogurt (look at label to make sure < 15 gram carb)   Plain greek yogurt with fresh berries added  [place] protein bar   Whisps parmesan cheese crisps   Hard boiled egg   Cottage cheese   Tuna salad lettuce roll-ups   Deli meat roll-ups with slice of cheese   Sugar Free Jello   Glucerna shake (16 grams)    Glucerna hunger smart shake (16 grams)   Ensure protein max shake   Fruit (1 serving/15 grams)   1/2 banana, sue, or grapefruit    1/3 melon (small cantaloupe)   1 slice or 1 cup of honeydew melon   1 slice or 1 and 1/4 cups of watermelon    1 small apple, peach, orange or pear   2 small tangerines   1 cup of raspberries   3/4 cup of blackberries, blueberries or pineapples   1/2 cup of fruit juice, pears, applesauce, or mangos   17 small grapes   12 cherries    Be careful with the glucerna products as they differ in the total carbs depending on the product (some are intended as meal replacements not snacks). Make sure you look at the total carbs on the label as products can differ. Physical Activity:  - Aim for 30 minutes of consistent, moderately intensive, physical activity a day for 5 days or an average of 150 minutes per week. - Start slow, increase as tolerated. For example: Walk every day, working up to 30 minutes of brisk walking, 5 days a weekor split the 30 minutes into two 15-minute or three 10-minute walks. - If you sit for a long time, get up and move/stretch every 90 minutes.

## 2021-08-23 NOTE — PROGRESS NOTES
Pharmacy Progress Note - Diabetes Management    S/O: Mr. Maricel Randle is a 70 y.o. male, referred by Jocelyn Prieto NP, was seen today for diabetes education and management visit. Patient's last A1c was 8.6% in March. Patient presents today with his PCA  to discuss dietary interventions to improve blood sugars. Brief History: Patient receives daily assistance from a PCA, who helps his prepare his breakfast and lunch. Patient reports a friend helps him with dinner. Patient and PCA requesting information about dietary changes to help the patient get his A1c to goal. PCA states she wants to make sure all people assisting patient with his meals are on the same page. Lantus and Januvia doses were both increased last week. Patient reports blood sugars have remained in the 200s despite these changes. Current anti-hyperglycemic regimen includes:    - Lantus 12 units at bedtime  - Januvia 100 mg daily    Previous medication therapies and reason for discontinuation:  - Metformin - caused significant diarrhea  - Glimepiride - hypoglycemia  - Meal-time insulin - hypoglycemia    Self Monitoring Blood Glucose (SMBG) or CGM:  - Patient tests once daily in the morning. Readings from the past 2-weeks are averaging in the low to mid 200s, but ranged from 160-300s. - Denies hypoglycemia. Nutrition/Lifestyle Modifications:  Diet:  - Eats 3 meals/day ;  - Breakfast: 2 eggs, whiteside or a sausage link, 2 pieces of toast w/ jelly  - Lunch:  Ham and cheese sandwich w/ lettuce, potato chips  - Dinner: Varies. Cheeseburger, chicken, or pork with rice, and corn on the cob.  - Snacks: 1 pack of peanut butter crackers, 1 fruit cocktail cup in 100% fruit juice, strawberries, or watermelon.   - Beverages: water, gatorade zero, and occasionally has a regular powerade. Patient reports he got rid of all of the soda in his home. Labs/Vitals:   Wt Readings from Last 3 Encounters:   07/14/21 140 lb (63.5 kg)   06/08/21 148 lb (67.1 kg)   03/30/21 150 lb (68 kg)     BP Readings from Last 3 Encounters:   06/08/21 124/84   03/30/21 100/65   03/08/21 125/74       Lab Results   Component Value Date/Time    Sodium 142 03/08/2021 03:57 AM    Potassium 4.2 03/08/2021 03:57 AM    Chloride 105 03/08/2021 03:57 AM    CO2 23 03/08/2021 03:57 AM    Anion gap 6 01/07/2020 05:38 AM    Glucose 261 (H) 03/08/2021 03:57 AM    BUN 13 03/08/2021 03:57 AM    Creatinine 1.20 03/08/2021 03:57 AM    BUN/Creatinine ratio 11 03/08/2021 03:57 AM    GFR est AA 70 03/08/2021 03:57 AM    GFR est non-AA 60 03/08/2021 03:57 AM    Calcium 9.6 03/08/2021 03:57 AM    Bilirubin, total <0.2 03/08/2021 03:57 AM    Alk. phosphatase 134 (H) 03/08/2021 03:57 AM    Protein, total 7.7 03/08/2021 03:57 AM    Albumin 4.9 (H) 03/08/2021 03:57 AM    Globulin 3.3 01/07/2020 05:38 AM    A-G Ratio 1.8 03/08/2021 03:57 AM    ALT (SGPT) 48 (H) 03/08/2021 03:57 AM       Lab Results   Component Value Date/Time    Cholesterol, total 127 03/08/2021 03:57 AM    HDL Cholesterol 50 03/08/2021 03:57 AM    LDL, calculated 63 03/08/2021 03:57 AM    LDL, calculated 62 02/27/2020 09:00 AM    VLDL, calculated 14 03/08/2021 03:57 AM    VLDL, calculated 8 02/27/2020 09:00 AM    Triglyceride 69 03/08/2021 03:57 AM       HbA1c:  Lab Results   Component Value Date/Time    Hemoglobin A1c 8.6 (H) 03/08/2021 03:57 AM    Hemoglobin A1c (POC) 6.4 06/23/2020 02:50 PM       A/P:    Diabetes Mainagement:  - Per ADA guidelines, Pt's A1c is not at goal of < 7%. - Current fasting SMBG readings are consistently above goal, averaging in the 200s. - Given patients current SMBG trend, recommend implementing one of the following medication therapy changes:  Consider increasing Lantus to 14 units daily, then increasing by 2 units every week until fasting blood glucose in target range, monitoring closely for hypoglycemia. Alternatively, could consider adding pioglitazone or switching from Januvia to a GLP1 RA.  This could help reduce patient's insulin dose and hypoglycemia risk. Would avoid SGLT2i given patients history of frequent urination.   - Recommend patient continue to check blood glucose in the morning, but also keep a log of what he had for dinner/snacks the night before to better identify the impact his diet has on his blood sugars. Nutrition/Lifestyle Modifications:  - Identified and discussed several areas for improvement in current diet, focusing on limiting carbohydrate portion sizes with meals and avoiding sugar-sweetened beverages. - Educated patient and PCA about reading nutrition labels w/ a focus on carbohydrate and sugar content.  - Recommended moderating and diversifying carbohydrate intake to max of~45-60 grams/meal and 15 grams/snack ; at least 1 serving of protein/meal.   - Provided resources on nutrition label, portion sizes, and food groups to assist patient w/ decision making/meal planning. Patient verbalized understanding of the information presented and all of the patients questions were answered. AVS was handed to the patient. Patient advised to call the office with any additional questions or concerns. Notifications of recommendations will be sent to Deepti Lozano NP for review. Will follow-up with patient as needed.     Thank you,  Rafa Benites, [address] [place] [address] in place: No   Recommendation Provided To: Provider: 2 via Note to Provider    Intervention Detail: Dose Adjustment: 1, reason: Therapy Optimization and New Rx: 1, reason: Needs Additional Therapy   Gap Closed?: No   Intervention Accepted By: Provider: 2   Time Spent (min): 60

## 2021-08-23 NOTE — Clinical Note
Mr. Trice Clifford fasting blood sugars are consistently running in the 200s, have ranged from 160-300s in the past week since adjusting his insulin and Januvia dose. Discussed diet in depth and identified some areas for improvement that patient and his PCA feel they could implement easily. However, patient will also likely need additional medication therapy to reach glycemic targets. I see a couple different routes you could go. You could continue to titrate the insulin (increasing to Lantus 14 units QHS and titrating by 2 units every week until FBG reaches target), or to minimize the risk of hypoglycemia, you could consider adding pioglitazone or switching from Januvia to a GLP1RA. I would avoid SGLT2i due to risk of exacerbating pts urinary frequency/urgency. Please let me know if I can be of any further assistance.

## 2021-09-04 DIAGNOSIS — K21.9 GASTROESOPHAGEAL REFLUX DISEASE, UNSPECIFIED WHETHER ESOPHAGITIS PRESENT: ICD-10-CM

## 2021-09-06 RX ORDER — FAMOTIDINE 40 MG/1
TABLET, FILM COATED ORAL
Qty: 90 TABLET | Refills: 0 | Status: SHIPPED | OUTPATIENT
Start: 2021-09-06 | End: 2021-11-03

## 2021-09-09 ENCOUNTER — PATIENT OUTREACH (OUTPATIENT)
Dept: CASE MANAGEMENT | Age: 71
End: 2021-09-09

## 2021-09-09 NOTE — PROGRESS NOTES
PAMELA made follow up with patient,but his aide had to leave to see about her son in school/ Asking ACJAZ to call back tomorrow or he will call AC back tomorrow. He did say he received his new rollator but can't assemble it so he called the people who delivered it and they will come out to do it. He did see the PharmD Educator at [facility]' office ( PCP) and his Urologist. He will update PAMELA tomorrow when his aide is back,because he forgets everything that they said. His blood sugar still running high 225 this morning. The nurse from [facility] cross could not place the [facility] on saying it has needles and it is not in her job description. To take it with him to his doctor office and they will have the nurse to assist him with it. PAMELA said then she was not a nurse. PAMELA will try to call back tomorrow but please call her if no call by noon. Patient voicing understanding.

## 2021-09-10 ENCOUNTER — PATIENT OUTREACH (OUTPATIENT)
Dept: CASE MANAGEMENT | Age: 71
End: 2021-09-10

## 2021-09-10 RX ORDER — FLASH GLUCOSE SCANNING READER
1 EACH MISCELLANEOUS ONCE
COMMUNITY
Start: 2021-09-10

## 2021-09-10 NOTE — PROGRESS NOTES
.Complex Case Management  Follow-Up       Date/Time:   9/10/2021  11:05 AM      Ambulatory Care Manager ( ACM) contacted patient for Complex Case Management  follow up. Spoke to patient and PCA , Mrs Isidro Hammonds  Introduced self/role and reason for call. Patient called ACM earlier but had not done his blood sugar as of yet. ACM asked that he do his blood glucose, take his medications and call back before noon around 11. PCA as well as patient voicing understanding. ACM called patient now:  Patient reported:   He and his PCA messed up on trying to apply the sensor when he first got the kit on 8/26/2021. He had several things going on ACM was to call him post PharmD visit on  8/23 and  bx on 9/2 by Dr Sofie Patel today. ACM read [facility] notes with recommendations. Will route to PCP [place] Magallon , is recommending Increasing Lantus to 14 units and titrate up by 2 units weekly until desired/ or targeted glucose level monitor for hypoglycemia or add Pioglitazone or switching from Januvia to a GLP 1RA  Patient denies any concerns from Dr Ailyn Suazo visit with his prostate bx. ACM reviewed blood glucose for this morning it was 232. Dinner he ate 3 chicken wings with tips removed, hand full of fries. Snack for bedtime slice of Pineapple cake with water. Did take 12 units of Lantus. 's notes. ACM went over his paper work from [facility] concerning meals what to eat and not as well as how much. Patient rechecked blood glucose while ACM was on phone it was 336. Patient ate 2 slices of whiteside,2 eggs, 1 slice toast with jelly and grits Class of 2% milk about 10 oz. ACM reminded patient that he is not to eat anything with concentrated sweets  Like the ( Pineapple Cake) limiting his milk intake. Pertinent concerns: How to apply the [place] [employer]. ACM explained to patient and PCA how to apply sensor, and set up [place] ( 150-200 ,but reader only went to 180. Can not use reader for 60 minutes.  They both did very well.  Marilyn Khan from his supply company said for him to bring the new rollator to them and they will put it together for him. They sent it not assembled. ACM asked to call her back with his afternnoon reading before the end of the day. Specialist appointments since last outreach? Yes   If so, specialist and date: 8/23 Pharm D Diabetic Education  9/2 With Urology Dr Sarai Monsivais for prostate BX. Next PCP Appointment: 9/21/2021    Medications:     New medications since last outreach: No  Does patient need refills on any medications: No   Medication changes since last outreach (dose adjustments or discontinued meds): None  [address]: Has PCA    Barriers to care? Forgets, very independent on his own    . Goals Addressed                 This Visit's Progress       Diabetes     Knowledge and adherence of medication (ie. action, side effects, missed dose, etc.)   On track     Patient will be compliant with all medications        Understands and monitors blood sugar levels        Patient will check blood sugars daily and   9/10/2021  Patient is to check blood glucose 4 times daily now that he has the [address] recommendations and achieves/maintains goal weight   Improving     8/16/2021  Patient will maintain ADA diet  9/10/2021  Patient continues to eat food with concentrated sweets. Met with PharmD 8/23/2021. ACM reviewed ADA diet with him and his PCA Mrs Wilson Rogel Self-schedules and keeps appointments    On track     Patient will keep all providers appointments. Patient reminded that there are physicians on call 24 hours a day / 7 days a week (M-F 5pm to 8am and from Friday 5pm until Monday 8a for the weekend) should the patient have questions or concerns.

## 2021-09-13 ENCOUNTER — HOSPITAL ENCOUNTER (OUTPATIENT)
Dept: NUTRITION | Age: 71
Discharge: HOME | End: 2021-09-13
Payer: MEDICAID

## 2021-09-13 PROCEDURE — 97802 MEDICAL NUTRITION INDIV IN: CPT

## 2021-09-13 NOTE — PROGRESS NOTES
..  [address] [place] [address], [address] [place] [address]   Nutrition Assessment  Medical Nutrition Therapy   Outpatient Initial Evaluation         Patient Name: Trey Caceres : 1950   Treatment Diagnosis: Type 2 DM   Referral Source: Arielle Gray NP Start of Care [facility]): 2021     Gender: male Age: 70 y.o. Ht: 68 in Wt: 140  lb  kg   BMI: 21.29 BMR   Male 1364 BMR Female      Past Medical History:  Paranoid schizophrenia, dementia, depression, extreme anxiety, hepatitis C, HTN, partial gastrectomy, the patient says he was just diagnosed with prostate cancer today      Pertinent Medications:   Januvia 50 mg, Lantus 12 units, BP medicines, Cymbalta      Biochemical Data:   Lab Results   Component Value Date/Time    Hemoglobin A1c 8.6 (H) 2021 03:57 AM    Hemoglobin A1c (POC) 6.4 2020 02:50 PM     Lab Results   Component Value Date/Time    Sodium 142 2021 03:57 AM    Potassium 4.2 2021 03:57 AM    Chloride 105 2021 03:57 AM    CO2 23 2021 03:57 AM    Anion gap 6 2020 05:38 AM    Glucose 261 (H) 2021 03:57 AM    BUN 13 2021 03:57 AM    Creatinine 1.20 2021 03:57 AM    BUN/Creatinine ratio 11 2021 03:57 AM    GFR est AA 70 2021 03:57 AM    GFR est non-AA 60 2021 03:57 AM    Calcium 9.6 2021 03:57 AM    Bilirubin, total <0.2 2021 03:57 AM    Alk.  phosphatase 134 (H) 2021 03:57 AM    Protein, total 7.7 2021 03:57 AM    Albumin 4.9 (H) 2021 03:57 AM    Globulin 3.3 2020 05:38 AM    A-G Ratio 1.8 2021 03:57 AM    ALT (SGPT) 48 (H) 2021 03:57 AM    AST (SGOT) 43 (H) 2021 03:57 AM     Lab Results   Component Value Date/Time    Cholesterol, total 127 2021 03:57 AM    HDL Cholesterol 50 2021 03:57 AM    LDL, calculated 63 2021 03:57 AM    LDL, calculated 62 2020 09:00 AM    VLDL, calculated 14 03/08/2021 03:57 AM    VLDL, calculated 8 02/27/2020 09:00 AM    Triglyceride 69 03/08/2021 03:57 AM     Lab Results   Component Value Date/Time    ALT (SGPT) 48 (H) 03/08/2021 03:57 AM    AST (SGOT) 43 (H) 03/08/2021 03:57 AM    Alk. phosphatase 134 (H) 03/08/2021 03:57 AM    Bilirubin, direct 0.1 06/20/2010 08:00 PM    Bilirubin, total <0.2 03/08/2021 03:57 AM     Lab Results   Component Value Date/Time    Creatinine 1.20 03/08/2021 03:57 AM     Lab Results   Component Value Date/Time    BUN 13 03/08/2021 03:57 AM     Lab Results   Component Value Date/Time    Microalb/Creat ratio (ug/mg creat.) 24 03/08/2021 03:57 AM        Assessment:    The patient is here today with his caretaker Ms. Dheeraj Polo. He has dementia and is slow to speak. He told me he gets very frustrated and angry when his words don't come to him. He was tearful today at this visit. He said he was just diagnosed with prostate cancer today. His caretaker is very supportive. She is with him everyday until 3 PM. She will fix him a meal at lunch and he cooks his dinner in the oven. The patient says he no longer uses the stove because he had an accident recently. The patient will walk for exercise but he stays close to his apartment building because he got lost one time. The caretaker says she tries to get him out when she is there. Food & Nutrition: The patient says he can't eat very much because he only has 1/3 of his stomach. Did not tell me why. The caretaker provided me with his diet history. (She prepares his meals).    Breakfast: 2 slices of whiteside, 2 scrambled eggs (sometimes with cheese), 1 cup of grits OR 1 slice of whole grain bread and 1/2 cup of grits and 16 oz milk  (The caretaker says she tries not to exceed 60 g carbs at meals)   Lunch: sandwich (ham and cheese, lettuce and tomato), handful of chips  Dinner: chicken, hamburger or pork chops, mashed potatoes or corn on the cob  The patient does not love vegetables but he will eat, cabbage greens and cucumbers and tomatoes (with vinegar)  The caretaker makes the meal and then the patient heats it up in the oven. Fruit: he will eat fruit cup packed in natural juice, water melon and strawberries  The patient recently gave up all sodas. He will drink water but he doesn't love it. Estimate Needs   Calories: 1500  Protein: 94 Carbs: 169 Fat: 50   Kcal/day  g/day  g/day  g/day        percent: 25  45  30               Nutrition Diagnosis   Altered nutrition related lab values related to type 2 DM as evidenced by A1C = 8.6%       Nutrition Intervention &  Education: Educated patient and caretaker on diabetes diet with plate method guidelines. Encouraged the patient to eat at least 3 times per day, spacing meals no more than 4-5 hours apart (2-3 hours in-between snacks). Discussed that 1/2 the plate should include non-starchy vegetables, 1/4 plate should be lean protein, and 1/4 of plate should be carbohydrate. Provided the patient with list of macro-nutrient sources (CHO, pro, and fat) and appropriate amounts of CHO to be consumed at each meal and snack. Encouraged the caretaker to measure out 1 cup of carb choice with measuring cups to familiarize with appropriate serving sizes. Balance plate with vegetables the patient will eat and lean protein (not fried). Suggested the patient include protein source with all meals and snacks. Include more non-starchy vegetables and 2 fruit servings per day (eat a variety). Don't drink calories: avoid fruit juice, regular sodas, sweet tea, Gatorade. We discussed the importance of timing of meals. Discussed importance of 150 minutes per week physical activity (get patient out for walks daily).      Handouts Provided: [x]  Carbohydrates  [x]  Protein  [x]  Non-starchy Vegetables  []  Food Label  [x]  Meal and Snack Ideas  []  Food Journals [x]  Diabetes  []  Cholesterol  []  Sodium  [x]  Gen Nutr Guidelines  []  SBGM Guidelines  [x]  Others: My Healthy Plate, Sugar Shockers, Planning Healthy Meals Bear Frankie)   Information Reviewed with: Patient and caretaker   Readiness to Change Stage: []  Pre-contemplative    []  Contemplative  []  Preparation               [x]  Action                  []  Maintenance   Potential Barriers to Learning: [x]  Decline in memory    []  Language barrier   []  Other:  [x]  Emotional                  []  Limited mobility  []  Lack of motivation     [x]  cognitive impairment (dementia)   Expected Compliance: Good- The caretaker has only been with the patient since July. She takes excellent care of him and she is very patient. Suspect blood sugars will improve now that the patient is no longer drinking soda. Nutritional Goal - To promote lifestyle changes to result in:    []  Weight loss  [x]  Improved diabetic control  []  Decreased cholesterol levels  []  Decreased blood pressure  []  Weight maintenance []  Preventing any interactions associated with food allergies  []  Adequate weight gain toward goal weight  []  Other:        Patient Goals:   1. No sugary drinks. Consume 64 oz water daily  2. Eat plenty of non-starchy vegetables (whatever the patient will eat)  3. Only 2 servings of fruit per day  4. 3 balanced meals per day (following plate method guidelines)  5. Daily exercise (try walking after meals) Goal is 150 minutes per week physical activity      Dietitian Signature: Juan Pablo Gamez RD,[facility] Date: 9/13/2021   Follow-up: Caretaker will contact RD if she needs more help. It is difficult to get the patient out for these appointments. He relies on a transportation service.  (He also gets agitated and stressed but he was not today; just tearful.)  Time: 2:22 PM

## 2021-09-14 ENCOUNTER — PATIENT OUTREACH (OUTPATIENT)
Dept: CASE MANAGEMENT | Age: 71
End: 2021-09-14

## 2021-09-17 ENCOUNTER — PATIENT OUTREACH (OUTPATIENT)
Dept: CASE MANAGEMENT | Age: 71
End: 2021-09-17

## 2021-09-17 NOTE — PROGRESS NOTES
.Date/Time:  9/17/2021 12:48 PM    Method of communication with patient:phone    [facility][note 2] ( [facility]) made out reach to  patient by telephone for  Complex Case Management  ( CCM). Provided introduction to self, and explanation of the Ambulatory Care . Contact at [phone] anytime Monday thru Friday 08:00-4:30. [facility] called Caregiver as well as she informed [facility] that she will be working with patient 7 days a week. [facility] checked care everywhere no ED or admission updated.

## 2021-09-20 ENCOUNTER — PATIENT OUTREACH (OUTPATIENT)
Dept: CASE MANAGEMENT | Age: 71
End: 2021-09-20

## 2021-09-20 NOTE — PROGRESS NOTES
.Complex Case Management  Follow-Up       Date/Time:   9/20/2021  1:17PM       Patient with his PCA Ms Wilmer Pepper called [facility]. He was down stairs with other apartment residents eating Crabs. His Aide went home early as she was not feeling well. He did not want her around if she was sick. Patient reported:   Blood sugar was 189. He is loving the [facility], well his fingers are loving it as he don't have to stick them no more. He is going with the [facility]. His CM said he don't have to worry about any transportation or cost of any medication. All of that will be taken care of. Pertinent concerns:None     Specialist appointments since last outreach? No   If so, specialist and date: N/A    Next PCP Appointment: 9/21/2021  Medications:     New medications since last outreach: No  Does patient need refills on any medications: No   Medication changes since last outreach (dose adjustments or discontinued meds): No      [employer] company: [address] [employer]    Barriers to care? None        Patient reminded that there are physicians on call 24 hours a day / 7 days a week (M-F 5pm to 8am and from Friday 5pm until Monday 8a for the weekend) should the patient have questions or concerns.

## 2021-09-21 ENCOUNTER — OFFICE VISIT (OUTPATIENT)
Dept: FAMILY MEDICINE CLINIC | Age: 71
End: 2021-09-21
Payer: MEDICAID

## 2021-09-21 VITALS
WEIGHT: 150 LBS | HEART RATE: 66 BPM | TEMPERATURE: 98.6 F | RESPIRATION RATE: 18 BRPM | OXYGEN SATURATION: 96 % | HEIGHT: 68 IN | BODY MASS INDEX: 22.73 KG/M2 | DIASTOLIC BLOOD PRESSURE: 84 MMHG | SYSTOLIC BLOOD PRESSURE: 128 MMHG

## 2021-09-21 DIAGNOSIS — E11.65 TYPE 2 DIABETES MELLITUS WITH HYPERGLYCEMIA, WITH LONG-TERM CURRENT USE OF INSULIN (HCC): ICD-10-CM

## 2021-09-21 DIAGNOSIS — Z23 ENCOUNTER FOR IMMUNIZATION: ICD-10-CM

## 2021-09-21 DIAGNOSIS — Z79.4 TYPE 2 DIABETES MELLITUS WITH HYPERGLYCEMIA, WITH LONG-TERM CURRENT USE OF INSULIN (HCC): ICD-10-CM

## 2021-09-21 DIAGNOSIS — Z79.4 DIABETES MELLITUS, TYPE II, INSULIN DEPENDENT (HCC): ICD-10-CM

## 2021-09-21 DIAGNOSIS — E11.9 DIABETES MELLITUS, TYPE II, INSULIN DEPENDENT (HCC): ICD-10-CM

## 2021-09-21 DIAGNOSIS — Z13.31 POSITIVE DEPRESSION SCREENING: ICD-10-CM

## 2021-09-21 DIAGNOSIS — I10 ESSENTIAL HYPERTENSION WITH GOAL BLOOD PRESSURE LESS THAN 130/80: Primary | ICD-10-CM

## 2021-09-21 DIAGNOSIS — C61 PROSTATE CANCER (HCC): ICD-10-CM

## 2021-09-21 DIAGNOSIS — F34.1 DYSTHYMIA: ICD-10-CM

## 2021-09-21 PROCEDURE — 90471 IMMUNIZATION ADMIN: CPT | Performed by: NURSE PRACTITIONER

## 2021-09-21 PROCEDURE — 3052F HG A1C>EQUAL 8.0%<EQUAL 9.0%: CPT | Performed by: NURSE PRACTITIONER

## 2021-09-21 PROCEDURE — 99214 OFFICE O/P EST MOD 30 MIN: CPT | Performed by: NURSE PRACTITIONER

## 2021-09-21 PROCEDURE — 90694 VACC AIIV4 NO PRSRV 0.5ML IM: CPT | Performed by: NURSE PRACTITIONER

## 2021-09-21 RX ORDER — FLASH GLUCOSE SENSOR
KIT MISCELLANEOUS
Qty: 1 KIT | Refills: 0 | Status: SHIPPED | OUTPATIENT
Start: 2021-09-21 | End: 2022-03-04 | Stop reason: SDUPTHER

## 2021-09-21 RX ORDER — INSULIN GLARGINE 100 [IU]/ML
14 INJECTION, SOLUTION SUBCUTANEOUS
Qty: 5 PEN | Refills: 1
Start: 2021-09-21 | End: 2021-10-07

## 2021-09-21 RX ORDER — PIOGLITAZONEHYDROCHLORIDE 15 MG/1
15 TABLET ORAL DAILY
Qty: 90 TABLET | Refills: 0 | Status: SHIPPED | OUTPATIENT
Start: 2021-09-21 | End: 2021-10-07 | Stop reason: SDUPTHER

## 2021-09-21 NOTE — PROGRESS NOTES
Room Number 8    Did patient bring someone? Yes Caregiver    Did patient use DME equipment? Yes walker    Did you take your medication ? Yes     1. Have you been to the ER, urgent care clinic since your last visit? Hospitalized since your last visit? Yes [facility] for stomach pains     2. Have you seen or consulted any other health care providers outside of the [address] [place] [address] since your last visit? Include any pap smears or colon screening. Yes     Health Maintenance Due   Topic Date Due    Foot Exam Q1  Never done    Eye Exam Retinal or Dilated  Never done    DTaP/Tdap/Td series (1 - Tdap) Never done    Shingrix Vaccine Age 50> (1 of 2) Never done    AAA Screening 73-69 YO Male Smoking Patients  Never done    Colorectal Cancer Screening Combo  06/23/2018    Flu Vaccine (1) 09/01/2021       Patient was given VIS for review, consent was obtained and per orders of NP. Raysa Madrigal, injection of Fluad given by VANGIE Neumann. Patient observed. No signs nor symptoms of any adverse reactions. Patient tolerated injection well.

## 2021-09-21 NOTE — PROGRESS NOTES
[facility] [address]               [phone]      Demi Fitzgerald is a 70 y.o. male and presents with Follow Up Chronic Condition (3 months), Diabetes (277 bs fasting at 7:45 am  and just now at 1:45 365), Hypertension, and Cholesterol Problem       Assessment/Plan:    Diagnoses and all orders for this visit:    1. Essential hypertension with goal blood pressure less than 561/58  -     METABOLIC PANEL, COMPREHENSIVE; Future  Endorses medication compliance, Follow up labs prior to next visit and Blood pressure stable, continue same medications   2. Type 2 diabetes mellitus with hyperglycemia, with long-term current use of insulin (HCC)  -     pioglitazone (ACTOS) 15 mg tablet; Take 1 Tablet by mouth daily.  -     LIPID PANEL; Future  -     HEMOGLOBIN A1C WITH EAG; Future  Endorses medication compliance, Follow up labs prior to next visit and since our last visit the patient has had formal diabetic diet teache, had visit with pharm D and is being follow by  complex case management. He does have the freestyle jarrett in place and the PCA demonstrates proper usage, she is working with the patient on using the Gamez when she is not there  They did have trouble with placing the first sensor, one additional sensor has been ordered  Will increase his lantus to 14 units a day and add actos, he is intolerant to metformin  Instructed to increase lantus 2 units ever seven days until he has an average glucose of 120-140, advised if his glucose starts to go too low to decrease the insulin by 2 units  Advised him to call anytime if there are questions  3. Prostate cancer [facility])  this is a new diagnosis, he is followed by Dr. Nikolay Jeffrey [facility], he is currently scheduled for further testing to check for metastisis and has seen oncology   4. Dysthymia  patient states his depression is r/t his new diagnosis of prostate cancer and the fear of the unknown.  active listening and emotional support provided. luckly he has a PCA, Ms.  Kaushal Arshad who seems to do very well with him and helps to keep him calm with all the new information coming his way. No medications at this time. 5. Positive depression screening  Depression screen positive, PHQ 9 Score: 12, C-SSRS completed and additional evaluation and assessment performed. 6. Diabetes mellitus, type II, insulin dependent (HCC)  -     flash glucose sensor (FreeStyle Juan Carlos 14 Day Sensor) kit; Check blood sugar 4 times a day. -     insulin glargine (Lantus Solostar U-100 Insulin) 100 unit/mL (3 mL) inpn; 14 Units by SubCUTAneous route nightly. Refill provided   7. Encounter for immunization  -     DC IMMUNIZ ADMIN,1 SINGLE/COMB VAC/TOXOID  -     FLU (FLUAD QUAD INFLUENZA VACCINE,QUAD,ADJUVANTED)  -     [address] maintenance needs addressed       Follow-up and Dispositions    · Return in about 3 months (around 12/21/2021) for DM, HLD, HTN, 30 min, office only, AND, labs prior.        Last PDMP Nuvia Strong as Reviewed:  Review User Review Instant Review Result   FERNANDO MANTILLA 9/21/2021  8:05 PM Reviewed PDMP [1]       Health Maintenance:   Health Maintenance   Topic Date Due    Foot Exam Q1  Never done    Eye Exam Retinal or Dilated  Never done    DTaP/Tdap/Td series (1 - Tdap) Never done    Shingrix Vaccine Age 50> (1 of 2) Never done    AAA Screening 73-67 YO Male Smoking Patients  Never done    Colorectal Cancer Screening Combo  06/23/2018    Medicare Yearly Exam  12/29/2021    A1C test (Diabetic or Prediabetic)  03/08/2022    MICROALBUMIN Q1  03/08/2022    Lipid Screen  03/08/2022    Flu Vaccine  Completed    COVID-19 Vaccine  Completed    Pneumococcal 65+ years  Completed        Subjective:    Depression  Due to new prostate cancer diagnosis  Is scheduled for CT scan, bone scan, radiation and chemotherapy  Still in the beginning stages of treatment, no prognosis    DMII-   Patient reports medication compliance Daily  Diabetic diet compliance most of the time  Patient monitors blood sugars regularly QID   Reports am fasting sugars range AM: 200's  afternoon: 224-300  evenin   Denies hypoglycemic episodes yes  Denies polyuria, polydipsia, paraesthesia, vision changes? yes  Engaging in daily exercise? Yes: Comment: walking daily about 15 minutes, brisk pace     Diabetic Foot and Eye Exam HM Status   Topic Date Due    Diabetic Foot Care  Never done    Eye Exam  Never done     Hemoglobin A1c   Date Value Ref Range Status   2021 8.6 (H) 4.8 - 5.6 % Final     Comment:              Prediabetes: 5.7 - 6.4           Diabetes: >6.4           Glycemic control for adults with diabetes: <7.0     ]  Creatinine, urine   Date Value Ref Range Status   2021 113.1 Not Estab. mg/dL Final     Microalb/Creat ratio (ug/mg creat.)   Date Value Ref Range Status   2021 24 0 - 29 mg/g creat Final     Comment:                            Normal:                0 -  29                         Moderately increased: 30 - 300                         Severely increased:       >300     2020 19 0 - 29 mg/g creat Final     Comment:                            Normal:                0 -  29                         Moderately increased: 30 - 300                         Severely increased:       >300                **Please note reference interval change**       Key Antihyperglycemic Medications             pioglitazone (ACTOS) 15 mg tablet (Taking) Take 1 Tablet by mouth daily. insulin glargine (Lantus Solostar U-100 Insulin) 100 unit/mL (3 mL) inpn (Taking) 14 Units by SubCUTAneous route nightly. SITagliptin (JANUVIA) 100 mg tablet (Taking) Take 1 Tablet by mouth daily. Hypertension:   Patient reports taking medications as instructed. yes   Medication side effects noted. no  Headache upon wakening. no   Home BP monitoring in range of 446/71'D systolic.     Do you experience chest pain/pressure or SOB with exertion? no  Maintain a low Sodium diet? yes  Key CAD CHF Meds             amLODIPine (NORVASC) 10 mg tablet (Taking) TAKE ONE TABLET BY MOUTH EVERY DAY    lisinopriL (PRINIVIL, ZESTRIL) 20 mg tablet (Taking) TAKE ONE TABLET BY MOUTH EVERY DAY        BUN   Date Value Ref Range Status   03/08/2021 13 8 - 27 mg/dL Final     Creatinine   Date Value Ref Range Status   03/08/2021 1.20 0.76 - 1.27 mg/dL Final     GFR est AA   Date Value Ref Range Status   03/08/2021 70 >59 mL/min/1.73 Final     Potassium   Date Value Ref Range Status   03/08/2021 4.2 3.5 - 5.2 mmol/L Final       HLD:  Has been compliant with meds  No: not on medications  Compliant with low-fat diet. most of the time    Denies myalgias or other side effects. N/A  The ASCVD Risk score (Jhonatan Garrison, et al., 2013) failed to calculate for the following reasons: The valid total cholesterol range is 130 to 320 mg/dL    Cholesterol, total   Date Value Ref Range Status   03/08/2021 127 100 - 199 mg/dL Final     Triglyceride   Date Value Ref Range Status   03/08/2021 69 0 - 149 mg/dL Final     HDL Cholesterol   Date Value Ref Range Status   03/08/2021 50 >39 mg/dL Final     LDL, calculated   Date Value Ref Range Status   03/08/2021 63 0 - 99 mg/dL Final   ]  Key Antihyperlipidemia Meds     The patient is on no antihyperlipidemia meds. ROS:     ROS  As stated in HPI, otherwise all others negative. The problem list was updated as a part of today's visit.   Patient Active Problem List   Diagnosis Code    Essential hypertension with goal blood pressure less than 130/80 I10    Diabetes mellitus, type II, insulin dependent (HCC) E11.9, Z79.4    Gastroesophageal reflux disease K21.9    BPH (benign prostatic hyperplasia) N40.0    Low back pain M54.5    Microcytic anemia D50.9    NS (nuclear sclerosis) H25.10    Severe depression (HCC) F32.2    Tarsal tunnel syndrome G57.50    Dementia associated with other underlying disease without behavioral disturbance (HCC) F02.80    Status post partial gastrectomy Z90.3    History of hepatitis C Z86.19    History of drug abuse (HCC) F19.11    Paranoid schizophrenia, chronic condition with acute exacerbation (HCC) F20.0    Posterior vitreous detachment, right eye H43.811    Viral hepatitis C B19.20    Vitreomacular adhesion, left eye H43.822    Diabetes mellitus without complication (HCC) U92.0    Hypertensive disorder I10    Prostate cancer ([address] C61       The PSH, FH were reviewed. SH:  Social History     Tobacco Use    Smoking status: Former Smoker     Packs/day: 0.25     Years: 1.00     Pack years: 0.25     Types: Cigarettes     Quit date: 5/24/2011     Years since quitting: 10.3    Smokeless tobacco: Never Used   Vaping Use    Vaping Use: Never used   Substance Use Topics    Alcohol use: No    Drug use: Yes     Types: Marijuana     Comment: H/O Heroin addiction Abstinent       Medications/Allergies:  Current Outpatient Medications on File Prior to Visit   Medication Sig Dispense Refill    FreeStyle Juan Carlos 14 Day [place] misc Apply 1 Device to affected area once.  famotidine (PEPCID) 40 mg tablet TAKE ONE TABLET BY MOUTH EVERY DAY 90 Tablet 0    SITagliptin (JANUVIA) 100 mg tablet Take 1 Tablet by mouth daily. 90 Tablet 0    tamsulosin (FLOMAX) 0.4 mg capsule Take 1 Capsule by mouth daily (after dinner).  90 Capsule 3    omeprazole (PRILOSEC) 20 mg capsule TAKE TWO CAPSULES BY MOUTH EVERY  Capsule 0    glucose blood VI test strips (Accu-Chek [place] Plus test strp) strip Check glucose three times a day, uses insulin 300 Strip 3    Blood-Glucose Meter (Accu-Chek [place] Plus Meter) misc Check glucose three times a day, uses insulin 1 Each 0    amLODIPine (NORVASC) 10 mg tablet TAKE ONE TABLET BY MOUTH EVERY DAY 90 Tab 1    lisinopriL (PRINIVIL, ZESTRIL) 20 mg tablet TAKE ONE TABLET BY MOUTH EVERY DAY 90 Tab 1    lancets (TRUEplus Lancets) 33 gauge misc TRUEplus Lancets 33 gauge 100 Lancet 3    ondansetron (ZOFRAN ODT) 4 mg disintegrating tablet Take 1 Tab by mouth every eight (8) hours as needed for Nausea or Vomiting. 90 Tab 0    Insulin Needles, Disposable, 31 gauge x 3/16\" ndle Use 1 new pen needle each time to inject insulin subcutaneously 4 times daily 150 Pen Needle 2    [DISCONTINUED] flash glucose sensor (FreeStyle Juan Carlos 14 Day Sensor) kit Check blood sugar 4 times a day. 2 Kit 5    [DISCONTINUED] insulin glargine (Lantus Solostar U-100 Insulin) 100 unit/mL (3 mL) inpn 10 Units by SubCUTAneous route nightly. 5 Pen 1    acetaminophen (TYLENOL) 325 mg tablet Take 2 Tabs by mouth every six (6) hours as needed for Pain. (Patient not taking: Reported on 9/21/2021) 20 Tab 0     No current facility-administered medications on file prior to visit. Allergies   Allergen Reactions    Ibuprofen Anaphylaxis    Fentanyl Other (comments)     Blurred vision    Metformin Diarrhea    Neurontin [Gabapentin] Other (comments)     Blurred Vision      Penicillins Swelling    Valium [Diazepam] Swelling       Objective:  Visit Vitals  /84 (BP 1 Location: Right arm, BP Patient Position: Sitting, BP Cuff Size: Adult)   Pulse 66   Temp 98.6 °F (37 °C) (Temporal)   Resp 18   Ht 5' 8\" (1.727 m)   Wt 150 lb (68 kg)   SpO2 96%   BMI 22.81 kg/m²    Body mass index is 22.81 kg/m². Physical assessment  Physical Exam  Vitals and nursing note reviewed. Eyes:      Conjunctiva/sclera: Conjunctivae normal.      Pupils: Pupils are equal, round, and reactive to light. Neck:      Vascular: No JVD. Cardiovascular:      Rate and Rhythm: Normal rate and regular rhythm. Heart sounds: Normal heart sounds. No murmur heard. No friction rub. No gallop. Pulmonary:      Effort: Pulmonary effort is normal.      Breath sounds: Normal breath sounds. Musculoskeletal:         General: Normal range of motion. Cervical back: Normal range of motion. Skin:     General: Skin is warm and dry. Neurological:      Mental Status: He is alert and oriented to person, place, and time. Psychiatric:         Mood and Affect: Affect normal.         Cognition and Memory: Memory normal.         Judgment: Judgment normal.           Labwork and Ancillary Studies:    CBC w/Diff  Lab Results   Component Value Date/Time    WBC 6.2 03/08/2021 03:57 AM    HGB 12.2 (L) 03/08/2021 03:57 AM    PLATELET 709 07/15/4038 03:57 AM         Basic Metabolic Profile  Lab Results   Component Value Date/Time    Sodium 142 03/08/2021 03:57 AM    Potassium 4.2 03/08/2021 03:57 AM    Chloride 105 03/08/2021 03:57 AM    CO2 23 03/08/2021 03:57 AM    Anion gap 6 01/07/2020 05:38 AM    Glucose 261 (H) 03/08/2021 03:57 AM    BUN 13 03/08/2021 03:57 AM    Creatinine 1.20 03/08/2021 03:57 AM    BUN/Creatinine ratio 11 03/08/2021 03:57 AM    GFR est AA 70 03/08/2021 03:57 AM    GFR est non-AA 60 03/08/2021 03:57 AM    Calcium 9.6 03/08/2021 03:57 AM        Cholesterol  Lab Results   Component Value Date/Time    Cholesterol, total 127 03/08/2021 03:57 AM    HDL Cholesterol 50 03/08/2021 03:57 AM    LDL, calculated 63 03/08/2021 03:57 AM    LDL, calculated 62 02/27/2020 09:00 AM    Triglyceride 69 03/08/2021 03:57 AM           I have discussed the diagnosis with the patient and the intended plan as seen in the above orders. The patient has received an After-Visit Summary and questions were answered concerning future plans. An After Visit Summary was printed and given to the patient. All diagnosis have been discussed with the patient and all of the patient's questions have been answered. Follow-up and Dispositions    · Return in about 3 months (around 12/21/2021) for DM, HLD, HTN, 30 min, office only, AND, labs prior. Bobo Boyer, AGNP-BC  [address] [id number] [facility] [address]  [id number]

## 2021-09-21 NOTE — PATIENT INSTRUCTIONS
Increase insulin to 14 units. Increase by 2 units every 7 days until blood glucose averages 120-140. If glucose is to low decrease by 2 units and maintain that dose.

## 2021-09-22 ENCOUNTER — TELEPHONE (OUTPATIENT)
Dept: FAMILY MEDICINE CLINIC | Age: 71
End: 2021-09-22

## 2021-09-22 DIAGNOSIS — Z12.11 COLON CANCER SCREENING: Primary | ICD-10-CM

## 2021-09-22 NOTE — TELEPHONE ENCOUNTER
Pharmacy Progress Note - Telephone Encounter    Beverley Magallon NP requested I call patient to ask about his history of treatment for hepatitis C and discuss scheduling a colonoscopy. Patient states he was treated several years ago for Hepatitis C. States he was given pills to treat it and that it has \"cleared up\". Also discussed need for colonoscopy. Patient asked if this would interfere with his other conditions/treatments. Informed patient that it should not interfere with his treatment, and that it is a routine screening to check for cancer. Patient confirmed understanding. Order for colonoscopy will need placed by PCP.     Thank you,  Johann Porter, PharmD        For Pharmacy Admin Tracking Only     CPA in place: No   Time Spent (min): 10

## 2021-09-28 ENCOUNTER — PATIENT OUTREACH (OUTPATIENT)
Dept: CASE MANAGEMENT | Age: 71
End: 2021-09-28

## 2021-09-28 NOTE — PROGRESS NOTES
.Complex Case Management  Follow-Up       Date/Time:   9/28/2021  9:50AM       Patient called [facility] requesting advice about his Eitan as she is never on time lately. Then she is saying she is sick, her child has something concerning school  or she had to go get her flue shot and not come in at all. He has been very good to her letting her do things ,but it is to the point when he needs her she is not there. Patient then preceded to tell ACM what lead up to him not being satisfied with his PCA, Ms Chuck Velazquez time management hours. [facility] suggested he speak with her no results then call the agency [employer], Mrs Swapna Magallon owner to discuss another PCA. Patient then voiced he wants an Aide that has been vaccinated and Ms Chuck Velazquez is. He was without aide for 2-3 days until Ms Chuck Velazquez Started 6 or 7 weeks ago. It is conveint for her as she lives 3-5 minutes away ( Across the street from his apartment building)  She started out doing real good but now she calls out with having something to do at least 2 times a week. For the last 3 weeks. He had to go to the emergency room on Sunday and did not leave until Monday morning after 4 am. He was having vomiting ,nausea with abdominal pains. [facility] reviewed [facility] care everywhere note for ED visit 9/26-27/2021 (Cannabinoid Hyperemesis Syndrome ;Cyclic Vomiting Syndrome ) CT scan of abdomin negative. Aide called him saying she had a fever and could not come . May have been from her flue shot that she got on Sunday. Patient asked [facility] to speak with Ms Chuck Velazquez on his behalf. [facility] said Ms Chuck Velazquez is a private duty personal care aide [facility] has no authority to intervene. If Ms Chuck Velazquez would like to speak to [facility] then it is Okay patient voicing he called her this morning at 8:07 and still at 8:47 she is still not here to fix his breakfast. She is to report 8 am-3 pm daily patient said he gets very agitated when he gets upset ,says things out of character. He gets loud and angry. He don't want to speak to her alone. He will call his girlfriend from downstairs to witness his conversation with her.09:20AM Ms Pelon Lainez came, patient has his girlfriend no name there as well. Patient calls ACM again as . ACM listen to both sides then girlfriend intervene. Voices became loud. Girlfriend left, ACM intervene with both parties need to set rules now time as to when Aze Roles is to be at work 9 am-4 daily. For any appointments she needs to notify patient in advances at least 24 hours or more. This will be a trial until the end of the week then they should revisit it and call Mrs Emmy Hillman at [facility] for another PCA. Pertinent concerns:See above       Specialist appointments since last outreach? No   If so, specialist and date: n/a    Next PCP Appointment: None scheduled     Medications:     New medications since last outreach: No  Does patient need refills on any medications: No   Medication changes since last outreach (dose adjustments or discontinued meds): No      Home Health company: Personal Care aide thru [employer] [address] Barriers to care? Anxiety        Patient reminded that there are physicians on call 24 hours a day / 7 days a week (M-F 5pm to 8am and from Friday 5pm until Monday 8a for the weekend) should the patient have questions or concerns.

## 2021-10-01 ENCOUNTER — PATIENT OUTREACH (OUTPATIENT)
Dept: CASE MANAGEMENT | Age: 71
End: 2021-10-01

## 2021-10-01 NOTE — PROGRESS NOTES
.Complex Case Management  Follow-Up       Date/Time:   10/1/2021  1:17pm       Ambulatory Care Manager ( ACM) contacted patient for Complex Case Management  follow up. Spoke to patient  Introduced self/role and reason for call. Patient reported:   Everything is doing alright. He and his PCA has come to an agreement for her times to arrive and leave. She has been on time. He and his sister went to visit his mother's gravesite yesterday. He had her sprits on his mind he called his sister and she too had been thinking about their mom. So they went to visit for over and hour and a haft out at [employer]\[facility] [place] [facility]\"" [employer] [facility] . Pertinent concerns:  He starts his treatments 10/11/2021. He is getting a little anxious, but wants to get it over with. After the 11 the ball will be in Dr Kathryn romano. Specialist appointments since last outreach? No   If so, specialist and date: N/A    Next PCP Appointment: Not scheduled. Medications:     New medications since last outreach: No  Does patient need refills on any medications: No   Medication changes since last outreach (dose adjustments or discontinued meds): No      Home Health company: Personal Care Aide    Barriers to care? None        Patient reminded that there are physicians on call 24 hours a day / 7 days a week (M-F 5pm to 8am and from Friday 5pm until Monday 8a for the weekend) should the patient have questions or concerns.

## 2021-10-05 DIAGNOSIS — E11.9 DIABETES MELLITUS, TYPE II, INSULIN DEPENDENT (HCC): ICD-10-CM

## 2021-10-05 DIAGNOSIS — Z79.4 DIABETES MELLITUS, TYPE II, INSULIN DEPENDENT (HCC): ICD-10-CM

## 2021-10-07 RX ORDER — INSULIN GLARGINE 100 [IU]/ML
INJECTION, SOLUTION SUBCUTANEOUS
Qty: 15 ML | Refills: 1 | Status: SHIPPED | OUTPATIENT
Start: 2021-10-07 | End: 2021-12-17

## 2021-10-13 ENCOUNTER — PATIENT OUTREACH (OUTPATIENT)
Dept: CASE MANAGEMENT | Age: 71
End: 2021-10-13

## 2021-10-13 NOTE — PROGRESS NOTES
.Complex Case Management  Follow-Up       Date/Time:   10/13/2021  9:59AM        Patient contacted [facility] to update on his completion of his x-rays done on 10/11/2021 requesting results. Patient reported:   He had is Bone scan and CT scan done Monday 10/11/2021. ACM informed patient if results are available ACM could not give them to him as it needs to be reviewed by the ordering doctor first. It may not be a favorable out come, the test may be missed read/interpreted . Patient voicing understanding. Patient then asked what happens now. ACM said usually after the x-rays depending on results the treatment starts  As he discussed with Dr Castillo Banda , Radiation with medication ( Elegard)or Proton with medication, surgery what ever that was decided. Patient said he had elected the proton therapy as his  from [facility] cross said they will take care of all of his transportation needs to and from [place] for therapy. Pascale Parker got on phone asking about patients next appointment with Dr Gopal Jeffrey ( Radiology [facility]) ACM look and saw an upcoming appointment on 11/1/2021 but no time. PCA said that's right that is what she has also but no time. ACM said she knew [place] is the only [place] [address] in [place]. If he is seeing an Radiologist Oncologist the he is getting ready to be marked and Radiation therapy would be scheduled. Pertinent concerns:  Patient said he wanted the Proton therapy. ACM said did he not discuss this with Dr Castillo Banda? . AC reviewed notes and the note with options were reviewed with patient and he was to call Dr Castillo Banda to say which option he was doing. He needs to call Dr Marisol Esposito office and tell them he wants to speak to Dr Castillo Banda or his nurse concerning his treatment plan. Specialist appointments since last outreach?  No   If so, specialist and date: N/A    Next PCP Appointment: 12/17/2021    Medications:     New medications since last outreach: No  Does patient need refills on any medications: no Medication changes since last outreach (dose adjustments or discontinued meds): no      Home Health company: n/a has PVT. duty PCA    Barriers to care? None at this time        Patient reminded that there are physicians on call 24 hours a day / 7 days a week (M-F 5pm to 8am and from Friday 5pm until Monday 8a for the weekend) should the patient have questions or concerns.

## 2021-10-15 ENCOUNTER — PATIENT OUTREACH (OUTPATIENT)
Dept: CASE MANAGEMENT | Age: 71
End: 2021-10-15

## 2021-10-15 NOTE — PROGRESS NOTES
.Complex Case Management  Follow-Up       Date/Time:   10/15/2021  5:39PM        Patient called  [facility] ( [facility]) upset that he just recieved a call from a doctor [facility] office stating the appointment is for 1 pm on Monday could [facility] check for him as his PCA is gone for today. Patient reported:   Patient reporting if rhe PCA knew about the appointment she should have told him, so he could make sure that he had a ride. [facility] reviewed EMR no appointment for Monday is scheduled it maybe with an [facility] provider . Patient becoming more upset by the minute. ACM said to calm down and we will get to the bottom of it. Patient gave [facility] Dr Nils Miller call back number [phone]. [facility] called number and it was to [address]  and [address] ( referral had been placed by PCP)  Yesenia answered phone and said the patient, Mr Mia Alvarez  said that his nurse would call back to verify the time and date. Yes appointment is set up for Monday Oct 18 at 1:00 PM [address] [facility], [address] [place] [address] [address] on . [facility] called patient back to let him know who the doctor was and why he was scheduled. Patient then said he needs to check in with his transportation company to see if his PCA made the appointment. Patient called [facility] back now more angry than before. The transportation person told him that someone called at 9:25 this morning asking about transportation and she was told that it needs to be at least 2 business days notice to accommodate. Patient gave [facility] transportation number [phone] Access to care line. Once connected spoke to Chai Cruz who said The insurance [place]  have to give them authorization for a short trip notice while patient is on the phone with them. [facility] called patient back informed him of what was said. Asked patient to look on the back of his insurance card and give member or customer service number .  [facility] called spoke with deanne,then got patient and representive on phone he explained what was happening gave her his address and destination to Dr Milton Martin. While on hold [facility] got disconnect after 24 minute wait. Called patient back who said he was talking to the lady because he got cut off also and she called him back. [facility] said she will not leave for the day until she hear back from him. Patient return call to [facility] 5:03PM now the company is saying they have no record of him in their system. Patient is so up set. He is voicing he has no money to get a cab or uber. [facility] said he will have to call early Monday 8 am to cancel and reschedule, if he can't find a ride over the weekend. Patient is so upset he is stuttering real don't seem to get 2 words out at a time. M attempting to calm him as it is nothing no one can do now . He said you are right. My PCA will be here tomorrow and we are going to talk about Monday. Pertinent concerns:  See above     Specialist appointments since last outreach? No   If so, specialist and date: N/A    Next PCP Appointment: 12/17 with Lab work 12/10    Medications:     New medications since last outreach: No  Does patient need refills on any medications: No   Medication changes since last outreach (dose adjustments or discontinued meds): No      Home Health company: Has Pvt duty  PCA    Barriers to care? Transportation        Patient reminded that there are physicians on call 24 hours a day / 7 days a week (M-F 5pm to 8am and from Friday 5pm until Monday 8a for the weekend) should the patient have questions or concerns.

## 2021-10-18 ENCOUNTER — PATIENT OUTREACH (OUTPATIENT)
Dept: CASE MANAGEMENT | Age: 71
End: 2021-10-18

## 2021-10-18 NOTE — PROGRESS NOTES
.Complex Case Management  Follow-Up       Date/Time:   10/18/2021  9:16 AM    Patient contacted  [address] [place] [address] ( [facility]) to update on ride /transortation to doctor appointment today. Patient reported:   Miss Alana Coronado from [employer] called after our conversation on Friday saying she has set up transportation to the doctor appointment today. They will be there to pick him and his PCA up at 12:40 to be there by 1:00. PCA ms Alon Lopez set up a clip board for patient with all of his appointments dates and time including rides/transportation update. Patient is more relaxed and happy with out come. Pertinent concerns:  None at this time       Specialist appointments since last outreach? No   If so, specialist and date: N/A    Next PCP Appointment: 12/17/2021 ( labs 12/10/2021)    Medications:     New medications since last outreach: None  Does patient need refills on any medications: No   Medication changes since last outreach (dose adjustments or discontinued meds): No      Home Health company: Has PVT duty PCA    Barriers to care? None        Patient reminded that there are physicians on call 24 hours a day / 7 days a week (M-F 5pm to 8am and from Friday 5pm until Monday 8a for the weekend) should the patient have questions or concerns.

## 2021-11-01 ENCOUNTER — PATIENT OUTREACH (OUTPATIENT)
Dept: CASE MANAGEMENT | Age: 71
End: 2021-11-01

## 2021-11-02 ENCOUNTER — PATIENT OUTREACH (OUTPATIENT)
Dept: CASE MANAGEMENT | Age: 71
End: 2021-11-02

## 2021-11-02 NOTE — PROGRESS NOTES
.Complex Case Management  Follow-Up       Date/Time:   11/2/2021  9:05AM       Ambulatory Care Manager ( ACM) contacted patient for Complex Case Management  follow up. Spoke to patient and PCA Tico Knutson as she was on speaker phone. Introduced self/role and reason for call. Patient reported:   Wanted to give ACM update. Patient was seen at the [facility] for evaluated and plan of treatment for his prostate Cancer. Spoke with Dr. Maricarmen Pritchard, patients daughter Raul Aggarwal ,PCA Tico Knutson and patient was present. Voicing he was very though all questions answered. The treatments/sessions  Will start after the holidays ( [place] . Patient will be going 5 days a week for 9 weeks. He still will be getting his monthly shot at the urologist office ( [address]). His schedule will be given in time enough to have his transportation set up. Patient and PCA has been changing the freestyle lebre without difficulty next change is due in 5 days. Readings has been below 200. Has been testing 4 times a day as requested. Diet still not fully no concentrated sweets but working on it. Since Proton therapy will not be started until after Dec. ACM will extend CCM until Ambrocio.15,2022 to see how he is doing with therapy. ACM has noted Several radiation oncology appointments with Dr OH Weaver. PCA called [facility] and they said it is an Automatic scheduling prompt which it will be cancelling and patient will not be responsible for any no show appointments. Pertinent concerns:None       Specialist appointments since last outreach?  Yes   If so, specialist and date: 10/28 [facility] for evaluation and plan of treatment    Next PCP Appointment: 12/17/2021( 12/10/2021 Labs)    Medications:     New medications since last outreach: No  Does patient need refills on any medications: No   Medication changes since last outreach (dose adjustments or discontinued meds): No      Home Health company: N/A has pvt PCA    Barriers to care? None  . Goals Addressed                 This Visit's Progress       Diabetes     Knowledge and adherence of medication (ie. action, side effects, missed dose, etc.)   On track     Patient will be compliant with all   11/2/2021  Patient is very compliant with all medications        COMPLETED: Understands and monitors blood sugar levels        Patient will check blood sugars daily and   9/10/2021  Patient is to check blood glucose 4 times daily now that he has the [facility]  11/2/2021  Patient is compliant with checking blood glucose 4 times daily with [address] recommendations and achieves/maintains goal weight   On track     8/16/2021  Patient will maintain ADA diet  9/10/2021  Patient continues to eat food with concentrated sweets. Met with PharmD 8/23/2021. ACM reviewed ADA diet with him and his PCA Mrs Frausto Reap Self-schedules and keeps appointments    On track     Patient will keep all providers appointments. 11/2/2021  Patient has been following up with all provider appointments. Jamia Bills was 10/28/2021 for his cancer treatment  To start after the holidays once everything has been set up  He will e going 5 days a week an hour a day for 9 weeks. Patient reminded that there are physicians on call 24 hours a day / 7 days a week (M-F 5pm to 8am and from Friday 5pm until Monday 8a for the weekend) should the patient have questions or concerns.

## 2021-11-05 ENCOUNTER — TELEPHONE (OUTPATIENT)
Dept: FAMILY MEDICINE CLINIC | Age: 71
End: 2021-11-05

## 2021-11-05 DIAGNOSIS — R25.1 TREMORS OF NERVOUS SYSTEM: ICD-10-CM

## 2021-11-05 DIAGNOSIS — F02.818 DEMENTIA ASSOCIATED WITH OTHER UNDERLYING DISEASE WITH BEHAVIORAL DISTURBANCE: Primary | ICD-10-CM

## 2021-11-05 NOTE — TELEPHONE ENCOUNTER
Called mr. godwin to discuss findings from [facility]. Informed I am placing a referral to neurology for further evaluation of his shaking. And treatment of his dementia. Mrs. Tommie Polk was present on this call. All diagnosis have been discussed with the patient and all of the patient's questions have been answered.

## 2021-11-08 ENCOUNTER — PATIENT OUTREACH (OUTPATIENT)
Dept: CASE MANAGEMENT | Age: 71
End: 2021-11-08

## 2021-11-08 NOTE — PROGRESS NOTES
Patient contacted [facility] asking to call his PCP Ms Ramon Learn for him as she had an emergency with her son. ACM said if she left for an emergency then she may not be able to take his calls. As her son could be sick in the ER or doctor's office. Patient said she did not say that he was sick, she said it was an emergency concerning her son. ACM asked was it anything she could do he said no. [facility] called DILSHAD Torres after 6 rings she picked up. [facility] introduced self as PCA said she knew who I was. Informed her that Mr Cleo Hogan has been trying to reach her. She said her phone was on vibrate and just so happen she was talking to someone when [facility] called. PCA said that her son was vomiting in school she had to bring him home. She will be calling Mr Cleo Hogan as soon as she got off the phone with me. [facility] will follow up later with patient.

## 2021-11-29 ENCOUNTER — PATIENT OUTREACH (OUTPATIENT)
Dept: CASE MANAGEMENT | Age: 71
End: 2021-11-29

## 2021-11-29 NOTE — PROGRESS NOTES
.Complex Case Management  Follow-Up        Date/Time:   11/29/2021         Ambulatory Care Manager [facility]) contacted patient for Complex Case Management  follow up. Spoke to   Introduced self/role and reason for call. Patient reported:   [place] he received a call from the Proton place and his insurance will not pay all of it. What is his next option. ACM said he needs to call Dr Cathy Ray office and tell them that you want to know what other options are available. ACM said it maybe Radiation as this was planned first before he change his mind. Patient voicing he did receive the Hormone shot at Dr Cathy Ray office ( 11/16/2021) [place]. ACM reviewed lab work and noted Glucose up at 463 on that day. Patient voicing he is eating Ice-cream, cereal with sugar and whole milk. ACM reminded patient he is to not eat concentrated sweets, regular sugar. Patient said he forgets. ACM said there should be a list of foods do and don't. Blood sugar for lunch 266 remains on 12 units Lantus and Januvia with Actos. Pertinent concerns:  Where do he go now since his insurance will not pay for the Proton Treatments. ACM suggested he call Urology He has started Hormone injects. Patient complaining of burning with pain urination, Just started about a week ago he is not good with time. Did not want to go the Emergency room. ACM said Rodrigo Barrientos may want to see him or drop off some urine ,but with his sugars being so high he maybe irritation. Denies fever or chills. ACM said she will route message to PCP his Next appointment is 12/10 for lab and 12/17 to see provider. Specialist appointments since last outreach?  Yes   If so, specialist and date: Dr Hunter Boone 11/16/2021    Next PCP Appointment: 12/17/2021    Medications:     New medications since last outreach: Yes Elaguard injection at Dr Ileana Narayan' office 11/16/2021  Does patient need refills on any medications: N/A  Medication changes since last outreach (dose adjustments or discontinued meds): No      Home Health company: PVT PCA    Barriers to care? None complaint with Diabetic diet continues to eat concentrated sweets ( Icecream, R[employer]in [employer] cereal with sugar   . Isabel Nicolas Patient reminded that there are physicians on call 24 hours a day / 7 days a week (M-F 5pm to 8am and from Friday 5pm until Monday 8a for the weekend) should the patient have questions or concerns.

## 2021-11-30 ENCOUNTER — PATIENT OUTREACH (OUTPATIENT)
Dept: CASE MANAGEMENT | Age: 71
End: 2021-11-30

## 2021-11-30 NOTE — PROGRESS NOTES
Patient called ACM from [facility] ED. Complaining of severe back and abdominal pains,burning and pain with urination. Vomiting and nauseas. ACM said to rest and take care listen to the nurses and doctor. Patient voicing his blood sugar was 417 finger stick. ACM reviewed EMR all labs not back yet. Patient asked ACM to call his home care aide to let her know. Then patient preceded to tell ABYM that someone from Beverley's office call about a colonoscopy at the [facility] office Dec. 7 at 8 AM.Call Bryan, she can tell you more. ACM called Mrs Sarath Khan PCA she said patient had called her at 6:05 this morning to let her know that he was going to the ER. His friend Chan Xavier was with him. She was trying to wait to call her boss to let her know what's going on and if she wanted her to go over after patient leave the emergency room. She said yes but the call came from the colonoscopy office. ABYM reviewed referrals noted Dr Reggie Ca had been consulted 9/2021. She verified the date and time as being correct.

## 2021-12-01 ENCOUNTER — PATIENT OUTREACH (OUTPATIENT)
Dept: CASE MANAGEMENT | Age: 71
End: 2021-12-01

## 2021-12-01 RX ORDER — LIDOCAINE 4 G/100G
1 PATCH TOPICAL EVERY 24 HOURS
COMMUNITY
Start: 2021-11-30 | End: 2021-12-02 | Stop reason: SDUPTHER

## 2021-12-01 NOTE — PROGRESS NOTES
.  Complex Case Management      Date/Time:  2021 9:39 AM    Method of communication with patient:phone    [facility] contacted the Patient and care giver , Ms CHARLI Benavides DILSHAD by telephone to perform Ambulatory Care Coordination. Verified name and  (PHI) with Patient and care giver , Ms CHARLI YUNG as identifiers. Provided introduction to self, and explanation of the Ambulatory Care Manager's role. Reviewed most recent [facility] ED visit w/ Patient and care giver , Ms CHARLI Benavides DILSHAD who verbalized understanding. Patient and care giver , Ms CHARLI Benavides PCA given an opportunity to ask questions. Patient said he did not leave without being told. The nurse called the lift for him. No she did not give him any paper work. So ACM again read after visit summary which said to follow up with PCP. Lidocaine Patches were not sent to patients current pharmacy as they said it was not their. Patient said patch did not stay on anyway. ACM said to ask PCP about ordering patch or get for lower back pain. Patient will see PCP 2021 @2:40 for this ED visit. ACM encouraged diabetic diet Fasting B/S 155 this morning. Yesterday in the ER they gave him some insulin while he was there. PCA read ,reader and b/s was 306 at 8:55 PM. AC will send PCP a message concerning b/s.       [facility] follow up appointment(s):   Future Appointments   Date Time Provider Devika Haley   2021  2:15 PM Les Dumont NP AMA BS AMB   12/10/2021  9:15 AM AMA LAB AMA BS AMB   2021  9:00 AM Les Dumont NP AMA BS AMB   2022 11:00 AM Peggy Berrios MD [facility] BS AMB

## 2021-12-02 ENCOUNTER — OFFICE VISIT (OUTPATIENT)
Dept: FAMILY MEDICINE CLINIC | Age: 71
End: 2021-12-02
Payer: MEDICARE

## 2021-12-02 VITALS
HEART RATE: 76 BPM | OXYGEN SATURATION: 98 % | RESPIRATION RATE: 18 BRPM | WEIGHT: 149 LBS | DIASTOLIC BLOOD PRESSURE: 71 MMHG | SYSTOLIC BLOOD PRESSURE: 102 MMHG | TEMPERATURE: 98.2 F | BODY MASS INDEX: 22.58 KG/M2 | HEIGHT: 68 IN

## 2021-12-02 DIAGNOSIS — F02.818 DEMENTIA ASSOCIATED WITH OTHER UNDERLYING DISEASE WITH BEHAVIORAL DISTURBANCE: ICD-10-CM

## 2021-12-02 DIAGNOSIS — G89.29 CHRONIC MIDLINE LOW BACK PAIN WITHOUT SCIATICA: Primary | ICD-10-CM

## 2021-12-02 DIAGNOSIS — M54.50 CHRONIC MIDLINE LOW BACK PAIN WITHOUT SCIATICA: Primary | ICD-10-CM

## 2021-12-02 DIAGNOSIS — C61 PROSTATE CANCER (HCC): ICD-10-CM

## 2021-12-02 DIAGNOSIS — F32.2 SEVERE DEPRESSION (HCC): ICD-10-CM

## 2021-12-02 PROCEDURE — G8752 SYS BP LESS 140: HCPCS | Performed by: NURSE PRACTITIONER

## 2021-12-02 PROCEDURE — G8754 DIAS BP LESS 90: HCPCS | Performed by: NURSE PRACTITIONER

## 2021-12-02 PROCEDURE — G8420 CALC BMI NORM PARAMETERS: HCPCS | Performed by: NURSE PRACTITIONER

## 2021-12-02 PROCEDURE — 1101F PT FALLS ASSESS-DOCD LE1/YR: CPT | Performed by: NURSE PRACTITIONER

## 2021-12-02 PROCEDURE — 99213 OFFICE O/P EST LOW 20 MIN: CPT | Performed by: NURSE PRACTITIONER

## 2021-12-02 PROCEDURE — G8427 DOCREV CUR MEDS BY ELIG CLIN: HCPCS | Performed by: NURSE PRACTITIONER

## 2021-12-02 PROCEDURE — G9717 DOC PT DX DEP/BP F/U NT REQ: HCPCS | Performed by: NURSE PRACTITIONER

## 2021-12-02 PROCEDURE — 3017F COLORECTAL CA SCREEN DOC REV: CPT | Performed by: NURSE PRACTITIONER

## 2021-12-02 PROCEDURE — G8536 NO DOC ELDER MAL SCRN: HCPCS | Performed by: NURSE PRACTITIONER

## 2021-12-02 RX ORDER — LIDOCAINE 4 G/100G
1 PATCH TOPICAL EVERY 24 HOURS
Qty: 30 EACH | Refills: 2 | Status: SHIPPED | OUTPATIENT
Start: 2021-12-02 | End: 2021-12-20

## 2021-12-02 NOTE — PROGRESS NOTES
Jesus               [address]               [phone]      Edelmira Mak is a 70 y.o. male and presents with Hospital Follow Up       Assessment/Plan:    Diagnoses and all orders for this visit:    1. Chronic midline low back pain without sciatica  -     lidocaine 4 % patch; 1 Patch by TransDERmal route every twenty-four (24) hours. -     REFERRAL TO PHYSICAL THERAPY   will try conservative therapy with PT, if this fails refer to spine specialist  Refills provided  Advised to be sure to let Dr. Sana Robbins know about his pain as there could be cancer metastasis involved  He was accompanied today by his caretaker MsKathe Newton Andersenирина  Patient verbalized understanding and is in agreement with this plan of care  2. Dementia associated with other underlying disease with behavioral disturbance ([address]  Assessment & Plan:   monitored by specialist. No acute findings meriting change in the plan      3. Prostate cancer [facility])  Assessment & Plan:   monitored by specialist. No acute findings meriting change in the plan      4. Severe depression ([address]  Assessment & Plan:   monitored by specialist. No acute findings meriting change in the plan        Follow-up and Dispositions    · Return for keep previously scheduled follow up.            Health Maintenance:   Health Maintenance   Topic Date Due    Foot Exam Q1  Never done    Eye Exam Retinal or Dilated  Never done    DTaP/Tdap/Td series (1 - Tdap) Never done    Shingrix Vaccine Age 50> (1 of 2) Never done    AAA Screening 73-67 YO Male Smoking Patients  Never done    Colorectal Cancer Screening Combo  06/23/2018    COVID-19 Vaccine (3 - Booster for Moderna series) 11/21/2021    Medicare Yearly Exam  12/29/2021    A1C test (Diabetic or Prediabetic)  03/08/2022    MICROALBUMIN Q1  03/08/2022    Lipid Screen  03/08/2022    Flu Vaccine  Completed    Pneumococcal 65+ years  Completed        Subjective:    Back pain  Got up in the morning and experienced new onset, sudden low back pain that went down his leg  The security guards called the paramedics and he went to ED  CT abs/pelvis:   Prostatomegaly with BPH  DDD with several schmorl's nodes L2-5. Disc bulge with mild to moderate canal stenosis  Urinalysis was negative for infection  Labs were unremarkable except for hyperglycemia  Has appointment with Dr. Emma Briseno: Has surgery on 1/25/22    ROS:     ROS  As stated in HPI, otherwise all others negative. The problem list was updated as a part of today's visit. Patient Active Problem List   Diagnosis Code    Essential hypertension with goal blood pressure less than 130/80 I10    Diabetes mellitus, type II, insulin dependent (HCC) E11.9, Z79.4    Gastroesophageal reflux disease K21.9    BPH (benign prostatic hyperplasia) N40.0    Low back pain M54.50    Microcytic anemia D50.9    NS (nuclear sclerosis) H25.10    Severe depression (HCC) F32.2    Tarsal tunnel syndrome G57.50    Dementia associated with other underlying disease with behavioral disturbance (HCC) F02.81    Status post partial gastrectomy Z90.3    History of hepatitis C Z86.19    History of drug abuse (HCC) F19.11    Paranoid schizophrenia, chronic condition with acute exacerbation (HCC) F20.0    Posterior vitreous detachment, right eye H43.811    Viral hepatitis C B19.20    Vitreomacular adhesion, left eye H43.822    Diabetes mellitus without complication (HCC) U25.5    Hypertensive disorder I10    Prostate cancer ([facility] C61       The PSH, FH were reviewed.       SH:  Social History     Tobacco Use    Smoking status: Former Smoker     Packs/day: 0.25     Years: 1.00     Pack years: 0.25     Types: Cigarettes     Quit date: 5/24/2011     Years since quitting: 10.5    Smokeless tobacco: Never Used   Vaping Use    Vaping Use: Never used   Substance Use Topics    Alcohol use: No    Drug use: Yes     Types: Marijuana     Comment: H/O Heroin addiction Abstinent Medications/Allergies:  Current Outpatient Medications on File Prior to Visit   Medication Sig Dispense Refill    amLODIPine (NORVASC) 10 mg tablet TAKE ONE TABLET BY MOUTH EVERY DAY 90 Tablet 1    famotidine (PEPCID) 40 mg tablet TAKE ONE TABLET BY MOUTH EVERY DAY 90 Tablet 0    Lantus Solostar U-100 Insulin 100 unit/mL (3 mL) inpn give 10 units subcutaneously AT BEDTIME 15 mL 1    pioglitazone (ACTOS) 15 mg tablet Take 1 Tablet by mouth daily. 90 Tablet 0    omeprazole (PRILOSEC) 20 mg capsule TAKE TWO CAPSULES BY MOUTH EVERY  Capsule 0    lisinopriL (PRINIVIL, ZESTRIL) 20 mg tablet Take 1 Tablet by mouth daily. 90 Tablet 1    SITagliptin (JANUVIA) 100 mg tablet Take 1 Tablet by mouth daily. 90 Tablet 0    Insulin Needles, Disposable, 31 gauge x 3/16\" ndle Use 1 new pen needle each time to inject insulin subcutaneously 4 times daily 150 Pen Needle 2    flash glucose sensor (FreeStyle [place] 14 Day Sensor) kit Check blood sugar 4 times a day. 1 Kit 0    FreeStyle [place] 14 Day [place] misc Apply 1 Device to affected area once.  tamsulosin (FLOMAX) 0.4 mg capsule Take 1 Capsule by mouth daily (after dinner). 90 Capsule 3    ondansetron (ZOFRAN ODT) 4 mg disintegrating tablet Take 1 Tab by mouth every eight (8) hours as needed for Nausea or Vomiting. 90 Tab 0    [DISCONTINUED] lidocaine 4 % patch 1 Patch by TransDERmal route every twenty-four (24) hours.       glucose blood VI test strips (Accu-Chek [place] Plus test strp) strip Check glucose three times a day, uses insulin (Patient not taking: Reported on 12/2/2021) 300 Strip 3    Blood-Glucose Meter (Accu-Chek [place] Plus Meter) misc Check glucose three times a day, uses insulin (Patient not taking: Reported on 12/2/2021) 1 Each 0    lancets (TRUEplus Lancets) 33 gauge misc TRUEplus Lancets 33 gauge (Patient not taking: Reported on 12/2/2021) 100 Lancet 3    acetaminophen (TYLENOL) 325 mg tablet Take 2 Tabs by mouth every six (6) hours as needed for Pain. (Patient not taking: Reported on 9/21/2021) 20 Tab 0     No current facility-administered medications on file prior to visit. Allergies   Allergen Reactions    Ibuprofen Anaphylaxis    Fentanyl Other (comments)     Blurred vision    Metformin Diarrhea    Neurontin [Gabapentin] Other (comments)     Blurred Vision      Penicillins Swelling    Valium [Diazepam] Swelling       Objective:  Visit Vitals  /71 (BP 1 Location: Left arm, BP Patient Position: Sitting, BP Cuff Size: Adult)   Pulse 76   Temp 98.2 °F (36.8 °C) (Temporal)   Resp 18   Ht 5' 8\" (1.727 m)   Wt 149 lb (67.6 kg)   SpO2 98%   BMI 22.66 kg/m²    Body mass index is 22.66 kg/m². Physical assessment  Physical Exam  Vitals and nursing note reviewed. Eyes:      Conjunctiva/sclera: Conjunctivae normal.      Pupils: Pupils are equal, round, and reactive to light. Neck:      Vascular: No JVD. Cardiovascular:      Rate and Rhythm: Normal rate and regular rhythm. Heart sounds: Normal heart sounds. No murmur heard. No friction rub. No gallop. Pulmonary:      Effort: Pulmonary effort is normal.      Breath sounds: Normal breath sounds. Musculoskeletal:         General: Normal range of motion. Cervical back: Normal range of motion. Skin:     General: Skin is warm and dry. Neurological:      Mental Status: He is alert and oriented to person, place, and time.    Psychiatric:         Mood and Affect: Affect normal.         Cognition and Memory: Memory normal.         Judgment: Judgment normal.           Labwork and Ancillary Studies:    CBC w/Diff  Lab Results   Component Value Date/Time    WBC 6.2 03/08/2021 03:57 AM    HGB 12.2 (L) 03/08/2021 03:57 AM    PLATELET 211 94/84/5565 03:57 AM         Basic Metabolic Profile  Lab Results   Component Value Date/Time    Sodium 135 11/16/2021 01:48 PM    Potassium 5.0 11/16/2021 01:48 PM    Chloride 97 11/16/2021 01:48 PM    CO2 19 (L) 11/16/2021 01:48 PM    Anion gap 6 01/07/2020 05:38 AM    Glucose 493 (H) 11/16/2021 01:48 PM    BUN 16 11/16/2021 01:48 PM    Creatinine 1.28 (H) 11/16/2021 01:48 PM    BUN/Creatinine ratio 13 11/16/2021 01:48 PM    GFR est AA 65 11/16/2021 01:48 PM    GFR est non-AA 56 (L) 11/16/2021 01:48 PM    Calcium 9.5 11/16/2021 01:48 PM        Cholesterol  Lab Results   Component Value Date/Time    Cholesterol, total 127 03/08/2021 03:57 AM    HDL Cholesterol 50 03/08/2021 03:57 AM    LDL, calculated 63 03/08/2021 03:57 AM    LDL, calculated 62 02/27/2020 09:00 AM    Triglyceride 69 03/08/2021 03:57 AM           I have discussed the diagnosis with the patient and the intended plan as seen in the above orders. The patient has received an After-Visit Summary and questions were answered concerning future plans. An After Visit Summary was printed and given to the patient. All diagnosis have been discussed with the patient and all of the patient's questions have been answered. Follow-up and Dispositions    · Return for keep previously scheduled follow up. Mayte Gregory, [facility]  [address] [facility]   [address] [facility] [address]  [address]

## 2021-12-02 NOTE — PROGRESS NOTES
Room 8    Did patient bring someone? Yes: Comment: Sarah Argueta CareGiver     Did the patient have DME equipment? Yes [employer] [place]     Did you take your medication today? Yes    Patient has no referrals       1. Have you been to the ER, urgent care clinic since your last visit? Hospitalized since your last visit? Yes, [facility]  For back pain and stomach pain     2. Have you seen or consulted any other health care providers outside of the [address] [place] [address] since your last visit? Include any pap smears or colon screening. Yes     When asked on a scale between 1 and 10 how bad Is your lower back pain patient states 9/10 pain .     3 most recent PHQ Screens 9/21/2021   Little interest or pleasure in doing things Nearly every day   Feeling down, depressed, irritable, or hopeless Nearly every day   Total Score PHQ 2 6   Trouble falling or staying asleep, or sleeping too much Not at all   Feeling tired or having little energy More than half the days   Poor appetite, weight loss, or overeating Not at all   Feeling bad about yourself - or that you are a failure or have let yourself or your family down Several days   Trouble concentrating on things such as school, work, reading, or watching TV Nearly every day   Moving or speaking so slowly that other people could have noticed; or the opposite being so fidgety that others notice Not at all   Thoughts of being better off dead, or hurting yourself in some way Not at all   PHQ 9 Score 12         Health Maintenance Due   Topic Date Due    Foot Exam Q1  Never done    Eye Exam Retinal or Dilated  Never done    DTaP/Tdap/Td series (1 - Tdap) Never done    Shingrix Vaccine Age 50> (1 of 2) Never done    AAA Screening 73-67 YO Male Smoking Patients  Never done    Colorectal Cancer Screening Combo  06/23/2018    COVID-19 Vaccine (3 - Booster for Ellan Sox series) 11/21/2021    Medicare Yearly Exam  12/29/2021       Learning Assessment 12/28/2020   PRIMARY LEARNER Patient   HIGHEST LEVEL OF EDUCATION - PRIMARY LEARNER  GRADUATED HIGH SCHOOL OR GED   BARRIERS PRIMARY LEARNER NONE   CO-LEARNER CAREGIVER No   PRIMARY LANGUAGE ENGLISH   LEARNER PREFERENCE PRIMARY LISTENING   ANSWERED BY Joseph Burrell   RELATIONSHIP SELF

## 2021-12-03 ENCOUNTER — TELEPHONE (OUTPATIENT)
Dept: FAMILY MEDICINE CLINIC | Age: 71
End: 2021-12-03

## 2021-12-03 NOTE — TELEPHONE ENCOUNTER
----- Message from [employer] Duty sent at 12/2/2021  4:20 PM EST -----  Subject: Message to Provider    QUESTIONS  Information for Provider? Pts caregiver Seema Barton is calling in   states they was in the office today and PCP Cristina Rodriges ordered   lidocaine 4 % patch for pt. They went to pick them up from the pharmacy   and was told that insurance needs a prior authorization. Forms were faxed   to the office. Please advise.   ---------------------------------------------------------------------------  --------------  CALL BACK INFO  What is the best way for the office to contact you? OK to leave message on   voicemail  Preferred Call Back Phone Number?  [phone]  ---------------------------------------------------------------------------  --------------  SCRIPT ANSWERS  undefined

## 2021-12-06 ENCOUNTER — TELEPHONE (OUTPATIENT)
Dept: FAMILY MEDICINE CLINIC | Age: 71
End: 2021-12-06

## 2021-12-06 NOTE — TELEPHONE ENCOUNTER
Spoke to patient about insurance will not cover Lidocaine 4% patch . Patient states I know I brought the ovc already .

## 2021-12-13 ENCOUNTER — PATIENT OUTREACH (OUTPATIENT)
Dept: CASE MANAGEMENT | Age: 71
End: 2021-12-13

## 2021-12-13 NOTE — PROGRESS NOTES
Patient conacted PAMELA asking for advice. His private duty Aide left Friday early. She just said she needed to leave early, but she did not show up on Sat., he called her and no answer so he called her boss who told him she has quit said it was too much for her. The boss came Sunday took all of the notes with his appointments on it and said she will try to find some one else. She called him and said the new PCA will be there by 8 and it's now after . Asked ACM to hold on then he cam back to said that the person was [place],which water she did not said , [place] or [place]. He has an appointment this morning at [facility] @10:00 he has his ride already set up ,he has not had breakfast. PAMELA reviewed EMR, noted labs to be drawn A1C and Lipid profile which both needs to be NPO after midnight. ABYM explained to patient that the labs he is to not eat or drink. Patient said that [facility] is calling can I hold again. ABYM got another call during the wait so she disconnected. Patient call PAMELA back to let her know that the lab person was not there today so the changed the day for 12/16, Patient has an appointment with PCP on 12/17/2021 @ 9:00am why can't he do labs then? PAMELA said she will asks PCP and Lead Nurse as this will be a second back to back ride to office. Instructed patient to call the ride services and cancel the ride for today. Patient continue to complain that the New PCA is not there. There are flyers posted for PCA help and he was going to call them. PAMELA said she has no control over any [facility] [address]. They are paid thru the state Medicaid. Patient voicing he will fix him some cereal take his medications and the start calling to see if he can find someone. ABYM said to call her for update. Patent

## 2021-12-14 ENCOUNTER — PATIENT OUTREACH (OUTPATIENT)
Dept: CASE MANAGEMENT | Age: 71
End: 2021-12-14

## 2021-12-14 NOTE — PROGRESS NOTES
Patient contacted [facility] voicing the owner of the PCA agency, Mrs Patel Ackerman  called him and told him that she is discharging him. His attitude is unacceptable. Patient said the young lady sent yesterday was just released from group home and had no Identification on her and the building management said she would not be accepted in without an ID the building is secured. She came today and again had no ID so they did not let her in. It's not his ever. Patient said he called his Devorah  , Cheryl Pitt who said when he goes to therapy tomorrow have them call her for his scheduled. She will write it down he also reached out to his sister. [facility] said she did not know he had a sister only a daughter. Patient said it's only them 2. Devorah  will assist with finding another PCA.

## 2021-12-15 ENCOUNTER — HOSPITAL ENCOUNTER (OUTPATIENT)
Dept: PHYSICAL THERAPY | Age: 71
Discharge: HOME | End: 2021-12-15
Payer: MEDICARE

## 2021-12-15 PROCEDURE — 97110 THERAPEUTIC EXERCISES: CPT | Performed by: GENERAL ACUTE CARE HOSPITAL

## 2021-12-15 PROCEDURE — 97162 PT EVAL MOD COMPLEX 30 MIN: CPT | Performed by: GENERAL ACUTE CARE HOSPITAL
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
## 2021-12-16 ENCOUNTER — PATIENT OUTREACH (OUTPATIENT)
Dept: CASE MANAGEMENT | Age: 71
End: 2021-12-16

## 2021-12-16 PROBLEM — I10 ESSENTIAL HYPERTENSION: Status: ACTIVE | Noted: 2020-03-24

## 2021-12-16 NOTE — PROGRESS NOTES
Patient contacted [facility] wanted to up date on his therapy appointment for his low back pains. He has 7 week sessions starting 12/21 @ 2:00 pm,12/23 @ 1:15 pm, 12/27 @ 8:30,29 @ 8:30 Jan. 3rd @ 9:15, 5th at 9:45,10th @ 10:00and Jan 13 @ 10:00 AM. While he was at his appointment he received a message from [facility] saying he has an appointment for a PSA T Monday Dec. 20 be there at 1:30. She left a number  if he has any problems. The  took the message down for him. [facility] called number spoke with Senait, for Anesthesia dept at [facility] for out patient procedures. Preanesthesia Surgical Testing ( PAST is what Eliazar Gold in anesthesia said is short for). She verified patient is set up for Monday @ 1:30 to be seen by the PA. They do this on the phone or in person depending on patients medical history. ABY returned call to patient and informed him of what was said. He verified that his transportation has been set up for the trip.

## 2021-12-17 ENCOUNTER — OFFICE VISIT (OUTPATIENT)
Dept: FAMILY MEDICINE CLINIC | Age: 71
End: 2021-12-17
Payer: MEDICARE

## 2021-12-17 VITALS
WEIGHT: 150 LBS | DIASTOLIC BLOOD PRESSURE: 76 MMHG | TEMPERATURE: 98.3 F | HEART RATE: 71 BPM | OXYGEN SATURATION: 99 % | BODY MASS INDEX: 22.73 KG/M2 | HEIGHT: 68 IN | SYSTOLIC BLOOD PRESSURE: 126 MMHG | RESPIRATION RATE: 18 BRPM

## 2021-12-17 DIAGNOSIS — Z79.4 TYPE 2 DIABETES MELLITUS WITH HYPERGLYCEMIA, WITH LONG-TERM CURRENT USE OF INSULIN (HCC): Primary | ICD-10-CM

## 2021-12-17 DIAGNOSIS — E11.65 TYPE 2 DIABETES MELLITUS WITH HYPERGLYCEMIA, WITH LONG-TERM CURRENT USE OF INSULIN (HCC): Primary | ICD-10-CM

## 2021-12-17 DIAGNOSIS — Z13.31 POSITIVE DEPRESSION SCREENING: ICD-10-CM

## 2021-12-17 DIAGNOSIS — I10 ESSENTIAL HYPERTENSION WITH GOAL BLOOD PRESSURE LESS THAN 130/80: ICD-10-CM

## 2021-12-17 DIAGNOSIS — F02.818 DEMENTIA ASSOCIATED WITH OTHER UNDERLYING DISEASE WITH BEHAVIORAL DISTURBANCE: ICD-10-CM

## 2021-12-17 PROCEDURE — 99214 OFFICE O/P EST MOD 30 MIN: CPT | Performed by: NURSE PRACTITIONER

## 2021-12-17 PROCEDURE — G8752 SYS BP LESS 140: HCPCS | Performed by: NURSE PRACTITIONER

## 2021-12-17 PROCEDURE — G8536 NO DOC ELDER MAL SCRN: HCPCS | Performed by: NURSE PRACTITIONER

## 2021-12-17 PROCEDURE — G9717 DOC PT DX DEP/BP F/U NT REQ: HCPCS | Performed by: NURSE PRACTITIONER

## 2021-12-17 PROCEDURE — 1101F PT FALLS ASSESS-DOCD LE1/YR: CPT | Performed by: NURSE PRACTITIONER

## 2021-12-17 PROCEDURE — 3046F HEMOGLOBIN A1C LEVEL >9.0%: CPT | Performed by: NURSE PRACTITIONER

## 2021-12-17 PROCEDURE — 2022F DILAT RTA XM EVC RTNOPTHY: CPT | Performed by: NURSE PRACTITIONER

## 2021-12-17 PROCEDURE — G8427 DOCREV CUR MEDS BY ELIG CLIN: HCPCS | Performed by: NURSE PRACTITIONER

## 2021-12-17 PROCEDURE — G8754 DIAS BP LESS 90: HCPCS | Performed by: NURSE PRACTITIONER

## 2021-12-17 PROCEDURE — 3017F COLORECTAL CA SCREEN DOC REV: CPT | Performed by: NURSE PRACTITIONER

## 2021-12-17 PROCEDURE — G8420 CALC BMI NORM PARAMETERS: HCPCS | Performed by: NURSE PRACTITIONER

## 2021-12-17 RX ORDER — PIOGLITAZONEHYDROCHLORIDE 30 MG/1
30 TABLET ORAL DAILY
Qty: 90 TABLET | Refills: 0 | Status: SHIPPED | OUTPATIENT
Start: 2021-12-17 | End: 2022-03-30

## 2021-12-17 RX ORDER — INSULIN GLARGINE 100 [IU]/ML
16 INJECTION, SOLUTION SUBCUTANEOUS
Qty: 15 ML | Refills: 1
Start: 2021-12-17 | End: 2022-01-19 | Stop reason: SDUPTHER

## 2021-12-17 NOTE — PROGRESS NOTES
Room     Did patient bring someone? No    Did the patient have DME equipment? Yes Andres Faust     Did you take your medication today? Yes       1. Have you been to the ER, urgent care clinic since your last visit? Hospitalized since your last visit? Yes [facility]     2. Have you seen or consulted any other health care providers outside of the [address] [place] [address] since your last visit? Include any pap smears or colon screening.  No    3 most recent PHQ Screens 12/17/2021   Little interest or pleasure in doing things Nearly every day   Feeling down, depressed, irritable, or hopeless Nearly every day   Total Score PHQ 2 6   Trouble falling or staying asleep, or sleeping too much Nearly every day   Feeling tired or having little energy Nearly every day   Poor appetite, weight loss, or overeating Nearly every day   Feeling bad about yourself - or that you are a failure or have let yourself or your family down More than half the days   Trouble concentrating on things such as school, work, reading, or watching TV Nearly every day   Moving or speaking so slowly that other people could have noticed; or the opposite being so fidgety that others notice Not at all   Thoughts of being better off dead, or hurting yourself in some way Not at all   PHQ 9 Score 20         Health Maintenance Due   Topic Date Due    Foot Exam Q1  Never done    Eye Exam Retinal or Dilated  Never done    DTaP/Tdap/Td series (1 - Tdap) Never done    Shingrix Vaccine Age 50> (1 of 2) Never done    AAA Screening 73-67 YO Male Smoking Patients  Never done    Colorectal Cancer Screening Combo  06/23/2018    COVID-19 Vaccine (3 - Booster for Moderna series) 11/21/2021    Medicare Yearly Exam  12/29/2021       Learning Assessment 12/28/2020   PRIMARY LEARNER Patient   HIGHEST LEVEL OF EDUCATION - PRIMARY LEARNER  GRADUATED HIGH SCHOOL OR GED   BARRIERS PRIMARY LEARNER NONE   CO-LEARNER CAREGIVER No   PRIMARY LANGUAGE ENGLISH   LEARNER PREFERENCE PRIMARY LISTENING   ANSWERED BY Hailey Farrar   RELATIONSHIP SELF

## 2021-12-17 NOTE — PATIENT INSTRUCTIONS
[address] saying he has an appointment for a pre-anesthesia surgical testing Monday Dec. 20 be there at 1:30.  She left a number  if he has any problems

## 2021-12-17 NOTE — PROGRESS NOTES
Jesus               [address]               [phone]      Reese Gaffney is a 70 y.o. male and presents with Follow Up Chronic Condition, Diabetes, Hypertension, and Cholesterol Problem       Assessment/Plan:    Diagnoses and all orders for this visit:    1. Type 2 diabetes mellitus with hyperglycemia, with long-term current use of insulin (HCC)  -     insulin glargine (Lantus Solostar U-100 Insulin) 100 unit/mL (3 mL) inpn; 16 Units by SubCUTAneous route nightly. -     LIPID PANEL; Future  -     HEMOGLOBIN A1C WITH EAG; Future  -     MICROALBUMIN, UR, RAND W/ MICROALB/CREAT RATIO; Future  -     REFERRAL TO OPHTHALMOLOGY  -     pioglitazone (ACTOS) 30 mg tablet; Take 1 Tablet by mouth daily. Endorses medication compliance, Refill provided, Follow-up labs today and Denies signs and symptoms of hyper or hypoglycemia   2. Essential hypertension with goal blood pressure less than 772/91  -     METABOLIC PANEL, COMPREHENSIVE; Future  Endorses medication compliance, Follow-up labs today and Blood pressure stable, continue same medications   3. Dementia associated with other underlying disease with behavioral disturbance ([facility] [address]  will get neuropsych notes from [facility]   4. Positive depression screening    Depression screen positive, PHQ 9 Score: 20, C-SSRS completed    Follow-up and Dispositions    · Return in about 3 months (around 3/17/2022) for DM, DM foot, HTN, 30 min, office only.   Routing History           Health Maintenance:   Health Maintenance   Topic Date Due    Foot Exam Q1  Never done    Eye Exam Retinal or Dilated  Never done    DTaP/Tdap/Td series (1 - Tdap) Never done    Shingrix Vaccine Age 50> (1 of 2) Never done    AAA Screening 73-69 YO Male Smoking Patients  Never done    Colorectal Cancer Screening Combo  06/23/2018    COVID-19 Vaccine (3 - Booster for Fabiola Josh series) 11/21/2021    Medicare Yearly Exam  12/29/2021    A1C test (Diabetic or Prediabetic) 03/08/2022    MICROALBUMIN Q1  03/08/2022    Lipid Screen  03/08/2022    Flu Vaccine  Completed    Pneumococcal 65+ years  Completed        Subjective:    Parkinsons  Has neurology appointment 2/9/22 for evaluation    Prosgtate cancer  Has transurthral US scheduled for 1/25/22 with dr. Jeanie Potts  He is going to receive chemotherapy for treatment  Has appointment [facility] 12/20/21 for PSAT    Low back pain  Is in physical therapy  Has only had one visit at this point  Has not mentioned to Dr. Austen Winter he is having low back pain    DMII-   Patient reports medication compliance Daily  Diabetic diet compliance most of the time  Patient monitors blood sugars regularly QID   Reports am fasting sugars range 250-350   Denies hypoglycemic episodes yes  Denies polyuria, polydipsia, paraesthesia, vision changes? yes  Engaging in daily exercise?  No     Diabetic Foot and Eye Exam HM Status   Topic Date Due    Diabetic Foot Care  Never done    Eye Exam  Never done     Hemoglobin A1c   Date Value Ref Range Status   03/08/2021 8.6 (H) 4.8 - 5.6 % Final     Comment:              Prediabetes: 5.7 - 6.4           Diabetes: >6.4           Glycemic control for adults with diabetes: <7.0     ]  Creatinine, urine   Date Value Ref Range Status   03/08/2021 113.1 Not Estab. mg/dL Final     Microalb/Creat ratio (ug/mg creat.)   Date Value Ref Range Status   03/08/2021 24 0 - 29 mg/g creat Final     Comment:                            Normal:                0 -  29                         Moderately increased: 30 - 300                         Severely increased:       >300     02/27/2020 19 0 - 29 mg/g creat Final     Comment:                            Normal:                0 -  29                         Moderately increased: 30 - 300                         Severely increased:       >300                **Please note reference interval change**       Key Antihyperglycemic Medications             insulin glargine (Lantus Solostar U-100 Insulin) 100 unit/mL (3 mL) inpn (Taking) 16 Units by SubCUTAneous route nightly. pioglitazone (ACTOS) 30 mg tablet (Taking) Take 1 Tablet by mouth daily. SITagliptin (JANUVIA) 100 mg tablet (Taking) Take 1 Tablet by mouth daily. ROS:     ROS  As stated in HPI, otherwise all others negative. The problem list was updated as a part of today's visit. Patient Active Problem List   Diagnosis Code    Essential hypertension with goal blood pressure less than 130/80 I10    Type 2 diabetes mellitus with hyperglycemia, with long-term current use of insulin (HCC) E11.65, Z79.4    Gastroesophageal reflux disease K21.9    BPH (benign prostatic hyperplasia) N40.0    Low back pain M54.50    Microcytic anemia D50.9    NS (nuclear sclerosis) H25.10    Severe depression (HCC) F32.2    Tarsal tunnel syndrome G57.50    Dementia associated with other underlying disease with behavioral disturbance (HCC) F02.81    Status post partial gastrectomy Z90.3    History of hepatitis C Z86.19    History of drug abuse (HCC) F19.11    Paranoid schizophrenia, chronic condition with acute exacerbation (HCC) F20.0    Posterior vitreous detachment, right eye H43.811    Viral hepatitis C B19.20    Vitreomacular adhesion, left eye H43.822    Diabetes mellitus without complication (HCC) H66.5    Hypertensive disorder I10    Prostate cancer ([facility] [address] C61    Essential hypertension I10       The PSH, FH were reviewed.       SH:  Social History     Tobacco Use    Smoking status: Former Smoker     Packs/day: 0.25     Years: 1.00     Pack years: 0.25     Types: Cigarettes     Quit date: 5/24/2011     Years since quitting: 10.5    Smokeless tobacco: Never Used   Vaping Use    Vaping Use: Never used   Substance Use Topics    Alcohol use: No    Drug use: Yes     Types: Marijuana     Comment: H/O Heroin addiction Abstinent       Medications/Allergies:  Current Outpatient Medications on File Prior to Visit   Medication Sig Dispense Refill    lidocaine 4 % patch 1 Patch by TransDERmal route every twenty-four (24) hours. 30 Each 2    amLODIPine (NORVASC) 10 mg tablet TAKE ONE TABLET BY MOUTH EVERY DAY 90 Tablet 1    famotidine (PEPCID) 40 mg tablet TAKE ONE TABLET BY MOUTH EVERY DAY 90 Tablet 0    omeprazole (PRILOSEC) 20 mg capsule TAKE TWO CAPSULES BY MOUTH EVERY  Capsule 0    lisinopriL (PRINIVIL, ZESTRIL) 20 mg tablet Take 1 Tablet by mouth daily. 90 Tablet 1    SITagliptin (JANUVIA) 100 mg tablet Take 1 Tablet by mouth daily. 90 Tablet 0    Insulin Needles, Disposable, 31 gauge x 3/16\" ndle Use 1 new pen needle each time to inject insulin subcutaneously 4 times daily 150 Pen Needle 2    flash glucose sensor (FreeStyle Juan Carlos 14 Day Sensor) kit Check blood sugar 4 times a day. 1 Kit 0    FreeStyle Juan Carlos 14 Day [place] misc Apply 1 Device to affected area once.  tamsulosin (FLOMAX) 0.4 mg capsule Take 1 Capsule by mouth daily (after dinner). 90 Capsule 3    ondansetron (ZOFRAN ODT) 4 mg disintegrating tablet Take 1 Tab by mouth every eight (8) hours as needed for Nausea or Vomiting. 90 Tab 0    [DISCONTINUED] Lantus Solostar U-100 Insulin 100 unit/mL (3 mL) inpn give 10 units subcutaneously AT BEDTIME 15 mL 1    [DISCONTINUED] pioglitazone (ACTOS) 15 mg tablet Take 1 Tablet by mouth daily. 90 Tablet 0    [DISCONTINUED] glucose blood VI test strips (Accu-Chek Laurel Plus test strp) strip Check glucose three times a day, uses insulin (Patient not taking: Reported on 12/2/2021) 300 Strip 3    [DISCONTINUED] Blood-Glucose Meter (Accu-Chek Laurel Plus Meter) misc Check glucose three times a day, uses insulin (Patient not taking: Reported on 12/2/2021) 1 Each 0    [DISCONTINUED] lancets (TRUEplus Lancets) 33 gauge misc TRUEplus Lancets 33 gauge (Patient not taking: Reported on 12/2/2021) 100 Lancet 3    [DISCONTINUED] acetaminophen (TYLENOL) 325 mg tablet Take 2 Tabs by mouth every six (6) hours as needed for Pain.  (Patient not taking: Reported on 9/21/2021) 20 Tab 0     No current facility-administered medications on file prior to visit. Allergies   Allergen Reactions    Ibuprofen Anaphylaxis    Fentanyl Other (comments)     Blurred vision    Metformin Diarrhea    Neurontin [Gabapentin] Other (comments)     Blurred Vision      Penicillins Swelling    Valium [Diazepam] Swelling       Objective:  Visit Vitals  /76 (BP 1 Location: Left arm, BP Patient Position: Sitting, BP Cuff Size: Adult)   Pulse 71   Temp 98.3 °F (36.8 °C) (Temporal)   Resp 18   Ht 5' 8\" (1.727 m)   Wt 150 lb (68 kg)   SpO2 99%   BMI 22.81 kg/m²    Body mass index is 22.81 kg/m². Physical assessment  Physical Exam  Vitals and nursing note reviewed. Eyes:      Conjunctiva/sclera: Conjunctivae normal.      Pupils: Pupils are equal, round, and reactive to light. Neck:      Vascular: No JVD. Cardiovascular:      Rate and Rhythm: Normal rate and regular rhythm. Heart sounds: Normal heart sounds. No murmur heard. No friction rub. No gallop. Pulmonary:      Effort: Pulmonary effort is normal.      Breath sounds: Normal breath sounds. Musculoskeletal:         General: Normal range of motion. Cervical back: Normal range of motion. Skin:     General: Skin is warm and dry. Neurological:      Mental Status: He is alert and oriented to person, place, and time.    Psychiatric:         Mood and Affect: Affect normal.         Cognition and Memory: Memory normal.         Judgment: Judgment normal.           Labwork and Ancillary Studies:    CBC w/Diff  Lab Results   Component Value Date/Time    WBC 6.2 03/08/2021 03:57 AM    HGB 12.2 (L) 03/08/2021 03:57 AM    PLATELET 466 69/28/1288 03:57 AM         Basic Metabolic Profile  Lab Results   Component Value Date/Time    Sodium 135 11/16/2021 01:48 PM    Potassium 5.0 11/16/2021 01:48 PM    Chloride 97 11/16/2021 01:48 PM    CO2 19 (L) 11/16/2021 01:48 PM    Anion gap 6 01/07/2020 05:38 AM Glucose 493 (H) 11/16/2021 01:48 PM    BUN 16 11/16/2021 01:48 PM    Creatinine 1.28 (H) 11/16/2021 01:48 PM    BUN/Creatinine ratio 13 11/16/2021 01:48 PM    GFR est AA 65 11/16/2021 01:48 PM    GFR est non-AA 56 (L) 11/16/2021 01:48 PM    Calcium 9.5 11/16/2021 01:48 PM        Cholesterol  Lab Results   Component Value Date/Time    Cholesterol, total 127 03/08/2021 03:57 AM    HDL Cholesterol 50 03/08/2021 03:57 AM    LDL, calculated 63 03/08/2021 03:57 AM    LDL, calculated 62 02/27/2020 09:00 AM    Triglyceride 69 03/08/2021 03:57 AM           I have discussed the diagnosis with the patient and the intended plan as seen in the above orders. The patient has received an After-Visit Summary and questions were answered concerning future plans. An After Visit Summary was printed and given to the patient. All diagnosis have been discussed with the patient and all of the patient's questions have been answered. Follow-up and Dispositions    · Return in about 3 months (around 3/17/2022) for DM, DM foot, HTN, 30 min, office only. Routing History           [facility]  [address] [facility]   [address] [facility] [address] [address]  .

## 2021-12-19 LAB
ALBUMIN SERPL-MCNC: 4.9 G/DL (ref 3.7–4.7)
ALBUMIN/CREAT UR: 26 MG/G CREAT (ref 0–29)
ALBUMIN/GLOB SERPL: 1.6 {RATIO} (ref 1.2–2.2)
ALP SERPL-CCNC: 147 IU/L (ref 44–121)
ALT SERPL-CCNC: 47 IU/L (ref 0–44)
AST SERPL-CCNC: 45 IU/L (ref 0–40)
BILIRUB SERPL-MCNC: <0.2 MG/DL (ref 0–1.2)
BUN SERPL-MCNC: 18 MG/DL (ref 8–27)
BUN/CREAT SERPL: 14 (ref 10–24)
CALCIUM SERPL-MCNC: 9.9 MG/DL (ref 8.6–10.2)
CHLORIDE SERPL-SCNC: 102 MMOL/L (ref 96–106)
CHOLEST SERPL-MCNC: 150 MG/DL (ref 100–199)
CO2 SERPL-SCNC: 25 MMOL/L (ref 20–29)
CREAT SERPL-MCNC: 1.29 MG/DL (ref 0.76–1.27)
CREAT UR-MCNC: 55.5 MG/DL
EST. AVERAGE GLUCOSE BLD GHB EST-MCNC: 266 MG/DL
GLOBULIN SER CALC-MCNC: 3 G/DL (ref 1.5–4.5)
GLUCOSE SERPL-MCNC: 362 MG/DL (ref 65–99)
HBA1C MFR BLD: 10.9 % (ref 4.8–5.6)
HDLC SERPL-MCNC: 69 MG/DL
IMP & REVIEW OF LAB RESULTS: NORMAL
INTERPRETATION: NORMAL
LDLC SERPL CALC-MCNC: 72 MG/DL (ref 0–99)
MICROALBUMIN UR-MCNC: 14.7 UG/ML
POTASSIUM SERPL-SCNC: 4.5 MMOL/L (ref 3.5–5.2)
PROT SERPL-MCNC: 7.9 G/DL (ref 6–8.5)
SODIUM SERPL-SCNC: 142 MMOL/L (ref 134–144)
TRIGL SERPL-MCNC: 37 MG/DL (ref 0–149)
VLDLC SERPL CALC-MCNC: 9 MG/DL (ref 5–40)

## 2021-12-20 ENCOUNTER — PATIENT OUTREACH (OUTPATIENT)
Dept: CASE MANAGEMENT | Age: 71
End: 2021-12-20

## 2021-12-20 NOTE — PROGRESS NOTES
Called [facility] Neuro[facility]iatrist for Patient most recent office notes. Patient has to come up to the office to feel out release information form . Patient has been notified and has understanding of coming into the office to sign release information form .

## 2021-12-20 NOTE — PROGRESS NOTES
.Complex Case Management  Follow-Up       Date/Time:   12/20/2021  08:31AM       Patient contacted [facility] ( [facility])  for Complex Case Management  assistance as he needs clarification concerning when is is home health aide is coming and which appointment is she going to with him. .     Patient reported:   He has a new [address] called \" [facility]" Owner /Manager name is Maria Eugenia Shaheen . The nurse came Friday to talk to him to see what needs he has and she said he will need at least 49 hours a week, but when he called Maria Eugenia Jamison yesterday she said that she was in [place] and will call him back later. When she called him back she said he was going to get 5 hours a day for now. All hours needs to be approved . He has an appointment today at 2 and needs someone to be with him. The aide name per patient is Philipp she did not give him a last name as the transportation company asked for it. Patient asking [facility] to  and ask what time is the aide coming today. He could not keep it straight in his head. He has all of these Physical Therapy appointments. [facility] ask why do Aide needs to go with him to PT? How far does it take from the drop off to getting in the building and do he needs assistance. Patient said no  Transportation drops him off right infornt, the therapy department is on the first floor. ACM said then why do he need the aide. She should come after therapy to spend more time preparing for the rest of his day meals ,cleaning and so on. Get with the aide make sure she has his appointments and discuss times that will be better for him. [facility] called [facility], Women voiced answered [facility] introduced self and reason for the call. [facility] asked her her last name ,given  the PCA assigned to him name is Saradada Dyeiar . Explained that patient has had a CVA that affects his memory and thinking he forgets .  [facility] asked her to explain hours that patient will receive. MS Mendoza said the RN that went out to see him said that he needs 49 hours of coverage care but they can only start with the minimal 35 hours until his insurance ( [employer] ) approve for the max amount if they start out with the 49 and don't get aproved they loose out. PCA will be there 11 to 4 today as he has an appointment at 2 pm. She will have her to get his schedule for all of his appointments and try to arrange her hours around them. ACM returned called to patient. Explained what Manager said ,gave him the PCA name that will be coming at 6 to 4 today and they need to set down and make a scheduled for the hours, according to his appointments. The early 8:30 appointments to PT he should be able to go by himself and PCA should come after his appointments. .    Next PCP Appointment: 3/17/2022      [employer]: [facility] [address]    Barriers to care? Forgetful,short term memory from CVA. ACM encourage to write down all information        Patient reminded that there are physicians on call 24 hours a day / 7 days a week (M-F 5pm to 8am and from Friday 5pm until Monday 8a for the weekend) should the patient have questions or concerns.

## 2021-12-21 ENCOUNTER — PATIENT OUTREACH (OUTPATIENT)
Dept: CASE MANAGEMENT | Age: 71
End: 2021-12-21

## 2021-12-21 ENCOUNTER — HOSPITAL ENCOUNTER (OUTPATIENT)
Dept: PHYSICAL THERAPY | Age: 71
Discharge: HOME | End: 2021-12-21
Payer: MEDICARE

## 2021-12-21 PROCEDURE — 97110 THERAPEUTIC EXERCISES: CPT

## 2021-12-21 NOTE — PROGRESS NOTES
PT DAILY TREATMENT NOTE     Patient Name: Otilia Jurado  Date:2021  : 1950  [x]  Patient  Verified  Payor: Jose Cecilio / Plan: [facility] HMO / Product Type: Managed Care Medicare /    In time:2:14  Out time:2:54  Total Treatment Time (min): 40  Visit #: 2 of 8-10    Medicare/BCBS Only   Total Timed Codes (min):  30 1:1 Treatment Time:  30       Treatment Area: Other low back pain [M54.59]    SUBJECTIVE  Pain Level (0-10 scale): 10+  Any medication changes, allergies to medications, adverse drug reactions, diagnosis change, or new procedure performed?: [x] No    [] Yes (see summary sheet for update)  Subjective functional status/changes:   [] No changes reported  \"Old Man La Schultz is really getting me today, I am having more pain than usual all in my low back and legs. I am having difficulty finding motivation because I have so many health issues going on. \"    OBJECTIVE    Modality rationale: decrease pain to improve the patients ability to perform ADLs and rest comfortably following therapy.     Min Type Additional Details    [] Estim:  []Unatt       []IFC  []Premod                        []Other:  []w/ice   []w/heat  Position:   Location:     [] Estim: []Att    []TENS instruct  []NMES                    []Other:  []w/US   []w/ice   []w/heat  Position:   Location:     []  Traction: [] Cervical       []Lumbar                       [] Prone          []Supine                       []Intermittent   []Continuous Lbs:  [] before manual  [] after manual    []  Ultrasound: []Continuous   [] Pulsed                           []1MHz   []3MHz W/cm2:  Location:    []  Iontophoresis with dexamethasone         Location: [] Take home patch   [] In clinic   10 []  Ice     [x]  heat  []  Ice massage  []  Laser   []  Anodyne Position: supine with wedge  Location: low back    []  Laser with stim  []  Other:  Position:  Location:    []  Vasopneumatic Device    []  Right     [] Left  Pre-treatment girth:  Post-treatment girth:  Measured at (location):  Pressure:       [] lo [] med [] hi   Temperature: [] lo [] med [] hi   [x] Skin assessment post-treatment:  [x]intact []redness- no adverse reaction    []redness  adverse reaction:     30 min Therapeutic Exercise:  [x] See flow sheet :   Rationale: increase ROM and increase strength to improve LE strength/mobility and  lumbar mobility to improve ease of ADLs and gait. With   [x] TE   [] TA   [] neuro   [] other: Patient Education: [x] Review HEP    [] Progressed/Changed HEP based on:   [] positioning   [] body mechanics   [] transfers   [] heat/ice application    [] other:      Other Objective/Functional Measures: -Initiated supine interventions due to pts increased pain today     Pain Level (0-10 scale) post treatment: 7    ASSESSMENT/Changes in Function: Patient reports lack of adherence with therapy HEP due to decreased motivation levels/increased fatigue due to multiple health issues. He demos some reluctance to complete interventions today due to increased pain. He states that his prostate cancer diagnosis has really affected his activity levels and his emotional levels, though he is improving with spiritual support. Limited number of interventions performed today as pt hesitant to complete more interventions. Asked patient to try completing increased number of additional interventions at next session and pt agreed. Patient will continue to benefit from skilled PT services to modify and progress therapeutic interventions, address functional mobility deficits, address ROM deficits, address strength deficits, analyze and address soft tissue restrictions, analyze and cue movement patterns, analyze and modify body mechanics/ergonomics, assess and modify postural abnormalities and address imbalance/dizziness to attain remaining goals.      []  See Plan of Care  []  See progress note/recertification  []  See Discharge Summary Progress towards goals / Updated goals: · Short Term Goals: To be accomplished in  4-6  treatments:  1. Pt will be independent and compliant with HEP for carryover of strength and mobility gains  Status at last assessment : Issued at   Current: not met yet, pt reports difficulty finding motivation/energy to complete exercises at home (12/21/21)  · Long Term Goals: To be accomplished in  8-10  treatments:  1. Pt will score >49/100 on FOTO to show significant improvement in functional mobility and QOL   Status at last assessment 31/100  2. Pt will demo ability to complete TUG in <30 seconds to show reduced fall risk  Status at last assessment : 48 sec with rollator  3. Pt will demo ability to perform romberg stance EO for 30 seconds indicating improved postural control   Status at last assessment: ROM EO 3 sec  4.  Pt will report >50% improvement in overall symptoms to show improved activity tolerance  Status at last assessment : severe impairments     PLAN  [x]  Upgrade activities as tolerated     [x]  Continue plan of care  []  Update interventions per flow sheet       []  Discharge due to:_  []  Other:_      Elfego Owens 12/21/2021  10:37 AM    Future Appointments   Date Time Provider Devika Haley   12/21/2021  2:00 PM Luane Fail [facility]   12/23/2021  1:15 PM Luane Fail [facility]   12/27/2021  8:30 AM [facility] [place] [facility]   12/29/2021  8:30 AM [facility] [place] [facility]   1/3/2022  9:15 AM [facility] PT [place] [facility]   1/5/2022 10:45 AM [facility] PT [place] [facility]   1[facility]10/2022 10:00 AM [facility] PT [place] [facility]   1/13/2022 10:00 AM [facility] PT [place] [facility]   2/9/2022 10:00 AM Orlando Ravi MD [facility] BS AMB   3/17/2022  9:30 AM Licha Huff NP [facility] BS AMB

## 2021-12-21 NOTE — PROGRESS NOTES
Spoke to patient about signing a release form for office note for [facility]. Patient states if I can get a ride up there then I will sign the paper. Patient verbalized understanding.

## 2021-12-21 NOTE — PROGRESS NOTES
.Complex Case Management  Follow-Up       Date/Time:   12?21/2021  10:06AM      Ambulatory Care Manager [facility]) contacted patient for Complex Case Management  follow up. Spoke to patient and PCA, Philip Cantu  Introduced self/role and reason for call. Patient reported:   He is doing well. His PCA came on time today. He had breakfast. She has updated his schedule board. All seems to be going well. Blood sugar 220 this morning ate fried fish with french fries for dinner did drink water. Freestyle Juan Carlos sensor due to be stringer this Friday and PCA said she knows   He did go to [facility] ,met with the Anesthesiology for his up coming surgery. Pertinent concerns:  None at this time     Specialist appointments since last outreach? Yes   If so, specialist and date: 12/20/2021    Next PCP Appointment: 3/17/2022    Medications:     New medications since last outreach: No  Does patient need refills on any medications: No   Medication changes since last outreach (dose adjustments or discontinued meds): No      Usound: Socialance           Patient reminded that there are physicians on call 24 hours a day / 7 days a week (M-F 5pm to 8am and from Friday 5pm until Monday 8a for the weekend) should the patient have questions or concerns.

## 2021-12-23 ENCOUNTER — HOSPITAL ENCOUNTER (OUTPATIENT)
Dept: PHYSICAL THERAPY | Age: 71
Discharge: HOME | End: 2021-12-23
Payer: MEDICARE

## 2021-12-23 PROCEDURE — 97112 NEUROMUSCULAR REEDUCATION: CPT

## 2021-12-23 PROCEDURE — 97110 THERAPEUTIC EXERCISES: CPT

## 2021-12-23 NOTE — PROGRESS NOTES
PT DAILY TREATMENT NOTE     Patient Name: Leonardo Romero  Date:2021  : 1950  [x]  Patient  Verified  Payor: Enocon Guillermo / Plan: [address] [place] [address] HMO / Product Type: Managed Care Medicare /    In time:1:18  Out time:2:10  Total Treatment Time (min): 52  Visit #: 3 of 8-10    Medicare/BCBS Only   Total Timed Codes (min):  42 1:1 Treatment Time:  42       Treatment Area: Other low back pain [M54.59]    SUBJECTIVE  Pain Level (0-10 scale): 10+  Any medication changes, allergies to medications, adverse drug reactions, diagnosis change, or new procedure performed?: [x] No    [] Yes (see summary sheet for update)  Subjective functional status/changes:   [] No changes reported  \"I was sore after last session from my exercises. \"    OBJECTIVE    Modality rationale: decrease pain to improve the patients ability to perform ADLs and rest comfortably following therapy.     Min Type Additional Details    [] Estim:  []Unatt       []IFC  []Premod                        []Other:  []w/ice   []w/heat  Position:   Location:     [] Estim: []Att    []TENS instruct  []NMES                    []Other:  []w/US   []w/ice   []w/heat  Position:   Location:     []  Traction: [] Cervical       []Lumbar                       [] Prone          []Supine                       []Intermittent   []Continuous Lbs:  [] before manual  [] after manual    []  Ultrasound: []Continuous   [] Pulsed                           []1MHz   []3MHz W/cm2:  Location:    []  Iontophoresis with dexamethasone         Location: [] Take home patch   [] In clinic   10 []  Ice     [x]  heat  []  Ice massage  []  Laser   []  Anodyne Position: seated  Location: low back    []  Laser with stim  []  Other:  Position:  Location:    []  Vasopneumatic Device    []  Right     []  Left  Pre-treatment girth:  Post-treatment girth:  Measured at (location):  Pressure:       [] lo [] med [] hi   Temperature: [] lo [] med [] hi   [x] Skin assessment post-treatment:  [x]intact []redness- no adverse reaction    []redness  adverse reaction:     28 min Therapeutic Exercise:  [x] See flow sheet :   Rationale: increase ROM and increase strength to improve LE strength/mobility and  lumbar mobility to improve ease of ADLs and gait. 14 min Neuromuscular Re-education:  [x]  See flow sheet :   Rationale: increase strength, improve coordination, improve balance and increase proprioception  to improve the patients ability to perform functional tasks with improved abdominal brace utilization, improved control, and decreased risk for falls. With   [x] TE   [] TA   [x] neuro   [] other: Patient Education: [x] Review HEP    [] Progressed/Changed HEP based on:   [] positioning   [] body mechanics   [] transfers   [] heat/ice application    [] other:      Other Objective/Functional Measures: -Initiated all standing interventions     Pain Level (0-10 scale) post treatment: 7-8    ASSESSMENT/Changes in Function: Patient continues to report elevated pain levels today. Based on continued high pain levels, therapist focus today on incorporating more functional standing interventions to improve activity tolerance and LE strength in closed chain rather than strict focus on addressing pain levels with supine interventions, especially as pt reported elevated pain with plinth interventions at last session. Patient will continue to benefit from skilled PT services to modify and progress therapeutic interventions, address functional mobility deficits, address ROM deficits, address strength deficits, analyze and address soft tissue restrictions, analyze and cue movement patterns, analyze and modify body mechanics/ergonomics, assess and modify postural abnormalities and address imbalance/dizziness to attain remaining goals.      []  See Plan of Care  []  See progress note/recertification  []  See Discharge Summary         Progress towards goals / Updated goals:  · Short Term Goals: To be accomplished in  4-6  treatments:  1.  Pt will be independent and compliant with HEP for carryover of strength and mobility gains  Status at last assessment : Issued at   Current: not met yet, pt reports difficulty finding motivation/energy to complete exercises at home (12/21/21)  · Long Term Goals: To be accomplished in  8-10  treatments:  1.  Pt will score >49/100 on FOTO to show significant improvement in functional mobility and QOL   Status at last assessment 31/100  2. Pt will demo ability to complete TUG in <30 seconds to show reduced fall risk  Status at last assessment : 48 sec with rollator  3. Pt will demo ability to perform romberg stance EO for 30 seconds indicating improved postural control   Status at last assessment: ROM EO 3 sec  4.  Pt will report >50% improvement in overall symptoms to show improved activity tolerance  Status at last assessment : severe impairments     PLAN  [x]  Upgrade activities as tolerated     [x]  Continue plan of care  []  Update interventions per flow sheet       []  Discharge due to:_  []  Other:_      Rochele Kind 12/23/2021  8:25 AM    Future Appointments   Date Time Provider Devika Haley   12/23/2021  1:15 PM Ame Brennan [facility]   12/27/2021  9:15 AM Jacquelin Calderon PT [facility]   12/29/2021  8:30 AM [facility] [place] [facility]   1/3/2022  9:15 AM [facility] PT [place] [facility]   1/5/2022 10:45 AM [facility] PT [place] [facility]   1/10/2022 10:00 AM [facility] PT [place] [facility]   1/13/2022 10[facility]00 AM [facility] PT [place] [facility]   2/9/2022 10:00 AM Carole Green MD [facility] BS AMB   3/17/2022  9:30 AM BEN Man BS AMB

## 2021-12-27 ENCOUNTER — APPOINTMENT (OUTPATIENT)
Dept: PHYSICAL THERAPY | Age: 71
End: 2021-12-27
Payer: MEDICARE

## 2021-12-27 NOTE — PROGRESS NOTES
2nd attempt calling patient to come into office to sign medical release form. Patient did not answer.

## 2021-12-28 ENCOUNTER — PATIENT OUTREACH (OUTPATIENT)
Dept: CASE MANAGEMENT | Age: 71
End: 2021-12-28

## 2021-12-28 NOTE — PROGRESS NOTES
Patient has been informed to come in to office to sign medical release form patient has yet to come into office.

## 2021-12-28 NOTE — PROGRESS NOTES
Patient voicing to [facility] [place] that he has lost taste for his food . It's been going on for the last 5 days but seems to be getting worst. Denies cough,fever,aches diarrhea or chills. ACM suggested that he go and get COVID tested yes he has had all 3 vaccines. His PCA said [facility] are testing without appointments. Patient voicing he will go there then.

## 2021-12-29 ENCOUNTER — HOSPITAL ENCOUNTER (OUTPATIENT)
Dept: PHYSICAL THERAPY | Age: 71
Discharge: HOME | End: 2021-12-29
Payer: MEDICARE

## 2021-12-29 PROCEDURE — 97110 THERAPEUTIC EXERCISES: CPT

## 2021-12-29 PROCEDURE — 97112 NEUROMUSCULAR REEDUCATION: CPT

## 2021-12-29 PROCEDURE — 97116 GAIT TRAINING THERAPY: CPT

## 2021-12-29 NOTE — PROGRESS NOTES
PT DAILY TREATMENT NOTE     Patient Name: Rajendra Quintanilla  Date:2021  : 1950  [x]  Patient  Verified  Payor: [place] Lul / Plan: [facility] HMO / Product Type: Managed Care Medicare /    In time: 8:28  Out time: 9:12  Total Treatment Time (min): 44  Visit #: 4 of 8-10    Medicare/BCBS Only   Total Timed Codes (min):  44 1:1 Treatment Time:  44       Treatment Area: Other low back pain [M54.59]    SUBJECTIVE  Pain Level (0-10 scale): 10  Any medication changes, allergies to medications, adverse drug reactions, diagnosis change, or new procedure performed?: [x] No    [] Yes (see summary sheet for update)  Subjective functional status/changes:   [] No changes reported  Pt reports non-compliance with HEP. He reports he has dementia and does not remember to do it. When suggested to set a reminder on his phone he reports he is not tech savvy. OBJECTIVE    Modality rationale: decrease pain to improve the patients ability to perform ADLs and rest comfortably following therapy.     Min Type Additional Details    [] Estim:  []Unatt       []IFC  []Premod                        []Other:  []w/ice   []w/heat  Position:   Location:     [] Estim: []Att    []TENS instruct  []NMES                    []Other:  []w/US   []w/ice   []w/heat  Position:   Location:     []  Traction: [] Cervical       []Lumbar                       [] Prone          []Supine                       []Intermittent   []Continuous Lbs:  [] before manual  [] after manual    []  Ultrasound: []Continuous   [] Pulsed                           []1MHz   []3MHz W/cm2:  Location:    []  Iontophoresis with dexamethasone         Location: [] Take home patch   [] In clinic   ND []  Ice     [x]  heat  []  Ice massage  []  Laser   []  Anodyne Position: seated  Location: low back    []  Laser with stim  []  Other:  Position:  Location:    []  Vasopneumatic Device    []  Right     []  Left  Pre-treatment girth:  Post-treatment girth:  Measured at (location):  Pressure:       [] lo [] med [] hi   Temperature: [] lo [] med [] hi   [x] Skin assessment post-treatment:  [x]intact []redness- no adverse reaction    []redness  adverse reaction:     18 min Therapeutic Exercise:  [x] See flow sheet :   Rationale: increase ROM and increase strength to improve LE strength/mobility and  lumbar mobility to improve ease of ADLs and gait. 6 NC min Therapeutic Activity:  [x]  See flow sheet :   Rationale: increase ROM, increase strength and improve coordination to improve the patients ability to perform proper posture, bed mobility and transfers without p!     12 min Neuromuscular Re-education:  [x]  See flow sheet :   Rationale: increase strength, improve coordination, improve balance and increase proprioception  to improve the patients ability to perform functional tasks with improved abdominal brace utilization, improved control, and decreased risk for falls. 8 min Gait Trainin lap each in parallel bars: hurdles: fwd/lat, lateral, retro - with BUE use    Rationale: To improve patients ability to:    [x] perform ADLs   [x] ambulate          With   [x] TE   [] TA   [x] neuro   [] other: Patient Education: [x] Review HEP    [] Progressed/Changed HEP based on:   [] positioning   [] body mechanics   [] transfers   [] heat/ice application    [] other:      Other Objective/Functional Measures:   - continued standing interventions  - light progression of reps as noted on FS  - 8:42 to 8:45 (3 minutes) bathroom break NO CHARGE  - WBOS EC: max challenge required mod TCs anterior/posterior to maintain balance  - NBOS EO: mod challenge required 2 intermittent touches to parallel bars to maintain balance   - MSR EO: mod-mac challenge required 2-3 intermittent touches to parallel bars and min TCs anterior/posterior to maintain balance  - initiated gait training in parallel bars     Pain Level (0-10 scale) post treatment: 7/10    ASSESSMENT/Changes in Function:   Patient continues to present reporting 10/10 pain and non-compliance with HEP d/t memory deficits. Fait tolerance to light progression of reps with standing interventions today. One seated break required d/t fatigue. Additional time required for each task. Patient will continue to benefit from skilled PT services to modify and progress therapeutic interventions, address functional mobility deficits, address ROM deficits, address strength deficits, analyze and address soft tissue restrictions, analyze and cue movement patterns, analyze and modify body mechanics/ergonomics, assess and modify postural abnormalities and address imbalance/dizziness to attain remaining goals. []  See Plan of Care  []  See progress note/recertification  []  See Discharge Summary         Progress towards goals / Updated goals: · Short Term Goals: To be accomplished in  4-6  treatments:  1.  Pt will be independent and compliant with HEP for carryover of strength and mobility gains  Status at last assessment : Issued at   Current: not met yet, pt reports difficulty finding motivation/energy to complete exercises at home (12/21/21), continues to report non-compliance. (12/29/21)    · Long Term Goals: To be accomplished in  8-10  treatments:  1.  Pt will score >49/100 on FOTO to show significant improvement in functional mobility and QOL   Status at last assessment 31/100  Current:  2. Pt will demo ability to complete TUG in <30 seconds to show reduced fall risk  Status at last assessment : 48 sec with rollator  Current:   3. Pt will demo ability to perform romberg stance EO for 30 seconds indicating improved postural control   Status at last assessment: ROM EO 3 sec  Current: 30\" with EO on Floor with 2 intermittent touches for LOB (12/29/21)  4.  Pt will report >50% improvement in overall symptoms to show improved activity tolerance  Status at last assessment : severe impairments   Current: no change at this time (12/29/21)    PLAN  [x]  Upgrade activities as tolerated     [x]  Continue plan of care  []  Update interventions per flow sheet       []  Discharge due to:_  []  Other:_      RUBI Jaime 12/29/2021  9:12 AM    Future Appointments   Date Time Provider Devika Haley   12/29/2021  8:30 AM [address] [id number] [facility]   1/3/2022  9:15 AM [facility] PT [place] [address] [facility]   1/5/2022 10:45 AM [facility] PT [place] [address] [facility]   1/10/2022 10:00 AM [facility] PT [place] [address] [facility]   1/13/2022 10:00 AM [facility] PT [place] [address] [facility]   2/9/2022 10:00 AM Tiffanie Hsu MD [facility] BS AMB   3/17/2022  9:30 AM Anika Zacarias NP [facility] BS AMB

## 2022-01-03 ENCOUNTER — HOSPITAL ENCOUNTER (OUTPATIENT)
Dept: PHYSICAL THERAPY | Age: 72
Discharge: HOME | End: 2022-01-03
Payer: MEDICARE

## 2022-01-03 PROCEDURE — 97112 NEUROMUSCULAR REEDUCATION: CPT

## 2022-01-03 PROCEDURE — 97530 THERAPEUTIC ACTIVITIES: CPT

## 2022-01-03 NOTE — PROGRESS NOTES
PT DAILY TREATMENT NOTE     Patient Name: Roby Valdovinos  Date:1/3/2022  : 1950  [x]  Patient  Verified  Payor: Kari Ferguson / Plan: [facility] HMO / Product Type: Managed Care Medicare /    In time:9:15  Out time:9:57  Total Treatment Time (min): 42  Total Timed Codes (min): 31  1:1 Treatment Time (min): 31  (-11 min for phone call and restroom)  Visit #: 5 of 8-10    Treatment Area: Other low back pain [M54.59]    SUBJECTIVE  Pain Level (0-10 scale): 10  Any medication changes, allergies to medications, adverse drug reactions, diagnosis change, or new procedure performed?: [x] No    [] Yes (see summary sheet for update)  Subjective functional status/changes:   [] No changes reported  \"Arthritis is talking to me\". Pt notes a fall yesterday when he was off balance; Inside, walking with the cane and fell due to neuropathy on his R side. Pt denies injuries to body or head. Pt notes approx 3x/week falls. Pt entered using the [facility] today. Notes he uses the cane around the home and the RW for mobility. Did not use rollator today because of the rain. OBJECTIVE      10 (NC) min Therapeutic Exercise:  [x] See flow sheet :    Rationale: increase ROM and increase strength to improve the patients ability to ADLs, mobility, gait, self-care    17 min Therapeutic Activity:  [x]  See flow sheet : added step-ups; hurdles for curb negotiation ; TUG test for sit to stand and gait speed   Rationale:   To improve t/f safely and with ease      15 min Neuromuscular Re-education:  [x]  See flow sheet :   Changed HR/TR to on foam   marching on foam added  Added stance (regular / wide BRAD) with EC and sbA   MSR progressed to bunion   Added rocker taps A/P and lateral    Rationale: improve coordination, improve balance and increase proprioception  to improve the patients ability to improve safety with self-care, community mobility            min Patient Education: [x] Review HEP [] Progressed/Changed HEP based on:   [] positioning   [] body mechanics   [] transfers   [] heat/ice application        Other Objective/Functional Measures:   TU\", 28\"   ROM EO: 30\" with 3 LOB and UE required to assist;  EC: 30\" with no UE assist in regular (wide) BRAD  ROM EO: 30\" with no UE assist (over 1 repetition)   MSR at bunion - 2-3x UE assist required on each stance R  Vs L    UNBILLED TIME:  Bathroom use : 3 minutes  Phone call: 8 min    Pain Level (0-10 scale) post treatment: 7    ASSESSMENT/Changes in Function: Pt progressing towards balance goals, but ana's inconsistent progress as he has random LOB, intermittently with posterior or R lateral weight shifting and ana's no stepping strategy to compensate for LOB. Improved TUG test from 48\" with rollator to 28\" with SPC. Pt does ana' faster ambulation when not being tested in the clinic - eg when walking to restroom. Patient will continue to benefit from skilled PT services to modify and progress therapeutic interventions, address functional mobility deficits, address ROM deficits, address strength deficits, analyze and address soft tissue restrictions, analyze and cue movement patterns, analyze and modify body mechanics/ergonomics, assess and modify postural abnormalities, address imbalance/dizziness and instruct in home and community integration to attain remaining goals. []  See Plan of Care  []  See progress note/recertification  []  See Discharge Summary         Progress towards goals / Updated goals: · Short Term Goals: To be accomplished in  4-6  treatments:  1.  Pt will be independent and compliant with HEP for carryover of strength and mobility gains  Status at last assessment : Issued at   Current: not met yet, pt reports difficulty finding motivation/energy to complete exercises at home (21), continues to report non-compliance.  (21)     · Long Term Goals: To be accomplished in  8-10  treatments:  1.  Pt will score >49/100 on FOTO to show significant improvement in functional mobility and QOL   Status at last assessment 31/100  Current:  2. Pt will demo ability to complete TUG in <30 seconds to show reduced fall risk  Status at last assessment : 48 sec with rollator  Current:  28\" with SPC and UE assist for sit to stand and stand to sit t/f   (1/3/22)  3. Pt will demo ability to perform romberg stance EO for 30 seconds indicating improved postural control   Status at last assessment: ROM EO 3 sec  Current: ROM EO: 30\" with 0 LOB (completed 1 repetition);  EC: 30\" with no UE assist in regular (wide) BRAD (1/3/22)  4.  Pt will report >50% improvement in overall symptoms to show improved activity tolerance  Status at last assessment : severe impairments   Current: no change at this time (12/29/21)    PLAN  [x]  Upgrade activities as tolerated     [x]  Continue plan of care  []  Update interventions per flow sheet       []  Discharge due to:_  []  Other:_      Danial Reich, PT 1/3/2022  8:01 AM    Future Appointments   Date Time Provider Devika Haley   1/3/2022  9:15 AM Aydee Fernandes, PT [facility]   1/5/2022 10:45 AM [facility] PT [facility]   1/10/2022 10:00 AM [facility] PT [facility] 2 [facility]   1/13/2022 10:00 AM [facility] PT [facility] 2 [facility]   2/9/2022 10:00 AM Saima Murcia MD [facility] BS AMB   3/17/2022  9:30 AM Cece Siddiqui NP [facility] BS AMB

## 2022-01-04 ENCOUNTER — PATIENT OUTREACH (OUTPATIENT)
Dept: CASE MANAGEMENT | Age: 72
End: 2022-01-04

## 2022-01-04 NOTE — PROGRESS NOTES
.Complex Case Management  Follow-Up       Date/Time:   1/4/2022  10:06AM     Patient contacted  [facility] ( [facility]) To inform her of him having a new PCA name Tina Drake . Patient requested PCA to introduce herself to [employer]. Patient reported:   He is doing well. His PCA came on time today. He had breakfast. She has updated his schedule board. All seems to be going well. Blood sugar 212 this morning. Freestyle Juan Carlos sensor due to be change next Friday and PCA said her mother is diabetic she may need help with changing the sensor. [facility] said she will help her when it's time. Patient is scheduled for Prostate Fiducial markers implant on 1/25/2022 he has to go to have his blood work on 1/11/2022 at [facility]    Pertinent concerns:  None at this time. Voicing he is in a good place as of now. Asking about having colonoscopy so close to the surgery on the 25th. Patient has called Dr Brian Gleason with no return call. [facility] suggested that he call Dr Bladimir Dunham. Blair Flores, gastroenterology  ,who is doing the Colonoscopy to have it move to a later date [phone] numner given to contact office. Specialist appointments since last outreach? Yes   If so, specialist and date: Having Physical Therapy every other day until 1/13/2022    Next PCP Appointment: 3/17/2022    Medications:     New medications since last outreach: No  Does patient need refills on any medications: No   Medication changes since last outreach (dose adjustments or discontinued meds): No      Home Health company: [email or web address]           Patient reminded that there are physicians on call 24 hours a day / 7 days a week (M-F 5pm to 8am and from Friday 5pm until Monday 8a for the weekend) should the patient have questions or concerns.

## 2022-01-05 ENCOUNTER — HOSPITAL ENCOUNTER (OUTPATIENT)
Dept: PHYSICAL THERAPY | Age: 72
Discharge: HOME | End: 2022-01-05
Payer: MEDICARE

## 2022-01-05 ENCOUNTER — TELEPHONE (OUTPATIENT)
Dept: FAMILY MEDICINE CLINIC | Age: 72
End: 2022-01-05

## 2022-01-05 PROCEDURE — 97530 THERAPEUTIC ACTIVITIES: CPT

## 2022-01-05 PROCEDURE — 97110 THERAPEUTIC EXERCISES: CPT

## 2022-01-05 PROCEDURE — 97112 NEUROMUSCULAR REEDUCATION: CPT

## 2022-01-05 NOTE — PROGRESS NOTES
PT DAILY TREATMENT NOTE     Patient Name: Asha Barraza  Date:2022  : 1950  [x]  Patient  Verified  Payor: [place] / Plan: [address] [facility] HMO / Product Type: Managed Care Medicare /    In time:1044   Out time: 4207  Total Treatment Time (min): 48  Total Timed Codes (min): 48  1:1 Treatment Time (min): 48  Visit #: 6 of 8-10    Treatment Area: Other low back pain [M54.59]    SUBJECTIVE  Pain Level (0-10 scale): 10/10 \" R hip is talking to me today\"  Any medication changes, allergies to medications, adverse drug reactions, diagnosis change, or new procedure performed?: [x] No    [] Yes (see summary sheet for update)  Subjective functional status/changes:   [] No changes reported  Pt reports that he has been compliant w/ his HEP 1x/day. Pt notes that he did fall this morning when he went to get out of the bed to use the restroom and the R hip gave out. Pt able to get up w/out assist from family. Pt noted that he did not have any a.d. at the time but did use after this episode. Pt presented to PT today w/ his rollator. OBJECTIVE    10 min Therapeutic Exercise:  [x] See flow sheet :    Rationale: increase ROM and increase strength to improve the patients ability to ADLs, mobility, gait, self-care    20 min Therapeutic Activity:  [x]  See flow sheet : step ups 8 inch, ; sit<>stands, TUG test for sit to stand and gait speed, fwd/side/retro stepping for improved safety w/ mobility at sink/kitchen in home   Rationale:   To improve t/f safely and with ease      18 min Neuromuscular Re-education:  [x]  See flow sheet :   HR/TR to on foam   marching on foam   ROM EO/EC     Rationale: improve coordination, improve balance and increase proprioception  to improve the patients ability to improve safety with self-care, community mobility            min Patient Education: [x] Review HEP, progress made, goals/intent of therapy      [] Progressed/Changed HEP based on:   [] positioning   [] body mechanics   [] transfers   [] heat/ice application        Other Objective/Functional Measures:  + increased time required for all activity 2/2 several seated rest/water breaks required    Subjective % improvement: 50%  Subjective deficits: cont R hip and back pain, falls, difficulty walking, prolonged sitting(15 min)/standing (10 min)/walking(5 min)  Subjective improvements: improved walking tolerance and strength  Falls/day: 1x/day (feels that this has increased 2/2 prostate cancer)  FOTO:   Pain: -10/10   TU\", 36\" w/ rollator, mod I.    ROM EO: 30\" with 4 LOB (able to hold 4-10 seconds at a time), arms at side and used for posterior LOB  ROM EC: 30\", 4 LOB ( able to hold 3-8 seconds at a time w/ moderate sway and intermittent grabbing of // bars  30 STST: 2 reps, hands on knees, 19 inch chair   Hip AROM: WFL as demo w/ functional activities/sit<>stand transfers, reaching into cart on his walker, marching in // bars  MMT: pt noted to have <3/5 strength into hip flex, knee flex, knee ext bilaterally when attempted in sitting. However, pt is able to perform transfers/walking/step ups to 8 inch step without buckling noted and therefore demo > 3/5 strength w/ functional tasks. Cont inconsistent performance w/ MMT noted as mentioned in the initial evaluation  Gait: Rollator w/ decreased iraj and stride length. Erect posture noted. Pain Level (0-10 scale) post treatment: 8/10 \" I am feeling better\". ASSESSMENT/Changes in Function:   Pt is a 69 yo male that presents to PT for LBP and increased falls. Pt has completed a total of 6 therapy sessions since the [facility] on 12/15/21. Pt has noted several falls 2/2 R leg buckling and notes that he is able to get up from the ground w/ use of hands on furniture in the room. Also reports that his falls have been mostly when he does not have his a.d. with him, stating that he sometimes walks without the RW when he is in the home.  Pt is progressing towards all his goals but displays inconsistent progress as noted w/ inconsistent MMT vs functional strength assessment, reduced score on FOTO from initial evaluation from 31 to 24/100, and cont elevated pain levels w/ pain ranging from 7-10/10. Pt did demo improved TUG times to 30-36 seconds both w/ a RW as compared to the IE at 48 sec w/ a RW and 28 seconds w/ a SPC at IE. He cont to have decreased standing balance and decreased endurance/strength increased fall risk as noted by 2 reps sit<> 30 seconds from standard chair. Patient will continue to benefit from skilled PT services to modify and progress therapeutic interventions, address functional mobility deficits, address ROM deficits, address strength deficits, analyze and address soft tissue restrictions, analyze and cue movement patterns, analyze and modify body mechanics/ergonomics, assess and modify postural abnormalities, address imbalance/dizziness and instruct in home and community integration to attain remaining goals. []  See Plan of Care  [x]  See progress note/recertification (0/9/90)  []  See Discharge Summary         Progress towards goals / Updated goals: · Short Term Goals: To be accomplished in  4-6  treatments:  1.  Pt will be independent and compliant with HEP for carryover of strength and mobility gains  Status at last assessment : Issued at [address] [place] [address]; Pt reports that he has been more compliant w/ HEP 1x/day.     · Long Term Goals: To be accomplished in  8-10  treatments:  1.  Pt will score >49/100 on FOTO to show significant improvement in functional mobility and QOL   Status at last assessment 31/100  Current: 1/5/22: declined 24/100  2. Pt will demo ability to complete TUG in <30 seconds to show reduced fall risk  Status at last assessment : 48 sec with rollator  Current:  1/5/22: progressing: w/ RW: 30 sec, 36 seconds  3.  Pt will demo ability to perform romberg stance EO for 30 seconds indicating improved postural control   Status at last assessment: ROM EO 3 sec  Current: 22: progressin\" with 4 LOB (able to hold 4-10 seconds at a time), arms at side and used for posterior LOB; ROM EC: 30\", 4 LOB ( able to hold 3-8 seconds at a time w/ moderate sway and intermittent grabbing of // bars  4.  Pt will report >50% improvement in overall symptoms to show improved activity tolerance  Status at last assessment : severe impairments   Current: progressing/met : 22: 50%    PLAN  [x]  Upgrade activities as tolerated     [x]  Continue plan of care  []  Update interventions per flow sheet       []  Discharge due to:_  [x]  Other:cont POC 2x/wk x 4 wks    Chilo Ramos, PT 2022  1132    Future Appointments   Date Time Provider Devika Haley   1/10/2022 10:00 AM [facility] PT [facility] 2 [facility]   2022 10:00 AM [facility] PT [facility] 2 [facility]   2022 10:00 AM Tod Iglesias MD [facility] BS AMB   3/17/2022  9:30 AM Karlee Powell NP [facility] BS AMB

## 2022-01-05 NOTE — TELEPHONE ENCOUNTER
Pharmacy Progress Note - Telephone Encounter    S/O: Mr. Julienne Harris 70 y.o. male, referred by Dr. Edmar Martinez, BEN, was contacted via an outbound telephone call to discuss his blood sugars today. - Patient has a freestyle jarrett and read me some recent readings. States his blood sugar was 192 this morning, got readings of 211 and 292 this afternoon. States that his fasting reading this morning was pretty typical for him. Denies any recent hypoglycemic events. - Reports using only 12 units of Lantus nightly instead of the 16 units he was prescribed. - Patient states he got a new care aide, Pollo Baumann, who assists him daily. Key Antihyperglycemic Medications             insulin glargine (Lantus Solostar U-100 Insulin) 100 unit/mL (3 mL) inpn 12 Units by SubCUTAneous route nightly. pioglitazone (ACTOS) 30 mg tablet Take 1 Tablet by mouth daily. SITagliptin (JANUVIA) 100 mg tablet Take 1 Tablet by mouth daily. A/P:  - Discussed that his PCP would like me to follow up with him and assist with adjusting his medications to get his blood sugars under better control. Patient agreeable to weekly follow-up calls with me to adjust his insulin. - Patient was instructed to increase his Lantus to 16 units nightly. Patients care aide wrote this down for him so he would not forget. - Will call patient again in 1 week to review blood glucose and further adjust insulin. - Patient endorses understanding to the provided information. All questions answered at this time. There are no discontinued medications. No orders of the defined types were placed in this encounter. Thank you,  Gerri Navas, [address] in place:  Yes   Recommendation Provided To: Patient/Caregiver: 2 via Telephone   Intervention Detail: Dose Adjustment: 1, reason: Therapy Optimization   Intervention Accepted By: Patient/Caregiver: 2   Time Spent (min): 30

## 2022-01-05 NOTE — PROGRESS NOTES
[facility] AT [address]  Phone: [phone] Fax: [phone]  PROGRESS NOTE  Patient Name: Siddharth De Souza : 1950   Treatment/Medical Diagnosis: Other low back pain [M54.59]   Referral Source: Renuka Valero NP     Date of Initial Visit: 12/15/21 Attended Visits: 6 Missed Visits: 1     SUMMARY OF TREATMENT  Pt is a 69 yo male that presents to PT for LBP and increased falls. Pt has completed a total of 6 therapy sessions since the [facility] on 12/15. Therapy has consisted of therapeutic exercise, gait training, neuro re-education, self-care/patient education, pain modalities and manual prn, therapeutic activity, functional mobility training, and  home safety training. CURRENT STATUS    Subjective % improvement: 50%  Subjective deficits: cont R hip and back pain, falls, difficulty walking, prolonged sitting(15 min)/standing (10 min)/walking(5 min)  Subjective improvements: improved walking tolerance and strength  Falls/day: 1x/day (feels that this has increased 2/2 prostate cancer)  FOTO:   Pain: 7-10/10         TU\", 36\" w/ rollator, mod I.    ROM EO: 30\" with 4 LOB (able to hold 4-10 seconds at a time), arms at side and used for posterior LOB  ROM EC: 30\", 4 LOB ( able to hold 3-8 seconds at a time w/ moderate sway and intermittent grabbing of // bars  30 STST: 2 reps, hands on knees, 19 inch chair   Hip AROM: WFL as demo w/ functional activities/sit<>stand transfers, reaching into cart on his walker, marching in // bars  MMT: pt noted to have <3/5 strength into hip flex, knee flex, knee ext bilaterally when attempted in sitting. However, pt is able to perform transfers/walking/step ups to 8 inch step without buckling noted and therefore demo > 3/5 strength w/ functional tasks.  Cont inconsistent performance w/ MMT noted as mentioned in the initial evaluation  Gait: Rollator w/ decreased iraj and stride length. Erect posture noted.      Progress towards goals / Updated goals: · Short Term Goals: To be accomplished in  4-6  treatments:  1.  Pt will be independent and compliant with HEP for carryover of strength and mobility gains  Status at last assessment : Issued at [address] [place] [address]; Pt reports that he has been more compliant w/ HEP 1x/day.     · Long Term Goals: To be accomplished in  8-10  treatments:  1.  Pt will score >49/100 on FOTO to show significant improvement in functional mobility and QOL   Status at last assessment   Current: 22: reports improvement, score declined 24/100  2. Pt will demo ability to complete TUG in <30 seconds to show reduced fall risk  Status at last assessment : 48 sec with rollator  Current:  22: progressing: w/ RW: 30 sec, 36 seconds  3. Pt will demo ability to perform romberg stance EO for 30 seconds indicating improved postural control   Status at last assessment: ROM EO 3 sec  Current: 22: progressin\" with 4 LOB (able to hold 4-10 seconds at a time), arms at side and used for posterior LOB; ROM EC: 30\", 4 LOB ( able to hold 3-8 seconds at a time w/ moderate sway and intermittent grabbing of // bars  4. Pt will report >50% improvement in overall symptoms to show improved activity tolerance  Status at last assessment : severe impairments   Current: progressing/met : 22: 50%     New Goals to be achieved in __4_  weeks:  1. Cont w/ aforementioned goals above   2. Added: pt will perform floor transfer w/ mod I, good safety, to enable pt to care for self in event of a fall. 3.  Pt will report no > 2 falls a week in order to indicate reduced risk for injury 2/2 improved strength/balance and home safety. RECOMMENDATIONS  Pt is a 71 yo male that presents to PT for LBP and increased falls. Pt has completed a total of 6 therapy sessions since the [facility] on 12/15/21.  Pt has noted several falls 2/2 R leg buckling and notes that he is able to get up from the ground w/ use of hands on furniture in the room. Also reports that his falls have been mostly when he does not have his a.d. with him, stating that he sometimes walks without the RW when he is in the home. Pt is progressing towards all his goals but displays inconsistent progress as noted w/ inconsistent MMT vs functional strength assessment, reduced score on FOTO from initial evaluation from 31 to 24/100, and cont elevated pain levels w/ pain ranging from 7-10/10. Pt did demo improved TUG times to 30-36 seconds both w/ a RW as compared to the IE at 48 sec w/ a RW and 28 seconds w/ a SPC at IE. He cont to have decreased standing balance and decreased endurance/strength increased fall risk as noted by 2 reps sit<> 30 seconds from standard chair. Pt would benefit from cont therapy to reduce fall risk to reduce risk for futher pain/injury, improve current pain/loss of balance and strength and monitor S&S as pt does live alone. If you have any questions/comments please contact us directly at [phone]   Thank you for allowing us to assist in the care of your patient. Therapist Signature: Lucy Morse PT Date: 1/5/2022   Reporting Period  12/15/21 to 1/5/22 Time: 1132 AM   NOTE TO PHYSICIAN:  PLEASE COMPLETE THE ORDERS BELOW AND FAX TO   [facility]: [phone]. If you are unable to process this request in 24 hours please contact our office: [phone].  ___ I have read the above report and request that my patient continue as recommended.   ___ I have read the above report and request that my patient continue therapy with the following changes/special instructions:_________________________________________________________   ___ I have read the above report and request that my patient be discharged from therapy.      Physician Signature:        Date:       Time:                                                        Zenaida Dunbar NP.

## 2022-01-10 ENCOUNTER — HOSPITAL ENCOUNTER (OUTPATIENT)
Dept: PHYSICAL THERAPY | Age: 72
End: 2022-01-10
Payer: MEDICARE

## 2022-01-12 ENCOUNTER — VIRTUAL VISIT (OUTPATIENT)
Dept: FAMILY MEDICINE CLINIC | Age: 72
End: 2022-01-12

## 2022-01-12 ENCOUNTER — PATIENT OUTREACH (OUTPATIENT)
Dept: CASE MANAGEMENT | Age: 72
End: 2022-01-12

## 2022-01-12 DIAGNOSIS — E11.65 TYPE 2 DIABETES MELLITUS WITH HYPERGLYCEMIA, WITH LONG-TERM CURRENT USE OF INSULIN (HCC): Primary | ICD-10-CM

## 2022-01-12 DIAGNOSIS — Z79.4 TYPE 2 DIABETES MELLITUS WITH HYPERGLYCEMIA, WITH LONG-TERM CURRENT USE OF INSULIN (HCC): Primary | ICD-10-CM

## 2022-01-12 RX ORDER — PIOGLITAZONEHYDROCHLORIDE 15 MG/1
1 TABLET ORAL ONCE
COMMUNITY
End: 2022-01-12 | Stop reason: ALTCHOICE

## 2022-01-12 RX ORDER — MAGNESIUM CITRATE
300 SOLUTION, ORAL ORAL ONCE
COMMUNITY
Start: 2021-12-07 | End: 2022-01-28

## 2022-01-12 RX ORDER — BISACODYL 5 MG
2 TABLET, DELAYED RELEASE (ENTERIC COATED) ORAL ONCE
COMMUNITY
Start: 2021-12-07 | End: 2022-01-28

## 2022-01-12 RX ORDER — POLYETHYLENE GLYCOL 3350 17 G/17G
238 POWDER, FOR SOLUTION ORAL ONCE
COMMUNITY
Start: 2021-12-07 | End: 2022-01-28

## 2022-01-12 NOTE — PROGRESS NOTES
Pharmacy Progress Note - Diabetes Management    S/O: Mr. Wren Corporal 70 y.o. male, referred by Dr. Anika Zacarias, BEN, was contacted via an outbound telephone call to discuss his blood glucose readings today. Patient's PCP has asked me to assist with titrating patients basal insulin therapy.     Patient has been taking Lantus 16 units nightly. Patient's aid, Zeke Tejada, read me his fasting BG readings for the past 4 days:  Date Reading   1/9/22 167   1/10/22 180   1/11/22 197   1/12/22 187     Key Antihyperglycemic Medications             insulin glargine (Lantus Solostar U-100 Insulin) 100 unit/mL (3 mL) inpn 16 Units by SubCUTAneous route nightly. pioglitazone (ACTOS) 30 mg tablet Take 1 Tablet by mouth daily. SITagliptin (JANUVIA) 100 mg tablet Take 1 Tablet by mouth daily. A/P:  - Increase Lantus to 19 units daily. Patients aid noted the dose change and will ensure patient starts taking this new dose. - Will call patient again in 1 week to review blood glucose and further adjust insulin. - Patient endorses understanding to the provided information. All questions answered at this time    Thank you,  Teri Leiva 36 in place:  Yes   Recommendation Provided To: Patient/Caregiver: 2 via Telephone   Intervention Detail: Dose Adjustment: 1, reason: Therapy Optimization and Scheduled Appointment   Intervention Accepted By: Patient/Caregiver: 2   Time Spent (min): 20

## 2022-01-12 NOTE — PROGRESS NOTES
.Complex Case Management  Follow-Up       Date/Time:   1/12/22  9:01AM       Patient contacted  [facility] ( [facility]) for Complex Case Management  follow up. Patient and PCA Ms Evangelista De La Cruz wanted to update ACM on patient. Patient reported:   He had his lab work done for his up coming procedure completed at [facility] on 1/11/2022. COVID19 test and UA. COVID19 results are negative UA C&S is pending. His Blood glucose this AM is 181. Getting ready to eat. PCA voicing over speaker phone it has been below 200 since last ACM contact. ACM did medication reconciliation with patient and PCA. New medication is for Colonoscopy prep has been added . PCA and patient changed Starleen David Sensor with little assistance of ACM  Pertinent concerns:  None     Specialist appointments since last outreach? No   If so, specialist and date: N/A    Next PCP Appointment: 3/17/2022    Medications:     New medications since last outreach: For Colonoscopy Prep Dulcolax tabs 2, Magnesium Citrate 300 ml ( 1 bottle) Miralax 1 dose as well as 15 mg Pioglitazone and noted Lantus 70 units   Does patient need refills on any medications: No   Medication changes since last outreach (dose adjustments or discontinued meds): No      Home Health company: [employer]    Barriers to care? None        Patient reminded that there are physicians on call 24 hours a day / 7 days a week (M-F 5pm to 8am and from Friday 5pm until Monday 8a for the weekend) should the patient have questions or concerns.

## 2022-01-13 ENCOUNTER — HOSPITAL ENCOUNTER (OUTPATIENT)
Dept: PHYSICAL THERAPY | Age: 72
Discharge: HOME | End: 2022-01-13
Payer: MEDICARE

## 2022-01-13 PROCEDURE — 97110 THERAPEUTIC EXERCISES: CPT

## 2022-01-13 PROCEDURE — 97530 THERAPEUTIC ACTIVITIES: CPT

## 2022-01-13 PROCEDURE — 97116 GAIT TRAINING THERAPY: CPT

## 2022-01-13 NOTE — PROGRESS NOTES
PT DAILY TREATMENT NOTE 8-    Patient Name: Bridget Camara  Date:2022  : 1950  [x]  Patient  Verified  Payor: Juan Antonio Nunes / Plan: [address] [facility] HMO / Product Type: Managed Care Medicare /    In time:1001 Out time: 1046  Total Treatment Time (min): 45  Total Timed Codes (min): 45   1:1 Treatment Time (min): 45  Visit #: 1 of 8    Treatment Area: Other low back pain [M54.59]    SUBJECTIVE  Pain Level (0-10 scale): 9/10 LBP  Any medication changes, allergies to medications, adverse drug reactions, diagnosis change, or new procedure performed?: [x] No    [] Yes (see summary sheet for update)  Subjective functional status/changes:   [] No changes reported  Pt denies any adverse responses to the last tx session. Pt denies any falls since the last visit from therapy. OBJECTIVE    10 min Therapeutic Exercise:  [x] See flow sheet :    Rationale: increase ROM and increase strength to improve the patients ability to ADLs, mobility, gait, self-care    15 min Therapeutic Activity:  [x]  See flow sheet : step ups 8 inch fwd/lateral, ; sit<>stands w/ hands on knees/standard chair   Rationale:   To improve t/f safely and with ease       min Neuromuscular Re-education:  [x]  See flow sheet :   HR/TR to on foam   marching on foam   ROM EO/EC     Rationale: improve coordination, improve balance and increase proprioception  to improve the patients ability to improve safety with self-care, community mobility      20 min Gait  [x]  See flow sheet :   - kristie step overs fwd/side in // bars (two hands on bars vs one hand on // bars)  - fwd/backward walking, erect posture in // bars  - cone weave 15' x 4 w/ rollator  - gait speed: w/ rollator: 25' in 23 seconds, 60' in 32 seconds, 100'  In 42 seconds (pt reports fatigue w/ this activity)   Rationale: improve coordination, improve balance, safety, and increase proprioception  to improve the patients ability to improve safety with self-care, community mobility                W/ TE/gait min Patient Education: [x] Review HEP, intent of activity today      [] Progressed/Changed HEP based on:   [] positioning   [] body mechanics   [] transfers   [] heat/ice application        Other Objective/Functional Measures:  + increased time required for all activity 2/2 several seated rest/water breaks required, phone ringing  + increased to 2 sets of 5 for STS  + progressed step ups w/ addition of lateral step up  + 2 min restroom     Pain Level (0-10 scale) post treatment: 8/10    ASSESSMENT/Changes in Function:   Pt cont to require intermittent rest breaks throughout session today 2/2 fatigue. Seated rest breaks taken. Pt w/ good tolerance to activity progression. Demo good safetyt, upright ambulation and speed w/ rollator today. Would like to attempt progression w/ ambulation to LRAD. Pt has a cane at home and will bring the cane to the next visit. Pt w/ reduced pain levels post tx. Will also progress to floor transfers. Pt w/ good tolerance to LE strengthening progression using the step ups, 8 inches, fwd and lateral. Pt reliant of hands on the // bars, however, demo ability to dec to one hand on // bars. Will progress as tolerated with all higher level balance. Patient will continue to benefit from skilled PT services to modify and progress therapeutic interventions, address functional mobility deficits, address ROM deficits, address strength deficits, analyze and address soft tissue restrictions, analyze and cue movement patterns, analyze and modify body mechanics/ergonomics, assess and modify postural abnormalities, address imbalance/dizziness and instruct in home and community integration to attain remaining goals. []  See Plan of Care  []  See progress note/recertification   []  See Discharge Summary         Progress towards goals / Updated goals:   · Short Term Goals: To be accomplished in  4-6  treatments:  1.  Pt will be independent and compliant with HEP for carryover of strength and mobility gains  Status at last assessment : Issued at [address] [place] [address]; Pt reports that he has been more compliant w/ HEP 1x/day. · Long Term Goals: To be accomplished in  8-10  treatments:  1.  Pt will score >49/100 on FOTO to show significant improvement in functional mobility and QOL   Status at last assessment 31/100  Current: 22: declined 24/100  2. Pt will demo ability to complete TUG in <30 seconds to show reduced fall risk  Status at last assessment : 48 sec with rollator  Current:  22: progressing: w/ RW: 30 sec, 36 seconds  3. Pt will demo ability to perform romberg stance EO for 30 seconds indicating improved postural control   Status at last assessment: ROM EO 3 sec  Current: 22: progressin\" with 4 LOB (able to hold 4-10 seconds at a time), arms at side and used for posterior LOB; ROM EC: 30\", 4 LOB ( able to hold 3-8 seconds at a time w/ moderate sway and intermittent grabbing of // bars  4. Pt will report >50% improvement in overall symptoms to show improved activity tolerance  Status at last assessment : severe impairments   Current: progressing/met : 22: 50%  5. pt will perform floor transfer w/ mod I, good safety, to enable pt to care for self in event of a fall. 6. Pt will report no > 2 falls a week in order to indicate reduced risk for injury 2/2 improved strength/balance and home safety.        PLAN  [x]  Upgrade activities as tolerated     [x]  Continue plan of care  []  Update interventions per flow sheet       []  Discharge due to:_  [x]  Other: trial walking w/ cane, pt will bring his in to [facility] 2022  1046    Future Appointments   Date Time Provider Devika Haley   2022 10:00 AM 1800 [address] 2 [facility]   2022  1:00 PM Tesfaye Hendricks, PHARMD AMA BS AMB   2022  1:00 PM [facility] PT MONICA 2 [facility]   3/17/2022  9:30 AM BEN Piedra BS AMB   2022 10:20 AM Justine Stallings, Ramesh Loaiza MD [facility] BS AMB

## 2022-01-14 ENCOUNTER — PATIENT OUTREACH (OUTPATIENT)
Dept: CASE MANAGEMENT | Age: 72
End: 2022-01-14

## 2022-01-14 NOTE — PROGRESS NOTES
Patient contacted [facility] upset that his procedure with Dr Nasir Ramos was cancelled. Office called to let him know that the insurance will not cover proton treatment for his Prostate Cancer. AC said she instructed his other PCA to call Dr Carroll Parson office and let them know when they informed him 11/29/2021  Patient has changed PCA x 3 since that time. Patient upset as to say the cancer can be spreading in my body. ACM said he has had his Bone Scan and Abdminal CT scan and he was clear. He was so upset he gave phone to his PCA to finish talking. Mrs Buzz Bell said Dr Nasir Ramos office called and said since he is going to do radiation now he is transferring his care to his brother who is also Nasir Ramos but he works out of [employer]. ACM attempted to explain the process again but patient did not want to hear it as he said this could have been avoided if the aide would have followed instructions. ACM said he should not think about what ifs. Just be looking out for the phone call from Dr Itz Collins office. ACM allowed patient to voice his concerns and frustrations.

## 2022-01-19 ENCOUNTER — VIRTUAL VISIT (OUTPATIENT)
Dept: FAMILY MEDICINE CLINIC | Age: 72
End: 2022-01-19

## 2022-01-19 DIAGNOSIS — Z79.4 TYPE 2 DIABETES MELLITUS WITH HYPERGLYCEMIA, WITH LONG-TERM CURRENT USE OF INSULIN (HCC): ICD-10-CM

## 2022-01-19 DIAGNOSIS — E11.65 TYPE 2 DIABETES MELLITUS WITH HYPERGLYCEMIA, WITH LONG-TERM CURRENT USE OF INSULIN (HCC): ICD-10-CM

## 2022-01-19 RX ORDER — INSULIN GLARGINE 100 [IU]/ML
21 INJECTION, SOLUTION SUBCUTANEOUS
Qty: 15 ML | Refills: 1 | Status: SHIPPED | OUTPATIENT
Start: 2022-01-19 | End: 2022-03-04 | Stop reason: SDUPTHER

## 2022-01-19 NOTE — PROGRESS NOTES
Pharmacy Progress Note - Diabetes Management     S/O: Mr. Lyndon Villa 71 y. o. male, referred by Landry Richardson NP, was contacted via an outbound telephone call to discuss his blood glucose readings today. Patient's PCP has asked me to assist with titrating patients basal insulin therapy.     - Patient has been taking Lantus 19 units nightly. Patient's aid, Blake Ramirez, read me his fasting BG readings for the past 6 days:  Date Reading   1/19 140   1/18 255   1/17 122   1/16 141   1/15 213   1/14 141     - Blake Ramirez states pts blood sugar have been fluctuating the past few days. Denies forgetting to take insulin any night this past week. Reports he thinks he may have eaten some things he shouldn't have the night before those high readings. Key Antihyperglycemic Medications             insulin glargine (Lantus Solostar U-100 Insulin) 100 unit/mL (3 mL) inpn (Taking) 19 Units by SubCUTAneous route nightly. pioglitazone (ACTOS) 30 mg tablet Take 1 Tablet by mouth daily. SITagliptin (JANUVIA) 100 mg tablet Take 1 Tablet by mouth daily.           A/P:  - Increase Lantus to 21 units daily. If FBG falls below 90 at any point this week, patient to reduce dose back to 19 units. Patient's aid noted the dose change and will ensure patient starts taking this new dose. - Patient requested refill for Lantus be sent to his pharmacy. Order placed for new dosage.  - Will call patient again in 1 week to review glucose readings. - Discussed that eating late at night, especially carbohydrate rich foods can lead to increase morning BG readings like patient experienced on 2 occasions this week. Encourage pt to avoid this pattern of eating.  - Patient endorses understanding to the provided information. All questions answered at this time     Thank you,  Jeffy Manzano, [facility] in place:  Yes   Recommendation Provided To: Patient/Caregiver: 3 via Telephone   Intervention Detail: Dose Adjustment: 1, reason: Therapy Optimization, Refill(s) Provided and Scheduled Appointment   Intervention Accepted By: Patient/Caregiver: 3   Time Spent (min): 30

## 2022-01-20 ENCOUNTER — HOSPITAL ENCOUNTER (OUTPATIENT)
Dept: PHYSICAL THERAPY | Age: 72
Discharge: HOME | End: 2022-01-20
Payer: MEDICARE

## 2022-01-20 PROCEDURE — 97530 THERAPEUTIC ACTIVITIES: CPT

## 2022-01-20 PROCEDURE — 97110 THERAPEUTIC EXERCISES: CPT

## 2022-01-20 NOTE — PROGRESS NOTES
PT DAILY TREATMENT NOTE 8-    Patient Name: Monroe Parra  Date:2022  : 1950  [x]  Patient  Verified  Payor: Hadley Murphy / Plan: [address] [facility] HMO / Product Type: Managed Care Medicare /    In time:1304  Out time: 1340   Total Treatment Time (min): 36   Total Timed Codes (min): 36   1:1 Treatment Time (min): 36   Visit #: 2 of 8    Treatment Area: Other low back pain [M54.59]    SUBJECTIVE  Pain Level (0-10 scale): 9 /10 LBP  Any medication changes, allergies to medications, adverse drug reactions, diagnosis change, or new procedure performed?: [x] No    [] Yes (see summary sheet for update)  Subjective functional status/changes:   [] No changes reported  Pt denies any adverse responses to the last tx session, notes that he is having transportation difficultly again, stating that his transportation company dropped him off today and reports that they will not be coming back 2/ \"inclement weather\". At that time, weather was overcast, sprinkling, mid 40s. Pt expressed frustration. OBJECTIVE    20 min Therapeutic Exercise:  [x] See flow sheet :    Rationale: increase ROM and increase strength to improve the patients ability to ADLs, mobility, gait, self-care    16 min Therapeutic Activity:  [x]  See flow sheet : bridge, sit<>stand from low mat table   Rationale:   To improve t/f safely and with ease      x min Neuromuscular Re-education:  [x]  See flow sheet :   HR/TR to on foam   marching on foam   ROM EO/EC     Rationale: improve coordination, improve balance and increase proprioception  to improve the patients ability to improve safety with self-care, community mobility      x min Gait  [x]  See flow sheet :   - kristie step overs fwd/side in // bars (two hands on bars vs one hand on // bars)  - fwd/backward walking, erect posture in // bars  - cone weave 15' x 4 w/ rollator  - gait speed: w/ rollator: 24' in 23 seconds, 60' in 32 seconds, 100'  In 42 seconds (pt reports fatigue w/ this activity)   Rationale: improve coordination, improve balance, safety, and increase proprioception  to improve the patients ability to improve safety with self-care, community mobility                W/ TE/gait min Patient Education: [x] Review HEP, intent of activity today      [] Progressed/Changed HEP based on:   [] positioning   [] body mechanics   [] transfers   [] heat/ice application        Other Objective/Functional Measures:  + tx session cut short today 2/2 pt w/ transportation difficulties and ultimately w/ Lyft ride that cut into tx time  + addressed supine there ex for proximal hip strength  + pt demo walk from nu-step to the table w/ SPC, noted to have one LOB at start of walk, self corrected w/ side step. CGA for safety    Pain Level (0-10 scale) post treatment: 9/10    ASSESSMENT/Changes in Function:   Tx session cut short 2/2 transportation issues today. Pt lyft ride ultimately coming prior to end of session. PT had planned to work on ambulation w/ LRAD, w/ pt bringing a SPC into the clinic today from home. Started session w/ warm up followed by proximal hip strengthening/core stabilization on the mat table. Unable to perform amb 2/2 ride arrived. Pt noted to require mod v.c. for PPT and TrA bracing. Able to perform bridging activity at 25% range however w/ distraction, performed w/in 75% range. Pt required hands on knees for sit<>stand from mat table Good balance noted. Seated rest break taken as needed between sets. PT will progress amb as tolerated w/ LRAD at the next tx session.      Patient will continue to benefit from skilled PT services to modify and progress therapeutic interventions, address functional mobility deficits, address ROM deficits, address strength deficits, analyze and address soft tissue restrictions, analyze and cue movement patterns, analyze and modify body mechanics/ergonomics, assess and modify postural abnormalities, address imbalance/dizziness and instruct in home and community integration to attain remaining goals. []  See Plan of Care  []  See progress note/recertification   []  See Discharge Summary         Progress towards goals / Updated goals: · Short Term Goals: To be accomplished in  4-6  treatments:  1.  Pt will be independent and compliant with HEP for carryover of strength and mobility gains  Status at last assessment : Issued at   Current: 22; Pt cont to report that he has been more compliant w/ HEP 1x/day. · Long Term Goals: To be accomplished in  8-10  treatments:  1.  Pt will score >49/100 on FOTO to show significant improvement in functional mobility and QOL   Status at last assessment   Current: 22: declined /100  2. Pt will demo ability to complete TUG in <30 seconds to show reduced fall risk  Status at last assessment : 48 sec with rollator  Current:  22: progressing: w/ RW: 30 sec, 36 seconds  3. Pt will demo ability to perform romberg stance EO for 30 seconds indicating improved postural control   Status at last assessment: ROM EO 3 sec  Current: 22: progressin\" with 4 LOB (able to hold 4-10 seconds at a time), arms at side and used for posterior LOB; ROM EC: 30\", 4 LOB ( able to hold 3-8 seconds at a time w/ moderate sway and intermittent grabbing of // bars  4. Pt will report >50% improvement in overall symptoms to show improved activity tolerance  Status at last assessment : severe impairments   Current: progressing/met : 22: 50%  5. pt will perform floor transfer w/ mod I, good safety, to enable pt to care for self in event of a fall. 6. Pt will report no > 2 falls a week in order to indicate reduced risk for injury 2/2 improved strength/balance and home safety.    22: pt denies any falls since PN on 22      PLAN  [x]  Upgrade activities as tolerated     [x]  Continue plan of care  []  Update interventions per flow sheet       []  Discharge due to:_  [x]  Other: trial walking w/ cane, pt will bring his in to [facility] 1/20/2022  1340     Future Appointments   Date Time Provider Devika Haley   1/20/2022  1:00 PM [facility] PT MONICA Hare [facility]   1/24/2022  9:00 AM [address] [place] [address],[address] Floor   3/17/2022  9:30 AM Ramin Moy NP AMA BS AMB   4/1/2022 10:20 AM Javid Morgan MD [facility] BS AMB

## 2022-01-21 ENCOUNTER — PATIENT OUTREACH (OUTPATIENT)
Dept: CASE MANAGEMENT | Age: 72
End: 2022-01-21

## 2022-01-21 NOTE — PROGRESS NOTES
.Complex Case Management  Follow-Up       Date/Time:   1/21/2022  9:25AM       Patient contact [facility] ( [facility]) because his Freestyle Gamez 2 sensor needs changing today and he has a new aide , other PCA Ms Latoya Garcia has death in her family . Patient reported:   He only has 25 minutes to change the Gamez sensor. His blood sugar reading is 129 on Lantus 21 units as Hayder Narayan PHD increased the dose on 1/19/22 virtual appointment. [facility] walked patient and PCA thru changing the Freestyle Juan Carlos 2 Sensor, patient did well at instructing aid as well. [facility] complimented him on it . [facility] reinforced MURIEL Damon PHD instructions for decreasing Lantus insulin back to 19 units if fasting blood sugar drops below 90. Patient said he has it written down and will try to remember like he did tell her he needed more insulin as she changed his dose again. Script was sent to patients pharmacy   ( Matt Landaverde who delivers his medications )  Patient has up coming Radiation consultation on Monday 1/24/22 @ 9 am location [facility]. He is still on for his colonoscopy for 1/31/22, since Fiducial surgery has been cancel. He continues outpatient physical therapy secessions. Pertinent concerns:  Had issues with transportation , but his aide Latoya Garcia has contacted the company and resolved it. They are going back to the previous [employer] has been informed also. Specialist appointments since last outreach? Yes   If so, specialist and date: 1/19/22 Diabetic /Medications with PHD Hayder Narayan    Next PCP Appointment: 3/17/2022    Medications:     New medications since last outreach: No  Does patient need refills on any medications: No   Medication changes since last outreach (dose adjustments or discontinued meds): Yes, Lantus dose increased to 21 units. Refill done. Home Health company: [employer]    Barriers to care? None   .   Goals Addressed                 This Visit's Progress       Diabetes  Knowledge and adherence of medication (ie. action, side effects, missed dose, etc.)   On track     Patient will be compliant with all   11/2/2021  Patient is very compliant with all medications   1/12/2022,  Lantus increase to 19 units by PHD MURIEL Martinez  Medication reconciliation done with patient and New PCA Ms Alana Davidson  1/21/22  PHD MURIEL Martinez Increase Lantus to 21 units decrease back to 19 units if blood   Glucose drops to 90       Understands and monitors blood sugar levels   On track     Patient will check blood sugars daily and   9/10/2021  Patient is to check blood glucose 4 times daily now that he has the Bullitt Ubaldo  11/2/2021  Patient is compliant with checking blood glucose 4 times daily with Luli Denton  1/12/2022  Patient and PCA is compliant with sensor application and monitoring blood glucoase.  1/21/2022  Freestyle Juan Carlos sensor changed today. Continues to be compliant with reading blood sugars          General     Follows diet recommendations and achieves/maintains goal weight   On track     8/16/2021  Patient will maintain ADA diet  9/10/2021  Patient continues to eat food with concentrated sweets. Met with PharmD 8/23/2021. ACM reviewed ADA diet with him and his PCA Mrs Wang Lamar  1/12/2022  Patient has new PCA Ms Mar Ramirez diet reviewed and is compliant as her mother is diabetic as well and she cooks for her.  Self-schedules and keeps appointments    On track     Patient will keep all providers appointments. 11/2/2021  Patient has been following up with all provider appointments. Arlette Giles was 10/28/2021 for his cancer treatment  To start after the holidays once everything has been set up  He will be going 5 days a week an hour a day for 9 weeks. 1/12/2022  Patient insurance would not pay full amount for Proton Therapy. Patient will be doing radiation now.  Prostate Fiducial Markers Implant scheduled for 1/25/22 by Dr Christian Mathias, Blood work 1/11/2022  Patient has PCP appointment 3/17/2022  Patient has Neurology appointment 4/1/2022 1/21/2022  Fiducial procedure cancel this was for Proton Therapy . Has Radiation consultation with Dr Joe ramachandran at [facility] 1/24/22               Patient reminded that there are physicians on call 24 hours a day / 7 days a week (M-F 5pm to 8am and from Friday 5pm until Monday 8a for the weekend) should the patient have questions or concerns.

## 2022-01-25 ENCOUNTER — APPOINTMENT (OUTPATIENT)
Dept: PHYSICAL THERAPY | Age: 72
End: 2022-01-25
Payer: MEDICARE

## 2022-01-26 ENCOUNTER — VIRTUAL VISIT (OUTPATIENT)
Dept: FAMILY MEDICINE CLINIC | Age: 72
End: 2022-01-26

## 2022-01-26 DIAGNOSIS — Z79.4 TYPE 2 DIABETES MELLITUS WITH HYPERGLYCEMIA, WITH LONG-TERM CURRENT USE OF INSULIN (HCC): Primary | ICD-10-CM

## 2022-01-26 DIAGNOSIS — E11.65 TYPE 2 DIABETES MELLITUS WITH HYPERGLYCEMIA, WITH LONG-TERM CURRENT USE OF INSULIN (HCC): Primary | ICD-10-CM

## 2022-01-26 NOTE — PROGRESS NOTES
Pharmacy Progress Note - Diabetes Management     S/O: Mr. Lyndon Villa 71 y. o. male, referred by Woody Bates NP, was contacted via an outbound telephone call to discuss his blood glucose readings today. Patient's PCP has asked me to assist with titrating patients basal insulin therapy. - Patient has been taking Lantus 21 units nightly. Patient's aid, Martin Smith, read me his fasting BG readings for the past 5 days:  Date Reading    121    157    170    118    116      Key Antihyperglycemic Medications             insulin glargine (Lantus Solostar U-100 Insulin) 100 unit/mL (3 mL) inpn 21 Units by SubCUTAneous route nightly. pioglitazone (ACTOS) 30 mg tablet Take 1 Tablet by mouth daily. SITagliptin (JANUVIA) 100 mg tablet Take 1 Tablet by mouth daily. A/P:  - FBG showing better control, with a majority of days below goal of 130. Do not want to push the dose and risk hypoglycemia in this patient. Continue Lantus 21 units nightly. Instructed patient to call if his FBG is staying consistently > 130.  - Will follow up in 3 weeks. - Patient endorses understanding to the provided information. All questions answered at this time    Thank you for the consult,  Erica Smith 69 in place:  Yes   Recommendation Provided To: Patient/Caregiver: 2 via Telephone   Intervention Detail: Adherence Monitorin and Scheduled Appointment   Intervention Accepted By: Patient/Caregiver: 2   Time Spent (min): 20

## 2022-01-27 ENCOUNTER — HOSPITAL ENCOUNTER (OUTPATIENT)
Dept: PHYSICAL THERAPY | Age: 72
Discharge: HOME | End: 2022-01-27
Payer: MEDICARE

## 2022-01-27 PROCEDURE — 97530 THERAPEUTIC ACTIVITIES: CPT

## 2022-01-27 PROCEDURE — 97112 NEUROMUSCULAR REEDUCATION: CPT

## 2022-01-27 PROCEDURE — 97116 GAIT TRAINING THERAPY: CPT

## 2022-01-27 NOTE — PROGRESS NOTES
PT DAILY TREATMENT NOTE     Patient Name: Sania Juan  Date:2022  : 1950  [x]  Patient  Verified  Payor: Volodymyr Boyd / Plan: [address]  HMO / Product Type: Managed Care Medicare /    In AFLR:[id number]   Out time: 1046  Total Treatment Time (min):51  Total Timed Codes (min): 51  1:1 Treatment Time (min): 51  Visit #: 3 of 8    Treatment Area: Other low back pain [M54.59]    SUBJECTIVE  Pain Level (0-10 scale): 8 /10 LBP  Any medication changes, allergies to medications, adverse drug reactions, diagnosis change, or new procedure performed?: [x] No    [] Yes (see summary sheet for update)  Subjective functional status/changes:   [] No changes reported  Pt reported feeling stiff today 2/2 cold weather. OBJECTIVE    X min Therapeutic Exercise:  [x] See flow sheet :    Rationale: increase ROM and increase strength to improve the patients ability to ADLs, mobility, gait, self-care    16 min Therapeutic Activity:  [x]  See flow sheet : floor transfer training: long sit>quadrued>tall kneel. Tall kneel holds x 30 sec to 1 min at a time x 5, min>mod assist for balance. Rationale:   To improve t/f safely and with ease      15 min Neuromuscular Re-education:  [x]  See flow sheet :   HR/TR to on foam   marching on foam w/ increased reps  ROM EO/EC    ROM horizontal/vertical head turns   Rationale: improve coordination, improve balance and increase proprioception  to improve the patients ability to improve safety with self-care, community mobility      20 min Gait  [x]  See flow sheet :   - cones over 10' x 2 : w/ SPC in the L hand, figure 8 weaves, fwd/side step, fwd/backward steps, fwd>side step>backaward step >side step  -  kristie step overs fwd in // bars (two hands on bars > one hand on // bars> progressed to no hands)  - hurdles side stepping in // bars (no hands)  -CGA w/ gait belt for safety all gait tasks  -trial of squat to touch cone to simulate retrieving of object while walking, pt w/ difficulty and min >mod assist for safety   Rationale: improve coordination, improve balance, safety, and increase proprioception  to improve the patients ability to improve safety with self-care, community mobility                W/ TE/gait min Patient Education: [x] Review HEP, intent of activity today      [] Progressed/Changed HEP based on:   [] positioning   [] body mechanics   [] transfers   [] heat/ice application        Other Objective/Functional Measures:  +post nu-step warm up, initiated gait training w/ SPC  + seated rest/water breaks taken throughout the session  + initiated ROM horizontal/vertical head turns    Pain Level (0-10 scale) post treatment: 8/10    ASSESSMENT/Changes in Function:   Pt noted to have good tolerance to gait training today w/ SPC. Performed at the start of the session today to reduce fatigue. CGA and gait belt used for safety during session. Pt noted to have difficulty w/ vertical head turns, maintaining balance as well as w/ tall kneel position. Attempted to perform tall to half kneel, pt noted to have difficulty bringing the LLE through, only able to perform partially but was able to pull the RLE completely through to half kneel position. Pt would benefit from progression of floor transfers in order to improve indep ability to rise in event of fall as well as to improve overall balance w/ mutli-directional, dual tasking during ambulation. Patient will continue to benefit from skilled PT services to modify and progress therapeutic interventions, address functional mobility deficits, address ROM deficits, address strength deficits, analyze and address soft tissue restrictions, analyze and cue movement patterns, analyze and modify body mechanics/ergonomics, assess and modify postural abnormalities, address imbalance/dizziness and instruct in home and community integration to attain remaining goals.      []  See Plan of Care  []  See progress note/recertification   []  See Discharge Summary         Progress towards goals / Updated goals: · Short Term Goals: To be accomplished in  4-6  treatments:  1.  Pt will be independent and compliant with HEP for carryover of strength and mobility gains  Status at last assessment : Issued at [facility] 61; Pt reports increased compliance w/ HEP 2x/day    · Long Term Goals: To be accomplished in  8-10  treatments:  1.  Pt will score >49/100 on FOTO to show significant improvement in functional mobility and QOL   Status at last assessment 31/100  Current: 1/5/22: declined 24/100  2. Pt will demo ability to complete TUG in <30 seconds to show reduced fall risk  Status at last assessment : 48 sec with rollator  Current:  1/5/22: progressing: w/ RW: 30 sec, 36 seconds  3. Pt will demo ability to perform romberg stance EO for 30 seconds indicating improved postural control   Status at last assessment: ROM EO 3 sec  Current: 1/27/22: ROM EO: 8-10  seconds at a time  4. Pt will report >50% improvement in overall symptoms to show improved activity tolerance  Status at last assessment : severe impairments   Current: progressing/met : 1/5/22: 50%  5. pt will perform floor transfer w/ mod I, good safety, to enable pt to care for self in event of a fall. 1/27/22: initiated floor transfer training, quadruped to tall kneel on the low table, min assist  6. Pt will report no > 2 falls a week in order to indicate reduced risk for injury 2/2 improved strength/balance and home safety.    1/20/22: pt denies any falls since PN on 1/5/22 1/27/22: Pt denies any falls      PLAN  [x]  Upgrade activities as tolerated     [x]  Continue plan of care  []  Update interventions per flow sheet       []  Discharge due to:_  [x]  Other: work on floor transfers, dual tasking    Kelley Weston 1/27/2022  1046    Future Appointments   Date Time Provider Devika Haley   1/27/2022 10:00 AM [facility] [place] [facility]   2/2/2022  9:15 AM Vance Coulter [facility]   2/4/2022 10:15 AM Vance Coulter [facility]   2/7/2022 12:15 PM [facility] PT [place] 2 [facility]   2/9/2022  1:00 PM [facility] PT [place] 2 [facility]   2/14/2022 12:15 PM [facility] PT [place] 2 [facility]   2/16/2022  9:30 AM Pura Hale PHARMD AMA BS AMB   2/16/2022  1:00 PM [facility] PT [place] 2 [facility]   3/17/2022  9:30 AM Bonnie Boyd NP AMA BS AMB   4/1/2022 10:20 AM Alejandra Reese MD [facility] BS AMB

## 2022-01-31 DIAGNOSIS — Z79.4 DIABETES MELLITUS, TYPE II, INSULIN DEPENDENT (HCC): ICD-10-CM

## 2022-01-31 DIAGNOSIS — K21.9 GASTROESOPHAGEAL REFLUX DISEASE, UNSPECIFIED WHETHER ESOPHAGITIS PRESENT: ICD-10-CM

## 2022-01-31 DIAGNOSIS — E11.9 DIABETES MELLITUS, TYPE II, INSULIN DEPENDENT (HCC): ICD-10-CM

## 2022-02-01 RX ORDER — FAMOTIDINE 40 MG/1
40 TABLET, FILM COATED ORAL DAILY
Qty: 90 TABLET | Refills: 0 | Status: SHIPPED | OUTPATIENT
Start: 2022-02-01 | End: 2022-04-27

## 2022-02-02 ENCOUNTER — HOSPITAL ENCOUNTER (OUTPATIENT)
Dept: PHYSICAL THERAPY | Age: 72
Discharge: HOME | End: 2022-02-02
Payer: MEDICARE

## 2022-02-02 PROCEDURE — 97530 THERAPEUTIC ACTIVITIES: CPT

## 2022-02-02 PROCEDURE — 97112 NEUROMUSCULAR REEDUCATION: CPT

## 2022-02-02 NOTE — PROGRESS NOTES
PT DAILY TREATMENT NOTE     Patient Name: Chele Cameron  Date:2022  : 1950  [x]  Patient  Verified  Payor: Mily Logan / Plan: [address] [facility] HMO / Product Type: Managed Care Medicare /    In PKII:   Out time: 1002  Total Treatment Time (min):47  Total Timed Codes (min): 47  1:1 Treatment Time (min):47  Visit #: 4 of 8    Treatment Area: Other low back pain [M54.59]    SUBJECTIVE  Pain Level (0-10 scale): 7/10 LBP  Any medication changes, allergies to medications, adverse drug reactions, diagnosis change, or new procedure performed?: [x] No    [] Yes (see summary sheet for update)  Subjective functional status/changes:   [] No changes reported  Pt reports that he had a colonoscopy the other day, notes that there were 3 polyps, sent out for pathology. Cont to wait for urology recommendations re: prostrate. Denied any complications following last session. Notes that he has been getting hot flashes 2/2 taking hormone injections. OBJECTIVE    5 (NC) min Therapeutic Exercise:  [x] See flow sheet :    Rationale: increase ROM and increase strength to improve the patients ability to ADLs, mobility, gait, self-care    30 min Therapeutic Activity:  [x]  See flow sheet : sit<>stand w/ weighted object,  floor transfer training: long sit>quadrued>tall kneel. Tall kneel holds, tall kneel side move to R/L, tall kneel to half kneel on the R x 5, added :step and reach fwd/side,min>mod assist for balance, step ups fwd/side to 8 inch step   Rationale:   To improve t/f safely and with ease      12 min Neuromuscular Re-education:  [x]  See flow sheet :   ROM horizontal/vertical head turns x45 sec  ROM EC, ROM EC w/ horizontal head turns x 45 sec   Rationale: improve coordination, improve balance and increase proprioception  to improve the patients ability to improve safety with self-care, community mobility      X min Gait  [x]  See flow sheet :   - cones over 10' x 2 : w/ [facility] in the L hand, figure 8 weaves, fwd/side step, fwd/backward steps, fwd>side step>backaward step >side step  -  kristie step overs fwd in // bars (two hands on bars > one hand on // bars> progressed to no hands)  - hurdles side stepping in // bars (no hands)  -CGA w/ gait belt for safety all gait tasks  -trial of squat to touch cone to simulate retrieving of object while walking, pt w/ difficulty and min >mod assist for safety   Rationale: improve coordination, improve balance, safety, and increase proprioception  to improve the patients ability to improve safety with self-care, community mobility                 W/ TA/NM min Patient Education: [x] Review HEP, intent of activity today      [] Progressed/Changed HEP based on:   [] positioning   [] body mechanics   [] transfers   [] heat/ice application        Other Objective/Functional Measures:  + progressed reps to 12 on 8\" step ups   + added weighted ball to STS, fwd/side step and reach    Pain Level (0-10 scale) post treatment: 7/10    ASSESSMENT/Changes in Function:   Pt noted to have good tolerance to activity progression, however had to take a seated rest/water/cold towel break x 1 for 3 min 2/2 \"hot flash\" associated w/ hormone injections that he received several months ago 2/2 prostate CA. Pt cont to deny any falls and notes that he is getting stronger overall. Able to progress functional balance tasks today as mentioned above w/ no hands on the // bars. Greatest difficulty noted w/ floor transfer prep going from tall kneel to half kneel on the R. Min assist needed for balance. Pt would benefit from progression of floor transfers in order to improve indep ability to rise in event of fall as well as to improve overall balance w/ mutli-directional, dual tasking during ambulation.      Patient will continue to benefit from skilled PT services to modify and progress therapeutic interventions, address functional mobility deficits, address ROM deficits, address strength deficits, analyze and address soft tissue restrictions, analyze and cue movement patterns, analyze and modify body mechanics/ergonomics, assess and modify postural abnormalities, address imbalance/dizziness and instruct in home and community integration to attain remaining goals. []  See Plan of Care  []  See progress note/recertification   []  See Discharge Summary         Progress towards goals / Updated goals: · Short Term Goals: To be accomplished in  4-6  treatments:  1.  Pt will be independent and compliant with HEP for carryover of strength and mobility gains  Status at last assessment : Issued at   Current: 2/2/22; Pt reports increased compliance w/ HEP 2x/day    · Long Term Goals: To be accomplished in  8-10  treatments:  1.  Pt will score >49/100 on FOTO to show significant improvement in functional mobility and QOL   Status at last assessment 31/100  Current: 1/5/22: declined 24/100  2. Pt will demo ability to complete TUG in <30 seconds to show reduced fall risk  Status at last assessment : 48 sec with rollator  Current:  1/5/22: progressing: w/ RW: 30 sec, 36 seconds  3. Pt will demo ability to perform romberg stance EO for 30 seconds indicating improved postural control   Status at last assessment: ROM EO 3 sec  Current: 2/2/22: ROM EO: 15 seconds at a time  4. Pt will report >50% improvement in overall symptoms to show improved activity tolerance  Status at last assessment : severe impairments   Current: progressing/met : 1/5/22: 50%  5. pt will perform floor transfer w/ mod I, good safety, to enable pt to care for self in event of a fall. 2/2/22: initiated floor transfer training, quadruped to tall kneel on the low table> tall kneel side stepping and tall>half kneel progression min assist  6. Pt will report no > 2 falls a week in order to indicate reduced risk for injury 2/2 improved strength/balance and home safety.    1/20/22: pt denies any falls since PN on 1/5/22 1/27/22: Pt denies any falls  2/2/22: no falls per pt      PLAN  [x]  Upgrade activities as tolerated     [x]  Continue plan of care  []  Update interventions per flow sheet       []  Discharge due to:_  [x]  Other: work on floor transfers, dual tasking, PN Due at [place], PT 2/2/2022  1600 VR1    Future Appointments   Date Time Provider Devika Haley   2/2/2022  9:15 AM [address] 2 [facility]   2/4/2022  9:30 AM Wilfredo Salcedo [facility]   2/7/2022  7:45 AM Bernie Tanner, PT [facility]   2/11/2022  8:00 AM Wilfredo Salcedo [facility]   2/16/2022  9:30 AM Alyx Marion PHARMD AMA BS AMB   2/16/2022 10:00 AM [facility] PT [place] [facility]   2/18/2022 10:15 AM Wilfredo Salcedo [facility]   3/8/2022  1:00 PM Mitra Rossi MD [facility] BS AMB   3/17/2022  9:30 AM Kaleb Mcdonald NP [facility] BS AMB

## 2022-02-04 ENCOUNTER — APPOINTMENT (OUTPATIENT)
Dept: PHYSICAL THERAPY | Age: 72
End: 2022-02-04
Payer: MEDICARE

## 2022-02-04 ENCOUNTER — PATIENT OUTREACH (OUTPATIENT)
Dept: CASE MANAGEMENT | Age: 72
End: 2022-02-04

## 2022-02-04 NOTE — PROGRESS NOTES
.Complex Case Management  Follow-Up       Date/Time:   2/4/2022  9:10AM       Ambulatory Care Manager ( ACM) contacted patient for Complex Case Management  follow up. Spoke to patient  Introduced self/role and reason for call. Patient reported:   Doing well with his blood sugars , when he spikes below 90 he did not eat a snack at bedtime. Remains on 21 units Lantus per Ed Andrade PharmDESHAUN Next out reach with her is scheduled for 2/16/2022. Patient tolerated his colonoscopy well. All he remember is waking up and it was over. She removed 3 polyps. He is just waiting back from the Radiology consultation for his prostate cancer . He is voicing he will call Dr Joe Sharma office to see what going on. He had his consultation with Dr Brijesh Gaspar on  1/24/2022. Pertinent concerns:  Requesting assistance of ACM with application of the Freestyle Juan Carlos 2 sensor( Change every 14 days) ACM walked patient and his PCA thru sensor application again. Last disc call pharmacy for refill       Specialist appointments since last outreach? Yes   If so, specialist and date: 1/31/2022 Colonoscopy done at [facility] by Dr Mia Chowdary    Next PCP Appointment: 3/17/2022  Next Neurology 3/8/2022  Next Julee Sanchez 2/16/2022    Medications:     New medications since last outreach: No  Does patient need refills on any medications: No   Medication changes since last outreach (dose adjustments or discontinued meds): No      Home Health company: [employer]    Barriers to care? None        Patient reminded that there are physicians on call 24 hours a day / 7 days a week (M-F 5pm to 8am and from Friday 5pm until Monday 8a for the weekend) should the patient have questions or concerns.

## 2022-02-07 ENCOUNTER — APPOINTMENT (OUTPATIENT)
Dept: PHYSICAL THERAPY | Age: 72
End: 2022-02-07
Payer: MEDICARE

## 2022-02-09 ENCOUNTER — HOSPITAL ENCOUNTER (OUTPATIENT)
Dept: PHYSICAL THERAPY | Age: 72
End: 2022-02-09
Payer: MEDICARE

## 2022-02-11 ENCOUNTER — APPOINTMENT (OUTPATIENT)
Dept: PHYSICAL THERAPY | Age: 72
End: 2022-02-11
Payer: MEDICARE

## 2022-02-14 ENCOUNTER — APPOINTMENT (OUTPATIENT)
Dept: PHYSICAL THERAPY | Age: 72
End: 2022-02-14
Payer: MEDICARE

## 2022-02-16 ENCOUNTER — APPOINTMENT (OUTPATIENT)
Dept: PHYSICAL THERAPY | Age: 72
End: 2022-02-16
Payer: MEDICARE

## 2022-02-18 ENCOUNTER — APPOINTMENT (OUTPATIENT)
Dept: PHYSICAL THERAPY | Age: 72
End: 2022-02-18
Payer: MEDICARE

## 2022-02-21 ENCOUNTER — VIRTUAL VISIT (OUTPATIENT)
Dept: FAMILY MEDICINE CLINIC | Age: 72
End: 2022-02-21

## 2022-02-21 DIAGNOSIS — Z79.4 DIABETES MELLITUS, TYPE II, INSULIN DEPENDENT (HCC): Primary | ICD-10-CM

## 2022-02-21 DIAGNOSIS — E11.9 DIABETES MELLITUS, TYPE II, INSULIN DEPENDENT (HCC): Primary | ICD-10-CM

## 2022-02-21 NOTE — PROGRESS NOTES
Pharmacy Progress Note - Diabetes Management     S/O: Mr. Lyndon Villa 71 y. o. male, referred by Kyara Darnell NP, was contacted via an outbound telephone call for diabetes follow up today.      Patient has been taking Lantus 21 units nightly. Patient reports yesterday morning he had a low blood sugar. States he had a reading of of 72 and took glucose tablets and drank orange juice to bring it back up. States that after this he tried to eat something but did not have much of an appetite and just felt off for most of the day. Pt could not recall anything different about this day that would have caused a low. His aide, Flaca Sarmiento, thinks it occurred because he did not eat a snack before bedtime like he normally does. Flaca Sarmiento read some FBG readings from pts meter from the past week: 72 mg/dL, 126 mg/dL, 151 mg/dL, 115 mg/dL, 135 mg/dL. Key Antihyperglycemic Medications             SITagliptin (JANUVIA) 100 mg tablet Take 1 Tablet by mouth daily. insulin glargine (Lantus Solostar U-100 Insulin) 100 unit/mL (3 mL) inpn 21 Units by SubCUTAneous route nightly. pioglitazone (ACTOS) 30 mg tablet Take 1 Tablet by mouth daily. HbA1c:  Lab Results   Component Value Date/Time    Hemoglobin A1c 10.9 (H) 12/17/2021 12:00 AM    Hemoglobin A1c (POC) 6.4 06/23/2020 02:50 PM         A/P:    Diabetes Management:  - Patient had FBG of 72 yesterday AM that caused symptoms of hypoglycemia. This is the first low reading he has had in some time. However, want to avoid too aggressive treatment targets to minimize risk of hypoglycemia. Per ADA guidelines fasting/pre-prandial glucose goal of  mg/dL is recommended for older adults with complex/intermediate health status. - Decrease Lantus slightly to 20 units daily. Follow up in 2 weeks to review BG data. Patient instructed to call if low reading occurs again before our next follow up.   - Continue pioglitazone 30 mg daily and Januvia 100 mg daily.  - Patient endorses understanding to the provided information. All questions answered at this time     Thank you for the consult,  Marcelo Ceballos, [address] [place] [address] in place:  Yes   Recommendation Provided To: Patient/Caregiver: 2 via Telephone   Intervention Detail: Dose Adjustment: 1, reason: Therapy De-escalation and Scheduled Appointment   Intervention Accepted By: Patient/Caregiver: 2   Time Spent (min): 30

## 2022-02-22 ENCOUNTER — APPOINTMENT (OUTPATIENT)
Dept: PHYSICAL THERAPY | Age: 72
End: 2022-02-22
Payer: MEDICARE

## 2022-02-22 ENCOUNTER — PATIENT OUTREACH (OUTPATIENT)
Dept: CASE MANAGEMENT | Age: 72
End: 2022-02-22

## 2022-02-22 PROBLEM — M47.817 LUMBOSACRAL SPONDYLOSIS WITHOUT MYELOPATHY: Status: ACTIVE | Noted: 2022-02-22

## 2022-02-22 PROBLEM — M51.16 RADICULOPATHY DUE TO LUMBAR INTERVERTEBRAL DISC DISORDER: Status: ACTIVE | Noted: 2022-02-22

## 2022-02-22 PROBLEM — M79.671 PAIN IN RIGHT FOOT: Status: ACTIVE | Noted: 2022-02-22

## 2022-02-22 RX ORDER — DICYCLOMINE HYDROCHLORIDE 20 MG/1
20 TABLET ORAL
COMMUNITY
Start: 2022-02-14 | End: 2022-03-17

## 2022-02-22 NOTE — PROGRESS NOTES
.Complex Case Management  Follow-Up       Date/Time:   2/22/22  3:22PM       Ambulatory Care Manager ( ACM) contacted patient for Complex Case Management  follow up. Spoke to patient  Introduced self/role and reason for call. Patient reported:   He is doing fine he was just thinking about when PAMELA was returning from [facility] today or tomorrow. Patient went on to voice to [facility] about his emergency room visit last week 2/14/2022 for abdominal pain. He stayed there about 6-7 hours. He had a virtual visit with Marta Thorne PharmD yesterday he did have 1 low BG of 72 and she decreased his Lantus to 20 units every bedtime. ACM encouraged bedtime snack. Keep peanut butter crackers at bedside. Moe Samayoa was with patient. Pertinent concerns:  ACM remind patient that he has an appointment with Dr Mode Ansari tomorrow. Patient asking ACM is this the visit when they start his radiation therapy for his cancer. ACM voiced no Dr Mode Ansari is the specialist who did the prostate biopsy. He will may decide to set you up again for the [employer] as Dr Mel Bell is the radiation radiologist at [facility] . He may be getting his second Hormone shot. If he is setting him up the the [employer] he may have to do the pre-op labs,including COVID tested it is 27days old since last tested even though it was negative. Fatou Ayala Specialist appointments since last outreach? [facility]    If so, specialist and date: 2/14/22    Next PCP Appointment: 3/17/2022    Medications:     New medications since last outreach: Bentyl 10 mg 4 times daily as needed. Does patient need refills on any medications: No   Medication changes since last outreach (dose adjustments or discontinued meds): No      Home Health company: No has personal Care aide    Barriers to care?  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

## 2022-02-24 ENCOUNTER — HOSPITAL ENCOUNTER (OUTPATIENT)
Dept: PHYSICAL THERAPY | Age: 72
End: 2022-02-24
Payer: MEDICARE

## 2022-02-28 ENCOUNTER — HOSPITAL ENCOUNTER (OUTPATIENT)
Dept: PHYSICAL THERAPY | Age: 72
Discharge: HOME | End: 2022-02-28
Payer: MEDICARE

## 2022-02-28 ENCOUNTER — PATIENT OUTREACH (OUTPATIENT)
Dept: CASE MANAGEMENT | Age: 72
End: 2022-02-28

## 2022-02-28 PROCEDURE — 97530 THERAPEUTIC ACTIVITIES: CPT

## 2022-02-28 NOTE — PROGRESS NOTES
[facility] AT [address]  Phone: [phone] Fax: [phone]  PROGRESS NOTE  Patient Name: Sania Juan : 1950   Treatment/Medical Diagnosis: Other low back pain [M54.59]   Referral Source: Taz Marc NP     Date of Initial Visit: 12/15/22 Attended Visits: 11 Missed Visits: 5     SUMMARY OF TREATMENT  Pt is a 66 yo male that presents to PT for LBP and increased falls. Pt has completed a total of 6 therapy sessions since the [facility] on 12/15. Therapy has consisted of therapeutic exercise, gait training, neuro re-education, self-care/patient education, pain modalities and manual prn, therapeutic activity, functional mobility training, and  home safety training.      CURRENT STATUS  Pt is a 66 yo male that presents to PT for LBP and increased falls. Pt has participated in a total of 11 therapy visits since his IE. Pt was last seen by PT on 22. Pt has been absent 2/2 appts for other medical conditions and decreased transportation. Since the last PN on 22, pt has improved FOTO score by 6 points, improved TUG time w/ rollator  and mod I to an average of 23.8 sec and TUG w/ SPC to average time of 19.3 sec w/ supervision, improved ROM w/ EC and EO to 30 seconds without LOB,and improved ability to ascend/descend 4, 6 inch steps w/ reciprocating gait, bilateral rails, and supervision. Pt does demo cont BLE strength deficits w/ pt having 3+/5 strength into hip flex, knee ext bilaterally when attempted in sitting. However, pt is able to perform transfers/walking/step ups to 8 inch step without buckling noted and therefore demo > 3/5 strength w/ functional tasks. Knee flex strength and ankle DF strength today assessed to be 4/5. Cont inconsistent performance w/ MMT noted as mentioned in the initial evaluation. Pt also cont to have elevated pain levels and difficulty w/ floor transfers.  From 22 to 22, pt reported no falls, however, now notes return of falls to 1x/day 2/2 instability and amb without a.d. without his aide present. Pt would benefit from cont therapy to address cont deficits to reduce risks for falls and improve tolerance to function    Subjective % improvement: 80%  Subjective deficits: cont R hip and back pain, falls, difficulty walking, prolonged sitting(20 min)/standing (30 min)/walking(30 min)  Subjective improvements: improved balance and walking w/ cane at home  Falls/day: 1x/day (feels that this has increased 2/2 prostate cancer),no falls from PN on 1/15/22 to 2/2/22 but reports that he started having falls again after getting up to fast to walk with LOB. No falls when aide around, falls occur when he is alone, admits to only having a.d. \"sometimes\"in event of his falls  FOTO: 30/100 (+6 from last PN)  Pain: 8-9/10         TUG w/ rollator: 30\", 20 \",21 w/ rollator, mod I. (avg 23.6 sec)  TUG w/ SPC: supervision:  22\", 18\", 18\" (19.3 sec)  30 STST: 6 (+4) reps, hands on arm rests of chair until upright, good balance, 19 inch chair   ROM EO: 30\" with no LOB  ROM EC: 30\", with no LOB , mild sway noted   Stairs: 4, 6 inch steps, reciprocating gait, Supervision. Bilateral rails, good balance and stability. Hip AROM: WFL as demo w/ functional activities/sit<>stand transfers, reaching into cart on his walker, marching in // bars  MMT: pt noted to have 3+/5 strength into hip flex, knee ext bilaterally when attempted in sitting. However, pt is able to perform transfers/walking/step ups to 8 inch step without buckling noted and therefore demo > 3/5 strength w/ functional tasks. Knee flex strength and ankle DF strength today assessed to be 4/5. Cont inconsistent performance w/ MMT noted as mentioned in the initial evaluation  Gait: Rollator w/ decreased iraj and stride length. Erect posture noted. SPC in the L hand, supervision, no LOB      Progress made towards goals:   · Short Term Goals: To be accomplished in  4-6  treatments:  1.  Pt will be independent and compliant with HEP for carryover of strength and mobility gains  Status at last assessment : Issued at [facility]; met: Pt reports increased compliance w/ HEP 1-2x/day  · Long Term Goals: To be accomplished in  8-10  treatments:  1.  Pt will score >49/100 on FOTO to show significant improvement in functional mobility and QOL   Status at last assessment 31/100  Current: progressing since PN (1/5/22) 2/28/22: 30/100  2. Pt will demo ability to complete TUG in <30 seconds to show reduced fall risk  Status at last assessment : 48 sec with rollator  Current:  2/28/22: met: TUG w/ rollator: avg 23.6 sec; TUG w/ SPC: 19.3 sec  3. Pt will demo ability to perform romberg stance EO for 30 seconds indicating improved postural control   Status at last assessment: ROM EO 3 sec  Current: met: 30 sec w/ EO or EC  4. Pt will report >50% improvement in overall symptoms to show improved activity tolerance  Status at last assessment : severe impairments   Current: progressing/met : 2/28/22: 80%  5. pt will perform floor transfer w/ mod I, good safety, to enable pt to care for self in event of a fall. 2/2/22: initiated floor transfer training, quadruped to tall kneel on the low table> tall kneel side stepping and tall>half kneel progression min assist  2/28/22: progressing: pt reports min assist to rise from floor at home, NT this session  6. Pt will report no > 2 falls a week in order to indicate reduced risk for injury 2/2 improved strength/balance and home safety.   2/28/22: Progressing: ,no falls from PN on 1/15/22 to 2/2/22 but reports that he started having falls again after getting up to fast to walk with LOB. No falls when aide around, falls occur when he is alone, admits to only having a.d.  \"sometimes\"in event of his falls    New Goals to be achieved in __8_  treatments:  1.  Pt will score >49/100 on FOTO to show significant improvement in functional mobility and QOL   Current: 2/28/22: 31/100  2. Pt will complete TUG w/ rollator and/or SPC in < 15 seconds. Current: 2/28/22: TUG w/ rollator: avg 23.6 sec; TUG w/ SPC: 19.3 sec  3. pt will perform floor transfer w/ mod I, good safety, to enable pt to care for self in event of a fall. Current: 2/28/22: pt reports requiring min assist to rise from floor in home. 4. Pt will report no > 2 falls a week in order to indicate reduced risk for injury 2/2 improved strength/balance and home safety.   Current: 2/28/22: no falls from PN on 1/15/22 to 2/2/22 but reports that he started having falls again after getting up to fast to walk with LOB. No falls when aide around, falls occur when he is alone, admits to only having a.d. \"sometimes\"in event of his falls    RECOMMENDATIONS  Cont w/ POC 1-2x/wk x 4 wks. If you have any questions/comments please contact us directly at [phone]   Thank you for allowing us to assist in the care of your patient. Therapist Signature: Marylene Blind, [place] Date: 2/28/2022   Reporting Period  1/5/22 to 2/28/22 Time: 1:44 PM   NOTE TO PHYSICIAN:  PLEASE COMPLETE THE ORDERS BELOW AND FAX TO   [facility]: [phone]. If you are unable to process this request in 24 hours please contact our office: [phone].  ___ I have read the above report and request that my patient continue as recommended.   ___ I have read the above report and request that my patient continue therapy with the following changes/special instructions:_________________________________________________________   ___ I have read the above report and request that my patient be discharged from therapy.      Physician Signature:        Date:       Time:                                                        Ez Rush NP.

## 2022-02-28 NOTE — PROGRESS NOTES
.Complex Case Management  Follow-Up       Date/Time:   2/28/2022  12:32       Patient contacted [facility] ( [facility]). Patient reported: For the last several visits to [facility] he has been having problems with Transportation bein late and not picking him up and he then has to pay for a lift ride out of pocket. [facility] said that the cancer society has transportation to appointments. He don't want to take a chance on him missing his radiation appointments. Asking [facility] did she know anything about that? [facility]  [employer] ,voicing he got his Cancer/ Radiation package from [facility] with his  on it Robert knows how to go about contacting them. Just know they will only be transporting to his Cancer follow up appointments not others. Notify company about late picks and possibly missing appointments spending monies that you don't have. Patient has been scheduled for his Fiducial markers placement 4/1/2022  With Dr Mode Ansari. Pertinent concerns:  See above. Specialist appointments since last outreach? Yes   If so, specialist and date: Dr. Mode Ansari 2/23/2022( Urology)    Next PCP Appointment: 3/17/2022    Medications:     New medications since last outreach: No  Does patient need refills on any medications: No   Medication changes since last outreach (dose adjustments or discontinued meds): No      Home Health company: [employer] ( [employer] )    Barriers to care? Easily gets upset, forgets        Patient reminded that there are physicians on call 24 hours a day / 7 days a week (M-F 5pm to 8am and from Friday 5pm until Monday 8a for the weekend) should the patient have questions or concerns.

## 2022-02-28 NOTE — PROGRESS NOTES
PT DAILY TREATMENT NOTE     Patient Name: Anand Jackson  Date:2022  : 1950  [x]  Patient  Verified  Payor: BLUE CROSS MEDICARE / Plan: Juliet Morris 8141 HMO / Product Type: Managed Care Medicare /    In time:1000  Out time: 7329  Total Treatment Time (min):40  Total Timed Codes (min): 40  1:1 Treatment Time (min):40   Visit #: 5 of 8    Treatment Area: Other low back pain [M54.59]    SUBJECTIVE  Pain Level (0-10 scale):8/10  Any medication changes, allergies to medications, adverse drug reactions, diagnosis change, or new procedure performed?: [x] No    [] Yes (see summary sheet for update)  Subjective functional status/changes:   [] No changes reported  , receives markers for radiation 2/2 prostate cancer. 1-2 weeks later then radiation will start, 5 treatments a week for 8-9 weeks, [facility]. Notes that the polyps from the colonoscopy were benign. Will have another one in 5 years. Pt states that he has been doing his HEP and would like to continue w/ therapy. OBJECTIVE     min Therapeutic Exercise:  [x] See flow sheet :    Rationale: increase ROM and increase strength to improve the patients ability to ADLs, mobility, gait, self-care    40 min Therapeutic Activity:  [x]  See flow sheet :reviewed FOTO w/ patient, assisting pt w/ completing the FOTO, completed measurements for re-assessment   Rationale:   To improve t/f safely and with ease      X min Neuromuscular Re-education:  [x]  See flow sheet :      Rationale: improve coordination, improve balance and increase proprioception  to improve the patients ability to improve safety with self-care, community mobility      X min Gait  [x]  See flow sheet :   - cones over 10' x 2 : w/ SPC in the L hand, figure 8 weaves, fwd/side step, fwd/backward steps, fwd>side step>backaward step >side step  -  kristie step overs fwd in // bars (two hands on bars > one hand on // bars> progressed to no hands)  - hurdles side stepping in // bars (no hands)  -CGA w/ gait belt for safety all gait tasks  -trial of squat to touch cone to simulate retrieving of object while walking, pt w/ difficulty and min >mod assist for safety   Rationale: improve coordination, improve balance, safety, and increase proprioception  to improve the patients ability to improve safety with self-care, community mobility                 W/ TA/NM min Patient Education: [x] Review HEP,addressed goals met/cont, progress made w/ therapy. Importance of compliance w/ Hep and amb w/ a.d. at all times to reduce risks for falls. [] Progressed/Changed HEP based on:   [] positioning   [] body mechanics   [] transfers   [] heat/ice application        Other Objective/Functional Measures:  Subjective % improvement: 80%  Subjective deficits: cont R hip and back pain, falls, difficulty walking, prolonged sitting(20 min)/standing (30 min)/walking(30 min)  Subjective improvements: improved balance and walking w/ cane at home  Falls/day: 1x/day (feels that this has increased 2/2 prostate cancer),no falls from PN on 1/15/22 to 2/2/22 but reports that he started having falls again after getting up to fast to walk with LOB. No falls when aide around, falls occur when he is alone, admits to only having a.d. \"sometimes\"in event of his falls  FOTO: 30/100 (+6 from last PN)  Pain: 8-9/10         TUG w/ rollator: 30\", 20 \",21 w/ rollator, mod I. (avg 23.6 sec)  TUG w/ SPC: supervision:  22\", 18\", 18\" (19.3 sec)  30 STST: 6 (+4) reps, hands on arm rests of chair until upright, good balance, 19 inch chair   ROM EO: 30\" with no LOB  ROM EC: 30\", with no LOB , mild sway noted   Stairs: 4, 6 inch steps, reciprocating gait, Supervision. Bilateral rails, good balance and stability.     Hip AROM: WFL as demo w/ functional activities/sit<>stand transfers, reaching into cart on his walker, marching in // bars  MMT: pt noted to have 3+/5 strength into hip flex, knee ext bilaterally when attempted in sitting. However, pt is able to perform transfers/walking/step ups to 8 inch step without buckling noted and therefore demo > 3/5 strength w/ functional tasks. Knee flex strength and ankle DF strength today assessed to be 4/5. Cont inconsistent performance w/ MMT noted as mentioned in the initial evaluation  Gait: Rollator w/ decreased iraj and stride length. Erect posture noted. SPC in the L hand, supervision, no LOB     Pain Level (0-10 scale) post treatment: 9/10     ASSESSMENT/Changes in Function:   Pt is a 68 yo male that presents to PT for LBP and increased falls. Pt has participated in a total of 11 therapy visits since his IE. Pt was last seen by PT on 2/2/22. Pt has been absent 2/2 appts for other medical conditions and decreased transportation. Since the last PN on 1/5/22, pt has improved FOTO score by 6 points, improved TUG time w/ rollator  and mod I to an average of 23.8 sec and TUG w/ SPC to average time of 19.3 sec w/ supervision, improved ROM w/ EC and EO to 30 seconds without LOB,and improved ability to ascend/descend 4, 6 inch steps w/ reciprocating gait, bilateral rails, and supervision. Pt does demo cont BLE strength deficits w/ pt having 3+/5 strength into hip flex, knee ext bilaterally when attempted in sitting. However, pt is able to perform transfers/walking/step ups to 8 inch step without buckling noted and therefore demo > 3/5 strength w/ functional tasks. Knee flex strength and ankle DF strength today assessed to be 4/5. Cont inconsistent performance w/ MMT noted as mentioned in the initial evaluation. Pt also cont to have elevated pain levels and difficulty w/ floor transfers. From 1/5/22 to 2/2/22, pt reported no falls, however, now notes return of falls to 1x/day 2/2 instability and amb without a.d. without his aide present.  Pt would benefit from cont therapy to address cont deficits to reduce risks for falls and improve tolerance to functional activities at home and in the community. Patient will continue to benefit from skilled PT services to modify and progress therapeutic interventions, address functional mobility deficits, address ROM deficits, address strength deficits, analyze and address soft tissue restrictions, analyze and cue movement patterns, analyze and modify body mechanics/ergonomics, assess and modify postural abnormalities, address imbalance/dizziness and instruct in home and community integration to attain remaining goals. []  See Plan of Care  []  See progress note/recertification   []  See Discharge Summary         Progress towards goals / Updated goals: · Short Term Goals: To be accomplished in  4-6  treatments:  1.  Pt will be independent and compliant with HEP for carryover of strength and mobility gains  Status at last assessment : Issued at [address] [place] [address]; met: Pt reports increased compliance w/ HEP 1-2x/day    · Long Term Goals: To be accomplished in  8-10  treatments:  1.  Pt will score >49/100 on FOTO to show significant improvement in functional mobility and QOL   Status at last assessment 31/100  Current: progressing since PN (1/5/22) 2/28/22: 30/100  2. Pt will demo ability to complete TUG in <30 seconds to show reduced fall risk  Status at last assessment : 48 sec with rollator  Current:  2/28/22: met: TUG w/ rollator: avg 23.6 sec; TUG w/ SPC: 19.3 sec  3. Pt will demo ability to perform romberg stance EO for 30 seconds indicating improved postural control   Status at last assessment: ROM EO 3 sec  Current: met: 30 sec w/ EO or EC  4. Pt will report >50% improvement in overall symptoms to show improved activity tolerance  Status at last assessment : severe impairments   Current: progressing/met : 2/28/22: 80%  5. pt will perform floor transfer w/ mod I, good safety, to enable pt to care for self in event of a fall.   2/2/22: initiated floor transfer training, quadruped to tall kneel on the low table> tall kneel side stepping and tall>half kneel progression min assist  2/28/22: progressing: pt reports min assist to rise from floor at home, NT this session  6. Pt will report no > 2 falls a week in order to indicate reduced risk for injury 2/2 improved strength/balance and home safety. 2/28/22: Progressing: ,no falls from PN on 1/15/22 to 2/2/22 but reports that he started having falls again after getting up to fast to walk with LOB. No falls when aide around, falls occur when he is alone, admits to only having a.d.  \"sometimes\"in event of his falls  PLAN  [x]  Upgrade activities as tolerated     [x]  Continue plan of care  []  Update interventions per flow sheet       []  Discharge due to:_  [x]  Other: work on floor transfers, dual tasking; cont w/ POC 1-2x/wk x 4 wks    Chilo Ramos, PT 2/28/2022  1040    Future Appointments   Date Time Provider Devika Haley   2/28/2022 10:00 AM [facility] PT [place] [facility]   3/2/2022 10:00 AM [facility] PT [place] [facility]   3/4/2022  9:30 AM Alyx Guy PHARMD AMA BS AMB   3/7/2022 10:00 AM [facility] PT [place] [facility]   3/8/2022  1:00 PM Charmayne Coral D, MD [facility] BS AMB   3/9/2022 10:00 AM [facility] PT [place] 2 [facility]   3/15/2022  1:00 PM [facility] PT [place] [facility]   3/17/2022  9:30 AM Zenaida Nurse, NP AMA BS AMB   5/4/2022  9:40 AM [facility] [address] [place]

## 2022-03-02 ENCOUNTER — APPOINTMENT (OUTPATIENT)
Dept: PHYSICAL THERAPY | Age: 72
End: 2022-03-02
Payer: MEDICARE

## 2022-03-04 ENCOUNTER — VIRTUAL VISIT (OUTPATIENT)
Dept: FAMILY MEDICINE CLINIC | Age: 72
End: 2022-03-04

## 2022-03-04 DIAGNOSIS — E11.9 DIABETES MELLITUS, TYPE II, INSULIN DEPENDENT (HCC): ICD-10-CM

## 2022-03-04 DIAGNOSIS — Z79.4 TYPE 2 DIABETES MELLITUS WITH HYPERGLYCEMIA, WITH LONG-TERM CURRENT USE OF INSULIN (HCC): ICD-10-CM

## 2022-03-04 DIAGNOSIS — Z79.4 DIABETES MELLITUS, TYPE II, INSULIN DEPENDENT (HCC): ICD-10-CM

## 2022-03-04 DIAGNOSIS — E11.65 TYPE 2 DIABETES MELLITUS WITH HYPERGLYCEMIA, WITH LONG-TERM CURRENT USE OF INSULIN (HCC): ICD-10-CM

## 2022-03-04 RX ORDER — INSULIN GLARGINE 100 [IU]/ML
20 INJECTION, SOLUTION SUBCUTANEOUS
Qty: 15 ML | Refills: 2 | Status: SHIPPED | OUTPATIENT
Start: 2022-03-04

## 2022-03-04 RX ORDER — FLASH GLUCOSE SENSOR
KIT MISCELLANEOUS
Qty: 2 KIT | Refills: 11 | Status: SHIPPED | OUTPATIENT
Start: 2022-03-04

## 2022-03-04 NOTE — PROGRESS NOTES
Pharmacy Progress Note - Diabetes Management    S/O: Mr. Luna Barclay 67 y.o. male, referred by Ez Rush NP, was contacted today for diabetes management follow up. Patient's last A1c was 10.9% in December 2021. Interim update: Patients blood sugars were stable over the past 2 weeks, no incidence of hypoglycemia reported. Fasting BG readings from the past week were as follows:  Date Reading   3/4 175   3/3 148   3/2 80   3/1 135   2/28 135   2/27 116   2/26 119     Patient reports eating peanut butter crackers and drinking 4-5 oz of orange juice before bed last night, could explain the elevated reading this morning. Patient states he needs refills on Lantus and his FreeStyle Gamez sensors. Current anti-hyperglycemic regimen includes:    Key Antihyperglycemic Medications             insulin glargine (Lantus Solostar U-100 Insulin) 100 unit/mL (3 mL) inpn (Taking) 20 Units by SubCUTAneous route nightly. SITagliptin (JANUVIA) 100 mg tablet Take 1 Tablet by mouth daily. pioglitazone (ACTOS) 30 mg tablet Take 1 Tablet by mouth daily. A/P:    Diabetes Management:  - Per ADA guidelines, fasting/pre-prandial glucose goal of  mg/dL is recommended for older adults with complex/intermediate health status. Patient did have 1 reading of 80 this past week; however, he states he didn't have his usual bedtime snack that night. One elevated reading due to high carbohydrate snack before bedtime. Overall, patients blood sugars are relatively stable at current Lantus dose. Will continue Lantus 20 units nightly. - Continue Januvia and pioglitazone at current doses. - Patient is due for A1c recheck this month. Patient has follow up visit scheduled with PCP on 3/17, will get rechecked then. Recommend A1c goal of < 8% per ADA guidelines. - Refills for Lanuts and FreeStyle Juan Carlos sent to pharmacy.       Medications Discontinued During This Encounter   Medication Reason    flash glucose sensor (FreeStyle Juan Carlos 14 Day Sensor) kit REORDER    insulin glargine (Lantus Solostar U-100 Insulin) 100 unit/mL (3 mL) inpn REORDER     Orders Placed This Encounter    insulin glargine (Lantus Solostar U-100 Insulin) 100 unit/mL (3 mL) inpn     Si Units by SubCUTAneous route nightly. Dispense:  15 mL     Refill:  2    flash glucose sensor (FreeStyle Juan Carlos 14 Day Sensor) kit     Sig: Apply 1 sensor to the back of the upper arm once every 14 days. Check glucose 4 times daily. Dispense:  2 Kit     Refill:  11       Notifications of recommendations will be sent to Alida Garrett NP for review. Thank you for the consult,  Maribeth Henriquez, [address] [place] [address] in place:  Yes   Recommendation Provided To: Patient/Caregiver: 2 via Telephone   Intervention Detail: New Rx: 1, reason: Needs Additional Therapy and Refill(s) Provided   Intervention Accepted By: Patient/Caregiver: 2   Time Spent (min): 30

## 2022-03-07 ENCOUNTER — HOSPITAL ENCOUNTER (OUTPATIENT)
Dept: PHYSICAL THERAPY | Age: 72
Discharge: HOME | End: 2022-03-07
Payer: MEDICARE

## 2022-03-07 PROCEDURE — 97112 NEUROMUSCULAR REEDUCATION: CPT

## 2022-03-07 PROCEDURE — 97116 GAIT TRAINING THERAPY: CPT

## 2022-03-07 PROCEDURE — 97530 THERAPEUTIC ACTIVITIES: CPT

## 2022-03-07 NOTE — PROGRESS NOTES
PT DAILY TREATMENT NOTE     Patient Name: Judge Leyva  Date:3/7/2022  : 1950  [x]  Patient  Verified  Payor: [employer] [place] / Plan: Juliet Morris [id number] HMO / Product Type: Managed Care Medicare /    In time:1005 Out time: 1046  Total Treatment Time (min):41   Total Timed Codes (min):41  1:1 Treatment Time (min):41   Visit #: 1 of 8    Treatment Area: Other low back pain [M54.59]    SUBJECTIVE  Pain Level (0-10 scale): 8/10, LB, R side  Any medication changes, allergies to medications, adverse drug reactions, diagnosis change, or new procedure performed?: [x] No    [] Yes (see summary sheet for update)  Subjective functional status/changes:   [] No changes reported  Pt denies any falls since his last therapy visit and denies any new concerns. Notes that his LBP is mostly on the R side, pointing to his flank. Cont to report that on , he will receive his markers for radiation tx for prostate cancer. Arrived 5 min late today  transportation. OBJECTIVE     min Therapeutic Exercise:  [x] See flow sheet :    Rationale: increase ROM and increase strength to improve the patients ability to ADLs, mobility, gait, self-care    15 min Therapeutic Activity:  [x]  See flow sheet : weighted sit<>stands, step to fwd/side w/ BOSU   Rationale:   To improve t/f safely and with ease      16 min Neuromuscular Re-education:  [x]  See flow sheet : standing dynamic balance tasks     Rationale: improve coordination, improve balance and increase proprioception  to improve the patients ability to improve safety with self-care, community mobility      10 min Gait  [x]  See flow sheet :   -gait w/ SPC for 6MW test, seated rest breaks taken intermittently as needed   Rationale: improve coordination, improve balance, safety, and increase proprioception  to improve the patients ability to improve safety with self-care, community mobility                 W/ TA/NM min Patient Education: [x] Review importance of cont use of rollator for all amb to redce risk for falls. Intent to address floor transfers at next session      [] Progressed/Changed HEP based on:   [] positioning   [] body mechanics   [] transfers   [] heat/ice application        Other Objective/Functional Measures:  + progressed ROM on foam w/ EO/EC, horizontal and vertical head turns w/ EO  + progressed step and reach activity w/ addition of BOSU ball for greater challenge to balance, arms sliding on the // bars for balance  + added step over/back w/ kristie to address balance and foot clearance w/ fwd/back movement, one hand needed on // bar for balance. + 6 minute walk test w/ SPC: 630' w/ 2 seated rest breaks of 15-20 seconds required (one at 450' (3 min laura)  and one at 550'.  Pain Level (0-10 scale) post treatment:8 /10     ASSESSMENT/Changes in Function:   Pt noted to have difficulty w/ posterior weight shifts on the foam, requiring tactile cues to lean forward to prevent stepping back. Able to perform horizontal and vertical head turns while standing in ROM on foam. Most difficulty w/ horizontal head turns w/ maintaining balance w/ mild>moderate sway and intermittent CGA. Also challenged w/ step to fwd and side reaching w/ need for hands on the // bars for stability. Pt able to initiate 6MW test today w/ the [facility], able to amnb 630' w/ mild fatigue noted, 2 seated rest breaks for 15-20 seconds. Pt would benefit from cont therapy to address cont deficits to reduce risks for falls and improve tolerance to functional activities at home and in the community.      Patient will continue to benefit from skilled PT services to modify and progress therapeutic interventions, address functional mobility deficits, address ROM deficits, address strength deficits, analyze and address soft tissue restrictions, analyze and cue movement patterns, analyze and modify body mechanics/ergonomics, assess and modify postural abnormalities, address imbalance/dizziness and instruct in home and community integration to attain remaining goals. []  See Plan of Care  []  See progress note/recertification   []  See Discharge Summary         New Goals to be achieved in __8_  treatments:  1.  Pt will score >49/100 on FOTO to show significant improvement in functional mobility and QOL   Current: 2/28/22: 31/100  2. Pt will complete TUG w/ rollator and/or SPC in < 15 seconds. Current: 2/28/22: TUG w/ rollator: avg 23.6 sec; TUG w/ SPC: 19.3 sec  3. pt will perform floor transfer w/ mod I, good safety, to enable pt to care for self in event of a fall. Current: 2/28/22: pt reports requiring min assist to rise from floor in home. 4. Pt will report no > 2 falls a week in order to indicate reduced risk for injury 2/2 improved strength/balance and home safety.   Current: 2/28/22: no falls from PN on 1/15/22 to 2/2/22 but reports that he started having falls again after getting up to fast to walk with LOB. No falls when aide around, falls occur when he is alone, admits to only having a.d.  \"sometimes\"in event of his falls  3/7/22: pt denies any falls since PN   PLAN  [x]  Upgrade activities as tolerated     [x]  Continue plan of care  []  Update interventions per flow sheet       []  Discharge due to:_  [x]  Other: work on floor transfers, dual tasking    Chilo Ramos, PT 3/7/2022      Future Appointments   Date Time Provider Devika Haley   3/7/2022 10:00 AM [address] 2 [facility]   3/8/2022  1:00 PM Arnulfo MEADE MD [address] [place] [address]   3/9/2022 10:00 AM [facility] PT [place] 2 [facility]   3/11/2022 11:00 AM [facility] 3 [facility]   3/15/2022  1:00 PM [facility] PT [place] 2 [facility]   3/17/2022  9:30 AM BEN Holman BS AMB   3/22/2022 12:15 PM [facility] PT [place] 2 [facility]   5/4/2022  9:40 AM [facility] [address] Jessica

## 2022-03-08 ENCOUNTER — OFFICE VISIT (OUTPATIENT)
Dept: NEUROLOGY | Age: 72
End: 2022-03-08
Payer: MEDICARE

## 2022-03-08 ENCOUNTER — PATIENT OUTREACH (OUTPATIENT)
Dept: CASE MANAGEMENT | Age: 72
End: 2022-03-08

## 2022-03-08 VITALS
DIASTOLIC BLOOD PRESSURE: 68 MMHG | SYSTOLIC BLOOD PRESSURE: 104 MMHG | HEIGHT: 68 IN | OXYGEN SATURATION: 98 % | BODY MASS INDEX: 22.76 KG/M2 | TEMPERATURE: 98.8 F | HEART RATE: 80 BPM | WEIGHT: 150.2 LBS | RESPIRATION RATE: 20 BRPM

## 2022-03-08 DIAGNOSIS — F03.91 DEMENTIA WITH BEHAVIORAL DISTURBANCE, UNSPECIFIED DEMENTIA TYPE: Primary | ICD-10-CM

## 2022-03-08 DIAGNOSIS — F32.A DEPRESSION, UNSPECIFIED DEPRESSION TYPE: ICD-10-CM

## 2022-03-08 PROCEDURE — G8427 DOCREV CUR MEDS BY ELIG CLIN: HCPCS | Performed by: STUDENT IN AN ORGANIZED HEALTH CARE EDUCATION/TRAINING PROGRAM

## 2022-03-08 PROCEDURE — G9717 DOC PT DX DEP/BP F/U NT REQ: HCPCS | Performed by: STUDENT IN AN ORGANIZED HEALTH CARE EDUCATION/TRAINING PROGRAM

## 2022-03-08 PROCEDURE — G8754 DIAS BP LESS 90: HCPCS | Performed by: STUDENT IN AN ORGANIZED HEALTH CARE EDUCATION/TRAINING PROGRAM

## 2022-03-08 PROCEDURE — 3017F COLORECTAL CA SCREEN DOC REV: CPT | Performed by: STUDENT IN AN ORGANIZED HEALTH CARE EDUCATION/TRAINING PROGRAM

## 2022-03-08 PROCEDURE — G8420 CALC BMI NORM PARAMETERS: HCPCS | Performed by: STUDENT IN AN ORGANIZED HEALTH CARE EDUCATION/TRAINING PROGRAM

## 2022-03-08 PROCEDURE — 1101F PT FALLS ASSESS-DOCD LE1/YR: CPT | Performed by: STUDENT IN AN ORGANIZED HEALTH CARE EDUCATION/TRAINING PROGRAM

## 2022-03-08 PROCEDURE — 99204 OFFICE O/P NEW MOD 45 MIN: CPT | Performed by: STUDENT IN AN ORGANIZED HEALTH CARE EDUCATION/TRAINING PROGRAM

## 2022-03-08 PROCEDURE — G8536 NO DOC ELDER MAL SCRN: HCPCS | Performed by: STUDENT IN AN ORGANIZED HEALTH CARE EDUCATION/TRAINING PROGRAM

## 2022-03-08 PROCEDURE — G8752 SYS BP LESS 140: HCPCS | Performed by: STUDENT IN AN ORGANIZED HEALTH CARE EDUCATION/TRAINING PROGRAM

## 2022-03-08 RX ORDER — FLUOXETINE 20 MG/1
20 TABLET ORAL DAILY
Qty: 30 TABLET | Refills: 5 | Status: SHIPPED | OUTPATIENT
Start: 2022-03-08 | End: 2022-04-07

## 2022-03-08 NOTE — LETTER
3/8/2022    Patient: Holger Fuchs   YOB: 1950   Date of Visit: 3/8/2022     Maribel Montoya NP  [address] [facility] [address]  [facility] [address]  [place] [facility] [address]    Dear Maribel Montoya NP,      Thank you for referring Mr. Karen Baker to [facility] for evaluation. My notes for this consultation are attached. If you have questions, please do not hesitate to call me. I look forward to following your patient along with you.       Sincerely,    Eulalia Mata MD

## 2022-03-08 NOTE — PROGRESS NOTES
Gerry Levin is a 67 y.o. male . presents for No chief complaint on file. .    A 67years old male patient here for evaluation of progressive forgetfulness; unknown duration but he says has been going on for a while. He came with a caretaker. According to his caretaker, he forgets appointments. Has difficulty getting his words out and has difficulty finishing a sentence. Has difficulty finding items. He takes his medications himself; he has a pillbox for 1 month and do not forget taking them. He takes care of finances by himself; he might forget paying bills. Sometimes, might forget the pin numbers. He is not currently cooking. No difficulty putting his clothes on. No problem taking shower. Has occasional difficulty sleeping; wakes up a lot to go to the bathroom. He occasionally frustrates. He cries and feels sad. No suicidal ideations. Has anxiety. No hallucinations. He might confuse the day and night. No use of alcohol currently. Denied smoking. He smokes marijuana; past history of heroin abuse. Family history of dementia: Mother, father, and grandmother. No previous history of head trauma. He has prostate cancer; no started on treatment; scheduled for radiotherapy. He has difficulty walking. Use a walker. Legs give out; mainly the right leg. Review of Systems   Constitutional: Negative for chills, fever and weight loss. HENT: Negative for hearing loss and tinnitus. Eyes: Positive for blurred vision (uses glasses) and double vision. Respiratory: Positive for cough. Negative for hemoptysis, sputum production and shortness of breath. Cardiovascular: Negative for chest pain and leg swelling. Gastrointestinal: Negative for nausea and vomiting. Genitourinary: Negative for dysuria, frequency and urgency. Occasional incontinence   Musculoskeletal: Positive for back pain. Negative for neck pain. Skin: Negative for itching and rash.    Neurological: Positive for dizziness, tingling (mostly on the right leg), sensory change, focal weakness (right leg) and headaches. Negative for tremors and seizures. Endo/Heme/Allergies: Does not bruise/bleed easily. Psychiatric/Behavioral: Positive for depression and memory loss. Negative for suicidal ideas. The patient is nervous/anxious and has insomnia.         Past Medical History:   Diagnosis Date    Acute cystitis without hematuria     [facility]IN [facility]) [facility]     Arthritis     Arthropathy     Balanitis     Candida infection     recurrent     Chronic pain     COVID-19 vaccine series completed     WITH BOOSTER    Dementia associated with other underlying disease without behavioral disturbance (HCC)     Depression     Diabetes mellitus, type II ([facility] 07/14/2013    Drug abuse ([facility]     H/O Heroin addiction     ED (erectile dysfunction)     Fatigue     GERD (gastroesophageal reflux disease)     H/O epididymitis 2012    left    H/O: pneumonia 2011    Hep C w/o coma, chronic ([facility] 01/2015    STATES TREATED    History of BPH     History of hematuria     History of tobacco use     Hypertension     Loss of appetite     LOST 10 LBS IN A MONTH    Loss of balance     Microcytic anemia 05/29/2012    Neuropathy     Pancytopenia (HCC)     Paranoid schizophrenia, chronic condition with acute exacerbation (HCC)     Prostate cancer ([facility] 09/08/2021    PNBx, [place] [address], Dr. Onelia Lu [facility]    SOB (shortness of breath)     WITH WALKING    Status post partial gastrectomy 1990    Stomach ulcer    Stroke ([facility] 2020    BALANCE ISSUES    Uncircumcised male        Past Surgical History:   Procedure Laterality Date    COLONOSCOPY N/A 1/31/2022    SCREENING COLONOSCOPY polypectomy w/cold snare, BX performed by Jacque Ku MD at [address] [place] [address] HX COLONOSCOPY      HX ENDOSCOPY  06/23/2015    [facility], EGD W/ BIOPSY , COLONOSCOPY , Surgeon: Les Tolliver MD    HX GASTRECTOMY  1990    partial     HX HEENT      HX HERNIA REPAIR Right     Right inguinal hernia repair 2006    HX HERNIA REPAIR  07/11/2017    Left indirect inguinal hernia repair    HX OPEN REDUCTION INTERNAL FIXATION      x 2    HX ORCHIECTOMY Left 2013    HX ORTHOPAEDIC  2012    Numerous surgeries on rt. foot    HX OTHER SURGICAL      removed testicle    HX UROLOGICAL  09/08/2021    PNBx, [place] [address], Dr. Angel Mayers        Family History   Problem Relation Age of Onset    Diabetes Mother     Hypertension Mother     Cancer Mother     OSTEOARTHRITIS Maternal Grandmother     Diabetes Maternal Grandmother     Hypertension Maternal Grandmother     Diabetes Maternal Grandfather     Breast Cancer Daughter         Social History     Socioeconomic History    Marital status: SINGLE     Spouse name: Not on file    Number of children: Not on file    Years of education: Not on file    Highest education level: Not on file   Occupational History    Not on file   Tobacco Use    Smoking status: Former Smoker     Packs/day: 1.00     Years: 20.00     Pack years: 20.00     Types: Cigarettes     Quit date: 5/24/2011     Years since quitting: 10.7    Smokeless tobacco: Never Used   Vaping Use    Vaping Use: Never used   Substance and Sexual Activity    Alcohol use: No    Drug use: Yes     Types: Marijuana     Comment: H/O Heroin addiction STATES QUIT   48 YRS AGO; STILL USE MARIJUANA  3X/DAY;  USING MARIJUANA X 54 YRS NOW    Sexual activity: Not Currently     Partners: Female   Other Topics Concern    Not on file   Social History Narrative    Not on file     Social Determinants of Health     Financial Resource Strain:     Difficulty of Paying Living Expenses: Not on file   Food Insecurity:     Worried About Running Out of Food in the Last Year: Not on file    Tanner of Food in the Last Year: Not on file   Transportation Needs:     Lack of Transportation (Medical): Not on file    Lack of Transportation (Non-Medical):  Not on file   Physical Activity:     Days of Exercise per Week: Not on file    Minutes of Exercise per Session: Not on file   Stress:     Feeling of Stress : Not on file   Social Connections:     Frequency of Communication with Friends and Family: Not on file    Frequency of Social Gatherings with Friends and Family: Not on file    Attends Voodoo Services: Not on file    Active Member of [address] [place] [address] or Organizations: Not on file    Attends Club or Organization Meetings: Not on file    Marital Status: Not on file   Intimate Partner Violence:     Fear of Current or Ex-Partner: Not on file    Emotionally Abused: Not on file    Physically Abused: Not on file    Sexually Abused: Not on file   Housing Stability:     Unable to Pay for Housing in the Last Year: Not on file    Number of [place] in the Last Year: Not on file    Unstable Housing in the Last Year: Not on file        Allergies   Allergen Reactions    Ibuprofen Anaphylaxis    Fentanyl Other (comments) and Swelling     Blurred vision    Gabapentin Other (comments) and Swelling     Blurred Vision      Metformin Diarrhea    Penicillin Swelling    Penicillins Swelling    Valium [Diazepam] Swelling         Current Outpatient Medications   Medication Sig Dispense Refill    FLUoxetine (PROzac) 20 mg tablet Take 1 Tablet by mouth daily for 30 days. 30 Tablet 5    insulin glargine (Lantus Solostar U-100 Insulin) 100 unit/mL (3 mL) inpn 20 Units by SubCUTAneous route nightly. 15 mL 2    dicyclomine (BENTYL) 20 mg tablet Take 20 mg by mouth four (4) times daily as needed.  SITagliptin (JANUVIA) 100 mg tablet Take 1 Tablet by mouth daily. 90 Tablet 0    famotidine (PEPCID) 40 mg tablet Take 1 Tablet by mouth daily. 90 Tablet 0    calcium-cholecalciferol, d3, (CALCIUM 600 + D) 600-125 mg-unit tab Take  by mouth daily.  cholecalciferol, vitamin D3, (Vitamin D3) 50 mcg (2,000 unit) tab Take  by mouth daily.       omeprazole (PRILOSEC) 20 mg capsule TAKE TWO CAPSULES BY MOUTH EVERY  Capsule 1    pioglitazone (ACTOS) 30 mg tablet Take 1 Tablet by mouth daily. 90 Tablet 0    amLODIPine (NORVASC) 10 mg tablet TAKE ONE TABLET BY MOUTH EVERY DAY 90 Tablet 1    lisinopriL (PRINIVIL, ZESTRIL) 20 mg tablet Take 1 Tablet by mouth daily. 90 Tablet 1    Insulin Needles, Disposable, 31 gauge x 3/16\" ndle Use 1 new pen needle each time to inject insulin subcutaneously 4 times daily 150 Pen Needle 2    flash glucose sensor (FreeStyle Juan Carlos 14 Day Sensor) kit Apply 1 sensor to the back of the upper arm once every 14 days. Check glucose 4 times daily. 2 Kit 11    FreeStyle Juan Carlos 14 Day [place] misc Apply 1 Device to affected area once. Physical Exam  Constitutional:       Appearance: Normal appearance. HENT:      Head: Normocephalic and atraumatic. Mouth/Throat:      Mouth: Mucous membranes are moist.      Pharynx: Oropharynx is clear. No oropharyngeal exudate. Eyes:      Extraocular Movements: Extraocular movements intact. Pupils: Pupils are equal, round, and reactive to light. Pulmonary:      Effort: Pulmonary effort is normal. No respiratory distress. Musculoskeletal:         General: Normal range of motion. Cervical back: Normal range of motion and neck supple. Right lower leg: No edema. Left lower leg: No edema. Neurological:      Mental Status: He is alert. Comments: Mental status: Awake, alert, oriented to place and time;  follows simple and complex commands, no neglect, no extinction to DSS;  immediate recall 3/3 and delayed recall 0/3. Attention to spelling world backwards is impaired (aspirin to the word world made initially he says that he cannot spell it). MMSE score is 17/30; possible poor effort.   Speech and languge: fluent, coherent,and comprehension intact  CN: VFF, EOMI, PERRLA, face sensation intact , no facial asymmetry noted, palate elevation symmetric bilat, SS+SCM 5/5 bilat, tongue midline  Motor: no pronator drift, tone normal throughout, strength 5/5 throughout. Sensory: intact to light touch and pinprick throughout  Coordination: Decreased at L5 through months; able to perform finger-nose-finger; was using a walker when walking. DTR: 2+ throughout            Office Visit on 02/23/2022   Component Date Value Ref Range Status    Color (UA POC) 02/23/2022 Yellow   Final    Clarity (UA POC) 02/23/2022 Clear   Final    Glucose (UA POC) 02/23/2022 1+  Negative Corrected    Bilirubin (UA POC) 02/23/2022 Negative  Negative Final    Ketones (UA POC) 02/23/2022 Negative  Negative Final    Specific gravity (UA POC) 02/23/2022 1.030  1.001 - 1.035 Corrected    Blood (UA POC) 02/23/2022 Trace  Negative Corrected    pH (UA POC) 02/23/2022 5.5  4.6 - 8.0 Corrected    Protein (UA POC) 02/23/2022 Trace  Negative Corrected    Urobilinogen (UA POC) 02/23/2022 0.2 mg/dL  0.2 - 1 Final    Nitrites (UA POC) 02/23/2022 Negative  Negative Final    Leukocyte esterase (UA POC) 02/23/2022 Negative  Negative Final    Urine Culture, Routine 02/23/2022 *  Final                    Value:Enterococcus faecalis  For Enterococcus species, aminoglycosides (except for high-level      Comment: resistance screening), cephalosporins, clindamycin, and  trimethoprim-sulfamethoxazole are not effective clinically. (CLSI, B174-K84, 2016)  Greater than 100,000 colony forming units per mL      Urine Culture, Routine 02/23/2022 *  Final                    Value:Escherichia coli  50,000-100,000 colony forming units per mL      Susceptibility profile is consistent with a probable ESBL.     Urine Culture, Routine 02/23/2022    Final                    Value:Mixed urogenital fara  25,000-50,000 colony forming units per mL     Admission on 01/31/2022, Discharged on 01/31/2022   Component Date Value Ref Range Status    Glucose (POC) 01/31/2022 163* 65 - 105 mg/dL Final    : [id number]   Orders Only on 01/11/2022   Component Date Value Ref Range Status    SARS-CoV-2 01/11/2022 Not Detected  Not Detect Final    Comment: The SARS-CoV-2 Test is a qualitative PCR-based assay for the detection and  identification of SARS-CoV-2 nucleic acid. Negative results do not preclude COVID-19 infection and should not be used as  the sole basis for diagnosis, treatment or other patient management decisions. Improper specimen collection may lead to a false-negative result. Positive  results do not rule out bacterial infection, or co-infection with other  viruses. The agent detected may not be the definitive cause of disease. The   use  of additional laboratory testing (e.g. bacterial and viral culture,  immunofluorescence and radiography) and clinical presentation must be taken  into consideration in the final diagnosis of COVID-19 infection. This test has been authorized by FDA under an Emergency Use Authorization  (EUA). This test has been validated in accordance with the FDA's Guidance  Document \"Policy for Diagnostics Testing in Laboratories Certified to Perform  High Complexity Testing under CLIA prior to                            Emergency Use Authorization for  Coronavirus [id number] during the [facility] Emergency\" issued on   February 29th, 2020. FDA independent review of this validation is pending. This test is  only authorized for the duration of time the declaration that circumstances  exist justifying the authorization of the emergency use of in vitro diagnostic  tests for detection of SARS-CoV-2 virus and/or diagnosis of COVID-19 infection  under section 564(b)(1) of the Act, 21 U. S.C. 872VLN-8(V)(4), unless the  authorization is terminated or revoked sooner.           RESULT 01/11/2022 Normal   Final    Comment: Updated: [id number]  LAB ACC#: [id number]  SOURCE: Clean Catch Urine  --FINAL REPORT--  Mixed Culture  Less than 10,000 org/mL       Hospital Outpatient Visit on 12/20/2021   Component Date Value Ref Range Status    Culture Result 12/20/2021 *   Final                    Value:>100,000 CFU/mL  Skin and/or genital contamination     Office Visit on 12/17/2021   Component Date Value Ref Range Status    Creatinine, urine 12/17/2021 55.5  Not Estab. mg/dL Final    Microalbumin, urine 12/17/2021 14.7  Not Estab. ug/mL Final    Microalb/Creat ratio (ug/mg creat.) 12/17/2021 26  0 - 29 mg/g creat Final    Comment:                        Normal:                0 -  29                         Moderately increased: 30 - 300                         Severely increased:       >300      Hemoglobin A1c 12/17/2021 10.9* 4.8 - 5.6 % Final    Comment:          Prediabetes: 5.7 - 6.4           Diabetes: >6.4           Glycemic control for adults with diabetes: <7.0      Estimated average glucose 12/17/2021 266  mg/dL Final    Cholesterol, total 12/17/2021 150  100 - 199 mg/dL Final    Triglyceride 12/17/2021 37  0 - 149 mg/dL Final    HDL Cholesterol 12/17/2021 69  >39 mg/dL Final    VLDL, calculated 12/17/2021 9  5 - 40 mg/dL Final    LDL, calculated 12/17/2021 72  0 - 99 mg/dL Final    Glucose 12/17/2021 362* 65 - 99 mg/dL Final    BUN 12/17/2021 18  8 - 27 mg/dL Final    Creatinine 12/17/2021 1.29* 0.76 - 1.27 mg/dL Final    GFR est non-AA 12/17/2021 55* >59 mL/min/1.73 Final    GFR est AA 12/17/2021 64  >59 mL/min/1.73 Final    Comment: **In accordance with recommendations from the NKF-ASN Task force,**    [facility] is in the process of updating its eGFR calculation to the    2021 CKD-EPI creatinine equation that estimates kidney function    without a race variable.       BUN/Creatinine ratio 12/17/2021 14  10 - 24 Final    Sodium 12/17/2021 142  134 - 144 mmol/L Final    Potassium 12/17/2021 4.5  3.5 - 5.2 mmol/L Final    Chloride 12/17/2021 102  96 - 106 mmol/L Final    CO2 12/17/2021 25  20 - 29 mmol/L Final    Calcium 12/17/2021 9.9  8.6 - 10.2 mg/dL Final    Protein, total 12/17/2021 7.9  6.0 - 8.5 g/dL Final    Albumin 12/17/2021 4.9* 3.7 - 4.7 g/dL Final    GLOBULIN, TOTAL 12/17/2021 3.0  1.5 - 4.5 g/dL Final    A-G Ratio 12/17/2021 1.6  1.2 - 2.2 Final    Bilirubin, total 12/17/2021 <0.2  0.0 - 1.2 mg/dL Final    Alk. phosphatase 12/17/2021 147* 44 - 121 IU/L Final                  **Please note reference interval change**    AST (SGOT) 12/17/2021 45* 0 - 40 IU/L Final    ALT (SGPT) 12/17/2021 47* 0 - 44 IU/L Final    INTERPRETATION 12/17/2021 Note   Final    Supplemental report is available.  Interpretation 12/17/2021 Note   Final    Comment: -------------------------------  CHRONIC KIDNEY DISEASE:  EGFR, BLOOD PRESSURE, AND PROTEINURIA ASSESSMENT  Patient's eGFR was previously outside the scope of the  program and now is 55 mL/min/1.73mE2 corresponding to CKD  stage 3a. Multiply eGFR by 1.159 if patient is   American. Potassium is within goal and has not changed  significantly, was 4.2 and now is 4.5 mmol/L. Albumin:Creatinine ratio is not elevated and has not changed  significantly, was 24.5 and now is 26.5 mg/g creat. If no  other markers of kidney damage are present consider  measurement of cystatin C to confirm diagnosis of CKD. Glycemic control (HB A1c: 10.9 %) is not within goal and  additional action is indicated. EGFR, BLOOD PRESSURE, AND PROTEINURIA TREATMENT SUGGESTIONS  -  Based on current eGFR and absence of significant  proteinuria, patient is at moderately increased risk for  adverse outcomes such as CKD progression, CVD, and  mortality. Guidelines recommend a target blood pressure of  120/80 mmHg o                           r less in CKD patients to reduce cardiovascular  risk and CKD progression. EGFR, BLOOD PRESSURE, AND PROTEINURIA FOLLOW-UP  -  Spot Urine Panel (Albumin preferred) and fasting Renal Panel  within 12 months; Hemoglobin A1C within 3 months;  BONE and MINERAL ASSESSMENT  Calcium is within goal and has not changed significantly,  was 9.6 and now is 9.9 mg/dL.  Carbon Dioxide is within goal  and has not changed significantly, was 23 and now is 25  mmol/L. BONE and MINERAL TREATMENT SUGGESTIONS  -  Interpretations require simultaneous measurements of serum  calcium and phosphorus. BONE and MINERAL FOLLOW-UP  -  fasting PTH with Renal Panel is recommended by KDOQI  guidelines, at least yearly; 25-Hydroxy Vitamin D is due;  LIPIDS ASSESSMENT  LDL-C is normal and has risen, was 63 and now is 72 mg/dL. Triglyceride is normal and has decreased, was 69 and now is  37 mg/dL. Non-HDL Cholesterol is optimal and has not changed  significantly, was 77 and now is 81 mg/dL. HDL-C is high and  has rise                           n, was 50 and now is 69 mg/dL. LIPIDS TREATMENT SUGGESTIONS  -  Therapeutic lifestyle changes are always valuable to  maintain optimal blood lipid status (diet, exercise, weight  management). If at least a 50% LDL reduction from baseline  has not been achieved, begin or increase statin. Consider  measurement of LDL particle number or Apo B to adjudicate  need for further LDL lowering therapy. If statin cannot be  tolerated or increased, alternatives include use of an  intestinal agent (ezetimibe or bile acid sequestrant) or  niacin. LIPIDS FOLLOW-UP  -  fasting Lipid Panel within 12 months;  ANEMIA ASSESSMENT  Most recent order does not include a CBC Panel or iron  studies. ANEMIA FOLLOW-UP  -  Fe/TIBC (TSAT) and Ferritin with CBC is due;  -------------------------------  DISCLAIMER  These assessments and treatment suggestions are provided as  a convenience in support of the physician-patient  relationship and are not intended to replace the physician's  clinical judgment. They are                            derived from national guidelines  in addition to other evidence and expert opinion. The  clinician should consider this information within the  context of clinical opinion and the individual patient.   SEE GUIDANCE FOR CHRONIC KIDNEY DISEASE PROGRAM: Kidney  Disease Improving Global Outcomes (KDIGO) clinical practice  guidelines are at http://kdigo. org/home/guidelines/.  [facility] [facility] Kidney Disease Outcomes Quality  Initiative (KDOQI (TM)), with its limitations and  disclaimers, are at www.kidney. org/professionals/KDOQI. This  program is intended for patients who have been diagnosed  with stages 3, 4, or pre-dialysis 5 CKD. It is not intended  for children, pregnant patients, or transplant patients. ICD-10-CM ICD-9-CM    1. Dementia with behavioral disturbance, unspecified dementia type (HCC)  F03.91 294.21 MRI BRAIN WO CONT      TSH 3RD GENERATION      VITAMIN B12      RPR      REFERRAL TO NEUROPSYCHOLOGY      FLUoxetine (PROzac) 20 mg tablet   2. Depression, unspecified depression type  F32. A 311 FLUoxetine (PROzac) 20 mg tablet       A 67years old male patient here for evaluation of progressive memory loss. Currently lives by himself; but he has a home health care provider during the daytime. Some difficulty walking and uses a walker. Some dependence on activities but is still able to take care of his finances and takes his medications himself. MMSE score is 17/30; some poor effort on the test.  Mild depression and anxiety. No suicidal ideations. We will start him on fluoxetine 20 mg p.o. per day. Referred him for neuropsychological testing. We will get MRI of the brain, TSH, RPR, and vitamin B12 level. I have advised him to remain active and exercise. We will see him again in 3 months time.

## 2022-03-09 ENCOUNTER — APPOINTMENT (OUTPATIENT)
Dept: PHYSICAL THERAPY | Age: 72
End: 2022-03-09
Payer: MEDICARE

## 2022-03-15 ENCOUNTER — PATIENT OUTREACH (OUTPATIENT)
Dept: CASE MANAGEMENT | Age: 72
End: 2022-03-15

## 2022-03-15 ENCOUNTER — HOSPITAL ENCOUNTER (OUTPATIENT)
Dept: PHYSICAL THERAPY | Age: 72
Discharge: HOME | End: 2022-03-15
Payer: MEDICARE

## 2022-03-15 NOTE — PROGRESS NOTES
.  Patient has graduated from the Complex Case Management  program on 3/15/22. Patient's symptoms are stable at this time. Patient/family has the ability to self-manage. Care management goals have been completed at this time. No further nurse navigator follow up scheduled. Goals Addressed                 This Visit's Progress       Diabetes     COMPLETED: Knowledge and adherence of medication (ie. action, side effects, missed dose, etc.)        Patient will be compliant with all   11/2/2021  Patient is very compliant with all medications   1/12/2022,  Lantus increase to 19 units by PHD MURIEL Martinez  Medication reconciliation done with patient and New PCA Ms Coronado Do  1/21/22  PHD MURIEL Martinez Increase Lantus to 21 units decrease back to 19 units if blood   Glucose drops to 90  2/22/22  PharmDESHAUN Martinez decrease Lantus down to 20 units as patient had a low BG 72       COMPLETED: To decrease or maintain patient's biometrics:  HgA1c < 7 mg/dL, /80 or less. LDL of less than 100 and/or less than 70 in a patient with CAD. Patient will get A1C <7 as it is 8.6 ( 3/8/2021)  Patent will get b/p at baseline 130/80 ( current 138/90)  Diet and exercise       COMPLETED: Understands and monitors blood sugar levels        Patient will check blood sugars daily and   9/10/2021  Patient is to check blood glucose 4 times daily now that he has the [facility]  11/2/2021  Patient is compliant with checking blood glucose 4 times daily with Arn Juana  1/12/2022  Patient and PCA is compliant with sensor application and monitoring blood glucoase.  1/21/2022  Freestyle Juan Carlos sensor changed today. Continues to be compliant with reading blood sugars  2/22/22  Patient reminded to check BG twice daily with  Freestyle reader as it will record it.   Lantus changed to 20 units         General     COMPLETED: Follows diet recommendations and achieves/maintains goal weight        8/16/2021  Patient will maintain ADA diet  9/10/2021  Patient continues to eat food with concentrated sweets. Met with PharmD 8/23/2021. ACM reviewed ADA diet with him and his PCA Mrs Toyin Moody  1/12/2022  Patient has new PCA Ms Emely Gonzalez ADA diet reviewed and is compliant as her mother is diabetic as well and she cooks for her.  2/22/22  Patient seen by Julee Sebastian 2/21/22   Had one low BG of 72. Lantus cut back to 20 units.  COMPLETED: Self-schedules and keeps appointments         Patient will keep all providers appointments. 11/2/2021  Patient has been following up with all provider appointments. Krysta Bach was 10/28/2021 for his cancer treatment  To start after the holidays once everything has been set up  He will be going 5 days a week an hour a day for 9 weeks. 1/12/2022  Patient insurance would not pay full amount for Proton Therapy. Patient will be doing radiation now. Prostate Fiducial Markers Implant scheduled for 1/25/22 by Dr Tarsha Oden, Blood work 1/11/2022  Patient has PCP appointment 3/17/2022  Patient has Neurology appointment 4/1/2022 1/21/2022  Fiducial procedure cancel this was for Proton Therapy . Has Radiation consultation with Dr Tarsha Oden brother at [facility] 1/24/22 but is was with Dr Tony Valdovinos  2/22/22  Patient has up coming appointment with Dr Tarsha Oden 2/23/22             Pt has nurse navigator's contact information for any further questions, concerns, or needs.   Patients upcoming visits:    Future Appointments   Date Time Provider Devika Haley   3/15/2022  1:00 PM [facility] PT GH[facility] 2 [facility]   3/17/2022  9:30 AM B[facility] AMB   3/22/2022 12:15 PM [facility] PT GH[facility] 2 [facility]   3/24/2022 10:00 AM [address] MOB MRI 1 RMMRID [address]   5/4/2022  9:40 AM [facility] POD1 NURSE [facility] INES REY   6/10/2022 10:00 AM Joanne Dumas MD [facility] AMB

## 2022-03-15 NOTE — PROGRESS NOTES
[address] [facility]   [address] [facility] [address] [facility], [place] [address]  Phone: [phone] Fax: [phone] OF CARE/RECERTIFICATION FOR PHYSICAL THERAPY          Patient Name: Chintan Mcwilliams : 1950   Treatment/Medical Diagnosis: Other low back pain [M54.59]   Onset Date: Few months ago - summer 2021    Referral Source: Vidhi Corrales NP Start of Care [facility]): 12/15/2021   Prior Hospitalization: See Medical History Provider #: [id number]   Prior Level of Function: Lives alone (looking for an aide), no [place], 1 level home. 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  Pt will be independent and compliant with HEP for carryover of strength and mobility gains  Status at last assessment : Issued at [address] [place] [address]; Pt reports that he has been more compliant w/ HEP 1x/day.     · Long Term Goals: To be accomplished in  8-10  treatments:  1.  Pt will score >49/100 on FOTO to show significant improvement in functional mobility and QOL   Status at last assessment 31/100  Current: 22: reports improvement, score declined 24/100  2. Pt will demo ability to complete TUG in <30 seconds to show reduced fall risk  Status at last assessment : 48 sec with rollator  Current:  22: progressing: w/ RW: 30 sec, 36 seconds  3.  Pt will demo ability to perform romberg stance EO for 30 seconds indicating improved postural control   Status at last assessment: ROM EO 3 sec  Current: 22: progressin\" with 4 LOB (able to hold 4-10 seconds at a time), arms at side and used for posterior LOB; ROM EC: 30\", 4 LOB ( able to hold 3-8 seconds at a time w/ moderate sway and intermittent grabbing of // bars  4. Pt will report >50% improvement in overall symptoms to show improved activity tolerance  Status at last assessment : severe impairments   Current: progressing/met : 1/5/22: 50%    Key Functional Changes/Progress:  Pt is a 68 yo male that presents to PT for LBP and increased falls. Pt has participated in a total of 11 therapy visits since his IE. Pt was last seen by PT on 2/2/22. Pt has been absent 2/2 appts for other medical conditions and decreased transportation. Since the last PN on 1/5/22, pt has improved FOTO score by 6 points, improved TUG time w/ rollator  and mod I to an average of 23.8 sec and TUG w/ SPC to average time of 19.3 sec w/ supervision, improved ROM w/ EC and EO to 30 seconds without LOB,and improved ability to ascend/descend 4, 6 inch steps w/ reciprocating gait, bilateral rails, and supervision. Pt does demo cont BLE strength deficits w/ pt having 3+/5 strength into hip flex, knee ext bilaterally when attempted in sitting. However, pt is able to perform transfers/walking/step ups to 8 inch step without buckling noted and therefore demo > 3/5 strength w/ functional tasks. Knee flex strength and ankle DF strength today assessed to be 4/5. Cont inconsistent performance w/ MMT noted as mentioned in the initial evaluation. Pt also cont to have elevated pain levels and difficulty w/ floor transfers. From 1/5/22 to 2/2/22, pt reported no falls, however, now notes return of falls to 1x/day 2/2 instability and amb without a.d. without his aide present.  Pt would benefit from cont therapy to address cont deficits to reduce risks for falls and improve tolerance to function  Problem List: pain affecting function, decrease ROM, decrease strength, impaired gait/ balance, decrease ADL/ functional abilitiies, decrease activity tolerance, decrease flexibility/ joint mobility and decrease transfer abilities   Treatment Plan may include any combination of the following: Therapeutic exercise, Therapeutic activities, Neuromuscular re-education, Gait/balance training, Manual therapy, Patient education, Self Care training, Functional mobility training, Home safety training and Stair training  Patient Goal(s) has been updated and includes:  \"help relieve back pain\" \" walk more indep\"   Goals for this certification period include and are to be achieved in   8  treatments:  New Goals to be achieved in __8_  treatments:  1.  Pt will score >49/100 on FOTO to show significant improvement in functional mobility and QOL   Current: 2/28/22: 31/100  2. Pt will complete TUG w/ rollator and/or SPC in < 15 seconds. Current: 2/28/22: TUG w/ rollator: avg 23.6 sec; TUG w/ SPC: 19.3 sec  3. pt will perform floor transfer w/ mod I, good safety, to enable pt to care for self in event of a fall. Current: 2/28/22: pt reports requiring min assist to rise from floor in home. 4. Pt will report no > 2 falls a week in order to indicate reduced risk for injury 2/2 improved strength/balance and home safety.   Current: 2/28/22: no falls from PN on 1/15/22 to 2/2/22 but reports that he started having falls again after getting up to fast to walk with LOB. No falls when aide around, falls occur when he is alone, admits to only having a.d. \"sometimes\"in event of his falls     Frequency / Duration:   Patient to be seen   1-2   times per week for   4    weeks:    Assessments/Recommendations: Patient would benefit from continuation of skilled PT 2 times per week for 24-36 sessions as needed in re-cert period. Goals will be assigned and reassessed every 10 visits/ 30 days per Medicare guidelines   If you have any questions/comments please contact us directly at [phone]. Thank you for allowing us to assist in the care of your patient. Therapist Signature:  Kelley Morse Date: 2/19/1232   Certification Period:  Reporting Period: 3/15/22 to 6/14/22 1/5/22 to 3/15/22  Time: 8:58 AM   NOTE TO PHYSICIAN:  PLEASE COMPLETE THE ORDERS BELOW AND FAX TO   [facility]: [phone]  If you are unable to process this request in 24 hours please contact our office: [phone]    ___ I have read the above report and request that my patient continue as recommended.   ___ I have read the above report and request that my patient continue therapy with the following changes/special instructions: ________________________________________________   ___ I have read the above report and request that my patient be discharged from therapy.      Physician Signature:        Date:       Time:    Mil Denton NP

## 2022-03-17 ENCOUNTER — OFFICE VISIT (OUTPATIENT)
Dept: FAMILY MEDICINE CLINIC | Age: 72
End: 2022-03-17
Payer: MEDICARE

## 2022-03-17 ENCOUNTER — TELEPHONE (OUTPATIENT)
Dept: CASE MANAGEMENT | Age: 72
End: 2022-03-17

## 2022-03-17 VITALS
BODY MASS INDEX: 22.61 KG/M2 | WEIGHT: 149.2 LBS | DIASTOLIC BLOOD PRESSURE: 59 MMHG | SYSTOLIC BLOOD PRESSURE: 106 MMHG | TEMPERATURE: 98.3 F | RESPIRATION RATE: 18 BRPM | OXYGEN SATURATION: 98 % | HEART RATE: 79 BPM | HEIGHT: 68 IN

## 2022-03-17 DIAGNOSIS — Z79.4 TYPE 2 DIABETES MELLITUS WITH HYPERGLYCEMIA, WITH LONG-TERM CURRENT USE OF INSULIN (HCC): ICD-10-CM

## 2022-03-17 DIAGNOSIS — F02.818 DEMENTIA ASSOCIATED WITH OTHER UNDERLYING DISEASE WITH BEHAVIORAL DISTURBANCE: ICD-10-CM

## 2022-03-17 DIAGNOSIS — E11.42 TYPE 2 DIABETES MELLITUS WITH DIABETIC POLYNEUROPATHY, WITH LONG-TERM CURRENT USE OF INSULIN (HCC): ICD-10-CM

## 2022-03-17 DIAGNOSIS — E11.65 TYPE 2 DIABETES MELLITUS WITH HYPERGLYCEMIA, WITH LONG-TERM CURRENT USE OF INSULIN (HCC): ICD-10-CM

## 2022-03-17 DIAGNOSIS — F20.0 PARANOID SCHIZOPHRENIA (HCC): ICD-10-CM

## 2022-03-17 DIAGNOSIS — F32.2 SEVERE DEPRESSION (HCC): ICD-10-CM

## 2022-03-17 DIAGNOSIS — F19.11 HISTORY OF DRUG ABUSE (HCC): ICD-10-CM

## 2022-03-17 DIAGNOSIS — Z79.4 TYPE 2 DIABETES MELLITUS WITH DIABETIC POLYNEUROPATHY, WITH LONG-TERM CURRENT USE OF INSULIN (HCC): ICD-10-CM

## 2022-03-17 DIAGNOSIS — C61 PROSTATE CANCER (HCC): ICD-10-CM

## 2022-03-17 DIAGNOSIS — I10 ESSENTIAL HYPERTENSION WITH GOAL BLOOD PRESSURE LESS THAN 130/80: Primary | ICD-10-CM

## 2022-03-17 PROCEDURE — 2022F DILAT RTA XM EVC RTNOPTHY: CPT | Performed by: NURSE PRACTITIONER

## 2022-03-17 PROCEDURE — 3017F COLORECTAL CA SCREEN DOC REV: CPT | Performed by: NURSE PRACTITIONER

## 2022-03-17 PROCEDURE — 99214 OFFICE O/P EST MOD 30 MIN: CPT | Performed by: NURSE PRACTITIONER

## 2022-03-17 PROCEDURE — 1101F PT FALLS ASSESS-DOCD LE1/YR: CPT | Performed by: NURSE PRACTITIONER

## 2022-03-17 PROCEDURE — G8420 CALC BMI NORM PARAMETERS: HCPCS | Performed by: NURSE PRACTITIONER

## 2022-03-17 PROCEDURE — G8754 DIAS BP LESS 90: HCPCS | Performed by: NURSE PRACTITIONER

## 2022-03-17 PROCEDURE — G8536 NO DOC ELDER MAL SCRN: HCPCS | Performed by: NURSE PRACTITIONER

## 2022-03-17 PROCEDURE — 3046F HEMOGLOBIN A1C LEVEL >9.0%: CPT | Performed by: NURSE PRACTITIONER

## 2022-03-17 PROCEDURE — G8752 SYS BP LESS 140: HCPCS | Performed by: NURSE PRACTITIONER

## 2022-03-17 PROCEDURE — G9717 DOC PT DX DEP/BP F/U NT REQ: HCPCS | Performed by: NURSE PRACTITIONER

## 2022-03-17 PROCEDURE — G8427 DOCREV CUR MEDS BY ELIG CLIN: HCPCS | Performed by: NURSE PRACTITIONER

## 2022-03-17 RX ORDER — TAMSULOSIN HYDROCHLORIDE 0.4 MG/1
0.4 CAPSULE ORAL EVERY EVENING
COMMUNITY
Start: 2022-02-28 | End: 2022-10-03 | Stop reason: SDUPTHER

## 2022-03-17 RX ORDER — AMITRIPTYLINE HYDROCHLORIDE 10 MG/1
10 TABLET, FILM COATED ORAL
Qty: 90 TABLET | Refills: 0 | Status: SHIPPED | OUTPATIENT
Start: 2022-03-17 | End: 2022-06-06

## 2022-03-17 NOTE — PROGRESS NOTES
Please use this number [phone]    Patient states I think I am having side effects from the Amitriptyline . I have been tired, sleepy and restless. Did you take your medication today? Yes       1. \"Have you been to the ER, urgent care clinic since your last visit? Hospitalized since your last visit? \" No    2. \"Have you seen or consulted any other health care providers outside of the [address] [place] [address] since your last visit? \" No     3. For patients aged 39-70: Has the patient had a colonoscopy / FIT/ Cologuard? Yes - no Care Gap present      If the patient is female:    4. For patients aged 41-77: Has the patient had a mammogram within the past 2 years? NA - based on age or sex      11. For patients aged 21-65: Has the patient had a pap smear?  NA - based on age or sex        1 most recent PHQ Screens 3/17/2022   Little interest or pleasure in doing things Nearly every day   Feeling down, depressed, irritable, or hopeless Nearly every day   Total Score PHQ 2 6   Trouble falling or staying asleep, or sleeping too much Nearly every day   Feeling tired or having little energy Nearly every day   Poor appetite, weight loss, or overeating Not at all   Feeling bad about yourself - or that you are a failure or have let yourself or your family down More than half the days   Trouble concentrating on things such as school, work, reading, or watching TV Nearly every day   Moving or speaking so slowly that other people could have noticed; or the opposite being so fidgety that others notice Several days   Thoughts of being better off dead, or hurting yourself in some way Several days   PHQ 9 Score 19         Health Maintenance Due   Topic Date Due    DTaP/Tdap/Td series (1 - Tdap) Never done    Shingrix Vaccine Age 50> (1 of 2) Never done    Low dose CT lung screening  Never done    AAA Screening 73-67 YO Male Smoking Patients  Never done    Medicare Yearly Exam  12/29/2021    A1C test (Diabetic or Prediabetic) 03/17/2022       Learning Assessment 12/28/2020   PRIMARY LEARNER Patient   HIGHEST LEVEL OF EDUCATION - PRIMARY LEARNER  GRADUATED HIGH SCHOOL OR GED   BARRIERS PRIMARY LEARNER NONE   CO-LEARNER CAREGIVER No   PRIMARY LANGUAGE ENGLISH   LEARNER PREFERENCE PRIMARY LISTENING   ANSWERED BY Shanique Garcia   RELATIONSHIP SELF

## 2022-03-17 NOTE — TELEPHONE ENCOUNTER
Patient contacted [facility] asking can she look at Dr Gen Rojas, Neurology notes and update him on what was said, because his PCA, Fei Segovia has an appointment and can not come with him to [place]'s appointment today. His daughter is going to try to come and go with him. Patient voicing he wants to do an MRI of his brain and he gave him some new medication. The MRI has been for the 24 at [facility]. Patient forgets, short term memory is poor. [facility] reviewed Dr Gen Rojas notes with patient. Informed patient that Jose Joseph can also see his notes. The medication is Prozac 20 mg daily. Patient said he has been taking it. [facility] asked patient has he made his follow up appointment with Dr Gen Rojas he thinks , [facility] reviewed schedule for  6/10/22. Patient said with the weather being so bad his daughter has to come from [place] and may not make the appointment. [facility] assured patient that He still can see his Provider the documentation is in the chart , he don't have to stress about trying to remember every word she said. Patient became more relaxed and said he will call his daughter and tell her to meet him at the doctors office instead of coming to his apartment.

## 2022-03-17 NOTE — PATIENT INSTRUCTIONS
[address] [facility]  [address] [facility]   [address] [facility]   GQ:[phone]  Fax: [phone]    Let your psychiatrist know you have been started on amitriptyline 10mg once an evening for your neuropathy-numbness in your feet.

## 2022-03-17 NOTE — PROGRESS NOTES
Room    Did patient bring someone? Yes: Comment: Daughter Ms. Areli Tim    Did the patient have DME equipment? Yes Cane/Walker    Did you take your medication today? Yes      1. \"Have you been to the ER, urgent care clinic since your last visit? Hospitalized since your last visit? \" Yes Patient states I went to [facility] for abdominal and back pain     2. \"Have you seen or consulted any other health care providers outside of the [address] [place] [address] since your last visit? \" No     3. For patients aged 39-70: Has the patient had a colonoscopy / FIT/ Cologuard? Yes - no Care Gap present      If the patient is female:    4. For patients aged 41-77: Has the patient had a mammogram within the past 2 years? NA - based on age or sex      11. For patients aged 21-65: Has the patient had a pap smear?  NA - based on age or sex        1 most recent PHQ Screens 3/17/2022   Little interest or pleasure in doing things Nearly every day   Feeling down, depressed, irritable, or hopeless Nearly every day   Total Score PHQ 2 6   Trouble falling or staying asleep, or sleeping too much Nearly every day   Feeling tired or having little energy Nearly every day   Poor appetite, weight loss, or overeating Not at all   Feeling bad about yourself - or that you are a failure or have let yourself or your family down More than half the days   Trouble concentrating on things such as school, work, reading, or watching TV Nearly every day   Moving or speaking so slowly that other people could have noticed; or the opposite being so fidgety that others notice Nearly every day   Thoughts of being better off dead, or hurting yourself in some way Several days   PHQ 9 Score 21         Health Maintenance Due   Topic Date Due    Foot Exam Q1  Never done    Eye Exam Retinal or Dilated  Never done    DTaP/Tdap/Td series (1 - Tdap) Never done    Shingrix Vaccine Age 50> (1 of 2) Never done    Low dose CT lung screening  Never done    AAA Screening 73-69 YO Male Smoking Patients  Never done    Medicare Yearly Exam  12/29/2021    A1C test (Diabetic or Prediabetic)  03/17/2022       Learning Assessment 12/28/2020   PRIMARY LEARNER Patient   HIGHEST LEVEL OF EDUCATION - PRIMARY LEARNER  GRADUATED HIGH SCHOOL OR GED   BARRIERS PRIMARY LEARNER NONE   CO-LEARNER CAREGIVER No   PRIMARY LANGUAGE ENGLISH   LEARNER PREFERENCE PRIMARY LISTENING   ANSWERED BY Guadalupe Morrissey   RELATIONSHIP SELF

## 2022-03-17 NOTE — PROGRESS NOTES
Jesus               [address]               [phone]      Roby Valdovinos is a 67 y.o. male and presents with Follow Up Chronic Condition, Hypertension, and Diabetes (dm foot ; Patient states my fasting bs is 134)       Assessment/Plan:    Diagnoses and all orders for this visit:    1. Essential hypertension with goal blood pressure less than 981/54  -     METABOLIC PANEL, COMPREHENSIVE; Future  Endorses medication compliance, Follow-up labs today and Blood pressure stable, continue amlodipine and lisinopril   2. Type 2 diabetes mellitus with hyperglycemia, with long-term current use of insulin ([facility])  -     LIPID PANEL; Future  -     HEMOGLOBIN A1C WITH EAG; Future  -     MICROALBUMIN, UR, RAND W/ MICROALB/CREAT RATIO; Future  -      DIABETES FOOT EXAM  Endorses medication compliance, Follow-up labs today, Denies signs and symptoms of hyper or hypoglycemia and Health maintenance needs addressed   3. Paranoid [facility] ([facility]  Monitored by specialist  4. History of drug abuse [facility])  Assessment & Plan:   well controlled, continue current medications      5. Prostate cancer [facility])  Assessment & Plan:   monitored by specialist. No acute findings meriting change in the plan      6. Severe depression ([facility]  Assessment & Plan:   monitored by specialist. No acute findings meriting change in the plan      7. Dementia associated with other underlying disease with behavioral disturbance [facility])  Assessment & Plan:   seen by Dr. Pollo Chavira, is scheduled for neuropsyche testing      8. Type 2 diabetes mellitus with diabetic polyneuropathy, with long-term current use of insulin ([facility])  Assessment & Plan:   Decreased sensation to right foot    Orders:  -     amitriptyline (ELAVIL) 10 mg tablet; Take 1 Tablet by mouth nightly.   sometimes the neuropathy is intolerable, worse at night  Start amitriptyline in the evening  DM foot exam revealed decreased sensation with microfilament and vibration to right foot  Follow-up and Dispositions    · Return in about 3 months (around 6/17/2022) for DM, HTN, 30 min, office only. Health Maintenance:   Health Maintenance   Topic Date Due    DTaP/Tdap/Td series (1 - Tdap) Never done    Shingrix Vaccine Age 50> (1 of 2) Never done    Low dose CT lung screening  Never done    AAA Screening 73-67 YO Male Smoking Patients  Never done    Medicare Yearly Exam  12/29/2021    A1C test (Diabetic or Prediabetic)  03/17/2022    Eye Exam Retinal or Dilated  07/17/2022 (Originally 2/9/1960)    Depression Monitoring  12/17/2022    MICROALBUMIN Q1  12/17/2022    Lipid Screen  12/17/2022    Foot Exam Q1  03/17/2023    Colorectal Cancer Screening Combo  01/31/2025    Flu Vaccine  Completed    COVID-19 Vaccine  Completed    Pneumococcal 65+ years  Completed        Subjective:  Accompanied today by his daughter Renzo Elizalde obtained prior to visit? NO  Reviewed with patient? Not applicable    DMII-   Patient reports medication compliance Daily  Diabetic diet compliance most of the time  Patient monitors blood sugars regularly Has CGM, diabetes care assisted by yokasta Rinaldi   Reports am fasting sugars range has CGM   Denies hypoglycemic episodes yes  Denies polyuria, polydipsia, paraesthesia, vision changes? yes  Engaging in daily exercise?  No     Diabetic Foot and Eye Exam HM Status   Topic Date Due    Eye Exam  07/17/2022 (Originally 2/9/1960)    Diabetic Foot Care  03/17/2023     Hemoglobin A1c   Date Value Ref Range Status   12/17/2021 10.9 (H) 4.8 - 5.6 % Final     Comment:              Prediabetes: 5.7 - 6.4           Diabetes: >6.4           Glycemic control for adults with diabetes: <7.0     ]  Creatinine, urine   Date Value Ref Range Status   12/17/2021 55.5 Not Estab. mg/dL Final     Microalb/Creat ratio (ug/mg creat.)   Date Value Ref Range Status   12/17/2021 26 0 - 29 mg/g creat Final     Comment:                            Normal: 0 -  29                         Moderately increased: 30 - 300                         Severely increased:       >300     2021 24 0 - 29 mg/g creat Final     Comment:                            Normal:                0 -  29                         Moderately increased: 30 - 300                         Severely increased:       >300     2020 19 0 - 29 mg/g creat Final     Comment:                            Normal:                0 -  29                         Moderately increased: 30 - 300                         Severely increased:       >300                **Please note reference interval change**       Key Antihyperglycemic Medications             insulin glargine (Lantus Solostar U-100 Insulin) 100 unit/mL (3 mL) inpn (Taking) 20 Units by SubCUTAneous route nightly. SITagliptin (JANUVIA) 100 mg tablet (Taking) Take 1 Tablet by mouth daily. pioglitazone (ACTOS) 30 mg tablet (Taking) Take 1 Tablet by mouth daily. Hypertension:  Visit Vitals  BP (!) 106/59 (BP 1 Location: Left arm, BP Patient Position: Sitting, BP Cuff Size: Small adult) Comment (BP Patient Position): feet flat on floor   Pulse 79   Temp 98.3 °F (36.8 °C) (Temporal)   Resp 18   Ht 5' 8\" (1.727 m)   Wt 149 lb 3.2 oz (67.7 kg)   SpO2 98%   BMI 22.69 kg/m²      Patient reports taking medications as instructed. yes   Medication side effects noted. no  Headache upon wakening. no   Home BP monitorin-120/80  Do you experience chest pain/pressure or SOB with exertion? no  Maintain a low Sodium diet? yes  Key CAD CHF Meds             amLODIPine (NORVASC) 10 mg tablet (Taking) TAKE ONE TABLET BY MOUTH EVERY DAY    lisinopriL (PRINIVIL, ZESTRIL) 20 mg tablet (Taking) Take 1 Tablet by mouth daily.         BUN   Date Value Ref Range Status   2022 26 (H) 7 - 25 mg/dl Final     Creatinine   Date Value Ref Range Status   2022 1.6 (H) 0.6 - 1.3 mg/dl Final     GFR est AA   Date Value Ref Range Status   2022 55.0   Final     Comment:     THE [facility] STATES THAT THE MDRD STUDY EQUATION SHOULD ONLY BE USED ON  INDIVIDUALS 18 OR OLDER. THE REPORT ALSO NOTES THAT THE MDRD STUDY EQUATION HAS NOT BEEN  VALIDATED FOR USE WITH THE ELDERLY (OVER 79YEARS OF AGE), PREGNANT WOMEN, PATIENTS WITH SERIOUS  COMORBID CONDITIONS, OR PERSONS WITH EXTREMES OF BODY SIZE, MUSCLE MASS, OR NUTRITIONAL STATUS. APPLICATION OF THE EQUATION TO THESE PATIENT GROUPS MAY LEAD TO ERRORS IN GFR ESTIMATION. GFR  ESTIMATING EQUATIONS HAVE POORER AGREEMENT WITH MEASURED GFR FOR ILL HOSPITALIZED PATIENTS AND  FOR PEOPLE WITH NEAR NORMAL KIDNEY FUNCTION THAN FOR SUBJECTS IN THE MDRD STUDY. VALIDATION  STUDIES ARE IN PROGRESS TO EVALUATE THE MDRD STUDY EQUATION FOR ADDITIONAL ETHNIC GROUPS, THE  ELDERLY, VARIOUS DISEASE CONDITIONS, AND PEOPLE WITH NORMAL KIDNEY FUNCTION. GFRA----REFERS TO   GFRO---REFERS TO OTHER RACES    REFERENCES AVAILABLE UPON REQUEST. Potassium   Date Value Ref Range Status   03/11/2022 4.5 3.5 - 5.1 mEq/L Final           ROS:     ROS  As stated in HPI, otherwise all others negative. The problem list was updated as a part of today's visit.   Patient Active Problem List   Diagnosis Code    Essential hypertension with goal blood pressure less than 130/80 I10    Type 2 diabetes mellitus with hyperglycemia, with long-term current use of insulin (HCC) E11.65, Z79.4    Gastroesophageal reflux disease K21.9    BPH (benign prostatic hyperplasia) N40.0    Microcytic anemia D50.9    NS (nuclear sclerosis) H25.10    Severe depression (HCC) F32.2    Tarsal tunnel syndrome G57.50    Dementia associated with other underlying disease with behavioral disturbance (HCC) F02.81    Status post partial gastrectomy Z90.3    History of hepatitis C Z86.19    History of drug abuse (HCC) F19.11    Paranoid schizophrenia, chronic condition with acute exacerbation (HCC) F20.0    Posterior vitreous detachment, right eye H43.811    Viral hepatitis C B19.20    Vitreomacular adhesion, left eye H43.822    Prostate cancer ([facility] C61    Lumbosacral spondylosis without myelopathy M47.817    Pain in right foot M79.671    Radiculopathy due to lumbar intervertebral disc disorder M51.16    Type 2 diabetes mellitus with diabetic polyneuropathy, with long-term current use of insulin (HCC) E11.42, Z79.4       The PSH, FH were reviewed. SH:  Social History     Tobacco Use    Smoking status: Former Smoker     Packs/day: 1.00     Years: 20.00     Pack years: 20.00     Types: Cigarettes     Quit date: 5/24/2011     Years since quitting: 10.8    Smokeless tobacco: Never Used   Vaping Use    Vaping Use: Never used   Substance Use Topics    Alcohol use: No    Drug use: Yes     Types: Marijuana     Comment: H/O Heroin addiction STATES QUIT   48 YRS AGO; STILL USE MARIJUANA  3X/DAY;  USING MARIJUANA X 55 YRS NOW       Medications/Allergies:  Current Outpatient Medications on File Prior to Visit   Medication Sig Dispense Refill    FLUoxetine (PROzac) 20 mg tablet Take 1 Tablet by mouth daily for 30 days. 30 Tablet 5    insulin glargine (Lantus Solostar U-100 Insulin) 100 unit/mL (3 mL) inpn 20 Units by SubCUTAneous route nightly. 15 mL 2    flash glucose sensor (FreeStyle Juan Carlos 14 Day Sensor) kit Apply 1 sensor to the back of the upper arm once every 14 days. Check glucose 4 times daily. 2 Kit 11    SITagliptin (JANUVIA) 100 mg tablet Take 1 Tablet by mouth daily. 90 Tablet 0    famotidine (PEPCID) 40 mg tablet Take 1 Tablet by mouth daily. 90 Tablet 0    cholecalciferol, vitamin D3, (Vitamin D3) 50 mcg (2,000 unit) tab Take  by mouth daily.  omeprazole (PRILOSEC) 20 mg capsule TAKE TWO CAPSULES BY MOUTH EVERY  Capsule 1    pioglitazone (ACTOS) 30 mg tablet Take 1 Tablet by mouth daily.  90 Tablet 0    amLODIPine (NORVASC) 10 mg tablet TAKE ONE TABLET BY MOUTH EVERY DAY 90 Tablet 1    lisinopriL (PRINIVIL, ZESTRIL) 20 mg tablet Take 1 Tablet by mouth daily. 90 Tablet 1    Insulin Needles, Disposable, 31 gauge x 3/16\" ndle Use 1 new pen needle each time to inject insulin subcutaneously 4 times daily 150 Pen Needle 2    FreeStyle Juan Carlos 14 Day [place] misc Apply 1 Device to affected area once.  [DISCONTINUED] dicyclomine (BENTYL) 20 mg tablet Take 20 mg by mouth four (4) times daily as needed. (Patient not taking: Reported on 3/17/2022)      calcium-cholecalciferol, d3, (CALCIUM 600 + D) 600-125 mg-unit tab Take  by mouth daily. No current facility-administered medications on file prior to visit. Allergies   Allergen Reactions    Ibuprofen Anaphylaxis    Fentanyl Other (comments) and Swelling     Blurred vision    Gabapentin Other (comments) and Swelling     Blurred Vision      Metformin Diarrhea    Penicillin Swelling    Penicillins Swelling    Valium [Diazepam] Swelling       Objective:  Visit Vitals  BP (!) 106/59 (BP 1 Location: Left arm, BP Patient Position: Sitting, BP Cuff Size: Small adult) Comment (BP Patient Position): feet flat on floor   Pulse 79   Temp 98.3 °F (36.8 °C) (Temporal)   Resp 18   Ht 5' 8\" (1.727 m)   Wt 149 lb 3.2 oz (67.7 kg)   SpO2 98%   BMI 22.69 kg/m²    Body mass index is 22.69 kg/m². Physical assessment  Physical Exam  Vitals and nursing note reviewed. Eyes:      Conjunctiva/sclera: Conjunctivae normal.      Pupils: Pupils are equal, round, and reactive to light. Cardiovascular:      Rate and Rhythm: Normal rate and regular rhythm. Heart sounds: Normal heart sounds. No murmur heard. No friction rub. No gallop. Pulmonary:      Effort: Pulmonary effort is normal.      Breath sounds: Normal breath sounds. Musculoskeletal:         General: Normal range of motion. Cervical back: Normal range of motion.    Feet:      Comments: Diabetic foot exam:     Left: Reflexes 2+     Vibratory sensation normal    Proprioception normal   Filament test normal sensation with micro filament   Pulse DP: 2+ (normal)   Deformities: None  Right: Reflexes 1+   Vibratory sensation diminished   Proprioception normal   Filament test 5/6   Pulse DP: 2+ (normal)   Deformities: None  Skin:     General: Skin is warm and dry. Neurological:      Mental Status: He is alert. Labwork and Ancillary Studies:    CBC w/Diff  Lab Results   Component Value Date/Time    WBC 6.2 03/08/2021 03:57 AM    HGB 12.2 (L) 03/08/2021 03:57 AM    PLATELET 213 50/89/0353 03:57 AM         Basic Metabolic Profile  Lab Results   Component Value Date/Time    Sodium 136 03/11/2022 10:58 AM    Potassium 4.5 03/11/2022 10:58 AM    Chloride 104 03/11/2022 10:58 AM    CO2 27 03/11/2022 10:58 AM    Anion gap 5 03/11/2022 10:58 AM    Glucose 283 (H) 03/11/2022 10:58 AM    BUN 26 (H) 03/11/2022 10:58 AM    Creatinine 1.6 (H) 03/11/2022 10:58 AM    BUN/Creatinine ratio 14 12/17/2021 12:00 AM    GFR est AA 55.0 03/11/2022 10:58 AM    GFR est non-AA 45 03/11/2022 10:58 AM    Calcium 10.6 (H) 03/11/2022 10:58 AM        Cholesterol  Lab Results   Component Value Date/Time    Cholesterol, total 150 12/17/2021 12:00 AM    HDL Cholesterol 69 12/17/2021 12:00 AM    LDL, calculated 72 12/17/2021 12:00 AM    LDL, calculated 62 02/27/2020 09:00 AM    Triglyceride 37 12/17/2021 12:00 AM           I have discussed the diagnosis with the patient and the intended plan as seen in the above orders. The patient has received an After-Visit Summary and questions were answered concerning future plans. An After Visit Summary was printed and given to the patient. All diagnosis have been discussed with the patient and all of the patient's questions have been answered. Follow-up and Dispositions    · Return in about 3 months (around 6/17/2022) for DM, HTN, 30 min, office only.            Codie Abdullahi, [facility]  [facility]   [address]  [address]

## 2022-03-18 ENCOUNTER — VIRTUAL VISIT (OUTPATIENT)
Dept: FAMILY MEDICINE CLINIC | Age: 72
End: 2022-03-18
Payer: MEDICARE

## 2022-03-18 DIAGNOSIS — Z00.00 MEDICARE ANNUAL WELLNESS VISIT, SUBSEQUENT: Primary | ICD-10-CM

## 2022-03-18 PROBLEM — C61 PROSTATE CANCER (HCC): Status: ACTIVE | Noted: 2021-09-21

## 2022-03-18 PROBLEM — H43.811 POSTERIOR VITREOUS DETACHMENT, RIGHT EYE: Status: ACTIVE | Noted: 2021-08-10

## 2022-03-18 PROBLEM — Z79.4 TYPE 2 DIABETES MELLITUS WITH HYPERGLYCEMIA, WITH LONG-TERM CURRENT USE OF INSULIN (HCC): Status: ACTIVE | Noted: 2020-03-24

## 2022-03-18 PROBLEM — E11.65 TYPE 2 DIABETES MELLITUS WITH HYPERGLYCEMIA, WITH LONG-TERM CURRENT USE OF INSULIN (HCC): Status: ACTIVE | Noted: 2020-03-24

## 2022-03-18 LAB
ALBUMIN SERPL-MCNC: 4.7 G/DL (ref 3.7–4.7)
ALBUMIN/GLOB SERPL: 1.7 {RATIO} (ref 1.2–2.2)
ALP SERPL-CCNC: 102 IU/L (ref 44–121)
ALT SERPL-CCNC: 30 IU/L (ref 0–44)
AST SERPL-CCNC: 27 IU/L (ref 0–40)
BILIRUB SERPL-MCNC: 0.2 MG/DL (ref 0–1.2)
BUN SERPL-MCNC: 21 MG/DL (ref 8–27)
BUN/CREAT SERPL: 15 (ref 10–24)
CALCIUM SERPL-MCNC: 10.2 MG/DL (ref 8.6–10.2)
CHLORIDE SERPL-SCNC: 100 MMOL/L (ref 96–106)
CHOLEST SERPL-MCNC: 138 MG/DL (ref 100–199)
CO2 SERPL-SCNC: 23 MMOL/L (ref 20–29)
CREAT SERPL-MCNC: 1.41 MG/DL (ref 0.76–1.27)
EGFR: 53 ML/MIN/1.73
EST. AVERAGE GLUCOSE BLD GHB EST-MCNC: 206 MG/DL
GLOBULIN SER CALC-MCNC: 2.7 G/DL (ref 1.5–4.5)
GLUCOSE SERPL-MCNC: 294 MG/DL (ref 65–99)
HBA1C MFR BLD: 8.8 % (ref 4.8–5.6)
HDLC SERPL-MCNC: 71 MG/DL
IMP & REVIEW OF LAB RESULTS: NORMAL
LDLC SERPL CALC-MCNC: 55 MG/DL (ref 0–99)
POTASSIUM SERPL-SCNC: 4.5 MMOL/L (ref 3.5–5.2)
PROT SERPL-MCNC: 7.4 G/DL (ref 6–8.5)
SODIUM SERPL-SCNC: 140 MMOL/L (ref 134–144)
TRIGL SERPL-MCNC: 56 MG/DL (ref 0–149)
VLDLC SERPL CALC-MCNC: 12 MG/DL (ref 5–40)

## 2022-03-18 PROCEDURE — G8427 DOCREV CUR MEDS BY ELIG CLIN: HCPCS | Performed by: NURSE PRACTITIONER

## 2022-03-18 PROCEDURE — 1101F PT FALLS ASSESS-DOCD LE1/YR: CPT | Performed by: NURSE PRACTITIONER

## 2022-03-18 PROCEDURE — G8536 NO DOC ELDER MAL SCRN: HCPCS | Performed by: NURSE PRACTITIONER

## 2022-03-18 PROCEDURE — G9717 DOC PT DX DEP/BP F/U NT REQ: HCPCS | Performed by: NURSE PRACTITIONER

## 2022-03-18 PROCEDURE — G8756 NO BP MEASURE DOC: HCPCS | Performed by: NURSE PRACTITIONER

## 2022-03-18 PROCEDURE — G0439 PPPS, SUBSEQ VISIT: HCPCS | Performed by: NURSE PRACTITIONER

## 2022-03-18 PROCEDURE — 3017F COLORECTAL CA SCREEN DOC REV: CPT | Performed by: NURSE PRACTITIONER

## 2022-03-18 PROCEDURE — G8420 CALC BMI NORM PARAMETERS: HCPCS | Performed by: NURSE PRACTITIONER

## 2022-03-18 NOTE — PATIENT INSTRUCTIONS
Medicare Wellness Visit, Male    The best way to live healthy is to have a lifestyle where you eat a well-balanced diet, exercise regularly, limit alcohol use, and quit all forms of tobacco/nicotine, if applicable. Regular preventive services are another way to keep healthy. Preventive services (vaccines, screening tests, monitoring & exams) can help personalize your care plan, which helps you manage your own care. Screening tests can find health problems at the earliest stages, when they are easiest to treat. [facility] follows the current, evidence-based guidelines published by the [facility] ([facility]) when recommending preventive services for our patients. Because we follow these guidelines, sometimes recommendations change over time as research supports it. (For example, a prostate screening blood test is no longer routinely recommended for men with no symptoms). Of course, you and your doctor may decide to screen more often for some diseases, based on your risk and co-morbidities (chronic disease you are already diagnosed with). Preventive services for you include:  - Medicare offers their members a free annual wellness visit, which is time for you and your primary care provider to discuss and plan for your preventive service needs. Take advantage of this benefit every year!  -All adults over age 72 should receive the recommended pneumonia vaccines. Current USPSTF guidelines recommend a series of two vaccines for the best pneumonia protection.   -All adults should have a flu vaccine yearly and tetanus vaccine every 10 years.  -All adults age 48 and older should receive the shingles vaccines (series of two vaccines).        -All adults age 38-68 who are overweight should have a diabetes screening test once every three years.   -Other screening tests & preventive services for persons with diabetes include: an eye exam to screen for diabetic retinopathy, a kidney function test, a foot exam, and stricter control over your cholesterol.   -Cardiovascular screening for adults with routine risk involves an electrocardiogram (ECG) at intervals determined by the provider.   -Colorectal cancer screening should be done for adults age 54-65 with no increased risk factors for colorectal cancer. There are a number of acceptable methods of screening for this type of cancer. Each test has its own benefits and drawbacks. Discuss with your provider what is most appropriate for you during your annual wellness visit. The different tests include: colonoscopy (considered the best screening method), a fecal occult blood test, a fecal DNA test, and sigmoidoscopy.  -All adults born between [facility] should be screened once for Hepatitis C.  -An Abdominal Aortic Aneurysm (AAA) Screening is recommended for men age 73-68 who has ever smoked in their lifetime.      Here is a list of your current Health Maintenance items (your personalized list of preventive services) with a due date:  Health Maintenance Due   Topic Date Due    DTaP/Tdap/Td  (1 - Tdap) Never done    Shingles Vaccine (1 of 2) Never done    Smoker or Former Smoker - [address]  Never done    AAA Screening  Never done

## 2022-03-18 NOTE — PROGRESS NOTES
Jesus               Hunter Escobedo               [phone]  This is the Subsequent Medicare Annual Wellness Exam, performed 12 months or more after the Initial AWV or the last Subsequent AWV    I have reviewed the patient's medical history in detail and updated the computerized patient record. Assessment/Plan   Education and counseling provided:  Are appropriate based on today's review and evaluation    1. Medicare annual wellness visit, subsequent   Completed today to include ETOH and depression screening and Healthcare decision maker verified     Follow-up and Dispositions    · Return for keep previously scheduled follow up. Depression Risk Factor Screening:     3 most recent PHQ Screens 3/17/2022   Little interest or pleasure in doing things Nearly every day   Feeling down, depressed, irritable, or hopeless Nearly every day   Total Score PHQ 2 6   Trouble falling or staying asleep, or sleeping too much Nearly every day   Feeling tired or having little energy Nearly every day   Poor appetite, weight loss, or overeating Not at all   Feeling bad about yourself - or that you are a failure or have let yourself or your family down More than half the days   Trouble concentrating on things such as school, work, reading, or watching TV Nearly every day   Moving or speaking so slowly that other people could have noticed; or the opposite being so fidgety that others notice Several days   Thoughts of being better off dead, or hurting yourself in some way Several days   PHQ 9 Score 19       Alcohol & Drug Abuse Risk Screen    Do you average more than 1 drink per night or more than 7 drinks a week: No    In the past three months have you have had more than 4 drinks containing alcohol on one occasion: No          Functional Ability and Level of Safety:    Hearing: Hearing is good. Activities of Daily Living:   The home contains: grab bars  Patient needs help with: transportation, shopping, preparing meals, laundry, managing medications and walking      Ambulation: with difficulty, uses a cane and walker     Fall Risk:  Fall Risk Assessment, last 12 mths 12/17/2021   Able to walk? Yes   Fall in past 12 months? 1   Do you feel unsteady? 1   Are you worried about falling 1   Is TUG test greater than 12 seconds? 0   Is the gait abnormal? 0   Number of falls in past 12 months 2   Fall with injury?  1      Abuse Screen:  Patient is not abused       Cognitive Screening    Has your family/caregiver stated any concerns about your memory: yes - he feels forgetful, has been evaluated in the past    Cognitive Screening: Abnormal - three word recall: 3/3    Health Maintenance Due     Health Maintenance Due   Topic Date Due    DTaP/Tdap/Td series (1 - Tdap) Never done    Shingrix Vaccine Age 50> (1 of 2) Never done    Low dose CT lung screening  Never done    AAA Screening 73-67 YO Male Smoking Patients  Never done       Patient Care Team   Patient Care Team:  Amaury Mcclain NP as PCP - General (Nurse Practitioner)  Amaury Mcclain NP as PCP - [address] [place] [address] [facility]  Caridad Liriano MD (Surgery)  Cecilio Mustafa MD (Physical Medicine and Rehabilitation)  Lazarus Palmer, MD as Consulting Provider (Neurology)  Lona Willingham MD as Consulting Provider (Urology)  KATI Paniagua as Pharmacist (Pharmacist)    History     Patient Active Problem List   Diagnosis Code    Essential hypertension with goal blood pressure less than 130/80 I10    Type 2 diabetes mellitus with hyperglycemia, with long-term current use of insulin (HCC) E11.65, Z79.4    Gastroesophageal reflux disease K21.9    BPH (benign prostatic hyperplasia) N40.0    Microcytic anemia D50.9    NS (nuclear sclerosis) H25.10    Severe depression ([address] F32.2    Tarsal tunnel syndrome G57.50    Dementia associated with other underlying disease with behavioral disturbance ([address] F02.81    Status post partial gastrectomy Z90.3    History of hepatitis C Z86.19    History of drug abuse (HCC) F19.11    Paranoid schizophrenia, chronic condition with acute exacerbation (HCC) F20.0    Posterior vitreous detachment, right eye H43.811    Viral hepatitis C B19.20    Vitreomacular adhesion, left eye H43.822    Prostate cancer ([facility] C61    Lumbosacral spondylosis without myelopathy M47.817    Pain in right foot M79.671    Radiculopathy due to lumbar intervertebral disc disorder M51.16    Type 2 diabetes mellitus with diabetic polyneuropathy, with long-term current use of insulin (HCC) E11.42, Z79.4     Past Medical History:   Diagnosis Date    Acute cystitis without hematuria     BENJAMIN (acute kidney injury) ([facility]     Arthritis     Arthropathy     Balanitis     Candida infection     recurrent     Chronic pain     COVID-19 vaccine series completed     WITH BOOSTER    Dementia associated with other underlying disease without behavioral disturbance ([facility])     Depression     Diabetes mellitus, type II ([facility] 07/14/2013    Drug abuse ([facility]     H/O Heroin addiction     ED (erectile dysfunction)     Fatigue     GERD (gastroesophageal reflux disease)     H/O epididymitis 2012    left    H/O: pneumonia 2011    Hep C w/o coma, chronic (HCC) 01/2015    STATES TREATED    History of BPH     History of hematuria     History of tobacco use     Hypertension     Loss of appetite     LOST 10 LBS IN A MONTH    Loss of balance     Microcytic anemia 05/29/2012    Neuropathy     Pancytopenia (HCC)     Paranoid schizophrenia, chronic condition with acute exacerbation (HCC)     Prostate cancer ([facility] 09/08/2021    PNBx, [place] [address], Dr. Manjit Gardner, [facility]    PUD (peptic ulcer disease)     SOB (shortness of breath)     WITH WALKING    Status post partial gastrectomy 1990    Stomach ulcer    Stroke ([address] 2020    BALANCE ISSUES; TIA    Uncircumcised male       Past Surgical History:   Procedure Laterality Date    COLONOSCOPY N/A 1/31/2022    SCREENING COLONOSCOPY polypectomy w/cold snare, BX performed by Tayla Tony MD at [facility] HX COLONOSCOPY      HX ENDOSCOPY  06/23/2015    [facility], EGD W/ BIOPSY , COLONOSCOPY , Surgeon: Francesca Curry MD    HX GASTRECTOMY  1990    partial - FOR TUMOR AND ULCER    HX HERNIA REPAIR Right     Right inguinal hernia repair 2006    HX HERNIA REPAIR  07/11/2017    Left indirect inguinal hernia repair    HX OPEN REDUCTION INTERNAL FIXATION      x 2    HX ORCHIECTOMY Left 2013    HX ORTHOPAEDIC  2012    Numerous surgeries on rt. foot    HX OTHER SURGICAL      removed testicle    HX UROLOGICAL  09/08/2021    PNBx, [place] [address], Dr. Scooter Schmidt, [facility]     Current Outpatient Medications   Medication Sig Dispense Refill    tamsulosin (FLOMAX) 0.4 mg capsule Take 0.4 mg by mouth every evening.  amitriptyline (ELAVIL) 10 mg tablet Take 1 Tablet by mouth nightly. 90 Tablet 0    FLUoxetine (PROzac) 20 mg tablet Take 1 Tablet by mouth daily for 30 days. 30 Tablet 5    insulin glargine (Lantus Solostar U-100 Insulin) 100 unit/mL (3 mL) inpn 20 Units by SubCUTAneous route nightly. 15 mL 2    flash glucose sensor (FreeStyle Juan Carlos 14 Day Sensor) kit Apply 1 sensor to the back of the upper arm once every 14 days. Check glucose 4 times daily. 2 Kit 11    SITagliptin (JANUVIA) 100 mg tablet Take 1 Tablet by mouth daily. 90 Tablet 0    famotidine (PEPCID) 40 mg tablet Take 1 Tablet by mouth daily. 90 Tablet 0    calcium-cholecalciferol, d3, (CALCIUM 600 + D) 600-125 mg-unit tab Take  by mouth daily.  cholecalciferol, vitamin D3, (Vitamin D3) 50 mcg (2,000 unit) tab Take  by mouth daily.  omeprazole (PRILOSEC) 20 mg capsule TAKE TWO CAPSULES BY MOUTH EVERY  Capsule 1    pioglitazone (ACTOS) 30 mg tablet Take 1 Tablet by mouth daily.  90 Tablet 0    amLODIPine (NORVASC) 10 mg tablet TAKE ONE TABLET BY MOUTH EVERY DAY 90 Tablet 1    lisinopriL (PRINIVIL, ZESTRIL) 20 mg tablet Take 1 Tablet by mouth daily. 90 Tablet 1    Insulin Needles, Disposable, 31 gauge x 3/16\" ndle Use 1 new pen needle each time to inject insulin subcutaneously 4 times daily 150 Pen Needle 2    FreeStyle Juan Carlos 14 Day [place] misc Apply 1 Device to affected area once. Allergies   Allergen Reactions    Ibuprofen Anaphylaxis    Fentanyl Other (comments) and Swelling     Blurred vision    Gabapentin Other (comments) and Swelling     Blurred Vision      Metformin Diarrhea    Penicillin Swelling    Penicillins Swelling    Valium [Diazepam] Swelling       Family History   Problem Relation Age of Onset    Diabetes Mother     Hypertension Mother     Cancer Mother     OSTEOARTHRITIS Maternal Grandmother     Diabetes Maternal Grandmother     Hypertension Maternal Grandmother     Diabetes Maternal Grandfather     Breast Cancer Daughter      Social History     Tobacco Use    Smoking status: Former Smoker     Packs/day: 1.00     Years: 20.00     Pack years: 20.00     Types: Cigarettes     Quit date: 5/24/2011     Years since quitting: 10.8    Smokeless tobacco: Never Used   Substance Use Topics    Alcohol use: No       Vernard Rheems, was evaluated through a synchronous (real-time) audio-video encounter. The patient (or guardian if applicable) is aware that this is a billable service, which includes applicable co-pays. Verbal consent to proceed has been obtained. The visit was conducted pursuant to the emergency declaration under the [address] [place] [address] authority and the [place] [employer] Act. Patient identification was verified, and a caregiver was present when appropriate. The patient was located at home in a state where the provider was licensed to provide care.        Madeline Britt NP

## 2022-03-19 PROBLEM — M51.16 RADICULOPATHY DUE TO LUMBAR INTERVERTEBRAL DISC DISORDER: Status: ACTIVE | Noted: 2022-02-22

## 2022-03-19 PROBLEM — I10 HYPERTENSIVE DISORDER: Status: RESOLVED | Noted: 2021-08-10 | Resolved: 2022-03-17

## 2022-03-19 PROBLEM — I10 ESSENTIAL HYPERTENSION WITH GOAL BLOOD PRESSURE LESS THAN 130/80: Status: ACTIVE | Noted: 2020-03-24

## 2022-03-19 PROBLEM — K21.9 GASTROESOPHAGEAL REFLUX DISEASE: Status: ACTIVE | Noted: 2020-03-24

## 2022-03-19 PROBLEM — I10 ESSENTIAL HYPERTENSION: Status: RESOLVED | Noted: 2020-03-24 | Resolved: 2022-03-17

## 2022-03-19 PROBLEM — M47.817 LUMBOSACRAL SPONDYLOSIS WITHOUT MYELOPATHY: Status: ACTIVE | Noted: 2022-02-22

## 2022-03-19 PROBLEM — B19.20 VIRAL HEPATITIS C: Status: ACTIVE | Noted: 2021-08-10

## 2022-03-19 PROBLEM — M79.671 PAIN IN RIGHT FOOT: Status: ACTIVE | Noted: 2022-02-22

## 2022-03-19 PROBLEM — F20.0 PARANOID SCHIZOPHRENIA, CHRONIC CONDITION WITH ACUTE EXACERBATION (HCC): Status: ACTIVE | Noted: 2021-02-08

## 2022-03-19 PROBLEM — H43.822 VITREOMACULAR ADHESION, LEFT EYE: Status: ACTIVE | Noted: 2021-08-10

## 2022-03-20 PROBLEM — F02.818 DEMENTIA ASSOCIATED WITH OTHER UNDERLYING DISEASE WITH BEHAVIORAL DISTURBANCE (HCC): Status: ACTIVE | Noted: 2020-12-28

## 2022-03-20 PROBLEM — G57.50 TARSAL TUNNEL SYNDROME: Status: ACTIVE | Noted: 2020-12-28

## 2022-03-20 PROBLEM — F32.2 SEVERE DEPRESSION (HCC): Status: ACTIVE | Noted: 2019-03-10

## 2022-03-22 ENCOUNTER — APPOINTMENT (OUTPATIENT)
Dept: PHYSICAL THERAPY | Age: 72
End: 2022-03-22
Payer: MEDICARE

## 2022-03-22 DIAGNOSIS — F03.91 DEMENTIA WITH BEHAVIORAL DISTURBANCE, UNSPECIFIED DEMENTIA TYPE: ICD-10-CM

## 2022-03-24 ENCOUNTER — HOSPITAL ENCOUNTER (OUTPATIENT)
Dept: MRI IMAGING | Age: 72
Discharge: HOME | End: 2022-03-24
Attending: STUDENT IN AN ORGANIZED HEALTH CARE EDUCATION/TRAINING PROGRAM
Payer: MEDICARE

## 2022-03-24 PROBLEM — Z79.4 TYPE 2 DIABETES MELLITUS WITH HYPERGLYCEMIA, WITH LONG-TERM CURRENT USE OF INSULIN (HCC): Status: RESOLVED | Noted: 2020-03-24 | Resolved: 2022-03-17

## 2022-03-24 PROBLEM — I10 ESSENTIAL HYPERTENSION WITH GOAL BLOOD PRESSURE LESS THAN 130/80: Status: RESOLVED | Noted: 2020-03-24 | Resolved: 2022-03-17

## 2022-03-24 PROBLEM — E11.42 TYPE 2 DIABETES MELLITUS WITH DIABETIC POLYNEUROPATHY, WITH LONG-TERM CURRENT USE OF INSULIN (HCC): Status: ACTIVE | Noted: 2022-03-17

## 2022-03-24 PROBLEM — Z79.4 TYPE 2 DIABETES MELLITUS WITH DIABETIC POLYNEUROPATHY, WITH LONG-TERM CURRENT USE OF INSULIN (HCC): Status: ACTIVE | Noted: 2022-03-17

## 2022-03-24 PROBLEM — E11.65 TYPE 2 DIABETES MELLITUS WITH HYPERGLYCEMIA, WITH LONG-TERM CURRENT USE OF INSULIN (HCC): Status: RESOLVED | Noted: 2020-03-24 | Resolved: 2022-03-17

## 2022-03-24 PROCEDURE — 70551 MRI BRAIN STEM W/O DYE: CPT

## 2022-03-25 NOTE — PROGRESS NOTES
.  Patient: Benancio Duane discussed during interdisciplinary rounds 3/8/2022 to optimize plan of care. Patient with a past medical history of   Past Medical History:   Diagnosis Date    Acute cystitis without hematuria     [facility]     Arthritis     Arthropathy     Balanitis     Candida infection     recurrent     Chronic pain     COVID-19 vaccine series completed     WITH BOOSTER    Dementia associated with other underlying disease without behavioral disturbance (HCC)     Depression     Diabetes mellitus, type II ([facility] 07/14/2013    Drug abuse ([facility]     H/O Heroin addiction     ED (erectile dysfunction)     Fatigue     GERD (gastroesophageal reflux disease)     H/O epididymitis 2012    left    H/O: pneumonia 2011    Hep C w/o coma, chronic ([facility] 01/2015    STATES TREATED    History of BPH     History of hematuria     History of tobacco use     Hypertension     Loss of appetite     LOST 10 LBS IN A MONTH    Loss of balance     Microcytic anemia 05/29/2012    Neuropathy     Pancytopenia (HCC)     Paranoid schizophrenia, chronic condition with acute exacerbation (HCC)     Prostate cancer ([facility] 09/08/2021    PNBx, [place] [address], Dr. Jossue Avalos, [facility]    PUD (peptic ulcer disease)     SOB (shortness of breath)     WITH WALKING    Status post partial gastrectomy 1990    Stomach ulcer    Stroke ([facility] 2020    BALANCE ISSUES; TIA    Uncircumcised male    . In attendance Zenaida Nurse, NP, Royer Carlson RN, ACM. Recommendations from the team: Updated PCP on Radiation therapy and up coming procedure for radiation implants by Dr Kia Ndiaye. High anxiety , Ambulatory  will continue to follow.   Royer Carlson RN

## 2022-03-28 ENCOUNTER — TELEPHONE (OUTPATIENT)
Dept: FAMILY MEDICINE CLINIC | Age: 72
End: 2022-03-28

## 2022-03-28 ENCOUNTER — HOSPITAL ENCOUNTER (OUTPATIENT)
Dept: PHYSICAL THERAPY | Age: 72
Discharge: HOME | End: 2022-03-28
Payer: MEDICARE

## 2022-03-28 PROCEDURE — 97112 NEUROMUSCULAR REEDUCATION: CPT

## 2022-03-28 PROCEDURE — 97110 THERAPEUTIC EXERCISES: CPT

## 2022-03-28 PROCEDURE — 97530 THERAPEUTIC ACTIVITIES: CPT

## 2022-03-28 NOTE — TELEPHONE ENCOUNTER
Please call the patient and get more information, does he want more PT? Did PT request he have more visits?   Thank you

## 2022-03-28 NOTE — PROGRESS NOTES
PT DAILY TREATMENT NOTE     Patient Name: Yue Piedra  Date:3/28/2022  : 1950  [x]  Patient  Verified  Payor: Tori Lucas / Plan: Juliet Morris 8141 HMO / Product Type: Managed Care Medicare /    In LLIB:[id number]  Out time: 2705  Total Treatment Time (min):40   Total Timed Codes (min):40  1:1 Treatment Time (min):40  Visit #: 2 of 8    Treatment Area: Other low back pain [M54.59]    SUBJECTIVE  Pain Level (0-10 scale): 8/10, LBP  Any medication changes, allergies to medications, adverse drug reactions, diagnosis change, or new procedure performed?: [x] No    [] Yes (see summary sheet for update)  Subjective functional status/changes:   [] No changes reported  Pt reports that he has had 4 falls since he was last seen in PT on 3/7/22. Pt reports that he only falls when he is getting up at night and Humberto Matthews has falls when [he] doesn't allow enough time to accommodate for the changes in position\". Pt reports that he was able to get up on his own, w/ use of his hands on the bed. Denies any injuries. Pt reports that he will start radiation next week and will go to radiation 5 days a week. OBJECTIVE    10 min Therapeutic Exercise:  [x] See flow sheet :    Rationale: increase ROM and increase strength to improve the patients ability to ADLs, mobility, gait, self-care    20 min Therapeutic Activity:  [x]  See flow sheet : review of progress made since start of therapy, floor transfer, 2MW test, TUG   Rationale:   To improve t/f safely and balance to reduce risks for falls     10 min Neuromuscular Re-education:  [x]  See flow sheet : standing dynamic balance tasks     Rationale: improve coordination, improve balance and increase proprioception  to improve the patients ability to improve safety with self-care, community mobility      X min Gait  [x]  See flow sheet :   -   Rationale: improve coordination, improve balance, safety, and increase proprioception  to improve the patients ability to improve safety with self-care, community mobility                 W/ TA/NM min Patient Education: [x] Review importance of cont use of rollator for all amb to redce risk for falls, goals of therapy and intent of cont therapy services. [] Progressed/Changed HEP based on:   [] positioning   [] body mechanics   [] transfers   [] heat/ice application        Other Objective/Functional Measures:  Subjective % improvement: 80%  Subjective deficits:  Pt reports decreased balance and difficulty getting off the bed 2/2 feeling dizzy w/ transitional movement at night  Subjective improvements: improved walking tolerance in the house without with the cane, walking more outside w/ the rollator  Falls/day:  Pt reports 4 total falls since the last therapy session 3/7/22. Pt reports that these falls were related to being dizzy after doing too quick of a transition from laying down to standing at night (when having to use the restroom)  FOTO: 26/100 (-4 from last PN)  Pain: 7-9/10         TUG w/ rollator: 17\", 17\", 17\" (Avg 17 sec)  TUG w/ SPC: supervision:  21\", 18\", 20\"  (19.6 sec) w/ supervision, on 2nd attempt, pt stopped and held a mid-range squat stating he was going to fall after making the turn to head back to seat. PT w/ hands on patient gait belt for safety, encouraged pt to return to standing. Pt did and resumed walking w/ ease and supervision. 30 STST: 6 (no change) reps, hands on lap, good balance, 19 inch chair   ROM EO: 30\" with no LOB  ROM EC: 30\", with no LOB , mild sway noted   FOAM: ROM EO: 5 \", 25\"  FOAM: ROM EC: 6\", 20\", 3 \"    Hip AROM: WFL as demo w/ functional activities/sit<>stand transfers, floor transfers  Floor transfer: min assist to get onto and off the floor: hands on table for support: To stand: tall kneel to half kneel to stand to rise. To floor: stand>tall kneel>1/2 kneel.   Quadruped to sit and back w / mod I once on floor  MMT: pt noted to have 3+/5 strength into hip flex, knee ext bilaterally when attempted in sitting. However, pt is able to perform transfers/walking without buckling noted and therefore demo > 3/5 strength w/ functional tasks. Knee flex strength and ankle DF strength today assessed to be 4/5. Cont inconsistent performance w/ MMT noted as mentioned in the initial evaluation  Gait: Rollator w/ decreased iraj and stride length. Erect posture noted. SPC in the L hand, supervision, no LOB  2MW: 220' w/ rollator used, mod I. However at 1.5 min, pt reported feeling dizzy and stood to rest x 5 sec and then resumed walking without any complications. Related his dizziness to turning to go the opposite direction   At last note on 3/7/22:  6 minute walk test w/ SPC: 630' w/ 2 seated rest breaks of 15-20 seconds required (one at 450' (3 min laura)  and one at 550'.  Pain Level (0-10 scale) post treatment:8 /10     ASSESSMENT/Changes in Function:   Pt is a 68 yo male that presents to PT for LBP and increased falls. Pt has completed a total of 13 therapy visits. Pt last visit was on 3/7/22 2/2 difficulty w/ transportation. Pt has only had one therapy visit since his last PN on 2/28/22. At this time, pt does report an 80% improvement in his functional ability w/ the help of therapy. He has improved TUG time w/ rollator to 17 sec on average, and has reported a reduction in falls/day to an average of 0 but does note that since his last visit on 3/7/22 he has had 4 total 2/2 falling while he is transitioning from supine> the middle of the night to use the restroom, relating his fall to dizziness. Pt w/ decreased endurance as noted by ability to walk 2 minutes w/ rollator for a total of 220'. On 3/7/22, pt had been able to amb w/ a ' w/ 2 seated rest breaks. Pt requires min assist for floor transfers and is challenged by dynamic balance tasks, indicating a higher risks for falls.  Pt MMT cont to be inconsistent, w/ pt demo floor transfers, and other functional transitional movements during therapy session w/ no buckling noted, however cog wheeling noted w/ attempted MMT formal tests, often breaking position before PT even places hands on patient. Pt has requested to cont therapy as he begins his radiation tx next week for active prostate cancer. PT recommends cont w/ therapy 2x/wk x 4 more weeks w/ goal to transition to HEP w/ caregiver. Pt would benefit from cont therapy to address cont deficits to reduce risks for falls and improve tolerance to functional activities at home and in the community. Patient will continue to benefit from skilled PT services to modify and progress therapeutic interventions, address functional mobility deficits, address ROM deficits, address strength deficits, analyze and address soft tissue restrictions, analyze and cue movement patterns, analyze and modify body mechanics/ergonomics, assess and modify postural abnormalities, address imbalance/dizziness and instruct in home and community integration to attain remaining goals. []  See Plan of Care  [x]  See progress note/recertification   []  See Discharge Summary         New Goals to be achieved in __8_  treatments:  1.  Pt will score >49/100 on FOTO to show significant improvement in functional mobility and QOL    2/28/22: 31/100  Current: 3/28/22: regressed 26/100  2. Pt will complete TUG w/ rollator and/or SPC in < 15 seconds. 2/28/22: TUG w/ rollator: avg 23.6 sec; TUG w/ SPC: 19.3 sec  Current: 3/28/22: progressing: rollator: 17 sec w/ mod I, TUG w/ SPC: 19.6 sec  3. pt will perform floor transfer w/ mod I, good safety, to enable pt to care for self in event of a fall. 2/28/22: pt reports requiring min assist to rise from floor in home. Current: 3/28/22: progressing: min assist in clinic w/ need for hands on table for support  4.  Pt will report no > 2 falls a week in order to indicate reduced risk for injury 2/2 improved strength/balance and home safety.   Current: 2/28/22: no falls from PN on 1/15/22 to 2/2/22 but reports that he started having falls again after getting up to fast to walk with LOB. No falls when aide around, falls occur when he is alone, admits to only having a.d.  \"sometimes\"in event of his falls  3/7/22: pt denies any falls since PN  Current: 3/28/22: pt reports 4 total falls since his last visit on 3/7/22 2/2 quick transitioning after waking to use the restroom at night   PLAN  [x]  Upgrade activities as tolerated     [x]  Continue plan of care  []  Update interventions per flow sheet       []  Discharge due to:_  [x]  Other: work on floor transfers, dual tasking, cont POC 2x/wk x 4 wks then transition to [employer], PT 3/28/2022      Future Appointments   Date Time Provider Devika Haley   4/19/2022  1:00 PM [facility] PT [place] [facility]   4/22/2022 10:15 AM [facility] PT [place] [facility]   4/26/2022 11:30 AM [facility] PT [place] [facility]   4/28/2022 12:15 PM [facility] PT [place] [facility]   5/3/2022 12:15 PM [facility]   5/4/2022  9:40 AM [facility]   5/5/2022 12:15 PM [facility] PT [place] 2 [facility]   5/10/2022 12:15 PM [facility] PT [place] 2 [facility]   5/12/2022 12:15 PM [facility] PT [place] 2 [facility]   6/10/2022 10:00 AM Annmarie Isabel MD [facility] BS AMB   6/17/2022 10:30 AM Susan Tucker NP AMA BS AMB

## 2022-03-28 NOTE — TELEPHONE ENCOUNTER
----- Message from ALONA MIJARES sent at 3/28/2022  9:38 AM EDT -----  Subject: Referral Request    QUESTIONS   Reason for referral request? More orders for extended PT   Has the physician seen you for this condition before? No   Preferred Specialist (if applicable)? Do you already have an appointment scheduled? No  Additional Information for Provider? Patient still wants to see the same   PT but would like more orders for PT because a few were missed. ---------------------------------------------------------------------------  --------------  Dami ACOSTA  What is the best way for the office to contact you? OK to leave message on   voicemail  Preferred Call Back Phone Number?  [phone]

## 2022-03-28 NOTE — PROGRESS NOTES
[facility] AT [address]  Phone: [phone] Fax: [phone]  PROGRESS NOTE  Patient Name: Jac Ruiz : 1950   Treatment/Medical Diagnosis: Other low back pain [M54.59]   Referral Source: Perla Mullen NP     Date of Initial Visit: 12/15/22 Attended Visits: 13 Missed Visits: 5,   1 RS     SUMMARY OF TREATMENT  Pt is a 66 yo male that presents to PT for LBP and increased falls. Pt has completed a total of 13 therapy sessions since the [facility] on 12/15. Therapy has consisted of therapeutic exercise, gait training, neuro re-education, self-care/patient education, pain modalities and manual prn, therapeutic activity, functional mobility training, and  home safety training.      CURRENT STATUS    Pt is a 66 yo male that presents to PT for LBP and increased falls. Pt has completed a total of 13 therapy visits. Pt last visit was on 3/7/22 2/2 difficulty w/ transportation. Pt has only had one therapy visit since his last PN on 22. At this time, pt does report an 80% improvement in his functional ability w/ the help of therapy. He has improved TUG time w/ rollator to 17 sec on average, and has reported a reduction in falls/day to an average of 0 but does note that since his last visit on 3/7/22 he has had 4 total 2/2 falling while he is transitioning from supine> the middle of the night to use the restroom, relating his fall to dizziness. Pt w/ decreased endurance as noted by ability to walk 2 minutes w/ rollator for a total of 220'. On 3/7/22, pt had been able to amb w/ a ' w/ 2 seated rest breaks. Pt requires min assist for floor transfers and is challenged by dynamic balance tasks, indicating a higher risks for falls.  Pt MMT cont to be inconsistent, w/ pt demo floor transfers, and other functional transitional movements during therapy session w/ no buckling noted, however cog wheeling noted w/ attempted MMT formal tests, often breaking position before PT even places hands on patient. Subjective % improvement: 80%  Subjective deficits:  Pt reports decreased balance and difficulty getting off the bed 2/2 feeling dizzy w/ transitional movement at night  Subjective improvements: improved walking tolerance in the house without with the cane, walking more outside w/ the rollator  Falls/day:  Pt reports 4 total falls since the last therapy session 3/7/22. Pt reports that these falls were related to being dizzy after doing too quick of a transition from laying down to standing at night (when having to use the restroom)  FOTO: 26/100 (-4 from last PN)  Pain: 7-9/10         TUG w/ rollator: 17\", 17\", 17\" (Avg 17 sec)  TUG w/ SPC: supervision:  21\", 18\", 20\"  (19.6 sec) w/ supervision, on 2nd attempt, pt stopped and held a mid-range squat stating he was going to fall after making the turn to head back to seat. PT w/ hands on patient gait belt for safety, encouraged pt to return to standing. Pt did and resumed walking w/ ease and supervision. 30 STST: 6 (no change) reps, hands on lap, good balance, 19 inch chair   ROM EO: 30\" with no LOB  ROM EC: 30\", with no LOB , mild sway noted   FOAM: ROM EO: 5 \", 25\"  FOAM: ROM EC: 6\", 20\", 3 \"    Hip AROM: WFL as demo w/ functional activities/sit<>stand transfers, floor transfers  Floor transfer: min assist to get onto and off the floor: hands on table for support: To stand: tall kneel to half kneel to stand to rise. To floor: stand>tall kneel>1/2 kneel. Quadruped to sit and back w / mod I once on floor  MMT: pt noted to have 3+/5 strength into hip flex, knee ext bilaterally when attempted in sitting. However, pt is able to perform transfers/walking without buckling noted and therefore demo > 3/5 strength w/ functional tasks. Knee flex strength and ankle DF strength today assessed to be 4/5.  Cont inconsistent performance w/ MMT noted as mentioned in the initial evaluation  Gait: Rollator w/ decreased iraj and stride length. Erect posture noted. SPC in the L hand, supervision, no LOB  2MW: 220' w/ rollator used, mod I. However at 1.5 min, pt reported feeling dizzy and stood to rest x 5 sec and then resumed walking without any complications. Related his dizziness to turning to go the opposite direction   At last note on 3/7/22:  6 minute walk test w/ SPC: 630' w/ 2 seated rest breaks of 15-20 seconds required (one at 450' (3 min laura)  and one at 550'.    Progress made towards goals:  1.  Pt will score >49/100 on FOTO to show significant improvement in functional mobility and QOL    2/28/22: 31/100  Current: 3/28/22: regressed 26/100  2. Pt will complete TUG w/ rollator and/or SPC in < 15 seconds. 2/28/22: TUG w/ rollator: avg 23.6 sec; TUG w/ SPC: 19.3 sec  Current: 3/28/22: progressing: rollator: 17 sec w/ mod I, TUG w/ SPC: 19.6 sec  3. pt will perform floor transfer w/ mod I, good safety, to enable pt to care for self in event of a fall. 2/28/22: pt reports requiring min assist to rise from floor in home. Current: 3/28/22: progressing: min assist in clinic w/ need for hands on table for support  4. Pt will report no > 2 falls a week in order to indicate reduced risk for injury 2/2 improved strength/balance and home safety.   Current: 2/28/22: no falls from PN on 1/15/22 to 2/2/22 but reports that he started having falls again after getting up to fast to walk with LOB. No falls when aide around, falls occur when he is alone, admits to only having a.d. \"sometimes\"in event of his falls  3/7/22: pt denies any falls since PN  Current: 3/28/22: pt reports 4 total falls since his last visit on 3/7/22 2/2 quick transitioning after waking to use the restroom at night    Updated goals:   Cont w/ goals above     PLAN  Pt has requested to cont therapy as he begins his radiation tx next week for active prostate cancer.  PT recommends cont w/ therapy 2x/wk x 4 more weeks w/ goal to transition to HEP w/ caregiver. Pt would benefit from cont therapy to address cont deficits to reduce risks for falls and improve tolerance to functional activities at home and in the community. RECOMMENDATIONS  Cont w/ POC 1-2x/wk x 4 wks. If you have any questions/comments please contact us directly at ([phone]   Thank you for allowing us to assist in the care of your patient. Therapist Signature: The Kelley Ramos Date: 3/28/2022   Reporting Period  2/28/22 to 3/28/22 Time: 1:44 PM   NOTE TO PHYSICIAN:  PLEASE COMPLETE THE ORDERS BELOW AND FAX TO   [facility] at [facility]: [phone]. If you are unable to process this request in 24 hours please contact our office: [phone].  ___ I have read the above report and request that my patient continue as recommended.   ___ I have read the above report and request that my patient continue therapy with the following changes/special instructions:_________________________________________________________   ___ I have read the above report and request that my patient be discharged from therapy.      Physician Signature:        Date:       Time:                                                        Jesus Hampton NP.

## 2022-03-30 ENCOUNTER — TELEPHONE (OUTPATIENT)
Dept: FAMILY MEDICINE CLINIC | Age: 72
End: 2022-03-30

## 2022-03-30 DIAGNOSIS — I10 ESSENTIAL HYPERTENSION WITH GOAL BLOOD PRESSURE LESS THAN 130/80: ICD-10-CM

## 2022-03-30 DIAGNOSIS — E11.65 TYPE 2 DIABETES MELLITUS WITH HYPERGLYCEMIA, WITH LONG-TERM CURRENT USE OF INSULIN (HCC): ICD-10-CM

## 2022-03-30 DIAGNOSIS — E11.65 POORLY CONTROLLED TYPE 2 DIABETES MELLITUS (HCC): Primary | ICD-10-CM

## 2022-03-30 DIAGNOSIS — Z79.4 TYPE 2 DIABETES MELLITUS WITH HYPERGLYCEMIA, WITH LONG-TERM CURRENT USE OF INSULIN (HCC): ICD-10-CM

## 2022-03-30 RX ORDER — PIOGLITAZONEHYDROCHLORIDE 30 MG/1
TABLET ORAL
Qty: 93 TABLET | Refills: 0 | Status: SHIPPED | OUTPATIENT
Start: 2022-03-30 | End: 2022-06-29

## 2022-03-30 RX ORDER — AMLODIPINE BESYLATE 10 MG/1
TABLET ORAL
Qty: 90 TABLET | Refills: 1 | Status: SHIPPED | OUTPATIENT
Start: 2022-03-30 | End: 2022-07-29

## 2022-03-30 RX ORDER — BLOOD SUGAR DIAGNOSTIC
STRIP MISCELLANEOUS
Qty: 50 STRIP | Refills: 5 | Status: ON HOLD | OUTPATIENT
Start: 2022-03-30 | End: 2022-04-01

## 2022-03-30 RX ORDER — LISINOPRIL 20 MG/1
TABLET ORAL
Qty: 90 TABLET | Refills: 1 | Status: SHIPPED | OUTPATIENT
Start: 2022-03-30 | End: 2022-07-29

## 2022-03-30 NOTE — TELEPHONE ENCOUNTER
Patient called and requested a call back regarding his low blood sugar.   94 this morning  Last couple of days its been between 75 and 90

## 2022-03-30 NOTE — TELEPHONE ENCOUNTER
Pharmacy Progress Note - Telephone Call    Spoke with patient and his aide. Patient reports his blood sugar was 94 this morning and has been as low as 50 recently. Pts aide reported other readings this week were as follows:  Date Reading   3/29 134   3/28 125   3/27 147   3/26 134   3/25 112   She states he did have a few mornings before this where his readings were in the 70s. Patient denies any changes to medications or diet, continues to have a snack before bedtime as usual. Patient taking 20 units of Lantus nightly. Patients A1c came back to 8.8% on 3/17/22. Discussed that this A1c suggests his blood sugars are averaging 206 mg/dL. States when he checks his glucose during the day, his readings are between 90 and 200. Given some lower readings and the fact that patients patients FBG is at goal of  mg/dL, expect he is possibly having some significant post prandial excursions but cannot be sure without seeing CGM data. For now, will have patient decrease Lantus to 19 units daily. If continues to have readings < 90, decrease to 18 units nightly. Also suggested patient test blood sugar when Franco reports lows to confirm low reading. Pt states he no longer has a glucometer and test strips but does have lancets. Order placed for precision octavio test strips to use with FreeStyle Juan Carlos reader. Pt has procedure coming up this Friday. Will follow up with patient in about 3 weeks to check on progress and discuss possible changes to improve post prandial control. Patient expressed understanding, all questions answered at this time       There are no discontinued medications. Orders Placed This Encounter    glucose blood VI test strips (FreeStyle Precision Octavio Strips) strip     Sig: Use as directed to test blood sugar once daily as needed with FreeStyle Juan Carlos reader.      Dispense:  50 Strip     Refill:  5     Thank you,  Hossein Strauss, [address] [place] [address] in place: Yes   Recommendation Provided To: Patient/Caregiver: 2 via Telephone   Intervention Detail: Dose Adjustment: 1, reason: Therapy De-escalation and New Rx: 1, reason: Needs Additional Therapy   Intervention Accepted By: Patient/Caregiver: 2   Time Spent (min): 30

## 2022-04-01 ENCOUNTER — TELEPHONE (OUTPATIENT)
Dept: CASE MANAGEMENT | Age: 72
End: 2022-04-01

## 2022-04-01 NOTE — TELEPHONE ENCOUNTER
Patient contacted [facility] concerning medications that he is to take prior to procedure. His medications come pre-packaged by pharmacy for each prescription time( Am medications, Evening medication and Bedtime medication ). Patient was instructed to take Amlodipine 10 mg, Famotidine 40 mg,Prozac 20 mg and Omeprazole 20 mg (2) cap prior to procedure this morning. With the assistance of his PCA, Dc Mallory described each pill in the AM pack, color,shape and identifying numbers on them for patient to take with leaving 3 pills left for after the procedure. Freestyle Juan Carlos flash sensor signaling needing replaced in an hour. ACM suggested to change it now as during or after the procedure they may want to check his blood glucose level since he was told to only take half of his 20 units Lantus. Take reader with them. PCA Wander voicing they will and they will call [facility] for updates.

## 2022-04-04 NOTE — TELEPHONE ENCOUNTER
Spoke with patient in regards of PATIENT Patient has been informed that PT has been extended for another 4 weeks. Patient states thank you . Patient verbalized understanding.

## 2022-04-07 ENCOUNTER — PATIENT OUTREACH (OUTPATIENT)
Dept: CASE MANAGEMENT | Age: 72
End: 2022-04-07

## 2022-04-07 NOTE — PROGRESS NOTES
.Date/Time:  4/7/2022 5:04 PM    Method of communication with patient:phone    [facility] ( [facility]) made out reach to  patient by telephone to offer Complex Case Management  ( CCM). Provided introduction to self, and explanation of the Ambulatory Care . Contact at [phone] anytime Monday thru Friday 08:00-4:30. Checking in on patient afterTRANSRECTAL ULTRASOUND; CARBON FIDUCIALPROSTATE MARKERS; SPACEOAR ILEANA GEL Procedure 4/1/2022 Markers placed by Dr Silvestre Bingham for radiation therapy. [facility] reviewed Dr Ivone Carrillo notes concerning procedure. Patient tolerated it well.

## 2022-04-08 ENCOUNTER — PATIENT OUTREACH (OUTPATIENT)
Dept: CASE MANAGEMENT | Age: 72
End: 2022-04-08

## 2022-04-08 NOTE — PROGRESS NOTES
.Complex Case Management  Follow-Up       Date/Time:   4/8/22  10:30 AM       Ambulatory Care Manager ( ACM) contacted patient for Complex Case Management  follow up. Spoke to patient and PCA Lillie Fletcher  Introduced self/role and reason for call. Patient reported:   Everything went fine, had one episode of passing blood in her urine but they next time it was back clear. No pain, just a little discomfort the first day but he is back to normal. All he remember is the man telling him he was going to sleep and when he work up he was back in the recovery room where he started from. He has an appointment Monday with Dr Randall Lomeli to get his radiation schedule. ACM said he has schedules in the system already for PT that may conflict with his radiation schedule. Call PT and let them know. Patient said Yes my radiation is more important. Patient voicing he has been having low blood sugars sometimes in the morning , but he knows it's when he forgets to eat his bedtime snack. Like today when he first check it it was 134 but when Lillie Fletcher got here before breakfast it was down to 97, he ate a good breakfast, saurage, 2 eggs, a cup of apple sauce and about 6 oz of milk . The dietician called him last week . His insulin is down to 19 units Lantus at bedtime. Pertinent concerns:  Transportation, don't want to be waiting after his radiation therapy for hors at a time. ACM suggest he gives them his schedule maybe he will get the same  and they can wait the 30 minutes post treatment. The first treatment will be longer as the have to prepare. Next PCP Appointment: 6/17/22    Barriers to care? Forgetful ( old CVA), has private duty PCA Ellen Tena)        Patient reminded that there are physicians on call 24 hours a day / 7 days a week (M-F 5pm to 8am and from Friday 5pm until Monday 8a for the weekend) should the patient have questions or concerns.

## 2022-04-19 ENCOUNTER — HOSPITAL ENCOUNTER (OUTPATIENT)
Dept: PHYSICAL THERAPY | Age: 72
End: 2022-04-19
Payer: MEDICARE

## 2022-04-20 ENCOUNTER — VIRTUAL VISIT (OUTPATIENT)
Dept: FAMILY MEDICINE CLINIC | Age: 72
End: 2022-04-20

## 2022-04-20 ENCOUNTER — TELEPHONE (OUTPATIENT)
Dept: CASE MANAGEMENT | Age: 72
End: 2022-04-20

## 2022-04-20 DIAGNOSIS — E11.65 TYPE 2 DIABETES MELLITUS WITH HYPERGLYCEMIA, WITH LONG-TERM CURRENT USE OF INSULIN (HCC): Primary | ICD-10-CM

## 2022-04-20 DIAGNOSIS — Z79.4 TYPE 2 DIABETES MELLITUS WITH HYPERGLYCEMIA, WITH LONG-TERM CURRENT USE OF INSULIN (HCC): Primary | ICD-10-CM

## 2022-04-20 NOTE — TELEPHONE ENCOUNTER
Patient contacted [facility] on 4/18/2022 concerning his pulse rate elevation. [facility] returned call today. Heart rates ate 25,47,78,36,80. Blood Pressures 124/88,118/85,122/84,126/88 and 125/88. Denies headaches, dizziness or light headiness. [facility] said to notify when heart rate is above 120 or blow 60 with symptoms. Blood pressure 160/100 with symptoms. No sausages, whiteside, french fries with salt bake or boil food. For now only and check back in 2 weeks unless something changes.  Radiation starts tomorrow until June 14 3:00 daily Medicaid ride is set up.j

## 2022-04-20 NOTE — PROGRESS NOTES
Pharmacy Progress Note - Diabetes Management    S/O: Mr. Trudi Mata is a 67 y.o. male referred by Beba Cuellar NP who was contacted today for diabetes management follow up. Patient's last A1c was 8.8% in March 2022. Interim update: Patient reports his blood sugars have been pretty good lately. Patient uses FreeStyle Juan Carlos 14 day system. We have been adjusting his Lantus to reach fasting blood glucose target of  mg/dL. Pt's aid read off recent fasting blood glucose readings from log:  Date Reading   4/20 108   4/19 123   4/18 109   4/17 110   4/16 120   4/15 119   4/14 125   4/13 125   4/12 91   4/11 163     Pt's aid also provided me patient's average glucose information for past 7 days:  Average glucose: 122 mg/dL  Average glucose by time:         12am-6am: 93 mg/dL      6am-12pm: 125 mg/dL      12pm-6pn: 137 mg/dL      6pm-12am: 142 mg/dL    Current anti-hyperglycemic regimen includes:    Key Antihyperglycemic Medications             pioglitazone (ACTOS) 30 mg tablet TAKE ONE TABLET BY MOUTH EVERY DAY    insulin glargine (Lantus Solostar U-100 Insulin) 100 unit/mL (3 mL) inpn 19 Units by SubCUTAneous route nightly. SITagliptin (JANUVIA) 100 mg tablet Take 1 Tablet by mouth daily.           Past Medical History:   Diagnosis Date    Acute cystitis without hematuria     [facility]ENJAMIN [facility] kidney [facility]) ([facility]     Arthritis     Arthropathy     Balanitis     Candida infection     recurrent     Chronic pain     COVID-19 vaccine series completed     WITH BOOSTER    Dementia associated with other underlying disease without behavioral disturbance ([facility]     Depression     Diabetes mellitus, type II ([facility] 07/14/2013    Drug abuse ([facility]     H/O Heroin addiction     ED (erectile dysfunction)     Fatigue     GERD (gastroesophageal reflux disease)     H/O epididymitis 2012    left    H/O: pneumonia 2011    Hep C w/o coma, chronic ([address] 01/2015    STATES TREATED    History of BPH     History of hematuria     History of tobacco use     Hypertension     Loss of appetite     LOST 10 LBS IN A MONTH    Loss of balance     Microcytic anemia 05/29/2012    Neuropathy     Pancytopenia (HCC)     Paranoid schizophrenia, chronic condition with acute exacerbation (HCC)     Prostate cancer ([facility] 09/08/2021    PNBx, [place] [address], Dr. Jesse Ernst, [facility]    PUD (peptic ulcer disease)     SOB (shortness of breath)     WITH WALKING    Status post partial gastrectomy 1990    Stomach ulcer    Stroke ([facility] 2020    BALANCE ISSUES; TIA    Uncircumcised male      Allergies   Allergen Reactions    Ibuprofen Anaphylaxis    Fentanyl Other (comments) and Swelling     Blurred vision    Gabapentin Other (comments) and Swelling     Blurred Vision      Metformin Diarrhea    Penicillin Swelling    Penicillins Swelling    Valium [Diazepam] Swelling       Current Outpatient Medications   Medication Sig    pioglitazone (ACTOS) 30 mg tablet TAKE ONE TABLET BY MOUTH EVERY DAY    amLODIPine (NORVASC) 10 mg tablet TAKE ONE TABLET BY MOUTH EVERY DAY    lisinopriL (PRINIVIL, ZESTRIL) 20 mg tablet TAKE ONE TABLET BY MOUTH EVERY DAY    tamsulosin (FLOMAX) 0.4 mg capsule Take 0.4 mg by mouth every evening.  amitriptyline (ELAVIL) 10 mg tablet Take 1 Tablet by mouth nightly.  insulin glargine (Lantus Solostar U-100 Insulin) 100 unit/mL (3 mL) inpn 19 Units by SubCUTAneous route nightly.  flash glucose sensor (FreeStyle Juan Carlos 14 Day Sensor) kit Apply 1 sensor to the back of the upper arm once every 14 days. Check glucose 4 times daily.  SITagliptin (JANUVIA) 100 mg tablet Take 1 Tablet by mouth daily.  famotidine (PEPCID) 40 mg tablet Take 1 Tablet by mouth daily.  calcium-cholecalciferol, d3, (CALCIUM 600 + D) 600-125 mg-unit tab Take  by mouth daily.  cholecalciferol, vitamin D3, (Vitamin D3) 50 mcg (2,000 unit) tab Take  by mouth daily.     omeprazole (PRILOSEC) 20 mg capsule TAKE TWO CAPSULES BY MOUTH EVERY DAY    Insulin Needles, Disposable, 31 gauge x 3/16\" ndle Use 1 new pen needle each time to inject insulin subcutaneously 4 times daily    FreeStyle Juan Carlos 14 Day Milroy misc Apply 1 Device to affected area once. No current facility-administered medications for this visit. Labs/Vitals: Wt Readings from Last 3 Encounters:   04/01/22 146 lb 9.7 oz (66.5 kg)   03/17/22 149 lb 3.2 oz (67.7 kg)   03/11/22 150 lb (68 kg)     BP Readings from Last 3 Encounters:   04/01/22 (!) 140/96   03/17/22 (!) 106/59   03/08/22 104/68       Lab Results   Component Value Date/Time    Sodium 140 03/17/2022 12:00 AM    Potassium 4.5 03/17/2022 12:00 AM    Chloride 100 03/17/2022 12:00 AM    CO2 23 03/17/2022 12:00 AM    Anion gap 11.0 03/14/2022 12:09 PM    Glucose 294 (H) 03/17/2022 12:00 AM    BUN 21 03/17/2022 12:00 AM    Creatinine 1.41 (H) 03/17/2022 12:00 AM    BUN/Creatinine ratio 15 03/17/2022 12:00 AM    GFR est AA 55.0 03/11/2022 10:58 AM    GFR est non-AA 45 03/11/2022 10:58 AM    Calcium 10.2 03/17/2022 12:00 AM    Bilirubin, total 0.2 03/17/2022 12:00 AM    Alk.  phosphatase 102 03/17/2022 12:00 AM    Protein, total 7.4 03/17/2022 12:00 AM    Albumin 4.7 03/17/2022 12:00 AM    Globulin 3.3 01/07/2020 05:38 AM    A-G Ratio 1.7 03/17/2022 12:00 AM    ALT (SGPT) 30 03/17/2022 12:00 AM       Lab Results   Component Value Date/Time    Cholesterol, total 138 03/17/2022 12:00 AM    HDL Cholesterol 71 03/17/2022 12:00 AM    LDL, calculated 55 03/17/2022 12:00 AM    LDL, calculated 62 02/27/2020 09:00 AM    VLDL, calculated 12 03/17/2022 12:00 AM    VLDL, calculated 8 02/27/2020 09:00 AM    Triglyceride 56 03/17/2022 12:00 AM       HbA1c:  Lab Results   Component Value Date/Time    Hemoglobin A1c 8.8 (H) 03/17/2022 12:00 AM    Hemoglobin A1c (POC) 6.4 06/23/2020 02:50 PM       A/P:    Diabetes Management:  - Per ADA guidelines, Pt's A1c is not at goal of < 8%.   - Current CGM trend shows patients fasting glucose and average glucose at goal. No recent hypoglycemia.  - Continue Lantus 19 units nightly, pioglitazone 30 mg daily, and Januvia 100 mg daily.  - Recommend scanning Juan Carlos sensor at least 3 times daily.  - Follow up in 4 weeks to review CGM data. Patient to reach out sooner with any questions or concerns. Medication reconciliation was completed during the visit. There are no discontinued medications. No orders of the defined types were placed in this encounter. Notifications of recommendations will be sent to Dirk Mcwilliams NP for review. Thank you,  Beth Ivy, [address] [place] [address] in place:  Yes   Recommendation Provided To: Patient/Caregiver: 2 via Telephone   Intervention Detail: Adherence Monitorin and Scheduled Appointment   Gap Closed?: Yes   Intervention Accepted By: Patient/Caregiver: 2   Time Spent (min): 30

## 2022-04-22 ENCOUNTER — HOSPITAL ENCOUNTER (OUTPATIENT)
Dept: PHYSICAL THERAPY | Age: 72
Discharge: HOME | End: 2022-04-22
Payer: MEDICARE

## 2022-04-22 PROCEDURE — 97110 THERAPEUTIC EXERCISES: CPT

## 2022-04-22 PROCEDURE — 97112 NEUROMUSCULAR REEDUCATION: CPT

## 2022-04-22 NOTE — PROGRESS NOTES
PT DAILY TREATMENT NOTE 8-    Patient Name: Susan Braden  Date:2022  : 1950  [x]  Patient  Verified  Payor: BLUE CROSS MEDICARE / Plan: Juliet Morris 8141 HMO / Product Type: Managed Care Medicare /    In time:1015 Out time: 1100  Total Treatment Time (min): 45    Total Timed Codes (min):45  1:1 Treatment Time (min):45   Visit #: 1 of 8    Treatment Area: Other low back pain [M54.59]    SUBJECTIVE  Pain Level (0-10 scale): 0/10, LBP  Any medication changes, allergies to medications, adverse drug reactions, diagnosis change, or new procedure performed?: [x] No    [] Yes (see summary sheet for update)  Subjective functional status/changes:   [] No changes reported  Pt reports that he started radiation yesterday. Denies any pain, reporting \"just tired today\". Pt denies any falls since he was last seen by PT on 3/28/22. Denies any changes to medical condition/medications other than the noted radiation tx. OBJECTIVE    20 min Therapeutic Exercise:  [x] See flow sheet :    Rationale: increase ROM and increase strength to improve the patients ability to ADLs, mobility, gait, self-care    X min Therapeutic Activity:  [x]  See flow sheet : review of progress made since start of therapy, floor transfer, 2MW test, TUG   Rationale:   To improve t/f safely and balance to reduce risks for falls     25 min Neuromuscular Re-education:  [x]  See flow sheet : standing dynamic balance tasks     Rationale: improve coordination, improve balance and increase proprioception  to improve the patients ability to improve safety with self-care, community mobility      X min Gait  [x]  See flow sheet :   -   Rationale: improve coordination, improve balance, safety, and increase proprioception  to improve the patients ability to improve safety with self-care, community mobility                 W/ TA/NM min Patient Education: [x] Review importance of cont use of rollator for all amb to redce risk for falls, reviewed importance of cont mobility despite fatigue in order to prevent deconditioning. [] Progressed/Changed HEP based on:   [] positioning   [] body mechanics   [] transfers   [] heat/ice application        Other Objective/Functional Measures:  + no changes to routine 2/2 pt last seen on 3/28/22 (PN completed at that time, no new changes)    Subjec Pain Level (0-10 scale) post treatment: 0 /10     ASSESSMENT/Changes in Function:   Pt required increased time and seated rest breaks today 2/2 fatigue. Decreased activity performance as a result. Pt able to complete standing tasks for balance and LE strength in the // bars w/ SBA for balance. Pt denied any pain, nausea, dizziness w/ any activity today. Denied any pain post tx. Will cont to progress LE balance/strength as tolerated. Patient will continue to benefit from skilled PT services to modify and progress therapeutic interventions, address functional mobility deficits, address ROM deficits, address strength deficits, analyze and address soft tissue restrictions, analyze and cue movement patterns, analyze and modify body mechanics/ergonomics, assess and modify postural abnormalities, address imbalance/dizziness and instruct in home and community integration to attain remaining goals. []  See Plan of Care  []  See progress note/recertification   []  See Discharge Summary        PN due 4/28/22  New Goals to be achieved in __8_  treatments:  1.  Pt will score >49/100 on FOTO to show significant improvement in functional mobility and QOL    2/28/22: 31/100  Current: 3/28/22: regressed 26/100  2. Pt will complete TUG w/ rollator and/or SPC in < 15 seconds. 2/28/22: TUG w/ rollator: avg 23.6 sec; TUG w/ SPC: 19.3 sec  Current: 3/28/22: progressing: rollator: 17 sec w/ mod I, TUG w/ SPC: 19.6 sec  3. pt will perform floor transfer w/ mod I, good safety, to enable pt to care for self in event of a fall.    2/28/22: pt reports requiring min assist to rise from floor in home. Current: 3/28/22: progressing: min assist in clinic w/ need for hands on table for support  4.  Pt will report no > 2 falls a week in order to indicate reduced risk for injury 2/2 improved strength/balance and home safety.   Current: 4/22/22 pt denies any falls since PN on 3/28    [facility]  [x]  Upgrade activities as tolerated     [x]  Continue plan of care  []  Update interventions per flow sheet       []  Discharge due to:_  [x]  Other: work on floor transfers, dual tasking    [facility], PT 4/22/2022      Future Appointments   Date Time Provider Devika Haley   4/22/2022 10:15 AM [facility] PT [facility] 2 [facility]   4/26/2022 11:30 AM [facility] PT [facility] 2 [facility]   4/28/2022 12:15 PM [facility] PT [facility] 2 [facility]   5/3/2022 12:15 PM [facility] PT [facility] 2 [facility]   5/4/2022  9:40 AM [facility] YEYO POD1 NURSE [facility] INES REY   5/5/2022 12:15 PM [facility] PT [facility] 2 [facility]   5/10/2022 12:15 PM [facility] PT [facility] 2 [facility]   5/12/2022 12:15 PM [facility] PT [facility] 2 [facility]   5/18/2022  9:30 AM JAMESON Lopez BS AMB   6/10/2022 10:00 AM Harry Garcia MD [facility] BS AMB   6/17/2022 10:30 AM BEN Zurita AMB

## 2022-04-27 DIAGNOSIS — Z79.4 DIABETES MELLITUS, TYPE II, INSULIN DEPENDENT (HCC): ICD-10-CM

## 2022-04-27 DIAGNOSIS — K21.9 GASTROESOPHAGEAL REFLUX DISEASE, UNSPECIFIED WHETHER ESOPHAGITIS PRESENT: ICD-10-CM

## 2022-04-27 DIAGNOSIS — E11.9 DIABETES MELLITUS, TYPE II, INSULIN DEPENDENT (HCC): ICD-10-CM

## 2022-04-27 RX ORDER — SITAGLIPTIN 100 MG/1
TABLET, FILM COATED ORAL
Qty: 90 TABLET | Refills: 0 | Status: SHIPPED | OUTPATIENT
Start: 2022-04-27 | End: 2022-07-29

## 2022-04-27 RX ORDER — FAMOTIDINE 40 MG/1
TABLET, FILM COATED ORAL
Qty: 90 TABLET | Refills: 0 | Status: SHIPPED | OUTPATIENT
Start: 2022-04-27 | End: 2022-07-29

## 2022-04-28 ENCOUNTER — APPOINTMENT (OUTPATIENT)
Dept: PHYSICAL THERAPY | Age: 72
End: 2022-04-28
Payer: MEDICARE

## 2022-05-03 ENCOUNTER — APPOINTMENT (OUTPATIENT)
Dept: PHYSICAL THERAPY | Age: 72
End: 2022-05-03

## 2022-05-05 ENCOUNTER — APPOINTMENT (OUTPATIENT)
Dept: PHYSICAL THERAPY | Age: 72
End: 2022-05-05

## 2022-05-10 ENCOUNTER — APPOINTMENT (OUTPATIENT)
Dept: PHYSICAL THERAPY | Age: 72
End: 2022-05-10

## 2022-05-12 ENCOUNTER — APPOINTMENT (OUTPATIENT)
Dept: PHYSICAL THERAPY | Age: 72
End: 2022-05-12

## 2022-05-16 ENCOUNTER — TELEPHONE (OUTPATIENT)
Dept: CASE MANAGEMENT | Age: 72
End: 2022-05-16

## 2022-05-16 RX ORDER — FLUOXETINE 20 MG/1
20 TABLET ORAL DAILY
COMMUNITY

## 2022-05-16 NOTE — TELEPHONE ENCOUNTER
ACM received call from patient and PCA( Louisa Freeman). Patient was seen in [facility] ED on Sat. 5/14/2022. He was found on the commode sweat pouring off of him per PCA. He had a dazed look did not recognize her. She cleaned him up helped him to the bed and call paramedics. She checked his vital signs and his blood sugar and they were good. This scared her as long as she have been taking care of him he has not had this type of episode. She called his girlfriend, she told her he did that to her one time. In the ER he got very agitated yelling for her,asking why was he there and where is he? PCA said she was sitting right bedside him. He was confused did not know her. After they gave him some ativan to help calm him down he started to come around, then wanted to go home. Patient received a call from Urology saying to inform his PCP of his lab work was showing kidney failure 5/11/2022 and she did call the office. ACM suggested they Increase fluid intake stay away from NSAID ( ASA ,Alevel,Motrin). Patient was seen @ [facility] ED Sat the 5/7 having stomach pains. He is on Miralax at bedtime and he has 2-3 bowel movements a day. ACM suggested he cut back on the Miralax . take it 3 or 4 times a week for now. ACM suggested PCA to call office to let them know this patient has been seen in the ED twice in 7 day. ACM unable to schedule an office visit as his PCP is booked. Patient has an up coming Office visit 6/17/22. Patient voiced to [employer] that he is feeling tired more so than anything else.  He continues with his radiation tx until June Mon-Fr @ 3 PM.

## 2022-05-17 ENCOUNTER — OFFICE VISIT (OUTPATIENT)
Dept: FAMILY MEDICINE CLINIC | Age: 72
End: 2022-05-17
Payer: MEDICARE

## 2022-05-17 VITALS
SYSTOLIC BLOOD PRESSURE: 105 MMHG | HEART RATE: 97 BPM | DIASTOLIC BLOOD PRESSURE: 73 MMHG | RESPIRATION RATE: 20 BRPM | OXYGEN SATURATION: 100 % | HEIGHT: 68 IN | BODY MASS INDEX: 22.13 KG/M2 | TEMPERATURE: 98.5 F | WEIGHT: 146 LBS

## 2022-05-17 DIAGNOSIS — R79.89 ELEVATED SERUM CREATININE: Primary | ICD-10-CM

## 2022-05-17 DIAGNOSIS — F41.9 ANXIETY: ICD-10-CM

## 2022-05-17 PROCEDURE — G8427 DOCREV CUR MEDS BY ELIG CLIN: HCPCS | Performed by: NURSE PRACTITIONER

## 2022-05-17 PROCEDURE — G8420 CALC BMI NORM PARAMETERS: HCPCS | Performed by: NURSE PRACTITIONER

## 2022-05-17 PROCEDURE — G8752 SYS BP LESS 140: HCPCS | Performed by: NURSE PRACTITIONER

## 2022-05-17 PROCEDURE — 1101F PT FALLS ASSESS-DOCD LE1/YR: CPT | Performed by: NURSE PRACTITIONER

## 2022-05-17 PROCEDURE — G8754 DIAS BP LESS 90: HCPCS | Performed by: NURSE PRACTITIONER

## 2022-05-17 PROCEDURE — G9717 DOC PT DX DEP/BP F/U NT REQ: HCPCS | Performed by: NURSE PRACTITIONER

## 2022-05-17 PROCEDURE — 99213 OFFICE O/P EST LOW 20 MIN: CPT | Performed by: NURSE PRACTITIONER

## 2022-05-17 PROCEDURE — 3017F COLORECTAL CA SCREEN DOC REV: CPT | Performed by: NURSE PRACTITIONER

## 2022-05-17 PROCEDURE — G8536 NO DOC ELDER MAL SCRN: HCPCS | Performed by: NURSE PRACTITIONER

## 2022-05-17 NOTE — PROGRESS NOTES
Jesus               [address]               [phone]      Michelle Marcelo is a 67 y.o. male and presents with Hospital Follow Up       Assessment/Plan:    Diagnoses and all orders for this visit:    1. Elevated serum creatinine  -     METABOLIC PANEL, BASIC; Future    2. Anxiety    follow up labs today to recheck renal function    Recent ED visit due to presumed anxiety attack, he was able to be calmed with ativan  Gave him the number to call Dr. Maliha Heaton office to make an appointment for testing  Has no current problems at today's visit to further address  Follow-up and Dispositions    · Return for keep previously scheduled follow up. Health Maintenance:   Health Maintenance   Topic Date Due    DTaP/Tdap/Td series (1 - Tdap) Never done    Shingrix Vaccine Age 50> (1 of 2) Never done    Low dose CT lung screening  Never done    AAA Screening 73-67 YO Male Smoking Patients  Never done    Eye Exam Retinal or Dilated  07/17/2022 (Originally 2/9/1960)    MICROALBUMIN Q1  12/17/2022    Depression Monitoring  03/17/2023    Foot Exam Q1  03/17/2023    A1C test (Diabetic or Prediabetic)  03/17/2023    Lipid Screen  03/17/2023    Medicare Yearly Exam  03/19/2023    Colorectal Cancer Screening Combo  01/31/2025    Flu Vaccine  Completed    COVID-19 Vaccine  Completed    Pneumococcal 65+ years  Completed        Subjective:    Labs obtained prior to visit? NO  Reviewed with patient?  Not applicable    Elevated creatinine  Labs on 5/10/22 showed creatinine on 2.0  Repeat on 5/14/22 at ED showed normalized to 1.6  Follow up today    Dementia testing  Gave name and number to dr. Mariela Ferraro he can do video visit with assit of aid  He was able to sign in     ED  Went to ED for confusion and anxiety  He was very anxious and yelling  He was given ativan which helped calm him down and his confusion resolved  The next day he did not remember the event    ROS: ROS  As stated in HPI, otherwise all others negative. The problem list was updated as a part of today's visit. Patient Active Problem List   Diagnosis Code    Essential hypertension with goal blood pressure less than 130/80 I10    Type 2 diabetes mellitus with hyperglycemia, with long-term current use of insulin (HCC) E11.65, Z79.4    Gastroesophageal reflux disease K21.9    BPH (benign prostatic hyperplasia) N40.0    Microcytic anemia D50.9    NS (nuclear sclerosis) H25.10    Severe depression (HCC) F32.2    Tarsal tunnel syndrome G57.50    Dementia associated with other underlying disease with behavioral disturbance (HCC) F02.81    Status post partial gastrectomy Z90.3    History of hepatitis C Z86.19    History of drug abuse (HCC) F19.11    Paranoid schizophrenia, chronic condition with acute exacerbation (HCC) F20.0    Posterior vitreous detachment, right eye H43.811    Viral hepatitis C B19.20    Vitreomacular adhesion, left eye H43.822    Prostate cancer (HCC) C61    Lumbosacral spondylosis without myelopathy M47.817    Pain in right foot M79.671    Radiculopathy due to lumbar intervertebral disc disorder M51.16    Type 2 diabetes mellitus with diabetic polyneuropathy, with long-term current use of insulin (HCC) E11.42, Z79.4       The PSH, FH were reviewed.       SH:  Social History     Tobacco Use    Smoking status: Former Smoker     Packs/day: 1.00     Years: 20.00     Pack years: 20.00     Types: Cigarettes     Quit date: 5/24/2011     Years since quitting: 10.9    Smokeless tobacco: Never Used   Vaping Use    Vaping Use: Never used   Substance Use Topics    Alcohol use: No    Drug use: Yes     Types: Marijuana     Comment: H/O Heroin addiction STATES QUIT   48 YRS AGO; STILL USE MARIJUANA  3X/DAY;  USING MARIJUANA X 55 YRS NOW       Medications/Allergies:  Current Outpatient Medications on File Prior to Visit   Medication Sig Dispense Refill    FLUoxetine (PROzac) 20 mg tablet Take 20 mg by mouth daily.  Januvia 100 mg tablet TAKE ONE TABLET BY MOUTH EVERY DAY 90 Tablet 0    famotidine (PEPCID) 40 mg tablet TAKE ONE TABLET BY MOUTH EVERY DAY 90 Tablet 0    pioglitazone (ACTOS) 30 mg tablet TAKE ONE TABLET BY MOUTH EVERY DAY 93 Tablet 0    amLODIPine (NORVASC) 10 mg tablet TAKE ONE TABLET BY MOUTH EVERY DAY 90 Tablet 1    lisinopriL (PRINIVIL, ZESTRIL) 20 mg tablet TAKE ONE TABLET BY MOUTH EVERY DAY 90 Tablet 1    tamsulosin (FLOMAX) 0.4 mg capsule Take 0.4 mg by mouth every evening.  amitriptyline (ELAVIL) 10 mg tablet Take 1 Tablet by mouth nightly. 90 Tablet 0    insulin glargine (Lantus Solostar U-100 Insulin) 100 unit/mL (3 mL) inpn 20 Units by SubCUTAneous route nightly. (Patient taking differently: 19 Units by SubCUTAneous route nightly.) 15 mL 2    flash glucose sensor (FreeStyle Juan Carlos 14 Day Sensor) kit Apply 1 sensor to the back of the upper arm once every 14 days. Check glucose 4 times daily. 2 Kit 11    calcium-cholecalciferol, d3, (CALCIUM 600 + D) 600-125 mg-unit tab Take  by mouth daily.  cholecalciferol, vitamin D3, (Vitamin D3) 50 mcg (2,000 unit) tab Take  by mouth daily.  omeprazole (PRILOSEC) 20 mg capsule TAKE TWO CAPSULES BY MOUTH EVERY  Capsule 1    Insulin Needles, Disposable, 31 gauge x 3/16\" ndle Use 1 new pen needle each time to inject insulin subcutaneously 4 times daily 150 Pen Needle 2    FreeStyle Juan Carlos 14 Day Rachel misc Apply 1 Device to affected area once. No current facility-administered medications on file prior to visit.         Allergies   Allergen Reactions    Ibuprofen Anaphylaxis    Fentanyl Other (comments) and Swelling     Blurred vision    Gabapentin Other (comments) and Swelling     Blurred Vision      Metformin Diarrhea    Penicillin Swelling    Penicillins Swelling    Valium [Diazepam] Swelling       Objective:  Visit Vitals  /73 (BP 1 Location: Right arm, BP Patient Position: Sitting, BP Cuff Size: Small adult)   Pulse 97   Temp 98.5 °F (36.9 °C) (Temporal)   Resp 20   Ht 5' 8\" (1.727 m)   Wt 146 lb (66.2 kg)   SpO2 100%   BMI 22.20 kg/m²    Body mass index is 22.2 kg/m². Physical assessment  Physical Exam  Vitals and nursing note reviewed. Constitutional:       General: He is not in acute distress. Appearance: Normal appearance. Eyes:      Conjunctiva/sclera: Conjunctivae normal.      Pupils: Pupils are equal, round, and reactive to light. Cardiovascular:      Rate and Rhythm: Normal rate and regular rhythm. Heart sounds: Normal heart sounds. No murmur heard. No friction rub. No gallop. Pulmonary:      Effort: Pulmonary effort is normal.      Breath sounds: Normal breath sounds. Musculoskeletal:         General: Normal range of motion. Cervical back: Normal range of motion. Skin:     General: Skin is warm and dry. Neurological:      Mental Status: He is alert.            Labwork and Ancillary Studies:    CBC w/Diff  Lab Results   Component Value Date/Time    WBC 5.2 05/10/2022 09:14 AM    HGB 11.5 (L) 05/10/2022 09:14 AM    PLATELET 604 (L) 03/15/8271 09:14 AM         Basic Metabolic Profile  Lab Results   Component Value Date/Time    Sodium 137 05/10/2022 09:14 AM    Potassium 4.2 05/10/2022 09:14 AM    Chloride 100 05/10/2022 09:14 AM    CO2 27 05/10/2022 09:14 AM    Anion gap 10.0 05/10/2022 09:14 AM    Glucose 183 (H) 05/10/2022 09:14 AM    BUN 42 (H) 05/10/2022 09:14 AM    Creatinine 2.0 (H) 05/10/2022 09:14 AM    BUN/Creatinine ratio 15 03/17/2022 12:00 AM    GFR est AA 55.0 03/11/2022 10:58 AM    GFR est non-AA 45 03/11/2022 10:58 AM    Calcium 11.6 (H) 05/10/2022 09:14 AM        Cholesterol  Lab Results   Component Value Date/Time    Cholesterol, total 138 03/17/2022 12:00 AM    HDL Cholesterol 71 03/17/2022 12:00 AM    LDL, calculated 55 03/17/2022 12:00 AM    LDL, calculated 62 02/27/2020 09:00 AM    Triglyceride 56 03/17/2022 12:00 AM I have discussed the diagnosis with the patient and the intended plan as seen in the above orders. The patient has received an After-Visit Summary and questions were answered concerning future plans. An After Visit Summary was printed and given to the patient. All diagnosis have been discussed with the patient and all of the patient's questions have been answered. Follow-up and Dispositions    · Return for keep previously scheduled follow up. Mahin Nicolas, [facility]  [address]  [address]

## 2022-05-18 ENCOUNTER — VIRTUAL VISIT (OUTPATIENT)
Dept: FAMILY MEDICINE CLINIC | Age: 72
End: 2022-05-18

## 2022-05-18 DIAGNOSIS — Z79.4 TYPE 2 DIABETES MELLITUS WITH HYPERGLYCEMIA, WITH LONG-TERM CURRENT USE OF INSULIN (HCC): Primary | ICD-10-CM

## 2022-05-18 DIAGNOSIS — E11.65 TYPE 2 DIABETES MELLITUS WITH HYPERGLYCEMIA, WITH LONG-TERM CURRENT USE OF INSULIN (HCC): Primary | ICD-10-CM

## 2022-05-18 LAB
BUN SERPL-MCNC: 22 MG/DL (ref 8–27)
BUN/CREAT SERPL: 15 (ref 10–24)
CALCIUM SERPL-MCNC: 10.6 MG/DL (ref 8.6–10.2)
CHLORIDE SERPL-SCNC: 100 MMOL/L (ref 96–106)
CO2 SERPL-SCNC: 27 MMOL/L (ref 20–29)
CREAT SERPL-MCNC: 1.47 MG/DL (ref 0.76–1.27)
EGFR: 50 ML/MIN/1.73
GLUCOSE SERPL-MCNC: 147 MG/DL (ref 65–99)
INTERPRETATION: NORMAL
POTASSIUM SERPL-SCNC: 4.7 MMOL/L (ref 3.5–5.2)
SODIUM SERPL-SCNC: 140 MMOL/L (ref 134–144)

## 2022-05-18 NOTE — PROGRESS NOTES
Pharmacy Progress Note - Diabetes Management     S/O: Mr. Adair Latham is a 67 y.o. male referred by Dirk Mcwilliams NP who was contacted today for diabetes management follow up. Patient's last A1c was 8.8% in March 2022. Interim update: Spoke with patient and his aid this morning to review blood sugar readings. Patient reports he is doing well and is working on his diet. Reports his blood sugar was high in the 200s yesterday because he ate Frosted Flakes. States he spoke with NP Tasia Perez about this at his visit yesterday and was told to switch to non-sugary cereal.    Patient's aid read off recent fasting blood glucose readings from patient's log:  Date Reading   5/18 124   5/17 246   5/16 125   5/15 114   5/14 133   5/13 110   5/12 137   5/11 141   5/10 174     Pt's aid also provided patient's average glucose information for past 14 days from Dwain Smith reader:  Average glucose: 145 mg/dL  Average glucose by time:         12am-6am: 109 mg/dL      6am-12pm: 164 mg/dL      12pm-6pn: 162 mg/dL      6pm-12am: 152 mg/dL    Current anti-hyperglycemic regimen includes:    Key Antihyperglycemic Medications             Januvia 100 mg tablet TAKE ONE TABLET BY MOUTH EVERY DAY    pioglitazone (ACTOS) 30 mg tablet TAKE ONE TABLET BY MOUTH EVERY DAY    insulin glargine (Lantus Solostar U-100 Insulin) 100 unit/mL (3 mL) inpn 19 Units by SubCUTAneous route nightly.           Past Medical History:   Diagnosis Date    Acute cystitis without hematuria     BENJAMIN (acute kidney injury) ([address]     Arthritis     Arthropathy     Balanitis     Candida infection     recurrent     Chronic pain     COVID-19 vaccine series completed     WITH BOOSTER    Dementia associated with other underlying disease without behavioral disturbance ([address]     Depression     Diabetes mellitus, type II ([address] 07/14/2013    Drug abuse [facility]     H/O Heroin addiction     ED (erectile dysfunction)     Fatigue     GERD (gastroesophageal reflux disease)     H/O epididymitis 2012    left    H/O: pneumonia 2011    [facility] 01/2015    STATES TREATED    History of BPH     History of hematuria     History of tobacco use     Hypertension     Loss of appetite     LOST 10 LBS IN A MONTH    Loss of balance     Microcytic anemia 05/29/2012    Neuropathy     Pancytopenia (HCC)     Paranoid schizophrenia, chronic condition with acute exacerbation (HCC)     Prostate cancer ([facility] 09/08/2021    PNBx, [place] [address], Dr. Jayna Holbrook, [facility]    PUD (peptic ulcer disease)     SOB (shortness of breath)     WITH WALKING    Status post partial gastrectomy 1990    Stomach ulcer    Stroke ([facility] 2020    BALANCE ISSUES; TIA    Uncircumcised male      Allergies   Allergen Reactions    Ibuprofen Anaphylaxis    Fentanyl Other (comments) and Swelling     Blurred vision    Gabapentin Other (comments) and Swelling     Blurred Vision      Metformin Diarrhea    Penicillins Swelling    Valium [Diazepam] Swelling       Current Outpatient Medications   Medication Sig    FLUoxetine (PROzac) 20 mg tablet Take 20 mg by mouth daily.  Januvia 100 mg tablet TAKE ONE TABLET BY MOUTH EVERY DAY    famotidine (PEPCID) 40 mg tablet TAKE ONE TABLET BY MOUTH EVERY DAY    pioglitazone (ACTOS) 30 mg tablet TAKE ONE TABLET BY MOUTH EVERY DAY    amLODIPine (NORVASC) 10 mg tablet TAKE ONE TABLET BY MOUTH EVERY DAY    lisinopriL (PRINIVIL, ZESTRIL) 20 mg tablet TAKE ONE TABLET BY MOUTH EVERY DAY    tamsulosin (FLOMAX) 0.4 mg capsule Take 0.4 mg by mouth every evening.  amitriptyline (ELAVIL) 10 mg tablet Take 1 Tablet by mouth nightly.  insulin glargine (Lantus Solostar U-100 Insulin) 100 unit/mL (3 mL) inpn 20 Units by SubCUTAneous route nightly. (Patient taking differently: 19 Units by SubCUTAneous route nightly.)    flash glucose sensor ([employer] Juan Carlos 14 Day Sensor) kit Apply 1 sensor to the back of the upper arm once every 14 days.  Check glucose 4 times daily.    calcium-cholecalciferol, d3, (CALCIUM 600 + D) 600-125 mg-unit tab Take  by mouth daily.  cholecalciferol, vitamin D3, (Vitamin D3) 50 mcg (2,000 unit) tab Take  by mouth daily.  omeprazole (PRILOSEC) 20 mg capsule TAKE TWO CAPSULES BY MOUTH EVERY DAY    Insulin Needles, Disposable, 31 gauge x 3/16\" ndle Use 1 new pen needle each time to inject insulin subcutaneously 4 times daily    FreeStyle Juan Carlos 14 Day [place] misc Apply 1 Device to affected area once. No current facility-administered medications for this visit. Labs/Vitals: Wt Readings from Last 3 Encounters:   05/17/22 146 lb (66.2 kg)   04/01/22 146 lb 9.7 oz (66.5 kg)   03/17/22 149 lb 3.2 oz (67.7 kg)     BP Readings from Last 3 Encounters:   05/17/22 105/73   04/01/22 (!) 140/96   03/17/22 (!) 106/59       Lab Results   Component Value Date/Time    Sodium 140 05/17/2022 10:18 AM    Potassium 4.7 05/17/2022 10:18 AM    Chloride 100 05/17/2022 10:18 AM    CO2 27 05/17/2022 10:18 AM    Anion gap 10.0 05/10/2022 09:14 AM    Glucose 147 (H) 05/17/2022 10:18 AM    BUN 22 05/17/2022 10:18 AM    Creatinine 1.47 (H) 05/17/2022 10:18 AM    BUN/Creatinine ratio 15 05/17/2022 10:18 AM    GFR est AA 55.0 03/11/2022 10:58 AM    GFR est non-AA 45 03/11/2022 10:58 AM    Calcium 10.6 (H) 05/17/2022 10:18 AM    Bilirubin, total 0.3 05/10/2022 09:14 AM    Alk.  phosphatase 109 05/10/2022 09:14 AM    Protein, total 7.2 05/10/2022 09:14 AM    Albumin 4.8 05/10/2022 09:14 AM    Globulin 2.4 05/10/2022 09:14 AM    A-G Ratio 2.0 05/10/2022 09:14 AM    ALT (SGPT) 32 05/10/2022 09:14 AM       Lab Results   Component Value Date/Time    Cholesterol, total 138 03/17/2022 12:00 AM    HDL Cholesterol 71 03/17/2022 12:00 AM    LDL, calculated 55 03/17/2022 12:00 AM    LDL, calculated 62 02/27/2020 09:00 AM    VLDL, calculated 12 03/17/2022 12:00 AM    VLDL, calculated 8 02/27/2020 09:00 AM    Triglyceride 56 03/17/2022 12:00 AM       HbA1c:  Lab Results Component Value Date/Time    Hemoglobin A1c 8.8 (H) 2022 12:00 AM    Hemoglobin A1c (POC) 6.4 2020 02:50 PM     Estimated Creatinine Clearance: 42.5 mL/min (A) (based on SCr of 1.47 mg/dL (H)). A/P:    Diabetes Management:  - Per ADA guidelines, Pt's A1c is not at goal of < 8%.    - Current CGM trend shows fasting blood glucose largely at goal. Patient has some days when readings are elevated due to eating high carbohydrate/high sugar foods. Average glucose in acceptable range for patients more relaxed A1c goal.  - Encouraged patient to moderate carbohydrates and avoid foods high in sugar,  - No medication changes indicated at this time. Continue all diabetes medications at current doses. - Reminded patient of next PCP appt, A1c will be rechecked at this visit. - Will review A1c results and follow up with patient if needed. Patient to reach out with any further questions or concerns. Medication reconciliation was completed during the visit. There are no discontinued medications. No orders of the defined types were placed in this encounter. Notifications of recommendations will be sent to Jesús Segovia NP for review. Thank you,  Ivette Caceres, [address] [place] [address] in place:  Yes   Recommendation Provided To: Patient/Caregiver: 1 via Telephone   Intervention Detail: Adherence Monitorin   Intervention Accepted By: Patient/Caregiver: 1   Time Spent (min): 30

## 2022-06-06 DIAGNOSIS — E11.42 TYPE 2 DIABETES MELLITUS WITH DIABETIC POLYNEUROPATHY, WITH LONG-TERM CURRENT USE OF INSULIN (HCC): ICD-10-CM

## 2022-06-06 DIAGNOSIS — Z79.4 TYPE 2 DIABETES MELLITUS WITH DIABETIC POLYNEUROPATHY, WITH LONG-TERM CURRENT USE OF INSULIN (HCC): ICD-10-CM

## 2022-06-06 RX ORDER — AMITRIPTYLINE HYDROCHLORIDE 10 MG/1
TABLET, FILM COATED ORAL
Qty: 90 TABLET | Refills: 0 | Status: SHIPPED | OUTPATIENT
Start: 2022-06-06 | End: 2022-09-13

## 2022-06-10 ENCOUNTER — OFFICE VISIT (OUTPATIENT)
Dept: NEUROLOGY | Age: 72
End: 2022-06-10
Payer: MEDICARE

## 2022-06-10 VITALS
RESPIRATION RATE: 16 BRPM | WEIGHT: 150.8 LBS | HEART RATE: 96 BPM | BODY MASS INDEX: 22.93 KG/M2 | OXYGEN SATURATION: 98 % | SYSTOLIC BLOOD PRESSURE: 102 MMHG | DIASTOLIC BLOOD PRESSURE: 70 MMHG

## 2022-06-10 DIAGNOSIS — F03.91 DEMENTIA WITH BEHAVIORAL DISTURBANCE, UNSPECIFIED DEMENTIA TYPE: Primary | ICD-10-CM

## 2022-06-10 PROCEDURE — G8420 CALC BMI NORM PARAMETERS: HCPCS | Performed by: STUDENT IN AN ORGANIZED HEALTH CARE EDUCATION/TRAINING PROGRAM

## 2022-06-10 PROCEDURE — 1101F PT FALLS ASSESS-DOCD LE1/YR: CPT | Performed by: STUDENT IN AN ORGANIZED HEALTH CARE EDUCATION/TRAINING PROGRAM

## 2022-06-10 PROCEDURE — G8752 SYS BP LESS 140: HCPCS | Performed by: STUDENT IN AN ORGANIZED HEALTH CARE EDUCATION/TRAINING PROGRAM

## 2022-06-10 PROCEDURE — 1123F ACP DISCUSS/DSCN MKR DOCD: CPT | Performed by: STUDENT IN AN ORGANIZED HEALTH CARE EDUCATION/TRAINING PROGRAM

## 2022-06-10 PROCEDURE — G8754 DIAS BP LESS 90: HCPCS | Performed by: STUDENT IN AN ORGANIZED HEALTH CARE EDUCATION/TRAINING PROGRAM

## 2022-06-10 PROCEDURE — G9717 DOC PT DX DEP/BP F/U NT REQ: HCPCS | Performed by: STUDENT IN AN ORGANIZED HEALTH CARE EDUCATION/TRAINING PROGRAM

## 2022-06-10 PROCEDURE — 3017F COLORECTAL CA SCREEN DOC REV: CPT | Performed by: STUDENT IN AN ORGANIZED HEALTH CARE EDUCATION/TRAINING PROGRAM

## 2022-06-10 PROCEDURE — 99214 OFFICE O/P EST MOD 30 MIN: CPT | Performed by: STUDENT IN AN ORGANIZED HEALTH CARE EDUCATION/TRAINING PROGRAM

## 2022-06-10 PROCEDURE — G8427 DOCREV CUR MEDS BY ELIG CLIN: HCPCS | Performed by: STUDENT IN AN ORGANIZED HEALTH CARE EDUCATION/TRAINING PROGRAM

## 2022-06-10 PROCEDURE — G8536 NO DOC ELDER MAL SCRN: HCPCS | Performed by: STUDENT IN AN ORGANIZED HEALTH CARE EDUCATION/TRAINING PROGRAM

## 2022-06-10 RX ORDER — MEMANTINE HYDROCHLORIDE 5 MG/1
5 TABLET ORAL 2 TIMES DAILY
Qty: 60 TABLET | Refills: 3 | Status: SHIPPED | OUTPATIENT
Start: 2022-06-10 | End: 2022-07-10

## 2022-06-10 NOTE — PROGRESS NOTES
[facility] AT [address], [place], [place]  Phone: [phone] Fax [phone] SUMMARY  Patient Name: Adair Latham : 1950   Treatment/Medical Diagnosis: Other low back pain [M54.59]   Referral Source: Dirk Mcwilliams NP     Date of Initial Visit: 12/15/22 Attended Visits: 14 Missed Visits: 5 CXL  2NS  1 RS     SUMMARY OF TREATMENT  Pt is a 66 yo male that presents to PT for LBP and increased falls. Pt has completed a total of 13 therapy sessions since the [facility] on 12/15. Therapy has consisted of therapeutic exercise, gait training, neuro re-education, self-care/patient education, pain modalities and manual prn, therapeutic activity, functional mobility training, and  home safety training. CURRENT STATUS  Pt was last seen on 22. At that time, pt denied any pain but reported increased fatigue 2/2 initiating radiation tx for prostate cancer. Pt last PN was on 3/28/22:please refer to this PN. Goals at that time were re-assessed and were as follows:    1.  Pt will score >49/100 on FOTO to show significant improvement in functional mobility and QOL    22: 31/100  Current: 3/28/22: regressed 26/100  2. Pt will complete TUG w/ rollator and/or SPC in < 15 seconds. 22: TUG w/ rollator: avg 23.6 sec; TUG w/ SPC: 19.3 sec  Current: 3/28/22: progressing: rollator: 17 sec w/ mod I, TUG w/ SPC: 19.6 sec  3. pt will perform floor transfer w/ mod I, good safety, to enable pt to care for self in event of a fall. 22: pt reports requiring min assist to rise from floor in home. Current: 3/28/22: progressing: min assist in clinic w/ need for hands on table for support  4.  Pt will report no > 2 falls a week in order to indicate reduced risk for injury 2/2 improved strength/balance and home safety.   Current: 22 pt denies any falls since PN on 3/28    RECOMMENDATIONS  Other: PT recommends d/c at this time 2/2 dealing with other medical issues causing increased fatigue. Pt to cont w/ HEP as able, will require new referral to return to PT services. If you have any questions/comments please contact us directly at [phone]. Thank you for allowing us to assist in the care of your patient. Therapist Signature:  Kelley Hernandez Date: 6/10/22   Reporting Period: 4/22/22 to 6/10/22 Time: 10:36 AM

## 2022-06-10 NOTE — PROGRESS NOTES
Jaci Carranza is a 67 y.o. male . presents for Follow-up and Dementia   . A 67years old male patient here for follow-up evaluation of memory loss. He came today with his sister. Last in the clinic in March 2022. Was referred for neuropsychological evaluation which was not done; scheduled next week. Also had an MRI of the brain which showed advanced white matter changes consistent with chronic microvascular ischemic changes with superimposed bilateral coronal radiata and left thalamic infarct. Labs were not done. He states his forgetfulness is getting worse. Sister not sure about the worsening. His speech seems good but. Do not repeat a lot. Might sometimes get lost finishing his sentence. He do not currently driving. Takes his medications properly and uses pill packs; also has alarms. No problem with his finances. He wakes up multiple times at night. No hallucinations. Has anxiety and depression; no suicidal thoughts. He is taking fluoxetine which she claims is helping him. Initial encounter:  A 67years old male patient here for evaluation of progressive forgetfulness; unknown duration but he says has been going on for a while. He came with a caretaker. According to his caretaker, he forgets appointments. Has difficulty getting his words out and has difficulty finishing a sentence. Has difficulty finding items. He takes his medications himself; he has a pillbox for 1 month and do not forget taking them. He takes care of finances by himself; he might forget paying bills. Sometimes, might forget the pin numbers. He is not currently cooking. No difficulty putting his clothes on. No problem taking shower. Has occasional difficulty sleeping; wakes up a lot to go to the bathroom. He occasionally frustrates. He cries and feels sad. No suicidal ideations. Has anxiety. No hallucinations. He might confuse the day and night. No use of alcohol currently. Denied smoking.   He smokes marijuana; past history of heroin abuse. Family history of dementia: Mother, father, and grandmother. No previous history of head trauma. He has prostate cancer; no started on treatment; scheduled for radiotherapy. He has difficulty walking. Use a walker. Legs give out; mainly the right leg. Review of Systems   Constitutional: Negative for chills, fever and weight loss. HENT: Negative for hearing loss and tinnitus. Eyes: Positive for blurred vision (uses glasses). Negative for double vision. Respiratory: Positive for cough and shortness of breath. Negative for hemoptysis and sputum production. Cardiovascular: Negative for chest pain and leg swelling. Gastrointestinal: Negative for nausea and vomiting. Genitourinary: Positive for frequency and urgency. Negative for dysuria. Occasional incontinence   Musculoskeletal: Positive for back pain. Negative for neck pain. Skin: Negative for itching and rash. Neurological: Positive for dizziness, tingling (mostly on the right leg), sensory change and focal weakness (right leg). Negative for tremors, seizures and headaches. Psychiatric/Behavioral: Positive for depression and memory loss. Negative for suicidal ideas. The patient is nervous/anxious and has insomnia.         Past Medical History:   Diagnosis Date    Acute cystitis without hematuria     [facility]JAMIN [facility]) [facility]     Arthritis     Arthropathy     Balanitis     Candida infection     recurrent     Chronic pain     COVID-19 vaccine series completed     WITH BOOSTER    Dementia associated with other underlying disease without behavioral disturbance (HCC)     Depression     Diabetes mellitus, type II ([facility] 07/14/2013    Drug abuse ([facility]     H/O Heroin addiction     ED (erectile dysfunction)     Fatigue     GERD (gastroesophageal reflux disease)     H/O epididymitis 2012    left    H/O: pneumonia 2011    Hep C w/o coma, chronic ([facility] 01/2015    STATES TREATED  History of BPH     History of hematuria     History of tobacco use     Hypertension     Loss of appetite     LOST 10 LBS IN A MONTH    Loss of balance     Microcytic anemia 05/29/2012    Neuropathy     Pancytopenia (HCC)     Paranoid schizophrenia, chronic condition with acute exacerbation ([facility]     Prostate cancer ([facility] 09/08/2021    [place], Dr. Larry Tan, [facility]    PUD (peptic ulcer disease)     SOB (shortness of breath)     WITH WALKING    Status post partial gastrectomy 1990    Stomach ulcer    Stroke ([facility] 2020    BALANCE ISSUES; TIA    Uncircumcised male        Past Surgical History:   Procedure Laterality Date    COLONOSCOPY N/A 1/31/2022    SCREENING COLONOSCOPY polypectomy w/cold snare, BX performed by Yohana Amato MD at [address] [place] [address] HX CATARACT REMOVAL      HX COLONOSCOPY      HX ENDOSCOPY  06/23/2015    [facility], EGD W/ BIOPSY , COLONOSCOPY , Surgeon: Amarilis Miller MD    HX GASTRECTOMY  1990    partial - FOR TUMOR AND ULCER    HX HERNIA REPAIR Right     Right inguinal hernia repair 2006    HX HERNIA REPAIR  07/11/2017    Left indirect inguinal hernia repair    HX OPEN REDUCTION INTERNAL FIXATION      x 2    HX ORCHIECTOMY Left 2013    HX ORTHOPAEDIC  2012    Numerous surgeries on rt. foot    HX OTHER SURGICAL      removed testicle    HX UROLOGICAL  09/08/2021    [place], Dr. [facility]        Family History   Problem Relation Age of Onset    Diabetes Mother     Hypertension Mother     Cancer Mother     OSTEOARTHRITIS Maternal Grandmother     Diabetes Maternal Grandmother     Hypertension Maternal Grandmother     Diabetes Maternal Grandfather     Breast Cancer Daughter         Social History     Socioeconomic History    Marital status: SINGLE     Spouse name: Not on file    Number of children: Not on file    Years of education: Not on file    Highest education level: Not on file   Occupational History    Not on file   Tobacco Use    Smoking status: Former Smoker     Packs/day: 1.00     Years: 20.00     Pack years: 20.00     Types: Cigarettes     Quit date: 2011     Years since quittin.0    Smokeless tobacco: Never Used   Vaping Use    Vaping Use: Never used   Substance and Sexual Activity    Alcohol use: No    Drug use: Yes     Types: Marijuana     Comment: H/O Heroin addiction STATES QUIT   48 YRS AGO; STILL USE MARIJUANA  3X/DAY;  USING MARIJUANA X 54 YRS NOW    Sexual activity: Not Currently     Partners: Female   Other Topics Concern    Not on file   Social History Narrative    Not on file     Social Determinants of Health     Financial Resource Strain:     Difficulty of Paying Living Expenses: Not on file   Food Insecurity:     Worried About Running Out of Food in the Last Year: Not on file    Tanner of Food in the Last Year: Not on file   Transportation Needs:     Lack of Transportation (Medical): Not on file    Lack of Transportation (Non-Medical):  Not on file   Physical Activity:     Days of Exercise per Week: Not on file    Minutes of Exercise per Session: Not on file   Stress:     Feeling of Stress : Not on file   Social Connections:     Frequency of Communication with Friends and Family: Not on file    Frequency of Social Gatherings with Friends and Family: Not on file    Attends Alevism Services: Not on file    Active Member of [address] [place] [address] or Organizations: Not on file    Attends Club or Organization Meetings: Not on file    Marital Status: Not on file   Intimate Partner Violence:     Fear of Current or Ex-Partner: Not on file    Emotionally Abused: Not on file    Physically Abused: Not on file    Sexually Abused: Not on file   Housing Stability:     Unable to Pay for Housing in the Last Year: Not on file    Number of [place] in the Last Year: Not on file    Unstable Housing in the Last Year: Not on file        Allergies   Allergen Reactions    Ibuprofen Anaphylaxis    Fentanyl Other (comments) and Swelling     Blurred vision    Gabapentin Other (comments) and Swelling     Blurred Vision      Metformin Diarrhea    Penicillins Swelling    Valium [Diazepam] Swelling         Current Outpatient Medications   Medication Sig Dispense Refill    memantine (NAMENDA) 5 mg tablet Take 1 Tablet by mouth two (2) times a day for 30 days. 60 Tablet 3    amitriptyline (ELAVIL) 10 mg tablet TAKE ONE TABLET BY MOUTH EVERY EVENING 90 Tablet 0    FLUoxetine (PROzac) 20 mg tablet Take 20 mg by mouth daily.  Januvia 100 mg tablet TAKE ONE TABLET BY MOUTH EVERY DAY 90 Tablet 0    famotidine (PEPCID) 40 mg tablet TAKE ONE TABLET BY MOUTH EVERY DAY 90 Tablet 0    pioglitazone (ACTOS) 30 mg tablet TAKE ONE TABLET BY MOUTH EVERY DAY 93 Tablet 0    amLODIPine (NORVASC) 10 mg tablet TAKE ONE TABLET BY MOUTH EVERY DAY 90 Tablet 1    lisinopriL (PRINIVIL, ZESTRIL) 20 mg tablet TAKE ONE TABLET BY MOUTH EVERY DAY 90 Tablet 1    tamsulosin (FLOMAX) 0.4 mg capsule Take 0.4 mg by mouth every evening.  insulin glargine (Lantus Solostar U-100 Insulin) 100 unit/mL (3 mL) inpn 20 Units by SubCUTAneous route nightly. (Patient taking differently: 19 Units by SubCUTAneous route nightly.) 15 mL 2    flash glucose sensor (FreeStyle Juan Carlos 14 Day Sensor) kit Apply 1 sensor to the back of the upper arm once every 14 days. Check glucose 4 times daily. 2 Kit 11    calcium-cholecalciferol, d3, (CALCIUM 600 + D) 600-125 mg-unit tab Take  by mouth daily.  cholecalciferol, vitamin D3, (Vitamin D3) 50 mcg (2,000 unit) tab Take  by mouth daily.  omeprazole (PRILOSEC) 20 mg capsule TAKE TWO CAPSULES BY MOUTH EVERY  Capsule 1    Insulin Needles, Disposable, 31 gauge x 3/16\" ndle Use 1 new pen needle each time to inject insulin subcutaneously 4 times daily 150 Pen Needle 2    FreeStyle Juan Carlos 14 Day [place] misc Apply 1 Device to affected area once. Physical Exam  Constitutional:       Appearance: Normal appearance. HENT:      Head: Normocephalic and atraumatic. Mouth/Throat:      Mouth: Mucous membranes are moist.      Pharynx: Oropharynx is clear. No oropharyngeal exudate. Eyes:      Extraocular Movements: Extraocular movements intact. Pupils: Pupils are equal, round, and reactive to light. Pulmonary:      Effort: Pulmonary effort is normal. No respiratory distress. Musculoskeletal:         General: Normal range of motion. Cervical back: Normal range of motion and neck supple. Right lower leg: No edema. Left lower leg: No edema. Neurological:      Mental Status: He is alert. Comments: Mental status: Awake, alert, oriented to place and time;  follows simple and complex commands, no neglect, no extinction to DSS;  immediate recall 3/3 and delayed recall 0/3. Speech and languge: fluent, coherent,and comprehension intact  CN: VFF, EOMI, PERRLA, face sensation intact , no facial asymmetry noted, palate elevation symmetric bilat, SS+SCM 5/5 bilat, tongue midline  Motor: no pronator drift, tone normal throughout, strength 5/5 throughout. Sensory: Decreased light touch and pinprick on the right side. Coordination: able to perform finger-nose-finger; was using a cane when walking.   DTR: 2+ throughout            Office Visit on 05/17/2022   Component Date Value Ref Range Status    Glucose 05/17/2022 147* 65 - 99 mg/dL Final    BUN 05/17/2022 22  8 - 27 mg/dL Final    Creatinine 05/17/2022 1.47* 0.76 - 1.27 mg/dL Final    eGFR 05/17/2022 50* >59 mL/min/1.73 Final    BUN/Creatinine ratio 05/17/2022 15  10 - 24 Final    Sodium 05/17/2022 140  134 - 144 mmol/L Final    Potassium 05/17/2022 4.7  3.5 - 5.2 mmol/L Final    Chloride 05/17/2022 100  96 - 106 mmol/L Final    CO2 05/17/2022 27  20 - 29 mmol/L Final    Calcium 05/17/2022 10.6* 8.6 - 10.2 mg/dL Final    Interpretation 05/17/2022 Note   Final    Comment: Medical Director's Note: Effective 2/28/2022, [facility] uses  the 2021 CKD-EPI creatinine equation without a race factor  to calculate and report eGFR results. eGFR results reported  prior to this date using the 2009 CKD-EPI equation are no  longer included in this report interpretation.  -------------------------------  CHRONIC KIDNEY DISEASE:  EGFR, BLOOD PRESSURE, AND PROTEINURIA ASSESSMENT  Estimated GFR has not changed significantly, was 53 and now  is 50 mL/min/1.73mE2. Current eGFR corresponds to CKD stage  3a. Potassium is within goal and has not changed  significantly, was 4.5 and now is 4.7 mmol/L. Glycemic  control (HB A1c: 8.8 % 3/17/2022) is not within goal and  additional action is indicated. EGFR, BLOOD PRESSURE, AND PROTEINURIA TREATMENT SUGGESTIONS  -  Guidelines recommend a target blood pressure of 120/80 mmHg  or less in CKD patients to reduce cardiovascular risk and  CKD progression. Assessment of albuminuria (urine  albumin:creatinine ratio or urine protein:creatini                           ne ratio  preferred) is recommended at least annually in CKD patients  for staging and disease prognosis. EGFR, BLOOD PRESSURE, AND PROTEINURIA FOLLOW-UP  -  Spot Urine Panel is recommended by guidelines, at least  yearly; fasting Renal Panel within 1 month;  BONE and MINERAL ASSESSMENT  Calcium is above goal and has risen, was 10.2 and now is  10.6 mg/dL. Carbon Dioxide is within goal and has risen, was  23 and now is 27 mmol/L. Guidelines recommend the  measurement of 25-hydroxy vitamin D in patients with CKD. BONE and MINERAL TREATMENT SUGGESTIONS  -  Interpretations require simultaneous measurements of serum  calcium and phosphorus. Stop calcium supplements if in use. BONE and MINERAL FOLLOW-UP  -  fasting Renal Panel within 1 month; fasting PTH with Renal  Panel is recommended by guidelines, at least yearly;  25-Hydroxy Vitamin D is due;  LIPIDS ASSESSMENT  Most recent order does not include a fasting Lipid Panel.   LIPIDS FOLLOW-UP  -  fasting Lipid Panel within 10 months;  ANE                           JEFF ASSESSMENT  Most recent order does not include a CBC Panel or iron  studies. -------------------------------  DISCLAIMER  These assessments and treatment suggestions are provided as  a convenience in support of the physician-patient  relationship and are not intended to replace the physician's  clinical judgment. They are derived from national guidelines  in addition to other evidence and expert opinion. The  clinician should consider this information within the  context of clinical opinion and the individual patient. SEE GUIDANCE FOR CHRONIC KIDNEY DISEASE PROGRAM: Kidney  Disease Improving Global Outcomes (KDIGO) clinical practice  guidelines are at http://kdigo. org/home/guidelines/.  [employer] Kidney Disease Outcomes Quality  Initiative (KDOQI (TM)), with its limitations and  disclaimers, are at www.kidney. org/professionals/KDOQI. This  program is intended for patients who have been diagnosed  with stages 3, 4, or pre-dialysis 5 CKD. It is not intended  for ch                           ildren, pregnant patients, or transplant patients.      Orders Only on 05/10/2022   Component Date Value Ref Range Status    WBC 05/10/2022 5.2  4.0 - 11.0 K/uL Final    RBC 05/10/2022 4.94  3.80 - 5.80 M/uL Final    HGB 05/10/2022 11.5* 12.6 - 17.1 g/dL Final    HCT 05/10/2022 37.4* 37.8 - 52.2 % Final    MCV 05/10/2022 76* 80 - 95 fL Final    MCH 05/10/2022 23* 26 - 34 pg Final    MCHC 05/10/2022 31  31 - 36 g/dL Final    RDW 05/10/2022 15.0  10.0 - 15.5 % Final    PLATELET 65/16/3716 143* 140 - 440 K/uL Final    MPV 05/10/2022 12.8  9.0 - 13.0 fL Final    IMMATURE PLATELET FRACTION 45/07/5207 6.3  1.1 - 7.1 % Final    Glucose 05/10/2022 183* 70 - 99 mg/dL Final    BUN 05/10/2022 42* 6 - 22 mg/dL Final    Creatinine 05/10/2022 2.0* 0.8 - 1.6 mg/dL Final    Sodium 05/10/2022 137  133 - 145 mmol/L Final    Potassium 05/10/2022 4.2  3.5 - 5.5 mmol/L Final  Chloride 05/10/2022 100  98 - 110 mmol/L Final    CO2 05/10/2022 27  20 - 32 mmol/L Final    AST (SGOT) 05/10/2022 32  10 - 37 U/L Final    ALT (SGPT) 05/10/2022 32  5 - 40 U/L Final    Alk. phosphatase 05/10/2022 109  40 - 125 U/L Final    Bilirubin, total 05/10/2022 0.3  0.2 - 1.2 mg/dL Final    Calcium 05/10/2022 11.6* 8.4 - 10.5 mg/dL Final    Protein, total 05/10/2022 7.2  6.2 - 8.1 g/dL Final    Albumin 05/10/2022 4.8  3.5 - 5.0 g/dL Final    A-G Ratio 05/10/2022 2.0  1.1 - 2.6 ratio Final    Globulin 05/10/2022 2.4  2.0 - 4.0 g/dL Final    Anion gap 05/10/2022 10.0  3.0 - 15.0 mmol/L Final    Comment: Anion Gap calculation based on electrolyte reference ranges. Test includes Albumin, Alkaline Phosphatase, ALT, AST, BUN, Calcium, CO2,  Chloride, Creatinine, Glucose, Potassium, Sodium, Total Bilirubin and Total  Protein. Estimated GFR results are reported in mL/min/1.73 sq.m. by the MDRD equation. This eGFR is validated for stable chronic renal failure patients. This   equation  is unreliable in acute illness or patients with normal renal function.               GFRAA 05/10/2022 39.5* >60.0 Final    GFRNA 05/10/2022 32.6* >60.0 Final    Prostate Specific Ag 05/10/2022 0.740  <=4.400 ng/mL Final    Testosterone, Serum (Total) 05/10/2022 <3* 348 - 1197 ng/dL Final    VITAMIN D, 25-HYDROXY 05/10/2022 >200.0* 32.0 - 100.0 ng/mL Final   Admission on 04/01/2022, Discharged on 04/01/2022   Component Date Value Ref Range Status    Glucose (POC) 04/01/2022 110* 65 - 105 mg/dL Final    Comment: Notified MD  : [id number]     Office Visit on 03/17/2022   Component Date Value Ref Range Status    Glucose 03/17/2022 294* 65 - 99 mg/dL Final    BUN 03/17/2022 21  8 - 27 mg/dL Final    Creatinine 03/17/2022 1.41* 0.76 - 1.27 mg/dL Final    eGFR 03/17/2022 53* >59 mL/min/1.73 Final    BUN/Creatinine ratio 03/17/2022 15  10 - 24 Final    Sodium 03/17/2022 140  134 - 144 mmol/L Final    Potassium 03/17/2022 4.5  3.5 - 5.2 mmol/L Final    Chloride 03/17/2022 100  96 - 106 mmol/L Final    CO2 03/17/2022 23  20 - 29 mmol/L Final    Calcium 03/17/2022 10.2  8.6 - 10.2 mg/dL Final    Protein, total 03/17/2022 7.4  6.0 - 8.5 g/dL Final    Albumin 03/17/2022 4.7  3.7 - 4.7 g/dL Final    GLOBULIN, TOTAL 03/17/2022 2.7  1.5 - 4.5 g/dL Final    A-G Ratio 03/17/2022 1.7  1.2 - 2.2 Final    Bilirubin, total 03/17/2022 0.2  0.0 - 1.2 mg/dL Final    Alk. phosphatase 03/17/2022 102  44 - 121 IU/L Final    AST (SGOT) 03/17/2022 27  0 - 40 IU/L Final    ALT (SGPT) 03/17/2022 30  0 - 44 IU/L Final    Cholesterol, total 03/17/2022 138  100 - 199 mg/dL Final    Triglyceride 03/17/2022 56  0 - 149 mg/dL Final    HDL Cholesterol 03/17/2022 71  >39 mg/dL Final    VLDL, calculated 03/17/2022 12  5 - 40 mg/dL Final    LDL, calculated 03/17/2022 55  0 - 99 mg/dL Final    INTERPRETATION 03/17/2022 Note   Final    Supplemental report is available.  Hemoglobin A1c 03/17/2022 8.8* 4.8 - 5.6 % Final    Comment:          Prediabetes: 5.7 - 6.4           Diabetes: >6.4           Glycemic control for adults with diabetes: <7.0      Estimated average glucose 03/17/2022 206  mg/dL Final   Orders Only on 03/14/2022   Component Date Value Ref Range Status    Glucose 03/14/2022 138* 70 - 99 mg/dL Final    BUN 03/14/2022 21  6 - 22 mg/dL Final    Creatinine 03/14/2022 1.4  0.8 - 1.6 mg/dL Final    Sodium 03/14/2022 140  133 - 145 mmol/L Final    Potassium 03/14/2022 4.6  3.5 - 5.5 mmol/L Final    Chloride 03/14/2022 102  98 - 110 mmol/L Final    CO2 03/14/2022 27  20 - 32 mmol/L Final    Calcium 03/14/2022 10.3  8.4 - 10.5 mg/dL Final    Anion gap 03/14/2022 11.0  3.0 - 15.0 mmol/L Final    Comment: Anion Gap calculation based on electrolyte reference ranges. Test includes BUN, CO2, Chloride, Creatinine, Glucose, Potassium, Calcium and  Sodium.    The ordering provider was changed from Hernandez Polanco to Edison Berry on  3/18/22 by [employer], [employer]([id number]). The authorizing provider was changed from Liliya Caraballo to Edison Berry on  3/18/22 by [employer], [employer]([id number]). Estimated GFR results are reported in mL/min/1.73 sq.m. by the MDRD equation. This eGFR is validated for stable chronic renal failure patients. This   equation  is unreliable in acute illness or patients with normal renal function.  GFRAA 03/14/2022 58.9* >60.0 Final    GFRNA 03/14/2022 48.6* >60.0 Final   Hospital Outpatient Visit on 03/11/2022   Component Date Value Ref Range Status    Isolate 03/11/2022 *   Final                    Value:10,000 CFU/mL  Staphylococcus epidermidis      Sodium 03/11/2022 136  136 - 145 mEq/L Final    Potassium 03/11/2022 4.5  3.5 - 5.1 mEq/L Final    Chloride 03/11/2022 104  98 - 107 mEq/L Final    CO2 03/11/2022 27  21 - 32 mEq/L Final    Glucose 03/11/2022 283* 74 - 106 mg/dl Final    BUN 03/11/2022 26* 7 - 25 mg/dl Final    Creatinine 03/11/2022 1.6* 0.6 - 1.3 mg/dl Final    GFR est AA 03/11/2022 55.0    Final    Comment: THE [facility] STATES THAT THE MDRD STUDY EQUATION SHOULD ONLY BE USED ON  INDIVIDUALS 18 OR OLDER. THE REPORT ALSO NOTES THAT THE MDRD STUDY EQUATION HAS NOT BEEN  VALIDATED FOR USE WITH THE ELDERLY (OVER 79YEARS OF AGE), PREGNANT WOMEN, PATIENTS WITH SERIOUS  COMORBID CONDITIONS, OR PERSONS WITH EXTREMES OF BODY SIZE, MUSCLE MASS, OR NUTRITIONAL STATUS. APPLICATION OF THE EQUATION TO THESE PATIENT GROUPS MAY LEAD TO ERRORS IN GFR ESTIMATION. GFR  ESTIMATING EQUATIONS HAVE POORER AGREEMENT WITH MEASURED GFR FOR ILL HOSPITALIZED PATIENTS AND  FOR PEOPLE WITH NEAR NORMAL KIDNEY FUNCTION THAN FOR SUBJECTS IN THE MDRD STUDY.  VALIDATION  STUDIES ARE IN PROGRESS TO EVALUATE THE MDRD STUDY EQUATION FOR ADDITIONAL ETHNIC GROUPS, THE  ELDERLY, VARIOUS DISEASE CONDITIONS, AND PEOPLE WITH NORMAL KIDNEY FUNCTION. GFRA----REFERS TO   GFRO---REFERS TO OTHER RACES    REFERENCES AVAILABLE UPON REQUEST.        GFR est non-AA 03/11/2022 45    Final    Calcium 03/11/2022 10.6* 8.5 - 10.1 mg/dl Final    Anion gap 03/11/2022 5  5 - 15 mmol/L Final             ICD-10-CM ICD-9-CM    1. Dementia with behavioral disturbance, unspecified dementia type (HCC)  F03.91 294.21 memantine (NAMENDA) 5 mg tablet     Will start him on memantine 5 mg p.o. twice daily. We will follow neuropsychological evaluation result. I have told him to get the labs done: TSH, B12, and RPR. He will continue taking the fluoxetine 20 mg p.o. daily. I have advised him to remain active. We will see him again in 4 months time.

## 2022-06-10 NOTE — PATIENT INSTRUCTIONS
Memantine (By mouth)   Memantine (me-MAN-teen)  Treats dementia associated with Alzheimer disease. Brand Name(s): Namenda, Namenda Titration Pack, Namenda XR, Namenda XR Titration Pack   There may be other brand names for this medicine. When This Medicine Should Not Be Used: You should not use this medicine if you have had an allergic reaction to memantine. How to Use This Medicine:   Long Acting Capsule, Liquid, Tablet  · Take your medicine as directed. Your dose may need to be changed several times to find what works best for you. Most people need to wait at least one week between dose changes. · You may take this medicine with or without food. · Measure the oral liquid medicine with a marked measuring spoon, oral syringe, or medicine cup. · Swallow the extended-release capsule whole. Do not crush, break, or chew it. · If you cannot swallow the extended-release capsule, you may open it and pour the medicine into a small amount of soft food such as pudding, yogurt, or applesauce. Stir this mixture well and swallow it without chewing. · For the liquid and extended-release capsule form: Read and follow the patient instructions that come with this medicine. Talk to your doctor or pharmacist if you have any questions. If a dose is missed:   · Take a dose as soon as you remember. If it is almost time for your next dose, wait until then and take a regular dose. Do not take extra medicine to make up for a missed dose. How to Store and Dispose of This Medicine:   · Store the medicine in a closed container at room temperature, away from heat, moisture, and direct light. · Ask your pharmacist, doctor, or health caregiver about the best way to dispose of any outdated medicine or medicine no longer needed. · Keep all medicine out of the reach of children. Never share your medicine with anyone.   Drugs and Foods to Avoid:   Ask your doctor or pharmacist before using any other medicine, including over-the-counter medicines, vitamins, and herbal products. · Make sure your doctor knows if you are also using amantadine (Symmetrel®), cimetidine (Tagamet®), ketamine ([place]), metformin (Glucophage®), quinidine (Cardioquin®, Norma Plant), or ranitidine (Zantac®). · Tell your doctor if you are also using a diuretic or \"water pill\" (such as acetazolamide, hydrochlorothiazide [HCTZ], methazolamide, triamterene, Diamox®, Dyazide®, Dyrenium®, Maxzide®, or Neptazane®) or an antacid or laxative that contains sodium bicarbonate, baking soda, or bicarbonate of soda (such as [place]®). · Tell your doctor if you smoke or if you are using a stop-smoking aid that contains nicotine (such as Nicoderm®, Nicorette®, or Nicotrol®) or a cold or cough medicine that contains dextromethorphan (DayQuil®, NyQuil®, Robitussin® DM, or TheraFlu®). Warnings While Using This Medicine:   · Make sure your doctor knows if you are pregnant or breastfeeding, or if you have kidney disease, liver disease, bladder problems or difficulty with urination, or epilepsy or seizures. · Check with your doctor right away if you get a urinary tract infection. This includes any infection in your bladder or kidneys. Your dose of this medicine might need to be changed while you have an infection. · Your doctor will check your progress and the effects of this medicine at regular visits. Keep all appointments. Possible Side Effects While Using This Medicine:   Call your doctor right away if you notice any of these side effects:  · Allergic reaction: Itching or hives, swelling in your face or hands, swelling or tingling in your mouth or throat, chest tightness, trouble breathing  · Change in how much or how often you urinate. · Chest pain. · Lightheadedness, dizziness, or fainting. · Seeing or hearing things that are not there. · Severe sleepiness, restlessness, or confusion. · Sudden or severe headache.   If you notice these less serious side effects, talk with your doctor:   · Back pain. · Constipation, diarrhea, vomiting, or stomach pain. · Feeling aggressive or depressed. · Mild headache. · Tiredness or weakness. · Weight gain. If you notice other side effects that you think are caused by this medicine, tell your doctor. Call your doctor for medical advice about side effects. You may report side effects to FDA at 1-258-DYH-3428  © 2017 [facility] Information is for End User's use only and may not be sold, redistributed or otherwise used for commercial purposes. The above information is an  only. It is not intended as medical advice for individual conditions or treatments. Talk to your doctor, nurse or pharmacist before following any medical regimen to see if it is safe and effective for you.

## 2022-06-10 NOTE — PROGRESS NOTES
Caitlyn Welch is a 67 y.o. male who is in the office as a follow up. 1. Have you been to the ER, urgent care clinic since your last visit? Hospitalized since your last visit?no    2. Have you seen or consulted any other health care providers outside of the [address] [place] [address] since your last visit? Include any pap smears or colon screening.  No

## 2022-06-13 ENCOUNTER — TELEPHONE (OUTPATIENT)
Dept: NEUROLOGY | Age: 72
End: 2022-06-13

## 2022-06-13 NOTE — TELEPHONE ENCOUNTER
Spoke with patient verified name and , patient reports abdominal pains, shaking, tremors, nausea and Diarrhea. He also stated he didn't feel like himself and had to get out of the house. Patient awaits MD advice to continue or to stop medication namenda.

## 2022-06-13 NOTE — TELEPHONE ENCOUNTER
Patient aide called and stated he was prescribed a medication on Friday and the patient has gotten sick and had to go to the ED and the ED told him it was from the namenda patient was seen at [facility] general

## 2022-06-16 ENCOUNTER — TELEPHONE (OUTPATIENT)
Dept: NEUROLOGY | Age: 72
End: 2022-06-16

## 2022-06-27 ENCOUNTER — HOSPITAL ENCOUNTER (OUTPATIENT)
Dept: LAB | Age: 72
Discharge: HOME | End: 2022-06-27

## 2022-06-27 LAB — XX-LABCORP SPECIMEN COL,LCBCF: NORMAL

## 2022-06-27 PROCEDURE — 99001 SPECIMEN HANDLING PT-LAB: CPT

## 2022-06-28 LAB
RPR SER QL: NON REACTIVE
TSH SERPL DL<=0.005 MIU/L-ACNC: 0.76 UIU/ML (ref 0.45–4.5)
VIT B12 SERPL-MCNC: 625 PG/ML (ref 232–1245)

## 2022-07-29 ENCOUNTER — HOSPITAL ENCOUNTER (OUTPATIENT)
Dept: LAB | Age: 72
Discharge: HOME | End: 2022-07-29

## 2022-07-29 DIAGNOSIS — Z79.4 DIABETES MELLITUS, TYPE II, INSULIN DEPENDENT (HCC): ICD-10-CM

## 2022-07-29 DIAGNOSIS — I10 ESSENTIAL HYPERTENSION WITH GOAL BLOOD PRESSURE LESS THAN 130/80: ICD-10-CM

## 2022-07-29 DIAGNOSIS — E11.9 DIABETES MELLITUS, TYPE II, INSULIN DEPENDENT (HCC): ICD-10-CM

## 2022-07-29 DIAGNOSIS — K21.9 GASTROESOPHAGEAL REFLUX DISEASE, UNSPECIFIED WHETHER ESOPHAGITIS PRESENT: ICD-10-CM

## 2022-07-29 LAB — XX-LABCORP SPECIMEN COL,LCBCF: NORMAL

## 2022-07-29 PROCEDURE — 99001 SPECIMEN HANDLING PT-LAB: CPT

## 2022-07-29 RX ORDER — SITAGLIPTIN 100 MG/1
TABLET, FILM COATED ORAL
Qty: 90 TABLET | Refills: 0 | Status: SHIPPED | OUTPATIENT
Start: 2022-07-29 | End: 2022-10-28 | Stop reason: SDUPTHER

## 2022-07-29 RX ORDER — AMLODIPINE BESYLATE 10 MG/1
TABLET ORAL
Qty: 90 TABLET | Refills: 0 | Status: SHIPPED | OUTPATIENT
Start: 2022-07-29

## 2022-07-29 RX ORDER — LISINOPRIL 20 MG/1
TABLET ORAL
Qty: 90 TABLET | Refills: 0 | Status: SHIPPED | OUTPATIENT
Start: 2022-07-29

## 2022-07-29 RX ORDER — FAMOTIDINE 40 MG/1
TABLET, FILM COATED ORAL
Qty: 93 TABLET | Refills: 0 | Status: SHIPPED | OUTPATIENT
Start: 2022-07-29 | End: 2022-10-28 | Stop reason: SDUPTHER

## 2022-08-08 ENCOUNTER — TELEPHONE (OUTPATIENT)
Dept: NEUROLOGY | Age: 72
End: 2022-08-08

## 2022-08-08 NOTE — TELEPHONE ENCOUNTER
Pt. Chloé Muir called because Dr. Ignacia Rand never sent a new referral for pt. To complete his neuropsych testing  Contact: [phone]  Please advise.

## 2022-08-15 NOTE — TELEPHONE ENCOUNTER
Patient was referred to Dr. Fartun Zimmerman she is no longer seeing patients and patient needs to be referred else where

## 2022-08-17 ENCOUNTER — DOCUMENTATION ONLY (OUTPATIENT)
Dept: NEUROLOGY | Age: 72
End: 2022-08-17

## 2022-08-23 ENCOUNTER — VIRTUAL VISIT (OUTPATIENT)
Dept: FAMILY MEDICINE CLINIC | Age: 72
End: 2022-08-23
Payer: MEDICARE

## 2022-08-23 DIAGNOSIS — Z79.4 TYPE 2 DIABETES MELLITUS WITH DIABETIC POLYNEUROPATHY, WITH LONG-TERM CURRENT USE OF INSULIN (HCC): Primary | ICD-10-CM

## 2022-08-23 DIAGNOSIS — E11.42 TYPE 2 DIABETES MELLITUS WITH DIABETIC POLYNEUROPATHY, WITH LONG-TERM CURRENT USE OF INSULIN (HCC): Primary | ICD-10-CM

## 2022-08-23 DIAGNOSIS — I10 ESSENTIAL HYPERTENSION WITH GOAL BLOOD PRESSURE LESS THAN 130/80: ICD-10-CM

## 2022-08-23 DIAGNOSIS — F02.818 DEMENTIA ASSOCIATED WITH OTHER UNDERLYING DISEASE WITH BEHAVIORAL DISTURBANCE: ICD-10-CM

## 2022-08-23 PROBLEM — Z00.00 PREVENTATIVE HEALTH CARE: Status: ACTIVE | Noted: 2022-08-23

## 2022-08-23 PROCEDURE — 3052F HG A1C>EQUAL 8.0%<EQUAL 9.0%: CPT | Performed by: NURSE PRACTITIONER

## 2022-08-23 PROCEDURE — 99214 OFFICE O/P EST MOD 30 MIN: CPT | Performed by: NURSE PRACTITIONER

## 2022-08-23 PROCEDURE — G8427 DOCREV CUR MEDS BY ELIG CLIN: HCPCS | Performed by: NURSE PRACTITIONER

## 2022-08-23 PROCEDURE — G9717 DOC PT DX DEP/BP F/U NT REQ: HCPCS | Performed by: NURSE PRACTITIONER

## 2022-08-23 PROCEDURE — 3017F COLORECTAL CA SCREEN DOC REV: CPT | Performed by: NURSE PRACTITIONER

## 2022-08-23 PROCEDURE — 2022F DILAT RTA XM EVC RTNOPTHY: CPT | Performed by: NURSE PRACTITIONER

## 2022-08-23 PROCEDURE — G8756 NO BP MEASURE DOC: HCPCS | Performed by: NURSE PRACTITIONER

## 2022-08-23 PROCEDURE — 1101F PT FALLS ASSESS-DOCD LE1/YR: CPT | Performed by: NURSE PRACTITIONER

## 2022-08-23 PROCEDURE — 1123F ACP DISCUSS/DSCN MKR DOCD: CPT | Performed by: NURSE PRACTITIONER

## 2022-08-23 RX ORDER — MEMANTINE HYDROCHLORIDE 5 MG/1
5 TABLET ORAL 2 TIMES DAILY
COMMUNITY
Start: 2022-07-29 | End: 2022-09-08 | Stop reason: SDUPTHER

## 2022-08-23 NOTE — PROGRESS NOTES
Please use this number [phone]    When asked if patient has any concerns he  or she would like to address with MIKE Archibald patient states yes, I would like to find a new neurophycologist . Patient states I do not maind if it is [facility] I Just want to get a new doctor     Did you take your medication today? Yes      1. \"Have you been to the ER, urgent care clinic since your last visit? Hospitalized since your last visit? \" Yes Patient states I went to [facility] for stomach pains and back pains. 2. \"Have you seen or consulted any other health care providers outside of the [address] [place] [address] since your last visit? \" No     3. For patients aged 39-70: Has the patient had a colonoscopy / FIT/ Cologuard? Yes - no Care Gap present      If the patient is female:    4. For patients aged 41-77: Has the patient had a mammogram within the past 2 years? NA - based on age or sex      11. For patients aged 21-65: Has the patient had a pap smear?  NA - based on age or sex        1 most recent PHQ Screens 3/17/2022   Little interest or pleasure in doing things Nearly every day   Feeling down, depressed, irritable, or hopeless Nearly every day   Total Score PHQ 2 6   Trouble falling or staying asleep, or sleeping too much Nearly every day   Feeling tired or having little energy Nearly every day   Poor appetite, weight loss, or overeating Not at all   Feeling bad about yourself - or that you are a failure or have let yourself or your family down More than half the days   Trouble concentrating on things such as school, work, reading, or watching TV Nearly every day   Moving or speaking so slowly that other people could have noticed; or the opposite being so fidgety that others notice Several days   Thoughts of being better off dead, or hurting yourself in some way Several days   PHQ 9 Score 19         Health Maintenance Due   Topic Date Due    DTaP/Tdap/Td series (1 - Tdap) Never done    Shingrix Vaccine Age 50> (1 of 2) Never done    Low dose CT lung screening  Never done    AAA Screening 73-67 YO Male Smoking Patients  Never done    COVID-19 Vaccine (4 - Booster for Moderna series) 03/10/2022       Learning Assessment 12/28/2020   PRIMARY LEARNER Patient   HIGHEST LEVEL OF EDUCATION - PRIMARY LEARNER  GRADUATED HIGH SCHOOL OR GED   BARRIERS PRIMARY LEARNER NONE   CO-LEARNER CAREGIVER No   PRIMARY LANGUAGE ENGLISH   LEARNER PREFERENCE PRIMARY LISTENING   ANSWERED BY Tate Plummer   RELATIONSHIP SELF

## 2022-08-23 NOTE — PROGRESS NOTES
[address] [place] [address]               [phone]      Krishna Blanc is a 67 y.o. male who was seen by synchronous (real-time) audio-video technology on 8/23/2022 for No chief complaint on file. Assessment & Plan:   Diagnoses and all orders for this visit:    1. Type 2 diabetes mellitus with diabetic polyneuropathy, with long-term current use of insulin (HCC)  Endorses medication compliance, Denies signs and symptoms of hyper or hypoglycemia, and last A1C greater than 7, will check at next office visit      2. Essential hypertension with goal blood pressure less than 130/80  Endorses medication compliance and Blood pressure stable per home checks, continue lisinopril and amlodipine at same dose      3. Dementia associated with other underlying disease with behavioral disturbance [facility])  He has had dementia testing completed with [facility], spoke with Dr. Chidi Sutton to inform him of this and faxed [facility] notes to his office      Labs at next visit  Follow-up and Dispositions    Return in about 3 months (around 11/23/2022) for DM, HTN, 30 min, office only. 712  Subjective:     Accompanied on this visit with Nito Avalos personal aid      Neuropsych referral/ currently managed by Dr. Chidi Sutton neurologist  Dr. Moises Encarnacion left the practice and the next referral did not take his insurance  States he is very frustrated with this process  States he is getting very forgetful and easily frustrated   States he is getting his medications from a pill pack pharmacy  Endorses smoking marijuana, feels like it calms him down, admits to overdoing it sometimes  Denies drinking ETOH    DMII-   Patient reports medication compliance Daily  Diabetic diet compliance most of the time  Patient monitors blood sugars regularly TID   Reports am fasting sugars range  AM: 139  PM: 163   Denies hypoglycemic episodes yes  Denies polyuria, polydipsia, paraesthesia, vision changes?  yes  Engaging in daily exercise? No     Diabetic Foot and Eye Exam HM Status   Topic Date Due    Diabetic Foot Care  2023    Eye Exam  2024     Hemoglobin A1c   Date Value Ref Range Status   2022 8.8 (H) 4.8 - 5.6 % Final     Comment:              Prediabetes: 5.7 - 6.4           Diabetes: >6.4           Glycemic control for adults with diabetes: <7.0     ]  Creatinine, urine   Date Value Ref Range Status   2021 55.5 Not Estab. mg/dL Final     Microalb/Creat ratio (ug/mg creat.)   Date Value Ref Range Status   2021 26 0 - 29 mg/g creat Final     Comment:                            Normal:                0 -  29                         Moderately increased: 30 - 300                         Severely increased:       >300     2021 24 0 - 29 mg/g creat Final     Comment:                            Normal:                0 -  29                         Moderately increased: 30 - 300                         Severely increased:       >300     2020 19 0 - 29 mg/g creat Final     Comment:                            Normal:                0 -  29                         Moderately increased: 30 - 300                         Severely increased:       >300                **Please note reference interval change**       Key Antihyperglycemic Medications               Januvia 100 mg tablet (Taking) TAKE ONE TABLET BY MOUTH EVERY DAY    pioglitazone (ACTOS) 30 mg tablet (Taking) TAKE ONE TABLET BY MOUTH EVERY DAY    insulin glargine (Lantus Solostar U-100 Insulin) 100 unit/mL (3 mL) inpn (Taking) 20 Units by SubCUTAneous route nightly. Hypertension:  There were no vitals taken for this visit. Patient reports taking medications as instructed. yes   Medication side effects noted. no  Headache upon wakening. no   Home BP monitorin/78  Do you experience chest pain/pressure or SOB with exertion? no  Maintain a low Sodium diet?  yes  Key CAD CHF Meds               lisinopriL (PRINIVIL, ZESTRIL) 20 mg tablet (Taking) TAKE ONE TABLET BY MOUTH EVERY DAY    amLODIPine (NORVASC) 10 mg tablet (Taking) TAKE ONE TABLET BY MOUTH EVERY DAY          BUN   Date Value Ref Range Status   05/17/2022 22 8 - 27 mg/dL Final     Creatinine   Date Value Ref Range Status   05/17/2022 1.47 (H) 0.76 - 1.27 mg/dL Final     GFR est AA   Date Value Ref Range Status   03/11/2022 55.0   Final     Comment:     THE [facility] STATES THAT THE MDRD STUDY EQUATION SHOULD ONLY BE USED ON  INDIVIDUALS 18 OR OLDER. THE REPORT ALSO NOTES THAT THE MDRD STUDY EQUATION HAS NOT BEEN  VALIDATED FOR USE WITH THE ELDERLY (OVER 79YEARS OF AGE), PREGNANT WOMEN, PATIENTS WITH SERIOUS  COMORBID CONDITIONS, OR PERSONS WITH EXTREMES OF BODY SIZE, MUSCLE MASS, OR NUTRITIONAL STATUS. APPLICATION OF THE EQUATION TO THESE PATIENT GROUPS MAY LEAD TO ERRORS IN GFR ESTIMATION. GFR  ESTIMATING EQUATIONS HAVE POORER AGREEMENT WITH MEASURED GFR FOR ILL HOSPITALIZED PATIENTS AND  FOR PEOPLE WITH NEAR NORMAL KIDNEY FUNCTION THAN FOR SUBJECTS IN THE MDRD STUDY. VALIDATION  STUDIES ARE IN PROGRESS TO EVALUATE THE MDRD STUDY EQUATION FOR ADDITIONAL ETHNIC GROUPS, THE  ELDERLY, VARIOUS DISEASE CONDITIONS, AND PEOPLE WITH NORMAL KIDNEY FUNCTION. GFRA----REFERS TO   GFRO---REFERS TO OTHER RACES    REFERENCES AVAILABLE UPON REQUEST. Potassium   Date Value Ref Range Status   05/17/2022 4.7 3.5 - 5.2 mmol/L Final             Prior to Admission medications    Medication Sig Start Date End Date Taking?  Authorizing Provider   Januvia 100 mg tablet TAKE ONE TABLET BY MOUTH EVERY DAY 7/29/22  Yes Rd King NP   famotidine (PEPCID) 40 mg tablet TAKE ONE TABLET BY MOUTH EVERY DAY 7/29/22  Yes Rd King NP   lisinopriL (PRINIVIL, ZESTRIL) 20 mg tablet TAKE ONE TABLET BY MOUTH EVERY DAY 7/29/22  Yes Rd King NP   amLODIPine (NORVASC) 10 mg tablet TAKE ONE TABLET BY MOUTH EVERY DAY 7/29/22  Yes Colleen Corrales Beverley SMITH NP   pioglitazone (ACTOS) 30 mg tablet TAKE ONE TABLET BY MOUTH EVERY DAY 6/29/22  Yes Stefany Valero NP   omeprazole (PRILOSEC) 20 mg capsule TAKE TWO CAPSULES BY MOUTH EVERY DAY 6/29/22  Yes Stefany Valero NP   amitriptyline (ELAVIL) 10 mg tablet TAKE ONE TABLET BY MOUTH EVERY EVENING 6/6/22  Yes Stefany Valero NP   FLUoxetine (PROzac) 20 mg tablet Take 20 mg by mouth daily. Yes Provider, Historical   tamsulosin (FLOMAX) 0.4 mg capsule Take 0.4 mg by mouth every evening. 2/28/22  Yes Edy Mcintyre MD   insulin glargine (Lantus Solostar U-100 Insulin) 100 unit/mL (3 mL) inpn 20 Units by SubCUTAneous route nightly. Patient taking differently: 19 Units by SubCUTAneous route nightly. 3/4/22  Yes Stefany Valero NP   flash glucose sensor (FreeStyle [place] 14 Day Sensor) kit Apply 1 sensor to the back of the upper arm once every 14 days. Check glucose 4 times daily. 3/4/22  Yes Stefany Valero NP   calcium-cholecalciferol, d3, (CALCIUM 600 + D) 600-125 mg-unit tab Take  by mouth daily. Yes Provider, Historical   cholecalciferol, vitamin D3, 50 mcg (2,000 unit) tab Take  by mouth daily. Yes Provider, Historical   Insulin Needles, Disposable, 31 gauge x 3/16\" ndle Use 1 new pen needle each time to inject insulin subcutaneously 4 times daily 10/7/21  Yes Stefany Valero NP   FreeStyle Juan Carlos 14 Day [place] misc Apply 1 Device to affected area once. 9/10/21  Yes Provider, Historical   memantine (NAMENDA) 5 mg tablet Take 5 mg by mouth two (2) times a day.  7/29/22   Provider, Historical     Patient Active Problem List   Diagnosis Code    Essential hypertension with goal blood pressure less than 130/80 I10    Type 2 diabetes mellitus with hyperglycemia, with long-term current use of insulin (HCC) E11.65, Z79.4    Gastroesophageal reflux disease K21.9    BPH (benign prostatic hyperplasia) N40.0    Microcytic anemia D50.9    NS (nuclear sclerosis) H25.10    Severe depression [facility]) F32.2    Tarsal tunnel syndrome G57.50    Dementia associated with other underlying disease with behavioral disturbance (HCC) F02.81    Status post partial gastrectomy Z90.3    History of hepatitis C Z86.19    History of drug abuse ([facility] F19.11    Paranoid schizophrenia, chronic condition with acute exacerbation ([facility] F20.0    Posterior vitreous detachment, right eye H43.811    Viral hepatitis C B19.20    Vitreomacular adhesion, left eye H43.822    Prostate cancer ([facility] C61    Lumbosacral spondylosis without myelopathy M47.817    Pain in right foot M79.671    Radiculopathy due to lumbar intervertebral disc disorder M51.16    Type 2 diabetes mellitus with diabetic polyneuropathy, with long-term current use of insulin (HCC) E11.42, Z79.4    Preventative health care Z00.00     Past Surgical History:   Procedure Laterality Date    COLONOSCOPY N/A 1/31/2022    SCREENING COLONOSCOPY polypectomy w/cold snare, BX performed by Jah Cartagena MD at [facility] ENDOSCOPY    HX CATARACT REMOVAL      HX COLONOSCOPY      HX ENDOSCOPY  06/23/2015    [facility], EGD W/ BIOPSY , COLONOSCOPY , Surgeon: Anthony Suarez MD    HX GASTRECTOMY  1990    partial - FOR TUMOR AND ULCER    HX HERNIA REPAIR Right     Right inguinal hernia repair 2006    HX HERNIA REPAIR  07/11/2017    Left indirect inguinal hernia repair    HX OPEN REDUCTION INTERNAL FIXATION      x 2    HX ORCHIECTOMY Left 2013    HX ORTHOPAEDIC  2012    Numerous surgeries on rt. foot    HX OTHER SURGICAL      removed testicle    HX UROLOGICAL  09/08/2021    PNBx, Tow [address], Dr. Ronny Bates     Family History   Problem Relation Age of Onset    Diabetes Mother     Hypertension Mother     Cancer Mother     OSTEOARTHRITIS Maternal Grandmother     Diabetes Maternal Grandmother     Hypertension Maternal Grandmother     Diabetes Maternal Grandfather     Breast Cancer Daughter      Social History     Tobacco Use    Smoking status: Former     Packs/day: 1.00     Years: 20.00     Pack years: 20.00     Types: Cigarettes     Quit date: 2011     Years since quittin.2    Smokeless tobacco: Never   Substance Use Topics    Alcohol use: No       ROS  As stated in HPI, otherwise all others negative. Objective:     Patient-Reported Vitals 2022   Patient-Reported Pulse 78   Patient-Reported Systolic  802   Patient-Reported Diastolic 82      General: alert, cooperative, no distress   Mental  status: normal mood, behavior, speech, dress, motor activity, and thought processes, able to follow commands   HENT: NCAT   Neck: no visualized mass   Resp: no respiratory distress   Neuro: no gross deficits   Skin: no discoloration or lesions of concern on visible areas   Psychiatric: normal affect, consistent with stated mood, no evidence of hallucinations     Additional exam findings: We discussed the expected course, resolution and complications of the diagnosis(es) in detail. Medication risks, benefits, costs, interactions, and alternatives were discussed as indicated. I advised him to contact the office if his condition worsens, changes or fails to improve as anticipated. He expressed understanding with the diagnosis(es) and plan. Winston Dodson, was evaluated through a synchronous (real-time) audio-video encounter. The patient (or guardian if applicable) is aware that this is a billable service, which includes applicable co-pays. Verbal consent to proceed has been obtained. The visit was conducted pursuant to the emergency declaration under the [address] [place] [address], [address] [place] [address] authority and the [employer] and [employer] General Act. Patient identification was verified, and a caregiver was present when appropriate. The patient was located at home in a state where the provider was licensed to provide care. Winston Dodson, was evaluated through a synchronous (real-time) audio-video encounter.  The patient (or guardian if applicable) is aware that this is a billable service, which includes applicable co-pays. This Virtual Visit was conducted with patient's (and/or legal guardian's) consent. The visit was conducted pursuant to the emergency declaration under the [facility], [address] [place] [address] authority and the [employer] and [employer] General Act. Patient identification was verified, and a caregiver was present when appropriate. The patient was located at: Home: [address] [place] [address] [address] [place] [phone]  The provider was located at: Facility (Appt Department): [facility] [address]  [address] [facility] [address] [place] [id number] [address] [place] Zapien

## 2022-08-31 ENCOUNTER — TELEPHONE (OUTPATIENT)
Dept: NEUROLOGY | Age: 72
End: 2022-08-31

## 2022-09-08 RX ORDER — MEMANTINE HYDROCHLORIDE 5 MG/1
5 TABLET ORAL 2 TIMES DAILY
Qty: 180 TABLET | Refills: 0 | Status: SHIPPED | OUTPATIENT
Start: 2022-09-08 | End: 2022-12-07

## 2022-09-26 ENCOUNTER — PATIENT OUTREACH (OUTPATIENT)
Dept: CASE MANAGEMENT | Age: 72
End: 2022-09-26

## 2022-09-26 DIAGNOSIS — F32.2 SEVERE DEPRESSION (HCC): Primary | ICD-10-CM

## 2022-09-26 DIAGNOSIS — F02.818 DEMENTIA ASSOCIATED WITH OTHER UNDERLYING DISEASE WITH BEHAVIORAL DISTURBANCE: ICD-10-CM

## 2022-09-26 NOTE — PROGRESS NOTES
.Complex Case Management      Date/Time:  9/26/2022 10:09 AM    Method of communication with patient:phone      Patient contacted [address] [place] [address] ([facility]) . Patient requesting a [facility] referral to a [facility] provider as Dr Ragini Castellanos office has not replaced Dr Basil Wild, they keep cancelling and rescheduling him the day or two before he is to be seen. He feels like he is getting more forgetful which is making his anxiety increase. Top Challenges reviewed with the Provider   Requesting a [facility] referral to a [place] provider. Dr Ragini Castellanos office has not gotten a replacement. Patient feels like he is forgetting more which is making his anxiety increase. He stays angry a lot. The patient agrees to contact the PCP office or the [address] [place] [address] for questions related to their healthcare. Provided contact information for future reference. Disease Specific:   N/A    Home Health Active: Yes Personal care aide,Nasreen    DME Active: Yes    Barriers to care? depression, ineffective coping, requesting Neuropsychology     Advance Care Planning:   Does patient have an Advance Directive:  not on file; education provided     Medication(s):   Medication reconciliation was not performed with patient,     Referral to Pharm D needed: no     Current Outpatient Medications   Medication Sig    amitriptyline (ELAVIL) 10 mg tablet TAKE ONE TABLET BY MOUTH AT BEDTIME    memantine (NAMENDA) 5 mg tablet Take 1 Tablet by mouth two (2) times a day for 90 days.     Januvia 100 mg tablet TAKE ONE TABLET BY MOUTH EVERY DAY    famotidine (PEPCID) 40 mg tablet TAKE ONE TABLET BY MOUTH EVERY DAY    lisinopriL (PRINIVIL, ZESTRIL) 20 mg tablet TAKE ONE TABLET BY MOUTH EVERY DAY    amLODIPine (NORVASC) 10 mg tablet TAKE ONE TABLET BY MOUTH EVERY DAY    pioglitazone (ACTOS) 30 mg tablet TAKE ONE TABLET BY MOUTH EVERY DAY    omeprazole (PRILOSEC) 20 mg capsule TAKE TWO CAPSULES BY MOUTH EVERY DAY FLUoxetine (PROzac) 20 mg tablet Take 20 mg by mouth daily. tamsulosin (FLOMAX) 0.4 mg capsule Take 0.4 mg by mouth every evening. insulin glargine (Lantus Solostar U-100 Insulin) 100 unit/mL (3 mL) inpn 20 Units by SubCUTAneous route nightly. (Patient taking differently: 19 Units by SubCUTAneous route nightly.)    flash glucose sensor (FreeStyle Juan Carlos 14 Day Sensor) kit Apply 1 sensor to the back of the upper arm once every 14 days. Check glucose 4 times daily. calcium-cholecalciferol, d3, (CALCIUM 600 + D) 600-125 mg-unit tab Take  by mouth daily. cholecalciferol, vitamin D3, 50 mcg (2,000 unit) tab Take  by mouth daily. Insulin Needles, Disposable, 31 gauge x 3/16\" ndle Use 1 new pen needle each time to inject insulin subcutaneously 4 times daily    FreeStyle Juan Carlos 14 Day [place] misc Apply 1 Device to affected area once. No current facility-administered medications for this visit.        [facility] follow up appointment(s):   Future Appointments   Date Time Provider Devika Haley   11/2/2022 10:00 AM [facility] [place] POD1 NURSE [facility] SCHED   11/9/2022 11:30 AM [facility] Cisneros AMB   11/16/2022  9:40 AM [facility]        Non-[facility] follow up appointment(s): No     Goals Addressed    None

## 2022-09-27 RX ORDER — PEN NEEDLE, DIABETIC 31 GX3/16"
NEEDLE, DISPOSABLE MISCELLANEOUS
Qty: 150 PEN NEEDLE | Refills: 2 | Status: SHIPPED | OUTPATIENT
Start: 2022-09-27

## 2022-09-30 PROBLEM — Z00.00 PREVENTATIVE HEALTH CARE: Status: RESOLVED | Noted: 2022-08-23 | Resolved: 2022-09-30

## 2022-10-28 DIAGNOSIS — K21.9 GASTROESOPHAGEAL REFLUX DISEASE, UNSPECIFIED WHETHER ESOPHAGITIS PRESENT: ICD-10-CM

## 2022-10-28 DIAGNOSIS — Z79.4 DIABETES MELLITUS, TYPE II, INSULIN DEPENDENT (HCC): ICD-10-CM

## 2022-10-28 DIAGNOSIS — E11.9 DIABETES MELLITUS, TYPE II, INSULIN DEPENDENT (HCC): ICD-10-CM

## 2022-10-28 DIAGNOSIS — Z79.4 TYPE 2 DIABETES MELLITUS WITH HYPERGLYCEMIA, WITH LONG-TERM CURRENT USE OF INSULIN (HCC): ICD-10-CM

## 2022-10-28 DIAGNOSIS — E11.65 TYPE 2 DIABETES MELLITUS WITH HYPERGLYCEMIA, WITH LONG-TERM CURRENT USE OF INSULIN (HCC): ICD-10-CM

## 2022-10-31 RX ORDER — SITAGLIPTIN 100 MG/1
TABLET, FILM COATED ORAL
Qty: 90 TABLET | Refills: 0 | OUTPATIENT
Start: 2022-10-31

## 2022-10-31 RX ORDER — FAMOTIDINE 40 MG/1
40 TABLET, FILM COATED ORAL DAILY
Qty: 93 TABLET | Refills: 1 | Status: SHIPPED | OUTPATIENT
Start: 2022-10-31

## 2022-10-31 RX ORDER — FAMOTIDINE 40 MG/1
TABLET, FILM COATED ORAL
Qty: 93 TABLET | Refills: 0 | OUTPATIENT
Start: 2022-10-31

## 2022-11-01 RX ORDER — PIOGLITAZONEHYDROCHLORIDE 30 MG/1
30 TABLET ORAL DAILY
Qty: 90 TABLET | Refills: 1 | Status: SHIPPED | OUTPATIENT
Start: 2022-11-01

## 2022-11-02 RX ORDER — OMEPRAZOLE 20 MG/1
40 CAPSULE, DELAYED RELEASE ORAL DAILY
Qty: 180 CAPSULE | Refills: 3 | Status: SHIPPED | OUTPATIENT
Start: 2022-11-02

## 2022-11-03 ENCOUNTER — TELEPHONE (OUTPATIENT)
Dept: CASE MANAGEMENT | Age: 72
End: 2022-11-03

## 2022-11-03 NOTE — TELEPHONE ENCOUNTER
Patient contacted [facility] again Sister brought over her meter and test strips he tested 106 at present.  Feeling much better

## 2022-11-03 NOTE — TELEPHONE ENCOUNTER
Patient contacted [facility] concerning blood glucose 309,1 hour after eating 3 pancakes with (regular syrup ) 3 strips of whiteside ,glass of regular milk. He started to feel bad ,funny so he called [facility] as his blood sugar before eating was 131. ACM explain regular syrup increases the glycemic index. He can use pure maple syrup or sugar-free syrup . 100 % Pure Maple syrup is still a form of sugar ,it is lower glycemic index. When taking blood sugar it should be taken 2 hours after eating. Call ACM back in another hour with results drink plenty of water it continues to feels bad ,get confused, feel faint call 911. Patient did not call [facility] back in an hour he is currently in the store. He needs his sensor change but he don't have one he ran out of them. They are coming in tomorrow. He will have to do the old way when he gets home by sticking his finger if he can find his strips. Patient voicing he is feeling better. ACM reminded what to eat and not increase his water intake and stay away from sweets.

## 2022-11-04 ENCOUNTER — TELEPHONE (OUTPATIENT)
Dept: FAMILY MEDICINE CLINIC | Age: 72
End: 2022-11-04

## 2022-11-04 DIAGNOSIS — E11.42 TYPE 2 DIABETES MELLITUS WITH DIABETIC POLYNEUROPATHY, WITH LONG-TERM CURRENT USE OF INSULIN (HCC): Primary | ICD-10-CM

## 2022-11-04 DIAGNOSIS — Z79.4 TYPE 2 DIABETES MELLITUS WITH DIABETIC POLYNEUROPATHY, WITH LONG-TERM CURRENT USE OF INSULIN (HCC): Primary | ICD-10-CM

## 2022-11-04 NOTE — TELEPHONE ENCOUNTER
Patient said his insurance company is requring him to have a scale to keep up his weight. Patient would like to know can you help with this.

## 2022-11-09 ENCOUNTER — OFFICE VISIT (OUTPATIENT)
Dept: FAMILY MEDICINE CLINIC | Age: 72
End: 2022-11-09
Payer: MEDICARE

## 2022-11-09 VITALS
HEART RATE: 87 BPM | OXYGEN SATURATION: 97 % | HEIGHT: 68 IN | BODY MASS INDEX: 23.89 KG/M2 | DIASTOLIC BLOOD PRESSURE: 81 MMHG | RESPIRATION RATE: 20 BRPM | TEMPERATURE: 98.6 F | SYSTOLIC BLOOD PRESSURE: 120 MMHG | WEIGHT: 157.6 LBS

## 2022-11-09 DIAGNOSIS — I10 ESSENTIAL HYPERTENSION WITH GOAL BLOOD PRESSURE LESS THAN 130/80: ICD-10-CM

## 2022-11-09 DIAGNOSIS — Z79.4 TYPE 2 DIABETES MELLITUS WITH DIABETIC POLYNEUROPATHY, WITH LONG-TERM CURRENT USE OF INSULIN (HCC): Primary | ICD-10-CM

## 2022-11-09 DIAGNOSIS — E11.42 TYPE 2 DIABETES MELLITUS WITH DIABETIC POLYNEUROPATHY, WITH LONG-TERM CURRENT USE OF INSULIN (HCC): Primary | ICD-10-CM

## 2022-11-09 PROBLEM — E11.65 TYPE 2 DIABETES MELLITUS WITH HYPERGLYCEMIA, WITH LONG-TERM CURRENT USE OF INSULIN (HCC): Status: RESOLVED | Noted: 2020-03-24 | Resolved: 2022-11-09

## 2022-11-09 PROCEDURE — 2022F DILAT RTA XM EVC RTNOPTHY: CPT | Performed by: NURSE PRACTITIONER

## 2022-11-09 PROCEDURE — G8754 DIAS BP LESS 90: HCPCS | Performed by: NURSE PRACTITIONER

## 2022-11-09 PROCEDURE — G8752 SYS BP LESS 140: HCPCS | Performed by: NURSE PRACTITIONER

## 2022-11-09 PROCEDURE — 1123F ACP DISCUSS/DSCN MKR DOCD: CPT | Performed by: NURSE PRACTITIONER

## 2022-11-09 PROCEDURE — G9717 DOC PT DX DEP/BP F/U NT REQ: HCPCS | Performed by: NURSE PRACTITIONER

## 2022-11-09 PROCEDURE — 3078F DIAST BP <80 MM HG: CPT | Performed by: NURSE PRACTITIONER

## 2022-11-09 PROCEDURE — G8420 CALC BMI NORM PARAMETERS: HCPCS | Performed by: NURSE PRACTITIONER

## 2022-11-09 PROCEDURE — G8427 DOCREV CUR MEDS BY ELIG CLIN: HCPCS | Performed by: NURSE PRACTITIONER

## 2022-11-09 PROCEDURE — 3017F COLORECTAL CA SCREEN DOC REV: CPT | Performed by: NURSE PRACTITIONER

## 2022-11-09 PROCEDURE — 99214 OFFICE O/P EST MOD 30 MIN: CPT | Performed by: NURSE PRACTITIONER

## 2022-11-09 PROCEDURE — 3074F SYST BP LT 130 MM HG: CPT | Performed by: NURSE PRACTITIONER

## 2022-11-09 PROCEDURE — 3051F HG A1C>EQUAL 7.0%<8.0%: CPT | Performed by: NURSE PRACTITIONER

## 2022-11-09 PROCEDURE — 1101F PT FALLS ASSESS-DOCD LE1/YR: CPT | Performed by: NURSE PRACTITIONER

## 2022-11-09 PROCEDURE — G8536 NO DOC ELDER MAL SCRN: HCPCS | Performed by: NURSE PRACTITIONER

## 2022-11-09 RX ORDER — SIMVASTATIN 10 MG/1
10 TABLET, FILM COATED ORAL
Qty: 90 TABLET | Refills: 3 | Status: SHIPPED | OUTPATIENT
Start: 2022-11-09

## 2022-11-09 NOTE — PROGRESS NOTES
Room 8     When asked if patient has any concerns he would like to address with MIKE Archibald patient states yes I would like to get a handicap placard for my sister due to he drives me around  . Did patient bring someone? Yes: Comment: Carole Boateng sister     Did the patient have DME equipment? Yes    Did you take your medication today? Yes      1. \"Have you been to the ER, urgent care clinic since your last visit? Hospitalized since your last visit? \" No    2. \"Have you seen or consulted any other health care providers outside of the [address] [place] [address] since your last visit? \" No     3. For patients aged 39-70: Has the patient had a colonoscopy / FIT/ Cologuard? Yes - no Care Gap present      If the patient is female:    4. For patients aged 41-77: Has the patient had a mammogram within the past 2 years? NA - based on age or sex      11. For patients aged 21-65: Has the patient had a pap smear?  NA - based on age or sex        3 most recent PHQ Screens 11/9/2022   Little interest or pleasure in doing things Not at all   Feeling down, depressed, irritable, or hopeless Several days   Total Score PHQ 2 1   Trouble falling or staying asleep, or sleeping too much -   Feeling tired or having little energy -   Poor appetite, weight loss, or overeating -   Feeling bad about yourself - or that you are a failure or have let yourself or your family down -   Trouble concentrating on things such as school, work, reading, or watching TV -   Moving or speaking so slowly that other people could have noticed; or the opposite being so fidgety that others notice -   Thoughts of being better off dead, or hurting yourself in some way -   PHQ 9 Score -         Health Maintenance Due   Topic Date Due    DTaP/Tdap/Td series (1 - Tdap) Never done    Shingrix Vaccine Age 50> (1 of 2) Never done    Low dose CT lung screening  Never done    AAA Screening 73-69 YO Male Smoking Patients  Never done    COVID-19 Vaccine (4 - Booster for Moderna series) 01/05/2022       Learning Assessment 12/28/2020   PRIMARY LEARNER Patient   HIGHEST LEVEL OF EDUCATION - PRIMARY LEARNER  GRADUATED HIGH SCHOOL OR GED   BARRIERS PRIMARY LEARNER NONE   CO-LEARNER CAREGIVER No   PRIMARY LANGUAGE ENGLISH   LEARNER PREFERENCE PRIMARY LISTENING   ANSWERED BY Noni Bright   RELATIONSHIP SELF

## 2022-11-16 ENCOUNTER — OFFICE VISIT (OUTPATIENT)
Dept: NEUROLOGY | Age: 72
End: 2022-11-16
Payer: MEDICARE

## 2022-11-16 VITALS — WEIGHT: 155 LBS | HEIGHT: 68 IN | BODY MASS INDEX: 23.49 KG/M2

## 2022-11-16 DIAGNOSIS — F32.A DEPRESSION, UNSPECIFIED DEPRESSION TYPE: Primary | ICD-10-CM

## 2022-11-16 DIAGNOSIS — F03.93 DEMENTIA WITH MOOD DISTURBANCE, UNSPECIFIED DEMENTIA SEVERITY, UNSPECIFIED DEMENTIA TYPE: ICD-10-CM

## 2022-11-16 PROCEDURE — 3017F COLORECTAL CA SCREEN DOC REV: CPT | Performed by: STUDENT IN AN ORGANIZED HEALTH CARE EDUCATION/TRAINING PROGRAM

## 2022-11-16 PROCEDURE — 99214 OFFICE O/P EST MOD 30 MIN: CPT | Performed by: STUDENT IN AN ORGANIZED HEALTH CARE EDUCATION/TRAINING PROGRAM

## 2022-11-16 PROCEDURE — G8420 CALC BMI NORM PARAMETERS: HCPCS | Performed by: STUDENT IN AN ORGANIZED HEALTH CARE EDUCATION/TRAINING PROGRAM

## 2022-11-16 PROCEDURE — G8427 DOCREV CUR MEDS BY ELIG CLIN: HCPCS | Performed by: STUDENT IN AN ORGANIZED HEALTH CARE EDUCATION/TRAINING PROGRAM

## 2022-11-16 PROCEDURE — G8536 NO DOC ELDER MAL SCRN: HCPCS | Performed by: STUDENT IN AN ORGANIZED HEALTH CARE EDUCATION/TRAINING PROGRAM

## 2022-11-16 PROCEDURE — 1123F ACP DISCUSS/DSCN MKR DOCD: CPT | Performed by: STUDENT IN AN ORGANIZED HEALTH CARE EDUCATION/TRAINING PROGRAM

## 2022-11-16 PROCEDURE — G8756 NO BP MEASURE DOC: HCPCS | Performed by: STUDENT IN AN ORGANIZED HEALTH CARE EDUCATION/TRAINING PROGRAM

## 2022-11-16 PROCEDURE — G9717 DOC PT DX DEP/BP F/U NT REQ: HCPCS | Performed by: STUDENT IN AN ORGANIZED HEALTH CARE EDUCATION/TRAINING PROGRAM

## 2022-11-16 PROCEDURE — 1101F PT FALLS ASSESS-DOCD LE1/YR: CPT | Performed by: STUDENT IN AN ORGANIZED HEALTH CARE EDUCATION/TRAINING PROGRAM

## 2022-11-16 RX ORDER — FLUOXETINE HYDROCHLORIDE 40 MG/1
40 CAPSULE ORAL DAILY
Qty: 30 CAPSULE | Refills: 3 | Status: SHIPPED | OUTPATIENT
Start: 2022-11-16 | End: 2022-12-16

## 2022-11-16 NOTE — PROGRESS NOTES
Bridget Camara is a 67 y.o. male . presents for Dementia  A 67years old male patient here for follow-up evaluation of memory loss. He came today with his caretaker. Last in the clinic in June 2022. Plan was to schedule his neuropsychological evaluation; he did not schedule it. He claims the forgetfulness is getting worse. When he speaks, it is difficult for him to finish a sentence. Might repeat. Has word finding difficulty. Might go into grocery and forgets what he is looking for. Sometimes, might think that he is in everybody's way. He does not go to groceries by himself. He is not currently driving. He takes his medications by himself; do not forget. Also takes care of his finances; no significant problems. He gets easily agitated. Becomes emotional.  Might sometimes cry. He feels sad and depressed. He takes Prozac. 20 mg p.o. per day. He takes memantine 5 mg p.o. twice daily. Initial encounter:  A 67years old male patient here for evaluation of progressive forgetfulness; unknown duration but he says has been going on for a while. He came with a caretaker. According to his caretaker, he forgets appointments. Has difficulty getting his words out and has difficulty finishing a sentence. Has difficulty finding items. He takes his medications himself; he has a pillbox for 1 month and do not forget taking them. He takes care of finances by himself; he might forget paying bills. Sometimes, might forget the pin numbers. He is not currently cooking. No difficulty putting his clothes on. No problem taking shower. Has occasional difficulty sleeping; wakes up a lot to go to the bathroom. He occasionally frustrates. He cries and feels sad. No suicidal ideations. Has anxiety. No hallucinations. He might confuse the day and night. No use of alcohol currently. Denied smoking. He smokes marijuana; past history of heroin abuse.   Family history of dementia: Mother, father, and grandmother. No previous history of head trauma. He has prostate cancer; no started on treatment; scheduled for radiotherapy. He has difficulty walking. Use a walker. Legs give out; mainly the right leg. Review of Systems   Constitutional:  Negative for chills, fever and weight loss. HENT:  Negative for hearing loss and tinnitus. Eyes:  Positive for blurred vision (uses glasses) and double vision. Respiratory:  Positive for cough (sometimes) and shortness of breath. Negative for hemoptysis and sputum production. Cardiovascular:  Negative for chest pain and leg swelling. Gastrointestinal:  Negative for nausea and vomiting. Genitourinary:  Positive for urgency. Negative for dysuria and frequency. Occasional incontinence   Musculoskeletal:  Positive for back pain and neck pain. Skin:  Negative for itching and rash. Neurological:  Positive for dizziness, weakness (generalized) and headaches (somtiems). Negative for tingling, tremors, sensory change, focal weakness and seizures. Endo/Heme/Allergies:  Bruises/bleeds easily. Psychiatric/Behavioral:  Positive for depression, hallucinations and memory loss. Negative for suicidal ideas. Substance abuse: flashes of people. .The patient is nervous/anxious and has insomnia.       Past Medical History:   Diagnosis Date    Acute cystitis without hematuria     BENJAMIN (acute kidney injury) ([facility]     Arthritis     Arthropathy     Balanitis     Candida infection     recurrent     Chronic pain     COVID-19 vaccine series completed     WITH BOOSTER    Dementia associated with other underlying disease without behavioral disturbance ([address]     Depression     Diabetes mellitus, type II ([facility] 07/14/2013    Drug abuse ([facility]     H/O Heroin addiction     ED (erectile dysfunction)     Fatigue     GERD (gastroesophageal reflux disease)     H/O epididymitis 2012    left    H/O: pneumonia 2011    Hep C w/o coma, chronic ([address] 01/2015    STATES TREATED    History of BPH     History of hematuria     History of tobacco use     Hypertension     Loss of appetite     LOST 10 LBS IN A MONTH    Loss of balance     Microcytic anemia 05/29/2012    Neuropathy     Pancytopenia (HCC)     Paranoid schizophrenia, chronic condition with acute exacerbation ([facility]     Prostate cancer ([facility] 09/08/2021    Sam[facility] Moulton, Dr. Caroline Watt, [facility]    PUD (peptic ulcer disease)     SOB (shortness of breath)     WITH WALKING    Status post partial gastrectomy 1990    Stomach ulcer    Stroke ([facility] 2020    BALANCE ISSUES; TIA    Type 2 diabetes mellitus with hyperglycemia, with long-term current use of insulin ([facility] 3/24/2020    Uncircumcised male        Past Surgical History:   Procedure Laterality Date    COLONOSCOPY N/A 1/31/2022    SCREENING COLONOSCOPY polypectomy w/cold snare, BX performed by Rajni Lester MD at [address] [place] [address]      HX COLONOSCOPY      HX ENDOSCOPY  06/23/2015    [facility], EGD W/ BIOPSY , COLONOSCOPY , Surgeon: Francisco Gilliam MD    HX GASTRECTOMY  1990    partial - FOR TUMOR AND ULCER    HX HERNIA REPAIR Right     Right inguinal hernia repair 2006    HX HERNIA REPAIR  07/11/2017    Left indirect inguinal hernia repair    HX OPEN REDUCTION INTERNAL FIXATION      x 2    HX ORCHIECTOMY Left 2013    HX ORTHOPAEDIC  2012    Numerous surgeries on rt. foot    HX OTHER SURGICAL      removed testicle    HX UROLOGICAL  09/08/2021    Samanta Moulton, Dr. Romain Riojas        Family History   Problem Relation Age of Onset    Diabetes Mother     Hypertension Mother     Cancer Mother     OSTEOARTHRITIS Maternal Grandmother     Diabetes Maternal Grandmother     Hypertension Maternal Grandmother     Diabetes Maternal Grandfather     Breast Cancer Daughter         Social History     Socioeconomic History    Marital status: SINGLE     Spouse name: Not on file    Number of children: Not on file    Years of education: Not on file    Highest education level: Not on file   Occupational History    Not on file   Tobacco Use    Smoking status: Former     Packs/day: 1.00     Years: 20.00     Pack years: 20.00     Types: Cigarettes     Quit date: 2011     Years since quittin.4    Smokeless tobacco: Never   Vaping Use    Vaping Use: Never used   Substance and Sexual Activity    Alcohol use: No    Drug use: Yes     Types: Marijuana     Comment: H/O Heroin addiction STATES QUIT   48 YRS AGO; STILL USE MARIJUANA  3X/DAY;  USING MARIJUANA X 54 YRS NOW    Sexual activity: Not Currently     Partners: Female   Other Topics Concern    Not on file   Social History Narrative    Not on file     Social Determinants of Health     Financial Resource Strain: Not on file   Food Insecurity: Not on file   Transportation Needs: Not on file   Physical Activity: Not on file   Stress: Not on file   Social Connections: Not on file   Intimate Partner Violence: Not on file   Housing Stability: Not on file        Allergies   Allergen Reactions    Ibuprofen Anaphylaxis    Fentanyl Other (comments) and Swelling     Blurred vision    Gabapentin Other (comments) and Swelling     Blurred Vision      Metformin Diarrhea    Penicillins Swelling    Valium [Diazepam] Swelling         Current Outpatient Medications   Medication Sig Dispense Refill    FLUoxetine (PROzac) 40 mg capsule Take 1 Capsule by mouth daily for 30 days. 30 Capsule 3    simvastatin (ZOCOR) 10 mg tablet Take 1 Tablet by mouth nightly. 90 Tablet 3    omeprazole (PRILOSEC) 20 mg capsule Take 2 Capsules by mouth daily. 180 Capsule 3    pioglitazone (ACTOS) 30 mg tablet Take 1 Tablet by mouth daily. 90 Tablet 1    SITagliptin (Januvia) 100 mg tablet Take 1 Tablet by mouth daily. 90 Tablet 1    famotidine (PEPCID) 40 mg tablet Take 1 Tablet by mouth daily. 93 Tablet 1    tamsulosin (FLOMAX) 0.4 mg capsule Take 1 Capsule by mouth two (2) times daily (after meals).  180 Capsule 3    memantine (NAMENDA) 5 mg tablet Take 1 Tablet by mouth two (2) times a day for 90 days. 180 Tablet 0    lisinopriL (PRINIVIL, ZESTRIL) 20 mg tablet TAKE ONE TABLET BY MOUTH EVERY DAY 90 Tablet 0    amLODIPine (NORVASC) 10 mg tablet TAKE ONE TABLET BY MOUTH EVERY DAY 90 Tablet 0    insulin glargine (Lantus Solostar U-100 Insulin) 100 unit/mL (3 mL) inpn 20 Units by SubCUTAneous route nightly. (Patient taking differently: 19 Units by SubCUTAneous route nightly.) 15 mL 2    calcium-cholecalciferol, d3, (CALCIUM 600 + D) 600-125 mg-unit tab Take  by mouth daily. cholecalciferol, vitamin D3, 50 mcg (2,000 unit) tab Take  by mouth daily. Insulin Needles, Disposable, (BD Ultra-Fine Mini Pen Needle) 31 gauge x 3/16\" ndle Use 1 new pen needle each time to inject insulin subcutaneously 4 times daily 150 Pen Needle 2    flash glucose sensor (FreeStyle Juan Carlos 14 Day Sensor) kit Apply 1 sensor to the back of the upper arm once every 14 days. Check glucose 4 times daily. 2 Kit 11    FreeStyle Juan Carlos 14 Day [place] misc Apply 1 Device to affected area once. Physical Exam  Constitutional:       Appearance: Normal appearance. HENT:      Head: Normocephalic and atraumatic. Mouth/Throat:      Mouth: Mucous membranes are moist.      Pharynx: Oropharynx is clear. No oropharyngeal exudate. Eyes:      Extraocular Movements: Extraocular movements intact. Pupils: Pupils are equal, round, and reactive to light. Pulmonary:      Effort: Pulmonary effort is normal. No respiratory distress. Musculoskeletal:         General: Normal range of motion. Cervical back: Normal range of motion and neck supple. Right lower leg: No edema. Left lower leg: No edema. Neurological:      Mental Status: He is alert. Comments: Mental status: Awake, alert, oriented to self; not to place; oriented to the year but not to the day/month or year; knows the president;  follows simple and complex commands, no neglect, no extinction.   Speech and languge: fluent, coherent,and comprehension intact  CN: VFF, EOMI, PERRLA, face sensation intact , no facial asymmetry noted, palate elevation symmetric bilat, SS+SCM 5/5 bilat, tongue midline  Motor: no pronator drift, tone normal throughout, strength 5/5 throughout. Sensory: Decreased light touch and pinprick on the right side. Coordination: able to perform finger-nose-finger; was using a cane when walking.   DTR: 2+ throughout        Appointment on 11/09/2022   Component Date Value Ref Range Status    Creatinine, urine random 11/09/2022 188.1  Not Estab. mg/dL Final    Microalbumin, urine 11/09/2022 37.8  Not Estab. ug/mL Final    Microalb/Creat ratio (ug/mg creat.) 11/09/2022 20  0 - 29 mg/g creat Final    Comment:                        Normal:                0 -  29                         Moderately increased: 30 - 300                         Severely increased:       >300      Hemoglobin A1c 11/09/2022 7.1 (A)  4.8 - 5.6 % Final    Comment:          Prediabetes: 5.7 - 6.4           Diabetes: >6.4           Glycemic control for adults with diabetes: <7.0      Estimated average glucose 11/09/2022 157  mg/dL Final    Cholesterol, total 11/09/2022 159  100 - 199 mg/dL Final    Triglyceride 11/09/2022 66  0 - 149 mg/dL Final    HDL Cholesterol 11/09/2022 67  >39 mg/dL Final    VLDL, calculated 11/09/2022 13  5 - 40 mg/dL Final    LDL, calculated 11/09/2022 79  0 - 99 mg/dL Final    Glucose 11/09/2022 112 (A)  70 - 99 mg/dL Final    BUN 11/09/2022 20  8 - 27 mg/dL Final    Creatinine 11/09/2022 1.34 (A)  0.76 - 1.27 mg/dL Final    eGFR 11/09/2022 56 (A)  >59 mL/min/1.73 Final    BUN/Creatinine ratio 11/09/2022 15  10 - 24 Final    Sodium 11/09/2022 142  134 - 144 mmol/L Final    Potassium 11/09/2022 4.4  3.5 - 5.2 mmol/L Final    Chloride 11/09/2022 103  96 - 106 mmol/L Final    CO2 11/09/2022 21  20 - 29 mmol/L Final    Calcium 11/09/2022 9.8  8.6 - 10.2 mg/dL Final    Protein, total 11/09/2022 7.8  6.0 - 8.5 g/dL Final    Albumin 11/09/2022 5.0 (A)  3.7 - 4.7 g/dL Final    GLOBULIN, TOTAL 11/09/2022 2.8  1.5 - 4.5 g/dL Final    A-G Ratio 11/09/2022 1.8  1.2 - 2.2 Final    Bilirubin, total 11/09/2022 <0.2  0.0 - 1.2 mg/dL Final    Alk. phosphatase 11/09/2022 105  44 - 121 IU/L Final    AST (SGOT) 11/09/2022 23  0 - 40 IU/L Final    ALT (SGPT) 11/09/2022 25  0 - 44 IU/L Final    INTERPRETATION 11/09/2022 Note   Final    Supplemental report is available. Interpretation 11/09/2022 Note   Final    Comment: -------------------------------  CHRONIC KIDNEY DISEASE:  EGFR, BLOOD PRESSURE, AND PROTEINURIA ASSESSMENT  The overall regression of eGFR with time is not  statistically significant. Current eGFR is 56 mL/min/1.73mE2  corresponding to CKD stage 3a. Potassium is within goal and  has not changed significantly, was 4.7 and now is 4.4  mmol/L. Albumin:Creatinine ratio is not elevated , 20.1 mg/g  creat. If no other markers of kidney damage are present  consider measurement of cystatin C to confirm diagnosis of  CKD. Glycemic control (HB A1c: 7.1 %) is above goal but may  be appropriate if patient is at risk for hypoglycemia. EGFR, BLOOD PRESSURE, AND PROTEINURIA TREATMENT SUGGESTIONS  -  Based on current eGFR and absence of significant  proteinuria, patient is at moderately increased risk for  adverse outcomes such as CKD progression, CVD, and  mortality. Guidelines recommend treating patients with type  2 diabetes and CKD with an SGLT2 inhibitor for cardiorenal  protection. Guidelines r                           ecommend a target blood pressure of  120/80 mmHg or less to reduce cardiovascular risk and CKD  progression. EGFR, BLOOD PRESSURE, AND PROTEINURIA FOLLOW-UP  -  Spot Urine Panel (Albumin preferred) within 12 months;  Hemoglobin A1C and fasting Renal Panel within 3 months;  BONE and MINERAL ASSESSMENT  Calcium is within goal and has decreased, was 10.6 and now  is 9.8 mg/dL. Carbon Dioxide is below goal and has  decreased, was 27 and now is 21 mmol/L. Guidelines recommend  the measurement of 25-hydroxy vitamin D in patients with  CKD. BONE and MINERAL TREATMENT SUGGESTIONS  -  Interpretations require simultaneous measurements of serum  calcium and phosphorus. If not on alkali, begin sodium  bicarbonate, one 650 mg pill 2-3 times daily, otherwise  increase dose. BONE and MINERAL FOLLOW-UP  -  fasting Renal Panel within 3 months; fasting PTH with Renal  Panel is recommended by guidelines, at least yearly;  25-Hydroxy Vitamin D is due;  LIPIDS ASSESSMENT  LDL-C is normal and has risen,                            was 55 and now is 79 mg/dL. Triglyceride is normal and has not changed significantly,  was 56 and now is 66 mg/dL. Non-HDL Cholesterol is optimal  and has risen, was 67 and now is 92 mg/dL. HDL-C is high and  has not changed significantly, was 71 and now is 67 mg/dL. LIPIDS TREATMENT SUGGESTIONS  -  Therapeutic lifestyle changes are always valuable to  maintain optimal blood lipid status (diet, exercise, weight  management). If at least a 50% LDL reduction from baseline  has not been achieved, begin or increase statin. Consider  measurement of LDL particle number or Apo B to adjudicate  need for further LDL lowering therapy. If statin cannot be  tolerated or increased, alternatives include use of an  intestinal agent (ezetimibe or bile acid sequestrant) or  niacin. LIPIDS FOLLOW-UP  -  fasting Lipid Panel within 12 months;  ANEMIA ASSESSMENT  Most recent order does not include a CBC Panel or iron  studies. -------------------------------  DISCLAIMER  These assessments and treatment                            suggestions are provided as  a convenience in support of the physician-patient  relationship and are not intended to replace the physician's  clinical judgment. They are derived from national guidelines  in addition to other evidence and expert opinion.  The  clinician should consider this information within the  context of clinical opinion and the individual patient. SEE GUIDANCE FOR CHRONIC KIDNEY DISEASE PROGRAM: Kidney  Disease Improving Global Outcomes (KDIGO) clinical practice  guidelines are at http://kdigo. org/home/guidelines/.  [address] [facility] Kidney Disease Outcomes Quality  Initiative (KDOQI (TM)), with its limitations and  disclaimers, are at www.kidney. org/professionals/KDOQI. This  program is intended for patients who have been diagnosed  with stages 3, 4, or pre-dialysis 5 CKD. It is not intended  for children, pregnant patients, or transplant patients. ICD-10-CM ICD-9-CM    1. Depression, unspecified depression type  F32. A 311 REFERRAL TO PSYCHIATRY      FLUoxetine (PROzac) 40 mg capsule      2. Dementia with mood disturbance, unspecified dementia severity, unspecified dementia type  F03.93 294.21 REFERRAL TO NEUROPSYCHOLOGY        A 67years old male patient here for follow-up of forgetfulness. Claims it is getting worse. He has agitation and emotional lability. Worsening forgetfulness could be mainly from the depression and mood disorder. I will refer him for neuropsychological test again. He also needs to see psychiatry. We will increase the dose of his Prozac to 40 mg p.o. per day. He will continue with memantine 5 mg p.o. twice daily. We will see him again in 4 months time.

## 2022-11-28 DIAGNOSIS — E11.65 TYPE 2 DIABETES MELLITUS WITH HYPERGLYCEMIA, WITH LONG-TERM CURRENT USE OF INSULIN (HCC): ICD-10-CM

## 2022-11-28 DIAGNOSIS — I10 ESSENTIAL HYPERTENSION WITH GOAL BLOOD PRESSURE LESS THAN 130/80: ICD-10-CM

## 2022-11-28 DIAGNOSIS — Z79.4 TYPE 2 DIABETES MELLITUS WITH HYPERGLYCEMIA, WITH LONG-TERM CURRENT USE OF INSULIN (HCC): ICD-10-CM

## 2022-11-29 RX ORDER — LISINOPRIL 20 MG/1
TABLET ORAL
Qty: 90 TABLET | Refills: 0 | Status: SHIPPED | OUTPATIENT
Start: 2022-11-29

## 2022-11-29 RX ORDER — AMLODIPINE BESYLATE 10 MG/1
TABLET ORAL
Qty: 90 TABLET | Refills: 0 | Status: SHIPPED | OUTPATIENT
Start: 2022-11-29

## 2022-11-29 RX ORDER — INSULIN GLARGINE 100 [IU]/ML
19 INJECTION, SOLUTION SUBCUTANEOUS
Qty: 15 ML | Refills: 3 | Status: SHIPPED | OUTPATIENT
Start: 2022-11-29

## 2022-12-15 ENCOUNTER — TELEPHONE (OUTPATIENT)
Dept: FAMILY MEDICINE CLINIC | Age: 72
End: 2022-12-15

## 2022-12-15 DIAGNOSIS — I10 ESSENTIAL HYPERTENSION WITH GOAL BLOOD PRESSURE LESS THAN 130/80: Primary | ICD-10-CM

## 2022-12-15 NOTE — TELEPHONE ENCOUNTER
Patient called in because there was a fax sent for a order pertaining to a new blood pressure cuff and patient states he has not seen or heard anything about it being ordered and would like a call to see when he will receive the order. I also gave the patient our fax number here in [place] just in case he had the wrong fax.

## 2022-12-23 NOTE — TELEPHONE ENCOUNTER
Spoke with patient In reguards of message stating patient is requesting a blood pressure cuff so he can check his blood pressure at home . Patient states I went through [employer] and was told that they sent paperwork over Via fax and is waiting for MIKE Lino to sign the paperwork. Patient has been advised that MIKE Lino did not receive any paperwork from patient insurance. Patient verbalized understanding and states I need this pt in please.

## 2023-01-05 ENCOUNTER — TELEPHONE (OUTPATIENT)
Dept: FAMILY MEDICINE CLINIC | Age: 73
End: 2023-01-05

## 2023-01-10 ENCOUNTER — TELEPHONE (OUTPATIENT)
Dept: FAMILY MEDICINE CLINIC | Age: 73
End: 2023-01-10

## 2023-01-10 RX ORDER — ACETAMINOPHEN 500 MG
TABLET ORAL
Qty: 1 KIT | Refills: 0 | Status: SHIPPED | OUTPATIENT
Start: 2023-01-10

## 2023-01-10 NOTE — TELEPHONE ENCOUNTER
Called patient in reguards of paperwork that was faxed from patient insurance company stating patient does not qualify for blood pressure cuff . When asked if patient has a automatic Blood Pressure cuff sent from insurance, patient states no I do not . Patient has been informed to call his insurance company to further assist the problem.

## 2023-01-26 NOTE — PROGRESS NOTES
Room 7     Patient states I tested my sugar today and it was 240 due eating a bowel of frosted flakes. Patient Aide Ms. Trinidad Ugalde states his Urologist states his kidney function is a little high due to dehydration . Patient states I want to discuss memory loss. Did patient bring someone? Yes: Comment: Patients Margarita Levi     Did the patient have DME equipment? No     Did you take your medication today? Yes       1. \"Have you been to the ER, urgent care clinic since your last visit? Hospitalized since your last visit? \" Yes Patient aide states [facility] for stomach pains on 5/7/22 and on the 5/14/22 my anxiey has been up     2. \"Have you seen or consulted any other health care providers outside of the [address] [place] [address] since your last visit? \" No     3. For patients aged 39-70: Has the patient had a colonoscopy / FIT/ Cologuard? Yes - no Care Gap present      If the patient is female:    4. For patients aged 41-77: Has the patient had a mammogram within the past 2 years? NA - based on age or sex      11. For patients aged 21-65: Has the patient had a pap smear?  NA - based on age or sex        3 most recent PHQ Screens 3/17/2022   Little interest or pleasure in doing things Nearly every day   Feeling down, depressed, irritable, or hopeless Nearly every day   Total Score PHQ 2 6   Trouble falling or staying asleep, or sleeping too much Nearly every day   Feeling tired or having little energy Nearly every day   Poor appetite, weight loss, or overeating Not at all   Feeling bad about yourself - or that you are a failure or have let yourself or your family down More than half the days   Trouble concentrating on things such as school, work, reading, or watching TV Nearly every day   Moving or speaking so slowly that other people could have noticed; or the opposite being so fidgety that others notice Several days   Thoughts of being better off dead, or hurting yourself in some way Several days PHQ 9 Score 19         Health Maintenance Due   Topic Date Due    DTaP/Tdap/Td series (1 - Tdap) Never done    Shingrix Vaccine Age 50> (1 of 2) Never done    Low dose CT lung screening  Never done    AAA Screening 73-69 YO Male Smoking Patients  Never done       Learning Assessment 12/28/2020   PRIMARY LEARNER Patient   HIGHEST LEVEL OF EDUCATION - PRIMARY LEARNER  GRADUATED HIGH SCHOOL OR GED   BARRIERS PRIMARY LEARNER NONE   CO-LEARNER CAREGIVER No   PRIMARY LANGUAGE ENGLISH   LEARNER PREFERENCE PRIMARY LISTENING   ANSWERED BY Helen Current   RELATIONSHIP SELF

## 2023-01-31 ENCOUNTER — TELEPHONE (OUTPATIENT)
Dept: FAMILY MEDICINE CLINIC | Age: 73
End: 2023-01-31

## 2023-01-31 NOTE — TELEPHONE ENCOUNTER
Patient would like to know if he can get a order place for a referral for a home health aid to some out to help him with his freestyle jarrett who can come once a week.

## 2023-02-01 ENCOUNTER — TELEPHONE (OUTPATIENT)
Dept: NEUROLOGY | Age: 73
End: 2023-02-01

## 2023-02-01 NOTE — TELEPHONE ENCOUNTER
Returned call. He wants someone to come to his house every two weeks to replace his jarrett sensor. Advised home health does not perform that service. Offered to have karene come into the office every two weeks and we can put the sensor on for him. States he has been getting agitated and \"cannot process\", he sees his neurology 2/16/23. Has appointment with me on 2/9/23  Advised to bring sensor to his next visit and we will apply.    Patient verbalized understanding and is in agreement with this plan of care

## 2023-02-09 ENCOUNTER — OFFICE VISIT (OUTPATIENT)
Dept: FAMILY MEDICINE CLINIC | Age: 73
End: 2023-02-09
Payer: MEDICARE

## 2023-02-09 VITALS
RESPIRATION RATE: 18 BRPM | TEMPERATURE: 98 F | HEART RATE: 67 BPM | WEIGHT: 148 LBS | HEIGHT: 68 IN | DIASTOLIC BLOOD PRESSURE: 82 MMHG | SYSTOLIC BLOOD PRESSURE: 122 MMHG | BODY MASS INDEX: 22.43 KG/M2 | OXYGEN SATURATION: 98 %

## 2023-02-09 DIAGNOSIS — Z79.4 TYPE 2 DIABETES MELLITUS WITH DIABETIC POLYNEUROPATHY, WITH LONG-TERM CURRENT USE OF INSULIN (HCC): Primary | ICD-10-CM

## 2023-02-09 DIAGNOSIS — F03.90 DEMENTIA, UNSPECIFIED DEMENTIA SEVERITY, UNSPECIFIED DEMENTIA TYPE, UNSPECIFIED WHETHER BEHAVIORAL, PSYCHOTIC, OR MOOD DISTURBANCE OR ANXIETY (HCC): ICD-10-CM

## 2023-02-09 DIAGNOSIS — I10 ESSENTIAL HYPERTENSION WITH GOAL BLOOD PRESSURE LESS THAN 130/80: ICD-10-CM

## 2023-02-09 DIAGNOSIS — F19.11 HISTORY OF DRUG ABUSE (HCC): ICD-10-CM

## 2023-02-09 DIAGNOSIS — E11.42 TYPE 2 DIABETES MELLITUS WITH DIABETIC POLYNEUROPATHY, WITH LONG-TERM CURRENT USE OF INSULIN (HCC): Primary | ICD-10-CM

## 2023-02-09 DIAGNOSIS — F20.0 PARANOID SCHIZOPHRENIA, CHRONIC CONDITION WITH ACUTE EXACERBATION (HCC): ICD-10-CM

## 2023-02-09 DIAGNOSIS — C61 PROSTATE CANCER (HCC): ICD-10-CM

## 2023-02-09 PROCEDURE — 99214 OFFICE O/P EST MOD 30 MIN: CPT | Performed by: NURSE PRACTITIONER

## 2023-02-09 PROCEDURE — 3046F HEMOGLOBIN A1C LEVEL >9.0%: CPT | Performed by: NURSE PRACTITIONER

## 2023-02-09 PROCEDURE — 3074F SYST BP LT 130 MM HG: CPT | Performed by: NURSE PRACTITIONER

## 2023-02-09 PROCEDURE — 2022F DILAT RTA XM EVC RTNOPTHY: CPT | Performed by: NURSE PRACTITIONER

## 2023-02-09 PROCEDURE — G9717 DOC PT DX DEP/BP F/U NT REQ: HCPCS | Performed by: NURSE PRACTITIONER

## 2023-02-09 PROCEDURE — 3017F COLORECTAL CA SCREEN DOC REV: CPT | Performed by: NURSE PRACTITIONER

## 2023-02-09 PROCEDURE — G8536 NO DOC ELDER MAL SCRN: HCPCS | Performed by: NURSE PRACTITIONER

## 2023-02-09 PROCEDURE — G8420 CALC BMI NORM PARAMETERS: HCPCS | Performed by: NURSE PRACTITIONER

## 2023-02-09 PROCEDURE — G8427 DOCREV CUR MEDS BY ELIG CLIN: HCPCS | Performed by: NURSE PRACTITIONER

## 2023-02-09 PROCEDURE — 1101F PT FALLS ASSESS-DOCD LE1/YR: CPT | Performed by: NURSE PRACTITIONER

## 2023-02-09 PROCEDURE — 3079F DIAST BP 80-89 MM HG: CPT | Performed by: NURSE PRACTITIONER

## 2023-02-09 PROCEDURE — 1123F ACP DISCUSS/DSCN MKR DOCD: CPT | Performed by: NURSE PRACTITIONER

## 2023-02-09 RX ORDER — FLUOXETINE HYDROCHLORIDE 40 MG/1
CAPSULE ORAL
COMMUNITY
Start: 2023-01-27

## 2023-02-09 RX ORDER — BUSPIRONE HYDROCHLORIDE 10 MG/1
TABLET ORAL
COMMUNITY
Start: 2023-02-06

## 2023-02-09 NOTE — PROGRESS NOTES
Room 4     When asked if patient has any concerns he would like to address with MIKE Archibald patient states no       Did patient bring someone? No    Did the patient have DME equipment? NO     Did you take your medication today? YES       1. \"Have you been to the ER, urgent care clinic since your last visit? Hospitalized since your last visit? \" No    2. \"Have you seen or consulted any other health care providers outside of the [address] [place] [address] since your last visit? \" No     3. For patients aged 39-70: Has the patient had a colonoscopy / FIT/ Cologuard? Yes - no Care Gap present      If the patient is female:    4. For patients aged 41-77: Has the patient had a mammogram within the past 2 years? NA - based on age or sex      11. For patients aged 21-65: Has the patient had a pap smear?  NA - based on age or sex        3 most recent PHQ Screens 2/9/2023   Little interest or pleasure in doing things Nearly every day   Feeling down, depressed, irritable, or hopeless Several days   Total Score PHQ 2 4   Trouble falling or staying asleep, or sleeping too much Nearly every day   Feeling tired or having little energy Nearly every day   Poor appetite, weight loss, or overeating Nearly every day   Feeling bad about yourself - or that you are a failure or have let yourself or your family down Not at all   Trouble concentrating on things such as school, work, reading, or watching TV Nearly every day   Moving or speaking so slowly that other people could have noticed; or the opposite being so fidgety that others notice Nearly every day   Thoughts of being better off dead, or hurting yourself in some way Not at all   PHQ 9 Score 19         Health Maintenance Due   Topic Date Due    DTaP/Tdap/Td series (1 - Tdap) Never done    Shingles Vaccine (1 of 2) Never done    Low dose CT lung screening  Never done    AAA Screening 73-69 YO Male Smoking Patients  Never done    COVID-19 Vaccine (4 - Booster for More  series) 01/05/2022       Learning Assessment 12/28/2020   PRIMARY LEARNER Patient   HIGHEST LEVEL OF EDUCATION - PRIMARY LEARNER  GRADUATED HIGH SCHOOL OR GED   BARRIERS PRIMARY LEARNER NONE   CO-LEARNER CAREGIVER No   PRIMARY LANGUAGE ENGLISH   LEARNER PREFERENCE PRIMARY LISTENING   ANSWERED BY Mauricio Salazar   RELATIONSHIP SELF

## 2023-02-09 NOTE — PROGRESS NOTES
Jesus               [address]               [phone]      René Dupree is a 68 y.o. male and presents with Follow Up Chronic Condition and Diabetes       Assessment/Plan:    Diagnoses and all orders for this visit:    1. Type 2 diabetes mellitus with diabetic polyneuropathy, with long-term current use of insulin ([facility])  -     LIPID PANEL; Future  -     HEMOGLOBIN A1C WITH EAG; Future  Endorses medication compliance, Follow-up labs today, Denies signs and symptoms of hyper or hypoglycemia, and Diabetes controlled, A1C <7   2. Essential hypertension with goal blood pressure less than 330/22  -     METABOLIC PANEL, COMPREHENSIVE; Future  Endorses medication compliance, Follow-up labs today, and Blood pressure stable, continue lisinopril and amlodipine at same dose    3. History of drug abuse [facility])  Assessment & Plan:   well controlled, continue current treatment plan      4. Prostate cancer [facility])  Assessment & Plan:   monitored by specialist. No acute findings meriting change in the plan      5. Dementia, unspecified dementia severity, unspecified dementia type, unspecified whether behavioral, psychotic, or mood disturbance or anxiety ([facility] [address]  Assessment & Plan:   monitored by specialist. No acute findings meriting change in the plan, Dr. Hung New      6. Paranoid schizophrenia, chronic condition with acute exacerbation [facility])  Assessment & Plan:  Monitored by specialist Nito Clifton NP        Follow-up and Dispositions    Return in about 2 weeks (around 2/23/2023) for NV, change jarrett sensor and 4 weeks MAWV, 30min office only.            Health Maintenance:   Health Maintenance   Topic Date Due    DTaP/Tdap/Td series (1 - Tdap) Never done    Shingles Vaccine (1 of 2) Never done    Low dose CT lung screening  Never done    AAA Screening 73-67 YO Male Smoking Patients  Never done    COVID-19 Vaccine (4 - Booster for Moderna series) 01/05/2022    Foot Exam Q1  03/17/2023    Medicare Yearly Exam  03/19/2023    Diabetic Alb to Cr ratio (uACR) test  11/09/2023    GFR test (Diabetes, CKD 3-4, OR last GFR 15-59)  11/09/2023    Depression Monitoring  11/09/2023    A1C test (Diabetic or Prediabetic)  11/09/2023    Lipid Screen  11/09/2023    Eye Exam Retinal or Dilated  07/01/2024    Colorectal Cancer Screening Combo  01/31/2025    Flu Vaccine  Completed    Pneumococcal 65+ years  Completed        Subjective:    Labs obtained prior to visit? NO  Reviewed with patient? Not applicable    DMII-   Patient reports medication compliance Yes  Diabetic diet compliance most of the time  Patient monitors blood sugars regularly  cgm   Home glucose readings:  cgm reader needs to be changed   Denies hypoglycemic episodes yes  Denies polyuria, polydipsia, paraesthesia, vision changes? yes  Engaging in daily exercise?  No     Diabetic Foot and Eye Exam HM Status   Topic Date Due    Diabetic Foot Care  03/17/2023    Eye Exam  07/01/2024     Hemoglobin A1c   Date Value Ref Range Status   11/09/2022 7.1 (H) 4.8 - 5.6 % Final     Comment:              Prediabetes: 5.7 - 6.4           Diabetes: >6.4           Glycemic control for adults with diabetes: <7.0     ]  Creatinine, urine random   Date Value Ref Range Status   11/09/2022 188.1 Not Estab. mg/dL Final     Microalb/Creat ratio (ug/mg creat.)   Date Value Ref Range Status   11/09/2022 20 0 - 29 mg/g creat Final     Comment:                            Normal:                0 -  29                         Moderately increased: 30 - 300                         Severely increased:       >300     12/17/2021 26 0 - 29 mg/g creat Final     Comment:                            Normal:                0 -  29                         Moderately increased: 30 - 300                         Severely increased:       >300     03/08/2021 24 0 - 29 mg/g creat Final     Comment:                            Normal:                0 -  29                         Moderately increased: 30 - 300                         Severely increased:       >300     02/27/2020 19 0 - 29 mg/g creat Final     Comment:                            Normal:                0 -  29                         Moderately increased: 30 - 300                         Severely increased:       >300                **Please note reference interval change**       Key Antihyperglycemic Medications               insulin glargine (Lantus Solostar U-100 Insulin) 100 unit/mL (3 mL) inpn (Taking) 19 Units by SubCUTAneous route nightly. pioglitazone (ACTOS) 30 mg tablet (Taking) Take 1 Tablet by mouth daily. SITagliptin (Januvia) 100 mg tablet (Taking) Take 1 Tablet by mouth daily. Hypertension:  Visit Vitals  /82 Comment: feet flat on floor   Pulse 67   Temp 98 °F (36.7 °C) (Temporal)   Resp 18   Ht 5' 8\" (1.727 m)   Wt 148 lb (67.1 kg)   SpO2 98%   BMI 22.50 kg/m²      Patient reports taking medications as instructed. yes   Medication side effects noted. no  Headache upon wakening. no   Home BP monitoring: Does not check  Do you experience chest pain/pressure or SOB with exertion? no  Maintain a low Sodium diet? yes  Key CAD CHF Meds               lisinopriL (PRINIVIL, ZESTRIL) 20 mg tablet (Taking) TAKE ONE TABLET BY MOUTH EVERY DAY    amLODIPine (NORVASC) 10 mg tablet (Taking) TAKE ONE TABLET BY MOUTH EVERY DAY    simvastatin (ZOCOR) 10 mg tablet (Taking) Take 1 Tablet by mouth nightly. BUN   Date Value Ref Range Status   11/09/2022 20 8 - 27 mg/dL Final     Creatinine   Date Value Ref Range Status   11/09/2022 1.34 (H) 0.76 - 1.27 mg/dL Final     GFR est AA   Date Value Ref Range Status   03/11/2022 55.0   Final     Comment:     THE [facility] STATES THAT THE MDRD STUDY EQUATION SHOULD ONLY BE USED ON  INDIVIDUALS 18 OR OLDER.  THE REPORT ALSO NOTES THAT THE MDRD STUDY EQUATION HAS NOT BEEN  VALIDATED FOR USE WITH THE ELDERLY (OVER 79YEARS OF AGE), PREGNANT WOMEN, PATIENTS WITH SERIOUS  COMORBID CONDITIONS, OR PERSONS WITH EXTREMES OF BODY SIZE, MUSCLE MASS, OR NUTRITIONAL STATUS. APPLICATION OF THE EQUATION TO THESE PATIENT GROUPS MAY LEAD TO ERRORS IN GFR ESTIMATION. GFR  ESTIMATING EQUATIONS HAVE POORER AGREEMENT WITH MEASURED GFR FOR ILL HOSPITALIZED PATIENTS AND  FOR PEOPLE WITH NEAR NORMAL KIDNEY FUNCTION THAN FOR SUBJECTS IN THE MDRD STUDY. VALIDATION  STUDIES ARE IN PROGRESS TO EVALUATE THE MDRD STUDY EQUATION FOR ADDITIONAL ETHNIC GROUPS, THE  ELDERLY, VARIOUS DISEASE CONDITIONS, AND PEOPLE WITH NORMAL KIDNEY FUNCTION. GFRA----REFERS TO   GFRO---REFERS TO OTHER RACES    REFERENCES AVAILABLE UPON REQUEST. Potassium   Date Value Ref Range Status   11/09/2022 4.4 3.5 - 5.2 mmol/L Final       Schizophrenia  Seeing psychiatry, Felicita Burleson NP  Recently started on buspar 10mg two times a day  Problem right now is being around crowds and clausterphobia  He is stuttering a lot and this upsets him  He endorses lack of appetite for at least 3 months, he is down 7 pounds from last visit 3 months ago  Is seeing dr. Abigail Avitia with neurology for dementia      ROS:     ROS  As stated in HPI, otherwise all others negative. The problem list was updated as a part of today's visit.   Patient Active Problem List   Diagnosis Code    Essential hypertension with goal blood pressure less than 130/80 I10    Gastroesophageal reflux disease K21.9    BPH (benign prostatic hyperplasia) N40.0    Microcytic anemia D50.9    NS (nuclear sclerosis) H25.10    Severe depression ([address] F32.2    Tarsal tunnel syndrome G57.50    Dementia, unspecified dementia severity, unspecified dementia type, unspecified whether behavioral, psychotic, or mood disturbance or anxiety ([address] F03.90    Status post partial gastrectomy Z90.3    History of hepatitis C Z86.19    History of drug abuse ([address] F19.11    Paranoid schizophrenia, chronic condition with acute exacerbation ([address] F20.0    Posterior vitreous detachment, right eye H43.811    Viral hepatitis C B19.20    Vitreomacular adhesion, left eye H43.822    Prostate cancer ([facility] [address] C61    Lumbosacral spondylosis without myelopathy M47.817    Pain in right foot M79.671    Radiculopathy due to lumbar intervertebral disc disorder M51.16    Type 2 diabetes mellitus with diabetic polyneuropathy, with long-term current use of insulin ([facility]) E11.42, Z79.4    Preventative health care Z00.00       The PSH, FH were reviewed. SH:  Social History     Tobacco Use    Smoking status: Former     Packs/day: 1.00     Years: 20.00     Pack years: 20.00     Types: Cigarettes     Quit date: 2011     Years since quittin.7    Smokeless tobacco: Never   Vaping Use    Vaping Use: Never used   Substance Use Topics    Alcohol use: No    Drug use: Yes     Types: Marijuana     Comment: H/O Heroin addiction STATES QUIT   48 YRS AGO; STILL USE MARIJUANA  3X/DAY;  USING MARIJUANA X 55 YRS NOW       Medications/Allergies:  Current Outpatient Medications on File Prior to Visit   Medication Sig Dispense Refill    busPIRone (BUSPAR) 10 mg tablet       Blood Pressure Monitor kit Use to check blood pressure once a day 1 Kit 0    lisinopriL (PRINIVIL, ZESTRIL) 20 mg tablet TAKE ONE TABLET BY MOUTH EVERY DAY 90 Tablet 0    amLODIPine (NORVASC) 10 mg tablet TAKE ONE TABLET BY MOUTH EVERY DAY 90 Tablet 0    insulin glargine (Lantus Solostar U-100 Insulin) 100 unit/mL (3 mL) inpn 19 Units by SubCUTAneous route nightly. 15 mL 3    simvastatin (ZOCOR) 10 mg tablet Take 1 Tablet by mouth nightly. 90 Tablet 3    omeprazole (PRILOSEC) 20 mg capsule Take 2 Capsules by mouth daily. 180 Capsule 3    pioglitazone (ACTOS) 30 mg tablet Take 1 Tablet by mouth daily. 90 Tablet 1    SITagliptin (Januvia) 100 mg tablet Take 1 Tablet by mouth daily. 90 Tablet 1    famotidine (PEPCID) 40 mg tablet Take 1 Tablet by mouth daily.  93 Tablet 1    tamsulosin (FLOMAX) 0.4 mg capsule Take 1 Capsule by mouth two (2) times daily (after meals). 180 Capsule 3    Insulin Needles, Disposable, (BD Ultra-Fine Mini Pen Needle) 31 gauge x 3/16\" ndle Use 1 new pen needle each time to inject insulin subcutaneously 4 times daily 150 Pen Needle 2    flash glucose sensor (FreeStyle Juan Carlos 14 Day Sensor) kit Apply 1 sensor to the back of the upper arm once every 14 days. Check glucose 4 times daily. 2 Kit 11    calcium-cholecalciferol, d3, (CALCIUM 600 + D) 600-125 mg-unit tab Take  by mouth daily. cholecalciferol, vitamin D3, 50 mcg (2,000 unit) tab Take  by mouth daily. FreeStyle Juan Carlos 14 Day [place] misc Apply 1 Device to affected area once. No current facility-administered medications on file prior to visit. Allergies   Allergen Reactions    Ibuprofen Anaphylaxis    Fentanyl Other (comments) and Swelling     Blurred vision    Gabapentin Other (comments) and Swelling     Blurred Vision      Metformin Diarrhea    Penicillins Swelling    Valium [Diazepam] Swelling       Objective:  Visit Vitals  /82 Comment: feet flat on floor   Pulse 67   Temp 98 °F (36.7 °C) (Temporal)   Resp 18   Ht 5' 8\" (1.727 m)   Wt 148 lb (67.1 kg)   SpO2 98%   BMI 22.50 kg/m²    Body mass index is 22.5 kg/m². Physical assessment  Physical Exam  Vitals and nursing note reviewed. Eyes:      Conjunctiva/sclera: Conjunctivae normal.      Pupils: Pupils are equal, round, and reactive to light. Cardiovascular:      Rate and Rhythm: Normal rate and regular rhythm. Heart sounds: Normal heart sounds. No murmur heard. No friction rub. No gallop. Pulmonary:      Effort: Pulmonary effort is normal.      Breath sounds: Normal breath sounds. Musculoskeletal:         General: Normal range of motion. Cervical back: Normal range of motion. Skin:     General: Skin is warm and dry. Neurological:      Mental Status: He is alert.          Labwork and Ancillary Studies:    CBC w/Diff  Lab Results   Component Value Date/Time    WBC 5.2 05/10/2022 09:14 AM    HGB 11.5 (L) 05/10/2022 09:14 AM    PLATELET 113 (L) 81/30/4367 09:14 AM         Basic Metabolic Profile  Lab Results   Component Value Date/Time    Sodium 142 11/09/2022 02:07 PM    Potassium 4.4 11/09/2022 02:07 PM    Chloride 103 11/09/2022 02:07 PM    CO2 21 11/09/2022 02:07 PM    Anion gap 10.0 05/10/2022 09:14 AM    Glucose 112 (H) 11/09/2022 02:07 PM    BUN 20 11/09/2022 02:07 PM    Creatinine 1.34 (H) 11/09/2022 02:07 PM    BUN/Creatinine ratio 15 11/09/2022 02:07 PM    GFR est AA 55.0 03/11/2022 10:58 AM    GFR est non-AA 45 03/11/2022 10:58 AM    Calcium 9.8 11/09/2022 02:07 PM        Cholesterol  Lab Results   Component Value Date/Time    Cholesterol, total 159 11/09/2022 02:07 PM    HDL Cholesterol 67 11/09/2022 02:07 PM    LDL, calculated 79 11/09/2022 02:07 PM    LDL, calculated 62 02/27/2020 09:00 AM    Triglyceride 66 11/09/2022 02:07 PM           I have discussed the diagnosis with the patient and the intended plan as seen in the above orders. The patient has received an After-Visit Summary and questions were answered concerning future plans. An After Visit Summary was printed and given to the patient. All diagnosis have been discussed with the patient and all of the patient's questions have been answered. Follow-up and Dispositions    Return in about 2 weeks (around 2/23/2023) for NV, change jarrett sensor and 4 weeks MAWV, 30min office only. Tena Henriquez, AGNP-BC  [address] [facility]   [address] [facility] [address]  [id number]

## 2023-02-10 ENCOUNTER — TELEPHONE (OUTPATIENT)
Dept: FAMILY MEDICINE CLINIC | Age: 73
End: 2023-02-10

## 2023-02-10 DIAGNOSIS — E11.42 TYPE 2 DIABETES MELLITUS WITH DIABETIC POLYNEUROPATHY, WITH LONG-TERM CURRENT USE OF INSULIN (HCC): Primary | ICD-10-CM

## 2023-02-10 DIAGNOSIS — Z79.4 TYPE 2 DIABETES MELLITUS WITH DIABETIC POLYNEUROPATHY, WITH LONG-TERM CURRENT USE OF INSULIN (HCC): Primary | ICD-10-CM

## 2023-02-10 RX ORDER — FLASH GLUCOSE SCANNING READER
EACH MISCELLANEOUS
Qty: 1 EACH | Refills: 0 | Status: SHIPPED | OUTPATIENT
Start: 2023-02-10

## 2023-02-10 RX ORDER — FLASH GLUCOSE SENSOR
KIT MISCELLANEOUS
Qty: 2 KIT | Refills: 11 | Status: SHIPPED | OUTPATIENT
Start: 2023-02-10

## 2023-02-10 NOTE — TELEPHONE ENCOUNTER
Pharmacy Progress Note - Telephone Call    Mr. Nirmal Morales contacted the office today regarding issues with his [address] Patient states that the sensors he received from the pharmacy are FreeStyle Gamez 2 sensors and when trying to start up the sensor, his reader [address] 14 day reader) says it is not compatible. Advised patient that pharmacy must have mistakenly dispensed him Gamez 2 sensors which will not work with his Gamez 14-day reader. Recommend patient get upgraded to [address] 2 system anyhow, as it has higher accuracy and has alarms to alert patient to hypoglycemia and hyperglycemia. Patient agreeable. Submitted order for [address] 2 supplies to patients pharmacy. Patient expressed understanding, all questions answered at this time.     Thank you,  Laurie Daniels, PharmD, [facility]    Program: Medical Group  CPA in place: Yes  Recommendation Provided To: Patient/Caregiver: 2 via Telephone  Intervention Detail: Device Training and New Rx: 2, reason: Needs Additional Therapy  Intervention Accepted By: Patient/Caregiver: 3  Time Spent (min): 20

## 2023-02-10 NOTE — TELEPHONE ENCOUNTER
Spoke with patient in reguards of message stating patient needs a new sensor for Knee brace. Patient states I need a new sensor for Gamez. Patient states it has already been taking care of by the pharmacy . Patient has bee in advised to call when he receives the new meter so patient can schedule an appointment.

## 2023-02-10 NOTE — TELEPHONE ENCOUNTER
----- Message from Mc Fetter sent at 2/10/2023  9:01 AM EST -----  Subject: Message to Provider    QUESTIONS  Information for Provider? pt calling in to figure out why the sensors on   his knee brace aren't connecting with the device. tried to reach out to   office, no answer/ please call pt asap. thanks  ---------------------------------------------------------------------------  --------------  Wendy Mosqueda INFO  [phone]; OK to leave message on voicemail  ---------------------------------------------------------------------------  --------------  SCRIPT ANSWERS  Relationship to Patient?  Self

## 2023-02-16 DIAGNOSIS — E11.42 TYPE 2 DIABETES MELLITUS WITH DIABETIC POLYNEUROPATHY, WITH LONG-TERM CURRENT USE OF INSULIN (HCC): ICD-10-CM

## 2023-02-16 DIAGNOSIS — I10 ESSENTIAL (PRIMARY) HYPERTENSION: Primary | ICD-10-CM

## 2023-02-16 DIAGNOSIS — Z79.4 TYPE 2 DIABETES MELLITUS WITH DIABETIC POLYNEUROPATHY, WITH LONG-TERM CURRENT USE OF INSULIN (HCC): ICD-10-CM

## 2023-02-24 DIAGNOSIS — I10 ESSENTIAL (PRIMARY) HYPERTENSION: ICD-10-CM

## 2023-02-24 RX ORDER — AMLODIPINE BESYLATE 10 MG/1
TABLET ORAL
Qty: 90 TABLET | Refills: 1 | Status: SHIPPED | OUTPATIENT
Start: 2023-02-24

## 2023-02-24 RX ORDER — LISINOPRIL 20 MG/1
TABLET ORAL
Qty: 90 TABLET | Refills: 1 | Status: SHIPPED | OUTPATIENT
Start: 2023-02-24

## 2023-02-28 ENCOUNTER — CARE COORDINATION (OUTPATIENT)
Facility: CLINIC | Age: 73
End: 2023-02-28

## 2023-02-28 PROBLEM — E11.65 HYPERGLYCEMIA DUE TO DIABETES MELLITUS (HCC): Status: ACTIVE | Noted: 2023-02-17

## 2023-02-28 PROBLEM — I10 ESSENTIAL HYPERTENSION WITH GOAL BLOOD PRESSURE LESS THAN 130/80: Status: ACTIVE | Noted: 2020-03-24

## 2023-02-28 PROBLEM — E11.10 DIABETIC ACIDOSIS WITHOUT COMA (HCC): Status: ACTIVE | Noted: 2019-06-19

## 2023-02-28 PROBLEM — B19.20 VIRAL HEPATITIS C: Status: ACTIVE | Noted: 2021-08-10

## 2023-02-28 PROBLEM — R73.9 HYPERGLYCEMIA: Status: ACTIVE | Noted: 2023-02-28

## 2023-02-28 PROBLEM — C61 PROSTATE CANCER (HCC): Status: ACTIVE | Noted: 2021-09-21

## 2023-02-28 PROBLEM — F20.0 PARANOID SCHIZOPHRENIA, CHRONIC CONDITION WITH ACUTE EXACERBATION (HCC): Status: ACTIVE | Noted: 2021-02-08

## 2023-02-28 PROBLEM — F32.2 SEVERE DEPRESSION (HCC): Status: ACTIVE | Noted: 2019-03-10

## 2023-02-28 RX ORDER — FLASH GLUCOSE SCANNING READER
1 EACH MISCELLANEOUS
COMMUNITY
Start: 2023-02-15

## 2023-02-28 RX ORDER — LEVOFLOXACIN 500 MG/1
500 TABLET, FILM COATED ORAL DAILY
COMMUNITY
Start: 2023-02-28 | End: 2023-03-05

## 2023-02-28 RX ORDER — BUSPIRONE HYDROCHLORIDE 10 MG/1
10 TABLET ORAL 2 TIMES DAILY
COMMUNITY
Start: 2023-02-06

## 2023-02-28 ASSESSMENT — PATIENT HEALTH QUESTIONNAIRE - PHQ9
SUM OF ALL RESPONSES TO PHQ QUESTIONS 1-9: 0

## 2023-03-01 ENCOUNTER — CARE COORDINATION (OUTPATIENT)
Facility: CLINIC | Age: 73
End: 2023-03-01

## 2023-03-01 NOTE — CARE COORDINATION
.Ambulatory Care Coordination Note  3/1/2023    Patient Current Location:  Home: [address] [place] [id number]    ACM contacted the patient by telephone. Verified name and  with patient as identifiers. Provided introduction to self, and explanation of the ACM role. ACM: Kwadwo Avery RN    Challenges to be reviewed by the provider   Additional needs identified to be addressed with provider: Yes  Patient was admitted to [facility] - Pneumonia . , he has a OV scheduled for 3/2/23 Needs to be changed to a DILAN  Patient will be picked up by a CTN for DILAN x 30 days. Method of communication with provider: chart routing. Ambulatory Care Coordination Assessment    Care Coordination Protocol  Referral from Primary Care Provider: No  Week 1 - Initial Assessment                             Suggested Interventions and Community Resources  Diabetes Education: In Process (Comment: ED visit  Hyperglycemia)   Fall Risk Prevention: In Process Home Health Services: In Process (Comment: D/C  [facility])   Occupational Therapy: In Process   Physical Therapy: In Process   Smoking Cessation:  In Process                  Future Appointments   Date Time Provider Dipika Mulligan   3/2/2023  2:45 PM MANAS Gregory CNP AMB   3/23/2023 11:30 AM Clau Ng MD [facility]   3/24/2023  9:30 AM MANAS Gregory CNP AMB

## 2023-03-01 NOTE — CARE COORDINATION
Ethyl Penning Care Transitions Initial Follow Up Call    Call within 2 business days of discharge: Yes    Patient Current Location:   N/A    Care Transition Nurse contacted the patient by telephone to perform post hospital discharge assessment. Call answered by female who identified herself as patient's aide. She verbalized patient was sleeping. CTN provided name and contact info and requested to have patient return call. Patient: Dashawn López Patient : 1950   MRN: [id number]  Reason for Admission: Pneumonia  Discharge Date: 20 RARS: No data recorded    Last Discharge 30 [facility]       None            Was this an external facility discharge? Yes, 23-23  Discharge Facility: [address] [place] [address] to be reviewed by the provider   Additional needs identified to be addressed with provider: No  none               Method of communication with provider: chart routing. Advance Care Planning:   Does patient have an Advance Directive: not on file. Was patient discharged with a pulse oximeter? no       Offered patient enrollment in the Remote Patient Monitoring (RPM) program for in-home monitoring: NA.    Care Transitions 24 Hour Call    Care Transitions Interventions         Discussed follow-up appointments. If no appointment was previously scheduled, appointment scheduling offered: No.   Is follow up appointment scheduled within 7 days of discharge? Yes. Follow Up  Future Appointments   Date Time Provider Dipika Mulligan   3/2/2023  2:45 PM MANAS Hidalgo CNP   3/23/2023 11:30 AM Angelica Lara MD [facility] Juliethjosh Sumeet Veronica   3/24/2023  9:30 AM MANAS Hidalgo CNP       Care Transition Nurse provided contact information.     Plan for next call:  complete initial transition of care outreach     Shar Figueroa RN

## 2023-03-02 ENCOUNTER — OFFICE VISIT (OUTPATIENT)
Facility: CLINIC | Age: 73
End: 2023-03-02
Payer: MEDICARE

## 2023-03-02 ENCOUNTER — CARE COORDINATION (OUTPATIENT)
Facility: CLINIC | Age: 73
End: 2023-03-02

## 2023-03-02 VITALS
WEIGHT: 138 LBS | SYSTOLIC BLOOD PRESSURE: 85 MMHG | DIASTOLIC BLOOD PRESSURE: 57 MMHG | BODY MASS INDEX: 20.92 KG/M2 | RESPIRATION RATE: 18 BRPM | HEIGHT: 68 IN | TEMPERATURE: 98 F | HEART RATE: 109 BPM | OXYGEN SATURATION: 98 %

## 2023-03-02 DIAGNOSIS — Z00.00 MEDICARE ANNUAL WELLNESS VISIT, SUBSEQUENT: Primary | ICD-10-CM

## 2023-03-02 DIAGNOSIS — J18.9 PNEUMONIA DUE TO INFECTIOUS ORGANISM, UNSPECIFIED LATERALITY, UNSPECIFIED PART OF LUNG: ICD-10-CM

## 2023-03-02 DIAGNOSIS — Z91.81 AT HIGH RISK FOR FALLS: ICD-10-CM

## 2023-03-02 DIAGNOSIS — R63.0 POOR APPETITE: ICD-10-CM

## 2023-03-02 PROCEDURE — 3078F DIAST BP <80 MM HG: CPT | Performed by: NURSE PRACTITIONER

## 2023-03-02 PROCEDURE — 3074F SYST BP LT 130 MM HG: CPT | Performed by: NURSE PRACTITIONER

## 2023-03-02 PROCEDURE — G0439 PPPS, SUBSEQ VISIT: HCPCS | Performed by: NURSE PRACTITIONER

## 2023-03-02 PROCEDURE — 1123F ACP DISCUSS/DSCN MKR DOCD: CPT | Performed by: NURSE PRACTITIONER

## 2023-03-02 RX ORDER — FLUOXETINE HYDROCHLORIDE 40 MG/1
40 CAPSULE ORAL DAILY
COMMUNITY

## 2023-03-02 SDOH — ECONOMIC STABILITY: FOOD INSECURITY: WITHIN THE PAST 12 MONTHS, YOU WORRIED THAT YOUR FOOD WOULD RUN OUT BEFORE YOU GOT MONEY TO BUY MORE.: NEVER TRUE

## 2023-03-02 SDOH — ECONOMIC STABILITY: TRANSPORTATION INSECURITY
IN THE PAST 12 MONTHS, HAS THE LACK OF TRANSPORTATION KEPT YOU FROM MEDICAL APPOINTMENTS OR FROM GETTING MEDICATIONS?: NO

## 2023-03-02 SDOH — ECONOMIC STABILITY: HOUSING INSECURITY
IN THE LAST 12 MONTHS, WAS THERE A TIME WHEN YOU DID NOT HAVE A STEADY PLACE TO SLEEP OR SLEPT IN A SHELTER (INCLUDING NOW)?: NO

## 2023-03-02 SDOH — ECONOMIC STABILITY: INCOME INSECURITY: IN THE LAST 12 MONTHS, WAS THERE A TIME WHEN YOU WERE NOT ABLE TO PAY THE MORTGAGE OR RENT ON TIME?: NO

## 2023-03-02 SDOH — ECONOMIC STABILITY: TRANSPORTATION INSECURITY
IN THE PAST 12 MONTHS, HAS LACK OF TRANSPORTATION KEPT YOU FROM MEETINGS, WORK, OR FROM GETTING THINGS NEEDED FOR DAILY LIVING?: NO

## 2023-03-02 SDOH — ECONOMIC STABILITY: FOOD INSECURITY: WITHIN THE PAST 12 MONTHS, THE FOOD YOU BOUGHT JUST DIDN'T LAST AND YOU DIDN'T HAVE MONEY TO GET MORE.: NEVER TRUE

## 2023-03-02 ASSESSMENT — PATIENT HEALTH QUESTIONNAIRE - PHQ9
5. POOR APPETITE OR OVEREATING: 3
4. FEELING TIRED OR HAVING LITTLE ENERGY: 3
SUM OF ALL RESPONSES TO PHQ QUESTIONS 1-9: 21
SUM OF ALL RESPONSES TO PHQ QUESTIONS 1-9: 21
7. TROUBLE CONCENTRATING ON THINGS, SUCH AS READING THE NEWSPAPER OR WATCHING TELEVISION: 3
2. FEELING DOWN, DEPRESSED OR HOPELESS: 3
SUM OF ALL RESPONSES TO PHQ QUESTIONS 1-9: 21
6. FEELING BAD ABOUT YOURSELF - OR THAT YOU ARE A FAILURE OR HAVE LET YOURSELF OR YOUR FAMILY DOWN: 0
3. TROUBLE FALLING OR STAYING ASLEEP: 3
SUM OF ALL RESPONSES TO PHQ9 QUESTIONS 1 & 2: 6
1. LITTLE INTEREST OR PLEASURE IN DOING THINGS: 3
8. MOVING OR SPEAKING SO SLOWLY THAT OTHER PEOPLE COULD HAVE NOTICED. OR THE OPPOSITE, BEING SO FIGETY OR RESTLESS THAT YOU HAVE BEEN MOVING AROUND A LOT MORE THAN USUAL: 3
SUM OF ALL RESPONSES TO PHQ QUESTIONS 1-9: 21
9. THOUGHTS THAT YOU WOULD BE BETTER OFF DEAD, OR OF HURTING YOURSELF: 0

## 2023-03-02 ASSESSMENT — LIFESTYLE VARIABLES
HOW OFTEN DO YOU HAVE A DRINK CONTAINING ALCOHOL: NEVER
HOW MANY STANDARD DRINKS CONTAINING ALCOHOL DO YOU HAVE ON A TYPICAL DAY: PATIENT DOES NOT DRINK

## 2023-03-02 ASSESSMENT — SOCIAL DETERMINANTS OF HEALTH (SDOH): HOW HARD IS IT FOR YOU TO PAY FOR THE VERY BASICS LIKE FOOD, HOUSING, MEDICAL CARE, AND HEATING?: NOT HARD AT ALL

## 2023-03-02 NOTE — PROGRESS NOTES
Room 2    When asked if patient has any concerns he would like to address with CHELSEA Petit patient states yes I am not eating like I am suppose to . Did patient bring someone? Yes my aid     Did the patient have DME equipment? Yes Rollator     Did you take your medication today? Yes       1. \"Have you been to the ER, urgent care clinic since your last visit? Hospitalized since your last visit? \" Yes     2. \"Have you seen or consulted any other health care providers outside of the [address] [place] [address] since your last visit? \" No     3. For patients aged 39-70: Has the patient had a colonoscopy / FIT/ Cologuard? Yes      If the patient is female:    4. For patients aged 41-77: Has the patient had a mammogram within the past 2 years? N/A      5. For patients aged 21-65: Has the patient had a pap smear? {Cancer Care Gap present? N/A      PHQ-9  3/2/2023   Little interest or pleasure in doing things 3   Little interest or pleasure in doing things -   Feeling down, depressed, or hopeless 3   Trouble falling or staying asleep, or sleeping too much 3   Trouble falling or staying asleep, or sleeping too much -   Feeling tired or having little energy 3   Feeling tired or having little energy -   Poor appetite or overeating 3   Poor appetite, weight loss, or overeating -   Feeling bad about yourself - or that you are a failure or have let yourself or your family down 0   Feeling bad about yourself - or that you are a failure or have let yourself or your family down -   Trouble concentrating on things, such as reading the newspaper or watching television 3   Trouble concentrating on things such as school, work, reading, or watching TV -   Moving or speaking so slowly that other people could have noticed.  Or the opposite - being so fidgety or restless that you have been moving around a lot more than usual 3   Moving or speaking so slowly that other people could have noticed; or the opposite being so fidgety that others notice -   Thoughts that you would be better off dead, or of hurting yourself in some way 0   Thoughts of being better off dead, or hurting yourself in some way -   PHQ-2 Score 6   Total Score PHQ 2 -   PHQ-9 Total Score 21   PHQ 9 Score -          Health Maintenance Due   Topic Date Due    DTaP/Tdap/Td vaccine (1 - Tdap) Never done    Shingles vaccine (1 of 2) Never done    Low dose CT lung screening  Never done    Pneumococcal 65+ years Vaccine (2 - PCV) 10/18/2017    COVID-19 Vaccine (4 - Booster for Nikki Lied series) 01/05/2022    Diabetic foot exam  03/17/2023    Annual Wellness Visit (AWV)  03/19/2023         Who is the primary learner? Patient    What is the preferred language for health care of the primary learner? ENGLISH    How does the primary learner prefer to learn new concepts?  DEMONSTRATION    Answered By Patient    Relationship to Learner SELF

## 2023-03-02 NOTE — PATIENT INSTRUCTIONS
Ask psychiatrist if you can start mirtazapine for your appetite. Preventing Falls: Care Instructions  Overview     Getting around your home safely can be a challenge if you have injuries or health problems that make it easy for you to fall. Loose rugs and furniture in walkways are among the dangers for many older people who have problems walking or who have poor eyesight. People who have conditions such as arthritis, osteoporosis, or dementia also have to be careful not to fall. You can make your home safer with a few simple measures. Follow-up care is a key part of your treatment and safety. Be sure to make and go to all appointments, and call your doctor if you are having problems. It's also a good idea to know your test results and keep a list of the medicines you take. How can you care for yourself at home? Taking care of yourself  Exercise regularly to improve your strength, muscle tone, and balance. Walk if you can. Swimming may be a good choice if you cannot walk easily. Have your vision and hearing checked each year or any time you notice a change. If you have trouble seeing and hearing, you might not be able to avoid objects and could lose your balance. Know the side effects of the medicines you take. Ask your doctor or pharmacist whether the medicines you take can affect your balance. Sleeping pills or sedatives can affect your balance. Limit the amount of alcohol you drink. Alcohol can impair your balance and other senses. Ask your doctor whether calluses or corns on your feet need to be removed. If you wear loose-fitting shoes because of calluses or corns, you can lose your balance and fall. Talk to your doctor if you have numbness in your feet. You may get dizzy if you do not drink enough water. To prevent dehydration, drink plenty of fluids. Choose water and other clear liquids.  If you have kidney, heart, or liver disease and have to limit fluids, talk with your doctor before you increase the amount of fluids you drink. Preventing falls at home  Remove raised doorway thresholds, throw rugs, and clutter. Repair loose carpet or raised areas in the floor. Move furniture and electrical cords to keep them out of walking paths. Use nonskid floor wax, and wipe up spills right away, especially on ceramic tile floors. If you use a walker or cane, put rubber tips on it. If you use crutches, clean the bottoms of them regularly with an abrasive pad, such as steel wool. Keep your house well lit, especially stairways, porches, and outside walkways. Use night-lights in areas such as hallways and bathrooms. Add extra light switches or use remote switches (such as switches that go on or off when you clap your hands) to make it easier to turn lights on if you have to get up during the night. Install sturdy handrails on stairways. Move items in your cabinets so that the things you use a lot are on the lower shelves (about waist level). Keep a cordless phone and a flashlight with new batteries by your bed. If possible, put a phone in each of the main rooms of your house, or carry a cell phone in case you fall and cannot reach a phone. Or, you can wear a device around your neck or wrist. You push a button that sends a signal for help. Wear low-heeled shoes that fit well and give your feet good support. Use footwear with nonskid soles. Check the heels and soles of your shoes for wear. Repair or replace worn heels or soles. Do not wear socks without shoes on smooth floors, such as wood. Walk on the grass when the sidewalks are slippery. If you live in an area that gets snow and ice in the winter, sprinkle salt on slippery steps and sidewalks. Or ask a family member or friend to do this for you. Preventing falls in the bath  Install grab bars and nonskid mats inside and outside your shower or tub and near the toilet and sinks. Use shower chairs and bath benches.   Use a hand-held shower head that will allow you to sit while showering. Get into a tub or shower by putting the weaker leg in first. Get out of a tub or shower with your strong side first.  Repair loose toilet seats and consider installing a raised toilet seat to make getting on and off the toilet easier. Keep your bathroom door unlocked while you are in the shower. Where can you learn more? Go to http://www.kauffman.com/ and enter G117 to learn more about \"Preventing Falls: Care Instructions. \"  Current as of: May 4, 2022               Content Version: 13.5  © 8357-2044 [employer]. Care instructions adapted under license by [facility] ([facility]). If you have questions about a medical condition or this instruction, always ask your healthcare professional. [address] any warranty or liability for your use of this information. Learning About Mild Cognitive Impairment (MCI)  What is mild cognitive impairment (MCI)? It's common to forget things sometimes as we get older. But some older people have memory loss that's more than normal aging. It's called mild cognitive impairment, or MCI. It is not the same as dementia. People with the condition often know that their memory or mental function has changed. Tests may show some loss. But their minds work well overall. They can carry out daily tasks that are normal for them. People with MCI have a higher chance of one day getting dementia. But not all people who have it will get dementia. Some people may stay the same over time. What are the symptoms? People with MCI have more memory loss than what occurs with normal aging. They may have increasing trouble with recalling words and keeping up with conversations. They may also have trouble remembering important events and making decisions. What puts you at risk? The risk of getting MCI increases with age. Having high blood pressure or having a family history of MCI may also increase your risk.   How is it diagnosed? Your doctor will do a physical exam.  You may be asked questions to check your memory and other mental skills. Your doctor may also talk to close friends and family members. This can help the doctor figure out how your memory and other mental skills have changed. You may get blood tests and tests that look at your brain. These questions and tests can make sure you don't have other conditions that can cause symptoms like MCI. These include depression, sleep problems, and side effects from medicines. How is it treated? There are no medicines to treat MCI or to keep it from progressing to dementia. But treating conditions like high blood pressure and diabetes may help. A person with MCI needs routine follow-up visits with their doctor to check on changes in the person's mental skills. How can you care for yourself at home? Keeping your body active can help slow MCI. Exercises like walking can help. Try to stay active mentally too. Read or do things like crossword puzzles if you enjoy doing them. If you need help coping with MCI, you may want to get support from family, friends, a support group, or a counselor who works with people who have [address] Though the future isn't always clear, it can be good to plan ahead with instructions for your care. These are called advanced directives. Having a plan can help make sure that you get the care you want. Current as of: August 25, 2022               Content Version: 13.5  © 2006-2022 [employer]. Care instructions adapted under license by [facility] ([facility]). If you have questions about a medical condition or this instruction, always ask your healthcare professional. [place] [address] any warranty or liability for your use of this information. Learning About Mindfulness for Stress  What are mindfulness and stress? Stress is what you feel when you have to handle more than you are used to.  A lot of things can cause stress. You may feel stress when you go on a job interview, take a test, or run a race. This kind of short-term stress is normal and even useful. It can help you if you need to work hard or react quickly. Stress also can last a long time. Long-term stress is caused by stressful situations or events. Examples of long-term stress include long-term health problems, ongoing problems at work, and conflicts in your family. Long-term stress can harm your health. Mindfulness is a focus only on things happening in the present moment. It's a process of purposefully paying attention to and being aware of your surroundings, your emotions, your thoughts, and how your body feels. You are aware of these things, but you aren't judging these experiences as \"good\" or \"bad. \" Mindfulness can help you learn to calm your mind and body to help you cope with illness, pain, and stress. How does mindfulness help to relieve stress? Mindfulness can help quiet your mind and relax your body. Studies show that it can help some people sleep better, feel less anxious, and bring their blood pressure down. And it's been shown to help some people live and cope better with certain health problems like heart disease, depression, chronic pain, and cancer. How do you practice mindfulness? To be mindful is to pay attention, to be present, and to be accepting. When you're mindful, you do just one thing and you pay close attention to that one thing. For example, you may sit quietly and notice your emotions or how your food tastes and smells. When you're present, you focus on the things that are happening right now. You let go of your thoughts about the past and the future. When you dwell on the past or the future, you miss moments that can heal and strengthen you. You may miss moments like hearing a child laugh or seeing a friendly face when you think you're all alone. When you're accepting, you don't  the present moment.  Instead you accept your thoughts and feelings as they come. You can practice anytime, anywhere, and in any way you choose. You can practice in many ways. Here are a few ideas:  While doing your chores, like washing the dishes, let your mind focus on what's in your hand. What does the dish feel like? Is the water warm or cold? Go outside and take a few deep breaths. What is the air like? Is it warm or cold? When you can, take some time at the start of your day to sit alone and think. Take a slow walk by yourself. Count your steps while you breathe in and out. Try yoga breathing exercises, stretches, and poses to strengthen and relax your muscles. At work, if you can, try to stop for a few moments each hour. Note how your body feels. Let yourself regroup and let your mind settle before you return to what you were doing. If you struggle with anxiety or \"worry thoughts,\" imagine your mind as a blue rachel and your worry thoughts as clouds. Now imagine those worry thoughts floating across your mind's rachel. Just let them pass by as you watch. Follow-up care is a key part of your treatment and safety. Be sure to make and go to all appointments, and call your doctor if you are having problems. It's also a good idea to know your test results and keep a list of the medicines you take. Where can you learn more? Go to http://www.kauffman.com/ and enter M676 to learn more about \"Learning About Mindfulness for Stress. \"  Current as of: February 9, 2022               Content Version: 13.5  © 2006-2022 Healthwise, Incorporated. Care instructions adapted under license by [facility] ([facility]). If you have questions about a medical condition or this instruction, always ask your healthcare professional. [address] any warranty or liability for your use of this information. Substance Use Disorder: Care Instructions  Overview     You can improve your life and health by stopping your use of alcohol or drugs.  When you don't drink or use drugs, you may feel and sleep better. You may get along better with your family, friends, and coworkers. There are medicines and programs that can help with substance use disorder. How can you care for yourself at home? If you have been given medicine to help keep you sober or reduce your cravings, be sure to take it exactly as prescribed. Talk to your doctor about programs that can help you stop using drugs or drinking alcohol. Do not tempt yourself by keeping alcohol or drugs in your home. Learn how to say no when other people drink or use drugs. Or don't spend time with people who drink or use drugs. Use the time and money spent on drinking or drugs to do something fun with your family or friends. Preventing a relapse  Do not drink alcohol or use drugs at all. Using any amount of alcohol or drugs greatly increases your risk for relapse. Seek help from organizations such as Alcoholics Anonymous, Narcotics Anonymous, or treatment facilities if you feel the need to drink alcohol or use drugs again. Remember that recovery is a lifelong process. Stay away from situations, friends, or places that may lead you to drink or use drugs. Have a plan to spot and deal with relapse. Learn to recognize changes in your thinking that lead you to drink or use drugs. These are warning signs. Get help before you start to drink or use drugs again. Get help as soon as you can if you relapse. Some people make a plan with another person that outlines what they want that person to do for them if they relapse. The plan usually includes how to handle the relapse and who to notify in case of relapse. Don't give up. Remember that a relapse does not mean that you have failed. Use the experience to learn the triggers that lead you to drink or use drugs. Then quit again. Many people have several relapses before they are able to quit for good. Follow-up care is a key part of your treatment and safety.  Be sure to make and go to all appointments, and call your doctor if you are having problems. It's also a good idea to know your test results and keep a list of the medicines you take. When should you call for help? Call 911  anytime you think you may need emergency care. For example, call if:    You feel you cannot stop from hurting yourself or someone else. Call your doctor now or seek immediate medical care if:    You have serious withdrawal symptoms, such as confusion, hallucinations, severe trembling, or seizures. Watch closely for changes in your health, and be sure to contact your doctor if:    You have a relapse. You need more help or support to stop. Where can you learn more? Go to http://www.kauffman.com/ and enter H573 to learn more about \"Substance Use Disorder: Care Instructions. \"  Current as of: August 2, 2022               Content Version: 13.5  © 2006-2022 [employer]. Care instructions adapted under license by [facility] ([facility]). If you have questions about a medical condition or this instruction, always ask your healthcare professional. [address] any warranty or liability for your use of this information. Learning About Emotional Support  When do you need emotional support? You might find getting support from others helpful when you have a long-term health problem. Often people feel alone, confused, or scared when coping with an illness. But you aren't alone. Other people are going through the same thing you are and know how you feel. Talking with others about your feelings can help you feel better. Your family and friends can give you support. So can your doctor, a support group, or a Lutheran. If you have a support network, you will not feel as alone. You will learn new ways to deal with your situation, and you may try harder to overcome it. Where you can get support  Family and friends:  They can help you cope by giving you comfort and encouragement. Counseling: Professional counseling can help you cope with situations that interfere with your life and cause stress. Counseling can help you understand and deal with your illness. Your doctor: Find a doctor you trust and feel comfortable with. Be open and honest about your fears and concerns. Your doctor can help you get the right medical treatments, including counseling. Spiritual or Synagogue groups: They can provide comfort and may be able to help you find counseling or other social support services. Social groups: They can help you meet new people and get involved in activities you enjoy. Community support groups: In a support group, you can talk to others who have dealt with the same problems or illness as you. You can encourage one another and learn ways to cope with tough emotions. How to find a support group  Ask your doctor, counselor, or other health professional for suggestions. Contact your local Congregation, Holiness, Orthodox, or other Synagogue group. Ask your family and friends. Ask people who have the same health concerns. Go online. Forums and blogs let you read messages from others and leave your own messages. You can exchange stories, vent your frustrations, and ask and answer questions. Contact a city, state, or national group that provides support for your health concerns. Your library or community center may have a list of these groups. Or you can look for information online. Look for a support group that works for you. Ask yourself if you prefer structure and would like a , or if you would like a less formal group. Do you prefer face-to-face meetings? Or do you feel more secure in online chat rooms or forums? Supportive relationships  A supportive relationship includes emotional support such as love, trust, and understanding, as well as advice and concrete help, such as help managing your time. Reach out to others  Family and friends can help you.  Ask them to:  Listen to you and give you encouragement. This can keep you from feeling hopeless or alone. Help with small daily tasks or with bigger problems. A helping hand can keep you from feeling overwhelmed. Help you manage a health problem. For example, ask them to go to doctor visits with you. Your loved ones can offer support by being involved in your medical care. Respect your relationships  A good relationship is also a two-way street. You count on help from others, but they also count on you. Know your friends' limits. You don't have to see or call your friends every day. If you are going through a rough patch, ask friends if you can contact them outside of the usual boundaries. Don't always complain or talk about yourself. Know when it's time to stop talking and listen or just enjoy your friend's company. Know that good friends can be a bad influence. For example, if a friend encourages you to drink when you know it will harm you, you may want to end the friendship. Where can you learn more? Go to http://Elepath.woods.com/ and enter G092 to learn more about \"Learning About Emotional Support. \"  Current as of: February 9, 2022               Content Version: 13.5  © 0141-5786 Healthwise, Incorporated. Care instructions adapted under license by [facility] ([facility]). If you have questions about a medical condition or this instruction, always ask your healthcare professional. [place] [address] any warranty or liability for your use of this information. Fatigue: Care Instructions  Your Care Instructions     Fatigue is a feeling of tiredness, exhaustion, or lack of energy. You may feel fatigue because of too much or not enough activity. It can also come from stress, lack of sleep, boredom, and poor diet. Many medical problems, such as viral infections, can cause fatigue. Emotional problems, especially depression, are often the cause of fatigue.   Fatigue is most often a symptom of another problem. Treatment for fatigue depends on the cause. For example, if you have fatigue because you have a certain health problem, treating this problem also treats your fatigue. If depression or anxiety is the cause, treatment may help. Follow-up care is a key part of your treatment and safety. Be sure to make and go to all appointments, and call your doctor if you are having problems. It's also a good idea to know your test results and keep a list of the medicines you take. How can you care for yourself at home? Get regular exercise. But don't overdo it. Go back and forth between rest and exercise. Get plenty of rest.  Eat a healthy diet. Do not skip meals, especially breakfast.  Reduce your use of caffeine, tobacco, and alcohol. Caffeine is most often found in coffee, tea, cola drinks, and chocolate. Limit medicines that can cause fatigue. This includes tranquilizers and cold and allergy medicines. When should you call for help? Watch closely for changes in your health, and be sure to contact your doctor if:    You have new symptoms such as fever or a rash. Your fatigue gets worse. You have been feeling down, depressed, or hopeless. Or you may have lost interest in things that you usually enjoy. You are not getting better as expected. Where can you learn more? Go to http://www.woods.com/ and enter R155 to learn more about \"Fatigue: Care Instructions. \"  Current as of: February 9, 2022               Content Version: 13.5  © 2006-2022 [employer] [facility]. Care instructions adapted under license by [facility] ([facility]). If you have questions about a medical condition or this instruction, always ask your healthcare professional. [place] [address] any warranty or liability for your use of this information. Learning About Emotional Support  When do you need emotional support?      You might find getting support from others helpful when you have a long-term health problem. Often people feel alone, confused, or scared when coping with an illness. But you aren't alone. Other people are going through the same thing you are and know how you feel. Talking with others about your feelings can help you feel better. Your family and friends can give you support. So can your doctor, a support group, or a Hoahaoism. If you have a support network, you will not feel as alone. You will learn new ways to deal with your situation, and you may try harder to overcome it. Where you can get support  Family and friends: They can help you cope by giving you comfort and encouragement. Counseling: Professional counseling can help you cope with situations that interfere with your life and cause stress. Counseling can help you understand and deal with your illness. Your doctor: Find a doctor you trust and feel comfortable with. Be open and honest about your fears and concerns. Your doctor can help you get the right medical treatments, including counseling. Spiritual or Sikhism groups: They can provide comfort and may be able to help you find counseling or other social support services. Social groups: They can help you meet new people and get involved in activities you enjoy. Community support groups: In a support group, you can talk to others who have dealt with the same problems or illness as you. You can encourage one another and learn ways to cope with tough emotions. How to find a support group  Ask your doctor, counselor, or other health professional for suggestions. Contact your local Hoahaoism, Yarsanism, Yazidism, or other Sikhism group. Ask your family and friends. Ask people who have the same health concerns. Go online. Forums and blogs let you read messages from others and leave your own messages. You can exchange stories, vent your frustrations, and ask and answer questions. Contact a city, state, or national group that provides support for your health concerns. Your library or [employer] center may have a list of these groups. Or you can look for information online. Look for a support group that works for you. Ask yourself if you prefer structure and would like a , or if you would like a less formal group. Do you prefer face-to-face meetings? Or do you feel more secure in online chat rooms or forums? Supportive relationships  A supportive relationship includes emotional support such as love, trust, and understanding, as well as advice and concrete help, such as help managing your time. Reach out to others  Family and friends can help you. Ask them to:  Listen to you and give you encouragement. This can keep you from feeling hopeless or alone. Help with small daily tasks or with bigger problems. A helping hand can keep you from feeling overwhelmed. Help you manage a health problem. For example, ask them to go to doctor visits with you. Your loved ones can offer support by being involved in your medical care. Respect your relationships  A good relationship is also a two-way street. You count on help from others, but they also count on you. Know your friends' limits. You don't have to see or call your friends every day. If you are going through a rough patch, ask friends if you can contact them outside of the usual boundaries. Don't always complain or talk about yourself. Know when it's time to stop talking and listen or just enjoy your friend's company. Know that good friends can be a bad influence. For example, if a friend encourages you to drink when you know it will harm you, you may want to end the friendship. Where can you learn more? Go to http://www.woods.com/ and enter G092 to learn more about \"Learning About Emotional Support. \"  Current as of: February 9, 2022               Content Version: 13.5  © 2438-5962 Healthwise, Incorporated. Care instructions adapted under license by [facility] ([facility]).  If you have questions about a medical condition or this instruction, always ask your healthcare professional. Healthwise, [facility] disclaims any warranty or liability for your use of this information.           Learning About Stress  What is stress?     Stress is what you feel when you have to handle more than you are used to. Stress is a fact of life for most people, and it affects everyone differently. What causes stress for you may not be stressful for someone else.  A lot of things can cause stress. You may feel stress when you go on a job interview, take a test, or run a race. This kind of short-term stress is normal and even useful. It can help you if you need to work hard or react quickly. For example, stress can help you finish an important job on time.  Stress also can last a long time. Long-term stress is caused by stressful situations or events. Examples of long-term stress include long-term health problems, ongoing problems at work, or conflicts in your family. Long-term stress can harm your health.  How does stress affect your health?  When you are stressed, your body responds as though you are in danger. It makes hormones that speed up your heart, make you breathe faster, and give you a burst of energy. This is called the fight-or-flight stress response. If the stress is over quickly, your body goes back to normal and no harm is done.  But if stress happens too often or lasts too long, it can have bad effects. Long-term stress can make you more likely to get sick, and it can make symptoms of some diseases worse. If you tense up when you are stressed, you may develop neck, shoulder, or low back pain. Stress is linked to high blood pressure and heart disease.  Stress also harms your emotional health. It can make you lomas, tense, or depressed. Your relationships may suffer, and you may not do well at work or school.  What can you do to manage stress?  How to relax your mind   Write. It may help to write about things that are bothering  you. This helps you find out how much stress you feel and what is causing it. When you know this, you can find better ways to cope. Let your feelings out. Talk, laugh, cry, and express anger when you need to. Talking with friends, family, a counselor, or a member of the clergy about your feelings is a healthy way to relieve stress. Do something you enjoy. For example, listen to music or go to a movie. Practice your hobby or do volunteer work. Meditate. This can help you relax, because you are not worrying about what happened before or what may happen in the future. Do guided imagery. Imagine yourself in any setting that helps you feel calm. You can use audiotapes, books, or a teacher to guide you. How to relax your body   Do something active. Exercise or activity can help reduce stress. Walking is a great way to get started. Even everyday activities such as housecleaning or yard work can help. Do breathing exercises. For example:  From a standing position, bend forward from the waist with your knees slightly bent. Let your arms dangle close to the floor. Breathe in slowly and deeply as you return to a standing position. Roll up slowly and lift your head last.  Hold your breath for just a few seconds in the standing position. Breathe out slowly and bend forward from the waist.  Try yoga or benja chi. These techniques combine exercise and meditation. You may need some training at first to learn them. What can you do to prevent stress? Manage your time. This helps you find time to do the things you want and need to do. Get enough sleep. Your body recovers from the stresses of the day while you are sleeping. Get support. Your family, friends, and community can make a difference in how you experience stress. Where can you learn more? Go to http://www.kauffman.com/ and enter N032 to learn more about \"Learning About Stress. \"  Current as of: October 6, 2021               Content Version: 13.5  © 7570-6040 Healthwise, [employer]. Care instructions adapted under license by [facility] . If you have questions about a medical condition or this instruction, always ask your healthcare professional. [address] any warranty or liability for your use of this information. Learning About Managing Anger  What causes anger? Many things can cause anger: Stress at work or at home. Social situations that make you angry. A response to everyday events. Anger signals your body to prepare for a fight. This reaction is often called \"fight or flight. \" When you get angry, adrenaline and other hormones are released into your blood. Then your blood pressure goes up, your heart beats faster, and you breathe faster. When you express anger in a healthy way, it can inspire change and make you productive. But if you don't have the skills to express anger in a healthy way, anger can build up. You may hurt others--and yourself--emotionally and even physically. Violent behavior often starts with verbal threats or fairly minor incidents. But over time, it can involve physical harm. It can include physical, verbal, or sexual abuse of an intimate partner (domestic violence), a child (child abuse), or an older adult (elder abuse). It can also make you sick. Anger and constant hostility keep your blood pressure high. They increase your chances of having another health problem, such as depression, a heart attack, or a stroke. Some people with post-traumatic stress disorder (PTSD) feel angry and on alert all the time. It may feel like there are no other ways to react when you are angry. But when you learn to work with anger in appropriate and healthy ways, your anger no longer controls you. How can you manage your anger? The first step to managing anger is to be more aware of it. Note the thoughts, feelings, and emotions that you have when you get angry.  Practice noticing these signs of anger when you are calm. If you are more aware of the signs of anger, you can take steps to manage it. Here are a few tips: Think before you act. Take time to stop and cool down when you feel yourself getting angry. Count to 10 while you take slow, steady breaths. Practice some other form of mental relaxation. Learn the feelings that lead to angry outbursts. Anger and hostility may be a symptom of unhappy feelings or depression about your job, your relationship, or other aspects of your personal life. Avoid situations that lead to angry outbursts. If standing in line bothers you, do errands at less busy times. Express anger in a healthy way. You might:  Go for a short walk or jog. Draw, paint, or listen to music to release the anger. Write in a daily journal.  Use \"I\" statements, not \"you\" statements, to discuss your anger. Say \"I don't feel valued when my needs are not being met\" instead of \"You make me mad when you are so inconsiderate. \"  Take care of yourself. Exercise regularly. Eat a variety of healthy foods. Don't skip meals. Try to get 8 hours of sleep each night. Limit your use of alcohol, and don't use drugs. Practice yoga, meditation, or benja chi to relax. Explore other resources that may be available through your job or your community. Contact your human resources department at work. You might be able to get services through an employee assistance program.  Contact your local hospital, mental health facility, or health department. Ask what types of programs or support groups are available in your area. Do not keep guns in your home. If you must have guns in your home, unload them and lock them up. Lock ammunition in a separate place. Keep guns away from children. Where can you find help? If anger or stress starts to harm your work or personal relationships, you might seek help. You can learn ways to manage your feelings and actions. Talk to someone you trust, or find a counselor.   There are groups in your area that can connect you with people to talk to. [facility] . This service from the [facility] Substance Abuse and [employer]  can help you find local counselors. Search online at [employer]. [facility].gov or call 7-954-249-HELP ([phone]), or TDD 3-681.490.5443. Parents Anonymous. Self-help groups that serve parents under stress, as well as children who have been abused, are available throughout the [place], [place] ([facility]), and [facility]. To find a group in your area, search online or in your phone book under Parents Anonymous or call (884) [phone]. Where can you learn more? Go to http://www.kauffman.com/ and enter Z357 to learn more about \"Learning About Managing Anger. \"  Current as of: February 9, 2022               Content Version: 13.5  © 2006-2022 [employer]. Care instructions adapted under license by [facility] [place] ([facility]). If you have questions about a medical condition or this instruction, always ask your healthcare professional. [place] [address] any warranty or liability for your use of this information. Learning About Being Active as an Older Adult  Why is being active important as you get older? Being active is one of the best things you can do for your health. And it's never too late to start. Being active--or getting active, if you aren't already--has definite benefits. It can:  Give you more energy,  Keep your mind sharp. Improve balance to reduce your risk of falls. Help you manage chronic illness with fewer medicines. No matter how old you are, how fit you are, or what health problems you have, there is a form of activity that will work for you. And the more physical activity you can do, the better your overall health will be. What kinds of activity can help you stay healthy? Being more active will make your daily activities easier.  Physical activity includes planned exercise and things you do in daily life. There are four types of activity:  Aerobic. Doing aerobic activity makes your heart and lungs strong. Includes walking, dancing, and gardening. Aim for at least 2½ hours spread throughout the week. It improves your energy and can help you sleep better. Muscle-strengthening. This type of activity can help maintain muscle and strengthen bones. Includes climbing stairs, using resistance bands, and lifting or carrying heavy loads. Aim for at least twice a week. It can help protect the knees and other joints. Stretching. Stretching gives you better range of motion in joints and muscles. Includes upper arm stretches, calf stretches, and gentle yoga. Aim for at least twice a week, preferably after your muscles are warmed up from other activities. It can help you function better in daily life. Balancing. This helps you stay coordinated and have good posture. Includes heel-to-toe walking, benja chi, and certain types of yoga. Aim for at least 3 days a week. It can reduce your risk of falling. Even if you have a hard time meeting the recommendations, it's better to be more active than less active. All activity done in each category counts toward your weekly total. You'd be surprised how daily things like carrying groceries, keeping up with grandchildren, and taking the stairs can add up. What keeps you from being active? If you've had a hard time being more active, you're not alone. Maybe you remember being able to do more. Or maybe you've never thought of yourself as being active. It's frustrating when you can't do the things you want. Being more active can help. What's holding you back? Getting started. Have a goal, but break it into easy tasks. Small steps build into big accomplishments. Staying motivated. If you feel like skipping your activity, remember your goal. Maybe you want to move better and stay independent. Every activity gets you one step closer.   Not feeling your best.  Start with 5 minutes of an activity you enjoy. Prove to yourself you can do it. As you get comfortable, increase your time. You may not be where you want to be. But you're in the process of getting there. Everyone starts somewhere. How can you find safe ways to stay active? Talk with your doctor about any physical challenges you're facing. Make a plan with your doctor if you have a health problem or aren't sure how to get started with activity. If you're already active, ask your doctor if there is anything you should change to stay safe as your body and health change. If you tend to feel dizzy after you take medicine, avoid activity at that time. Try being active before you take your medicine. This will reduce your risk of falls. If you plan to be active at home, make sure to clear your space before you get started. Remove things like TV cords, coffee tables, and throw rugs. It's safest to have plenty of space to move freely. The key to getting more active is to take it slow and steady. Try to improve only a little bit at a time. Pick just one area to improve on at first. And if an activity hurts, stop and talk to your doctor. Where can you learn more? Go to http://www.kauffman.com/ and enter P600 to learn more about \"Learning About Being Active as an Older Adult. \"  Current as of: October 10, 2022               Content Version: 13.5  © 1517-1254 Healthwise, Incorporated. Care instructions adapted under license by [facility] [place] ([facility]). If you have questions about a medical condition or this instruction, always ask your healthcare professional. [address] any warranty or liability for your use of this information. Learning About Dental Care for Older Adults  Dental care for older adults: Overview  Dental care for older people is much the same as for younger adults. But older adults do have concerns that younger adults do not.  Older adults may have problems with gum disease and decay on the roots of their teeth. They may need missing teeth replaced or broken fillings fixed. Or they may have dentures that need to be cared for. Some older adults may have trouble holding a toothbrush. You can help remind the person you are caring for to brush and floss their teeth or to clean their dentures. In some cases, you may need to do the brushing and other dental care tasks. People who have trouble using their hands or who have dementia may need this extra help. How can you help with dental care? Normal dental care  To keep the teeth and gums healthy:  Brush the teeth with fluoride toothpaste twice a day--in the morning and at night--and floss at least once a day. Plaque can quickly build up on the teeth of older adults. Watch for the signs of gum disease. These signs include gums that bleed after brushing or after eating hard foods, such as apples. See a dentist regularly. Many experts recommend checkups every 6 months. Keep the dentist up to date on any new medications the person is taking. Encourage a balanced diet that includes whole grains, vegetables, and fruits, and that is low in saturated fat and sodium. Encourage the person you're caring for not to use tobacco products. They can affect dental and general health. Many older adults have a fixed income and feel that they can't afford dental care. But most towns and [facility] have programs in which dentists help older adults by lowering fees. Contact your area's public health offices or  for information about dental care in your area. Using a toothbrush  Older adults with arthritis sometimes have trouble brushing their teeth because they can't easily hold the toothbrush. Their hands and fingers may be stiff, painful, or weak. If this is the case, you can: Offer an electric toothbrush. Enlarge the handle of a non-electric toothbrush by wrapping a sponge, an elastic bandage, or adhesive tape around it.   Push the toothbrush handle through a ball made of rubber or soft foam.  Make the handle longer and thicker by taping Popsicle sticks or tongue depressors to it. You may also be able to buy special toothbrushes, toothpaste dispensers, and floss holders. Your doctor may recommend a soft-bristle toothbrush if the person you care for bleeds easily. Bleeding can happen because of a health problem or from certain medicines. A toothpaste for sensitive teeth may help if the person you care for has sensitive teeth. How do you brush and floss someone's teeth? If the person you are caring for has a hard time cleaning their teeth on their own, you may need to brush and floss their teeth for them. It may be easiest to have the person sit and face away from you, and to sit or stand behind them. That way you can steady their head against your arm as you reach around to floss and brush their teeth. Choose a place that has good lighting and is comfortable for both of you. Before you begin, gather your supplies. You will need gloves, floss, a toothbrush, and a container to hold water if you are not near a sink. Wash and dry your hands well and put on gloves. Start by flossing:  Gently work a piece of floss between each of the teeth toward the gums. A plastic flossing tool may make this easier, and they are available at most [facility]. Curve the floss around each tooth into a U-shape and gently slide it under the gum line. Move the floss firmly up and down several times to scrape off the plaque. After you've finished flossing, throw away the used floss and begin brushing:  Wet the brush and apply toothpaste. Place the brush at a 45-degree angle where the teeth meet the gums. Press firmly, and move the brush in small circles over the surface of the teeth. Be careful not to brush too hard. Vigorous brushing can make the gums pull away from the teeth and can scratch the tooth enamel.   Brush all surfaces of the teeth, on the tongue side and on the cheek side. Pay special attention to the front teeth and all surfaces of the back teeth. Brush chewing surfaces with short back-and-forth strokes. After you've finished, help the person rinse the remaining toothpaste from their mouth. Where can you learn more? Go to http://www.woods.com/ and enter F944 to learn more about \"Learning About Dental Care for Older Adults. \"  Current as of: June 16, 2022               Content Version: 13.5  © 2006-2022 [employer]. Care instructions adapted under license by [facility] [place] ([facility]). If you have questions about a medical condition or this instruction, always ask your healthcare professional. [place] [address] any warranty or liability for your use of this information. Hearing Loss: Care Instructions  Overview     Hearing loss is a sudden or slow decrease in how well you hear. It can range from mild to severe. Permanent hearing loss can occur with aging. It also can happen when you are exposed long-term to loud noise. Examples include listening to loud music, riding motorcycles, or being around other loud machines. Hearing loss can affect your work and home life. It can make you feel lonely or depressed. You may feel that you have lost your independence. But hearing aids and other devices can help you hear better and feel connected to others. Follow-up care is a key part of your treatment and safety. Be sure to make and go to all appointments, and call your doctor if you are having problems. It's also a good idea to know your test results and keep a list of the medicines you take. How can you care for yourself at home? Avoid loud noises whenever possible. This helps keep your hearing from getting worse. Always wear hearing protection around loud noises. Wear a hearing aid as directed. See a professional who can help you pick a hearing aid that fits you. Have hearing tests as your doctor suggests.  They can show whether your hearing has changed. Your hearing aid may need to be adjusted. Use other devices as needed. These may include:  Telephone amplifiers and hearing aids that can connect to a television, stereo, radio, or microphone. Devices that use lights or vibrations. These alert you to the doorbell, a ringing telephone, or a baby monitor. Television closed-captioning. This shows the words at the bottom of the screen. Most new TVs can do this. TTY (text telephone). This lets you type messages back and forth on the telephone instead of talking or listening. These devices are also called TDD. When messages are typed on the keyboard, they are sent over the phone line to a receiving TTY. The message is shown on a monitor. Use text messaging, social media, and email if it is hard for you to communicate by telephone. Try to learn a listening technique called speechreading. It is not lipreading. You pay attention to people's gestures, expressions, posture, and tone of voice. These clues can help you understand what a person is saying. Face the person you are talking to, and have them face you. Make sure the lighting is good. You need to see the other person's face clearly. Think about counseling if you need help to adjust to your hearing loss. When should you call for help? Watch closely for changes in your health, and be sure to contact your doctor if:    You think your hearing is getting worse. You have new symptoms, such as dizziness or nausea. Where can you learn more? Go to http://www.GameCrush.com/ and enter R798 to learn more about \"Hearing Loss: Care Instructions. \"  Current as of: May 4, 2022               Content Version: 13.5  © 7239-1368 Healthwise, Incorporated. Care instructions adapted under license by [facility] ([facility]).  If you have questions about a medical condition or this instruction, always ask your healthcare professional. [employer] disclaims any warranty or liability for your use of this information. Learning About Activities of Daily Living  What are activities of daily living? Activities of daily living (ADLs) are the basic self-care tasks you do every day. As you age, and if you have health problems, you may find that it's harder to do these things for yourself. That's when you may need some help. Your doctor uses ADLs to measure how much help you need. Knowing what you can and can't do for yourself is an important first step to getting help. And when you have the help you need, you can stay as independent as possible. Your doctor will want to know if you are able to do tasks such as: Take a bath or shower without help. Go to the bathroom by yourself. Dress and undress without help. Shave, comb your hair, and brush teeth on your own. Get in and out of bed or a chair without help. Feed yourself without help. If you are having trouble doing basic self-care tasks, talk with your doctor. You may want to bring a caregiver or family member who can help the doctor understand your needs and abilities. How will a doctor assess your ADLs? Asking about ADLs is part of a routine health checkup your doctor will likely do as you age. Your health check might be done in a doctor's office, in your home, or at a hospital. The goal is to find out if you are having any problems that could make your health problems worse or that make it unsafe for you to be on your own. To measure your ADLs, your doctor will ask how hard it is for you to do routine tasks. He or she may also want to know if you have changed the way you do a task because of a health problem. He or she may watch how you:  Walk back and forth. Keep your balance while you stand or walk. Move from sitting to standing or from a bed to a chair. Button or unbutton a shirt or sweater. Remove and put on your shoes.   It's normal to feel a little worried or anxious if you find you can't do all the things you used to be able to do. Talking with your doctor about ADLs isn't a test that you either pass or fail. It's just a way to get more information about your health and safety. Follow-up care is a key part of your treatment and safety. Be sure to make and go to all appointments, and call your doctor if you are having problems. It's also a good idea to know your test results and keep a list of the medicines you take. Current as of: October 6, 2021               Content Version: 13.5  © 2006-2022 [employer]. Care instructions adapted under license by [facility] ([facility]). If you have questions about a medical condition or this instruction, always ask your healthcare professional. [address] any warranty or liability for your use of this information. Advance Directives: Care Instructions  Overview  An advance directive is a legal way to state your wishes at the end of your life. It tells your family and your doctor what to do if you can't say what you want. There are two main types of advance directives. You can change them any time your wishes change. Living will. This form tells your family and your doctor your wishes about life support and other treatment. The form is also called a declaration. Medical power of . This form lets you name a person to make treatment decisions for you when you can't speak for yourself. This person is called a health care agent (health care proxy, health care surrogate). The form is also called a durable power of  for health care. If you do not have an advance directive, decisions about your medical care may be made by a family member, or by a doctor or a  who doesn't know you. It may help to think of an advance directive as a gift to the people who care for you. If you have one, they won't have to make tough decisions by themselves.   For more information, including forms for your state, see the [facility] website (www.caringinfo.org/planning/advance-directives/). Follow-up care is a key part of your treatment and safety. Be sure to make and go to all appointments, and call your doctor if you are having problems. It's also a good idea to know your test results and keep a list of the medicines you take. What should you include in an advance directive? Many states have a unique advance directive form. (It may ask you to address specific issues.) Or you might use a universal form that's approved by many states. If your form doesn't tell you what to address, it may be hard to know what to include in your advance directive. Use the questions below to help you get started. Who do you want to make decisions about your medical care if you are not able to? What life-support measures do you want if you have a serious illness that gets worse over time or can't be cured? What are you most afraid of that might happen? (Maybe you're afraid of having pain, losing your independence, or being kept alive by machines.)  Where would you prefer to die? (Your home? A hospital? A nursing home?)  Do you want to donate your organs when you die? Do you want certain Scientologist practices performed before you die? When should you call for help? Be sure to contact your doctor if you have any questions. Where can you learn more? Go to http://www.kauffman.com/ and enter R264 to learn more about \"Advance Directives: Care Instructions. \"  Current as of: June 16, 2022               Content Version: 13.5  © 2006-2022 [employer]. Care instructions adapted under license by [facility] [address] If you have questions about a medical condition or this instruction, always ask your healthcare professional. [place] [address] any warranty or liability for your use of this information.            A Healthy Heart: Care Instructions  Your Care Instructions     Coronary artery disease, also called heart disease, occurs when a substance called plaque builds up in the vessels that supply oxygen-rich blood to your heart muscle. This can narrow the blood vessels and reduce blood flow. A heart attack happens when blood flow is completely blocked. A high-fat diet, smoking, and other factors increase the risk of heart disease. Your doctor has found that you have a chance of having heart disease. You can do lots of things to keep your heart healthy. It may not be easy, but you can change your diet, exercise more, and quit smoking. These steps really work to lower your chance of heart disease. Follow-up care is a key part of your treatment and safety. Be sure to make and go to all appointments, and call your doctor if you are having problems. It's also a good idea to know your test results and keep a list of the medicines you take. How can you care for yourself at home? Diet    Use less salt when you cook and eat. This helps lower your blood pressure. Taste food before salting. Add only a little salt when you think you need it. With time, your taste buds will adjust to less salt. Eat fewer snack items, fast foods, canned soups, and other high-salt, high-fat, processed foods. Read food labels and try to avoid saturated and trans fats. They increase your risk of heart disease by raising cholesterol levels. Limit the amount of solid fat-butter, margarine, and shortening-you eat. Use olive, peanut, or canola oil when you cook. Bake, broil, and steam foods instead of frying them. Eat a variety of fruit and vegetables every day. Dark green, deep orange, red, or yellow fruits and vegetables are especially good for you. Examples include spinach, carrots, peaches, and berries. Foods high in fiber can reduce your cholesterol and provide important vitamins and minerals. High-fiber foods include whole-grain cereals and breads, oatmeal, beans, brown rice, citrus fruits, and apples. Eat lean proteins.  Heart-healthy proteins include seafood, lean meats and poultry, eggs, beans, peas, nuts, seeds, and soy products. Limit drinks and foods with added sugar. These include candy, desserts, and soda pop. Lifestyle changes    If your doctor recommends it, get more exercise. Walking is a good choice. Bit by bit, increase the amount you walk every day. Try for at least 30 minutes on most days of the week. You also may want to swim, bike, or do other activities. Do not smoke. If you need help quitting, talk to your doctor about stop-smoking programs and medicines. These can increase your chances of quitting for good. Quitting smoking may be the most important step you can take to protect your heart. It is never too late to quit. Limit alcohol to 2 drinks a day for men and 1 drink a day for women. Too much alcohol can cause health problems. Manage other health problems such as diabetes, high blood pressure, and high cholesterol. If you think you may have a problem with alcohol or drug use, talk to your doctor. Medicines    Take your medicines exactly as prescribed. Call your doctor if you think you are having a problem with your medicine. If your doctor recommends aspirin, take the amount directed each day. Make sure you take aspirin and not another kind of pain reliever, such as acetaminophen (Tylenol). When should you call for help? Call 911 if you have symptoms of a heart attack. These may include:    Chest pain or pressure, or a strange feeling in the chest.     Sweating. Shortness of breath. Pain, pressure, or a strange feeling in the back, neck, jaw, or upper belly or in one or both shoulders or arms. Lightheadedness or sudden weakness. A fast or irregular heartbeat. After you call 911, the  may tell you to chew 1 adult-strength or 2 to 4 low-dose aspirin. Wait for an ambulance. Do not try to drive yourself.   Watch closely for changes in your health, and be sure to contact your doctor if you have any problems. Where can you learn more? Go to http://www.kauffman.com/ and enter F075 to learn more about \"A Healthy Heart: Care Instructions. \"  Current as of: September 7, 2022               Content Version: 13.5  © 7574-9211 [employer]. Care instructions adapted under license by [facility] ([facility]). If you have questions about a medical condition or this instruction, always ask your healthcare professional. [address] any warranty or liability for your use of this information. Personalized Preventive Plan for Deyanira Kunz - 3/2/2023  Medicare offers a range of preventive health benefits. Some of the tests and screenings are paid in full while other may be subject to a deductible, co-insurance, and/or copay. Some of these benefits include a comprehensive review of your medical history including lifestyle, illnesses that may run in your family, and various assessments and screenings as appropriate. After reviewing your medical record and screening and assessments performed today your provider may have ordered immunizations, labs, imaging, and/or referrals for you. A list of these orders (if applicable) as well as your Preventive Care list are included within your After Visit Summary for your review. Other Preventive Recommendations:    A preventive eye exam performed by an eye specialist is recommended every 1-2 years to screen for glaucoma; cataracts, macular degeneration, and other eye disorders. A preventive dental visit is recommended every 6 months. Try to get at least 150 minutes of exercise per week or 10,000 steps per day on a pedometer . Order or download the FREE \"Exercise & Physical Activity: Your Everyday Guide\" from [employer]. Call 6-811.659.3441 or search [employer] online. You need 7712-2947 mg of calcium and 4080-3749 IU of vitamin D per day.  It is possible to meet your calcium requirement with diet alone, but a vitamin D supplement is usually necessary to meet this goal.  When exposed to the sun, use a sunscreen that protects against both UVA and UVB radiation with an SPF of 30 or greater. Reapply every 2 to 3 hours or after sweating, drying off with a towel, or swimming. Always wear a seat belt when traveling in a car. Always wear a helmet when riding a bicycle or motorcycle.

## 2023-03-02 NOTE — PROGRESS NOTES
Josh Her               [phone]      Jamal Palacios is a 68 y.o. male and presents with Follow-Up from Hospital and Medicare AWV       Assessment/Plan:    1. Medicare annual wellness visit, subsequent         Follow up and disposition:   Return for Medicare Annual Wellness Visit in 1 year. Health Maintenance:   Health Maintenance   Topic Date Due    DTaP/Tdap/Td vaccine (1 - Tdap) Never done    Shingles vaccine (1 of 2) Never done    Low dose CT lung screening  Never done    Pneumococcal 65+ years Vaccine (2 - PCV) 10/18/2017    COVID-19 Vaccine (4 - Booster for Dene Calkin series) 01/05/2022    Diabetic foot exam  03/17/2023    Diabetic retinal exam  07/01/2023    Diabetic Alb to Cr ratio (uACR) test  11/09/2023    Lipids  11/09/2023    GFR test (Diabetes, CKD 3-4, OR last GFR 15-59)  11/09/2023    Prostate Specific Antigen (PSA) Screening or Monitoring  12/12/2023    A1C test (Diabetic or Prediabetic)  02/23/2024    Depression Monitoring  02/28/2024    Annual Wellness Visit (AWV)  03/02/2024    Colorectal Cancer Screen  01/31/2025    Flu vaccine  Completed    AAA screen  Completed    Hepatitis A vaccine  Aged Out    Hib vaccine  Aged Out    Meningococcal (ACWY) vaccine  Aged Out        Subjective:    Labs obtained prior to visit? {YES (DEF)/NO:71681::\"Yes\"}  Reviewed with patient? {YES/NO/NA:19705}    Transition of care      ROS:     Review of Systems      The problem list was updated as a part of today's visit.   Patient Active Problem List   Diagnosis    BPH (benign prostatic hyperplasia)    Microcytic anemia    Prostate cancer ([address]    Posterior vitreous detachment, right eye    NS (nuclear sclerosis)    Pain in right foot    Lumbosacral spondylosis without myelopathy    Low back pain    History of drug abuse ([address]    Radiculopathy due to lumbar intervertebral disc disorder    Paranoid schizophrenia, chronic condition with acute exacerbation ([address] History of hepatitis C    Vitreomacular adhesion, left eye    Viral hepatitis C    Gastroesophageal reflux disease    Status post partial gastrectomy    Tarsal tunnel syndrome    Severe depression (HCC)    Type 2 diabetes mellitus with diabetic polyneuropathy, with long-term current use of insulin (HCC)    Dementia (HCC)    Essential hypertension with goal blood pressure less than 130/80    GERD (gastroesophageal reflux disease)    Diabetes mellitus ([facility]    Diabetic acidosis without coma (HCC)    Hyperglycemia due to diabetes mellitus (HCC)    Hyperglycemia    Hypoglycemia       The PSH, FH were reviewed. SH:  Social History     Tobacco Use    Smoking status: Former     Packs/day: 1.00     Types: Cigarettes     Quit date: 2011     Years since quittin.7    Smokeless tobacco: Never   Substance Use Topics    Alcohol use: No    Drug use: Yes     Types: Marijuana Valdene Tamayo)       Medications/Allergies:  Current Outpatient Medications on File Prior to Visit   Medication Sig Dispense Refill    busPIRone (BUSPAR) 10 MG tablet Take 10 mg by mouth 2 times daily      Continuous Blood Gluc  (FREESTYLE ELIE 2 READER) WADE 1 each by Does not apply route Over 24 hours      levoFLOXacin (LEVAQUIN) 500 MG tablet Take 500 mg by mouth daily      amLODIPine (NORVASC) 10 MG tablet TAKE ONE TABLET BY MOUTH EVERY DAY 90 tablet 1    lisinopril (PRINIVIL;ZESTRIL) 20 MG tablet TAKE ONE TABLET BY MOUTH EVERY DAY 90 tablet 1    Calcium Carbonate-Vitamin D 600-3. 125 MG-MCG TABS Take by mouth daily      Cholecalciferol 50 MCG (2000) TABS Take by mouth daily      famotidine (PEPCID) 40 MG tablet Take 40 mg by mouth daily      insulin glargine (LANTUS SOLOSTAR) 100 UNIT/ML injection pen Inject 19 Units into the skin      omeprazole (PRILOSEC) 20 MG delayed release capsule Take 40 mg by mouth daily      pioglitazone (ACTOS) 30 MG tablet Take 30 mg by mouth daily      simvastatin (ZOCOR) 10 MG tablet Take 10 mg by mouth SITagliptin (JANUVIA) 100 MG tablet Take 100 mg by mouth daily      tamsulosin (FLOMAX) 0.4 MG capsule Take 0.4 mg by mouth       No current facility-administered medications on file prior to visit. Allergies   Allergen Reactions    Gabapentin Other (See Comments), Swelling and Anaphylaxis     Blurred Vision    Ibuprofen Anaphylaxis    Nsaids Anaphylaxis    Diazepam Swelling    Diphenhydramine Swelling     Tongue swelling, throat swelling    Fentanyl Other (See Comments) and Swelling     Blurred vision    Metformin Diarrhea    Penicillins Swelling       Objective:  BP (!) 85/57 Comment: feet flat on floor  Pulse (!) 109   Temp 98 °F (36.7 °C) (Temporal)   Resp 18   Ht 5' 8\" (1.727 m)   Wt 138 lb (62.6 kg)   SpO2 98%   BMI 20.98 kg/m²  Body mass index is 20.98 kg/m². Physical assessment  Physical Exam            I have discussed the diagnosis with the patient and the intended plan as seen in the above orders. The patient has received an After-Visit Summary and questions were answered concerning future plans. An After Visit Summary was printed and given to the patient. All diagnosis have been discussed with the patient and all of the patient's questions have been answered. Twan Ortiz, AGNP-BC  [address] [facility]   [address] [facility] [address]  [address]

## 2023-03-02 NOTE — CARE COORDINATION
[facility] Care Transitions Initial Follow Up Call    Call within 2 business days of discharge: Yes    Patient Current Location:  [place] [address] Transition Nurse contacted the patient by telephone to perform post hospital discharge assessment. Verified name and  with patient as identifiers. Provided introduction to self, and explanation of the Care Transition Nurse role. Patient provided verbal permission to speak with RIP Vargas and discuss health information. Patient: Walker Shell Patient : 1950   MRN: [id number]  Reason for Admission: Pneumonia  Discharge Date: 20 RARS: No data recorded    Last Discharge 30 [facility]       None            Was this an external facility discharge? Yes, 23-23  Discharge Facility: [address] [place] [address] to be reviewed by the provider   Additional needs identified to be addressed with provider: Yes  DME-Patient request portable oxygen cylinder with regulator and shoulder bag or portable oxygen concentrator that he can carry when he has to leave his home for appts. Oxygen company is [employer] (formerly [employer])                   Method of communication with provider: chart routing. Patient and caregiver on call. Confirmed receipt of 3 in 1 commode. Patient voiced he already has a bench and shower chair in shower. CTN also  confirmed receipt of home concentrator and additional oxygen tank. Patient request portable oxygen tank with shoulder bag for easier ambulation. Patient has started Levaquin. Patient voiced he plans to take [facility] with him to PCP appt today so they can demonstrate how to apply sensor. CTN also advised patient to complete new PHI form to include current caregiver and any other persons he would like to have permission to speak with regarding his health info. Patient voiced understanding. Patient uses RW to ambulate and also has a standard walker, standing walker and rollator.  Caregiver voiced patient is to be seen today by ZIA Fisher with [facility]. Caregiver also to reschedule psych appt. Caregiver verbalized patient was too tired to attend appt. Caregiver verbalized patient has been anxious/nervous since starting Buspar about 2 weeks ago. CTN reviewed common side effects of Buspar with patient/caregiver and advised caregiver to make Charles Jones NP aware of symptoms patient is experiencing. Care Transition Nurse reviewed discharge instructions and red flags with patient and caregiver who verbalized understanding. The patient and caregiver was given an opportunity to ask questions and does not have any further questions or concerns at this time. Were discharge instructions available to patient? Yes. Reviewed appropriate site of care based on symptoms and resources available to patient including: PCP  When to call 911  CTN . The patient and caregiver agrees to contact the PCP office for questions related to their healthcare. Advance Care Planning:   Does patient have an Advance Directive: not on file. Medication reconciliation was performed with caregiver, who verbalizes understanding of administration of home medications. Medications reviewed, 1111F entered: yes    Was patient discharged with a pulse oximeter? no    Non-face-to-face services provided:  Obtained and reviewed discharge summary and/or continuity of care documents  Communication with home health agencies or other community services the patient is currently using-CTN contacted [address]. Spoke with [facility], [facility]. Provided 2 patient identifiers. Order is for PT with [facility] date of today. Assessment and support for treatment adherence and medication management-CTN reviewed common side effects of Levaquin to monitor for and report.     Offered patient enrollment in the Remote Patient Monitoring (RPM) program for in-home monitoring: NA.    Care Transitions 24 Hour Call    Do you have all of your prescriptions and are they filled?: Yes  Have you been contacted by a [address]?: No  Have you scheduled your follow up appointment?: Yes  How are you going to get to your appointment?: Car - family or friend to transport  Do you feel like you have everything you need to keep you well at home?: Yes  Care Transitions Interventions         Discussed follow-up appointments. If no appointment was previously scheduled, appointment scheduling offered: No.   Is follow up appointment scheduled within 7 days of discharge? Yes. Follow Up  Future Appointments   Date Time Provider Dipika Mulligan   3/2/2023  2:45 PM MANAS Ogden CNP   3/23/2023 11:30 AM Taylor Ponce MD [facility] Brielle Veronica   3/24/2023  9:30 AM MANAS Ogden CNP       Care Transition Nurse provided contact information. Plan for follow-up call in 5-7 days based on severity of symptoms and risk factors.   Plan for next call: symptom management-assess for PNA red flags  follow-up appointment-verify attendance of DILAN appt  medication management-verify adherence/completion of Jorge L Farrar RN

## 2023-03-02 NOTE — PROGRESS NOTES
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  Recommended screening schedule for the next 5-10 years is provided to the patient in written form: see Patient Instructions/AVS.     Return in 3 months (on 6/2/2023) for  DM, HTN, 30min, office AND Medicare Annual Wellness Visit in 1 year, and 2weeks sensor change. Subjective       Patient's complete Health Risk Assessment and screening values have been reviewed and are found in Flowsheets. The following problems were reviewed today and where indicated follow up appointments were made and/or referrals ordered. Positive Risk Factor Screenings with Interventions:    Fall Risk:  Do you feel unsteady or are you worried about falling? : (!) yes  2 or more falls in past year?: (!) yes  Fall with injury in past year?: no     Interventions:    Advised to take his time ambulating, use a walker at all times     Cognitive:    Words recalled: 1 Word Recalled   Clock Drawing Test (CDT): (!) Abnormal   Total Score: (!) 1   Total Score Interpretation: Abnormal Mini-Cog Interventions:  Patient has history of dementia    Depression:  PHQ-2 Score: 6  PHQ-9 Total Score: 21    Interpretation:   1-4 = minimal  5-9 = mild  10-14 = moderate  15-19 = moderately severe  20-27 = severe  Interventions: This is a chronic problem, managed by psychiatry Dr. Gerald Priest     Drug Use:          Interpretation:  1-2: Low level - Monitor, re-assess at a later date  3-5: Moderate level - Further Investigation  6-8: Substantial level - Intensive Assessment  9-10: Severe level - Intensive Assessment    Interventions:  Does not use drugs       Self-assessment of health: In general, how would you say your health is?: (!) Poor    Interventions:  Patient comments: states he feels like he is in poor health \"because I am\", states his overall health is not what he wants it to be. General HRA Questions:  Select all that apply: (!) New or Increased Fatigue, Loneliness, Social Isolation, Stress, Anger    Fatigue Interventions:  Encouraged increased activity    Loneliness Interventions:  Has a second caretaker starting which will give him more time with others    Social Isolation Interventions:  Has caretakers and family around    Stress Interventions:  He is seeing psychiatry    Anger Interventions:  He is seeing psychiatry       Weight and Activity:  Physical Activity: Inactive    Days of Exercise per Week: 0 days    Minutes of Exercise per Session: 0 min     On average, how many days per week do you engage in moderate to strenuous exercise (like a brisk walk)?: 0 days  Have you lost any weight without trying in the past 3 months?: (!) Yes  Body mass index: 20.98    Inactivity Interventions:   At this time has not been feeling well, he just got out of the hospital and is now on chronic O2    Unintentional Weight Loss Interventions:  Advised to eat higher calorie health foods, include nutrition drinks in his diet, since discharge has a poor appetite      Dentist Screen:  Have you seen the dentist within the past year?: (!) No    Intervention:  Is gong to make an appointment    Hearing Screen:  Do you or your family notice any trouble with your hearing that hasn't been managed with hearing aids?: (!) Yes    Interventions:  Patient comments: every now an again has to turn the TV up louder, sometimes cannot understand what people are saying     Safety:  Do you have any tripping hazards - loose or unsecured carpets or rugs?: (!) Yes (Patient Aid states Oxygen Cord is a trip xin)  Interventions:  Advised to hold cord in his hand when he is walking around house    ADL's:   Patient reports needing help with:  Select all that apply: (!) Eating, Dressing, Grooming, Bathing, Toileting, Walking/Balance  Interventions:  Has home care aids in the home 7 hours a day    Lung Cancer Screening:                      Objective   Vitals:    03/02/23 1527   BP: (!) 85/57   Pulse: (!) 109   Resp: 18   Temp: 98 °F (36.7 °C)   TempSrc: Temporal   SpO2: 98%   Weight: 138 lb (62.6 kg)   Height: 5' 8\" (1.727 m)      Body mass index is 20.98 kg/m². Allergies   Allergen Reactions    Gabapentin Other (See Comments), Swelling and Anaphylaxis     Blurred Vision    Ibuprofen Anaphylaxis    Nsaids Anaphylaxis    Diazepam Swelling    Diphenhydramine Swelling     Tongue swelling, throat swelling    Fentanyl Other (See Comments) and Swelling     Blurred vision    Metformin Diarrhea    Penicillins Swelling     Prior to Visit Medications    Medication Sig Taking?  Authorizing Provider   FLUoxetine (PROZAC) 40 MG capsule Take 40 mg by mouth daily  Historical Provider, MD   busPIRone (BUSPAR) 10 MG tablet Take 10 mg by mouth 2 times daily  Historical Provider, MD   Continuous Blood Gluc  (FREESTYLE ELIE 2 READER) WADE 1 each by Does not apply route Over 24 hours  Historical Provider, MD   amLODIPine (NORVASC) 10 MG tablet TAKE ONE TABLET BY MOUTH EVERY DAY  MANAS Edwards - CNP   lisinopril (PRINIVIL;ZESTRIL) 20 MG tablet TAKE ONE TABLET BY MOUTH EVERY DAY  MANAS Aleman CNP   Calcium Carbonate-Vitamin D 600-3. 125 MG-MCG TABS Take by mouth daily  Ar Automatic Reconciliation   Cholecalciferol 50 MCG ( UT) TABS Take by mouth daily  Ar Automatic Reconciliation   famotidine (PEPCID) 40 MG tablet Take 40 mg by mouth daily  Ar Automatic Reconciliation   insulin glargine (LANTUS SOLOSTAR) 100 UNIT/ML injection pen Inject 19 Units into the skin  Ar Automatic Reconciliation   omeprazole (PRILOSEC) 20 MG delayed release capsule Take 40 mg by mouth daily  Ar Automatic Reconciliation   pioglitazone (ACTOS) 30 MG tablet Take 30 mg by mouth daily  Ar Automatic Reconciliation   simvastatin (ZOCOR) 10 MG tablet Take 10 mg by mouth  Ar Automatic Reconciliation   SITagliptin (JANUVIA) 100 MG tablet Take 100 mg by mouth daily  Ar Automatic Reconciliation   tamsulosin (FLOMAX) 0.4 MG capsule Take 0.4 mg by mouth  Ar Automatic Reconciliation       CareTeam (Including outside providers/suppliers regularly involved in providing care):   Patient Care Team:  MANAS Aleman CNP as PCP - [place] [id number], APRN - CNP as PCP - [facility]  Juanita Stephen MD as Consulting Physician  Sergio Charles MD as Consulting Physician  Lesley Lewis, [facility] as Pharmacist  Sandy Clancy, RN as Ambulatory Care Manager  Lit Kramer RN as Care Transitions Nurse  MANAS Cook NP (Nurse Practitioner)     Reviewed and updated this visit:  Tobacco  Allergies  Meds  Problems  Med Hx  Surg Hx  Soc Hx  Fam Hx        On the basis of positive falls risk screening, assessment and plan is as follows: home safety tips provided.        MANAS Aleman CNP

## 2023-03-07 ENCOUNTER — TELEPHONE (OUTPATIENT)
Facility: CLINIC | Age: 73
End: 2023-03-07

## 2023-03-07 NOTE — TELEPHONE ENCOUNTER
Patient was just in the office and discharged from the hospital.  He was supposed to get a prescription for loss of appetite, would like some help.

## 2023-03-08 ENCOUNTER — CARE COORDINATION (OUTPATIENT)
Facility: CLINIC | Age: 73
End: 2023-03-08

## 2023-03-08 ENCOUNTER — TELEPHONE (OUTPATIENT)
Facility: CLINIC | Age: 73
End: 2023-03-08

## 2023-03-08 NOTE — CARE COORDINATION
[address] Care Transitions Follow Up Call    Patient Current Location:  [facility] Transition Nurse contacted the patient by telephone to follow up after admission on 23. Verified name and  with patient as identifiers. Patient provided verbal permission to speak with Álvaro Bauer. Patient: Ian Dang  Patient : 1950   MRN: [id number]  Reason for Admission: PNA  Discharge Date: 20 RARS: No data recorded    Needs to be reviewed by the provider   Additional needs identified to be addressed with provider: No  none             Method of communication with provider: none. CTN advised patient to contact ERIKA MuellerNP to ask if mirtazapine may be added. Also informed patient order for portabel oxygen tank with shoulder bag has been ordered. CTN advised patient if he has not received a call or oxygen tank ordered by next week to notify CTN. Addressed changes since last contact:  medications-Patient completed Delfin  Discussed follow-up appointments. If no appointment was previously scheduled, appointment scheduling offered: Yes. Is follow up appointment scheduled within 7 days of discharge? Yes. Follow Up  Future Appointments   Date Time Provider Dipika Mulligan   3/16/2023  2:00 PM MANAS Rodriguez CNP AMB   3/23/2023 11:30 AM Merry Garcia MD [facility] [place] Sched   2023  9:30 AM MANAS Rodriguez CNP AMB   3/4/2024  8:30 AM MANAS Rodriguez CNP BS AMB     Non-[facility] follow up appointment(s): none    Care Transition Nurse reviewed red flags with patient and caregiver and discussed any barriers to care and/or understanding of plan of care after discharge. Discussed appropriate site of care based on symptoms and resources available to patient including: PCP  CTN . The patient and caregiver agrees to contact the PCP office for questions related to their healthcare. Advance Care Planning:   not on file.      Patients top risk factors for readmission: medical condition-Pneumonia  Interventions to address risk factors:  CTN assessed patient for PNA red flags. Fever (100.5 or greater): YES/NO: NO  Tiredness/Fatigue: YES/NO: NO  Trouble breathing: rapid breathing or shortness of breath:  YES/NO: NO; on oxygen 2 LPM  Sweating/Chills:  YES/NO: NO  Cough with mucus:  YES/NO: NO  Chest pain: YES/NO: NO  Loss of appetite:  YES/NO: YES  Confused mental state or changes in awareness:  YES/NO: NO     Offered patient enrollment in the Remote Patient Monitoring (RPM) program for in-home monitoring: NA.     Care Transitions Subsequent and Final Call    Subsequent and Final Calls  Do you have any ongoing symptoms?: No  Have your medications changed?: No  Do you have any questions related to your medications?: No  Do you currently have any active services?: Yes  Are you currently active with any services?: Home Health  Do you have any needs or concerns that I can assist you with?: No  Identified Barriers: None  Care Transitions Interventions  No Identified Needs  Other Interventions:             Care Transition Nurse provided contact information for future needs. Plan for follow-up call in 7-10 days based on severity of symptoms and risk factors.   Plan for next call: symptom management-assess for PNA red flags  follow-up appointment-verify rescheduling of psych appt  Advance care planning and follow up on portable O2 with shoulder bag    Campbell Crocker RN

## 2023-03-08 NOTE — TELEPHONE ENCOUNTER
Bhanu Peacock with [facility], [phone] stated was supposed to receive home health care, but patient has no contact information on who to call.       Please let him know

## 2023-03-08 NOTE — TELEPHONE ENCOUNTER
Phone call from patient checking to see if his portable oxygen tank has been ordered. He stated he cannot go outside with his walker with the tank that he has now. He stated at his last visit (3-2) he was told vitamins would be ordered for him to increase his appetite but the pharmacy does not have them. He would like a phone call.     [phone]

## 2023-03-10 ENCOUNTER — TELEPHONE (OUTPATIENT)
Facility: CLINIC | Age: 73
End: 2023-03-10

## 2023-03-10 NOTE — TELEPHONE ENCOUNTER
Pt returned my call,  States he called his psychiatrist and has appointment on the 29th. Reminded patient to ask if it is ok to start mirtazapine for his appetite due to risk of serotonin syndrome. Also spoke with his aid, pushpa, and gave her this information.   Patient verbalized understanding and is in agreement with this plan of care

## 2023-03-10 NOTE — TELEPHONE ENCOUNTER
Called pro lozada with [facility] with patient name  and his phone number to be reached. Advised to call if she has more questions.

## 2023-03-14 ENCOUNTER — CARE COORDINATION (OUTPATIENT)
Facility: CLINIC | Age: 73
End: 2023-03-14

## 2023-03-14 NOTE — CARE COORDINATION
[facility] Care Transitions Follow Up Call    Patient Current Location:  [facility] Transition Nurse contacted the patient by telephone to follow up after admission on 23. Verified name and  with patient as identifiers. Patient: Elvia Rockwell  Patient : 1950   MRN: [id number]  Reason for Admission: Pneumona  Discharge Date: 20 RARS: No data recorded    Needs to be reviewed by the provider   Additional needs identified to be addressed with provider: No  none             Method of communication with provider: none. Patient verbalized he is having a good day today. Was seen by PT today. Patient on oxygen 2 LPM. Oxygen saturation 99%. Patient voiced he has not yet received portable oxygen concentrator. Addressed changes since last contact:  none  Did patient attend post hospital follow appointment: Yes. Was follow up appointment scheduled within 7 days of discharge? Yes. Follow Up  Future Appointments   Date Time Provider Dipika Mulligan   3/16/2023  2:00 PM MANAS Archer CNP BS AMB   3/23/2023 11:30 AM Alexy Nails MD [facility] Sched   2023  9:30 AM Blayne Malcolm APRTAYLOR - CNP AMA BS AMB   3/4/2024  8:30 AM MANAS Archer - CNP AMA BS AMB     [facility] follow up appointment(s): Fortunato Ferrell NP 3/29/23    Care Transition Nurse reviewed red flags with patient and discussed any barriers to care and/or understanding of plan of care after discharge. Discussed appropriate site of care based on symptoms and resources available to patient including: PCP  CTN . The patient agrees to contact the PCP office for questions related to their healthcare. Advance Care Planning:   Patient voiced he has an AMD. CTN requested patient to take next appt to be scanned into medical chart .      Patients top risk factors for readmission: medical condition-Pnemonia  Interventions to address risk factors: Establishment or re-establishment of referrals-CTN contacted [facility]. Spoke with Katrina Loghayley. Provided 2 patient identifiers. Katrina Loges voiced they have not received order for portable oxygen concentrator and shoulder bag. Katrina Loges voiced currently there is a 6-12 month wait for POC's. CTN requested five B tanks with shoulder bag be orderd. Katrina Loges voiced they will be delivered to patient's home address (which was verified) on Thursday (3/16/23). Katrina Loges voiced each tank last 1 hour and patient will need to call weekly to request new tanks. Katrina Loges also voiced supply is subject to availability. CTN made patient and patient's PCA Diego aware. and CTN reassessed patient for PNA red flags. Fever (100.5 or greater): YES/NO: NO  Tiredness/Fatigue: YES/NO: NO  Trouble breathing: rapid breathing or shortness of breath:  YES/NO: YES  Sweating/Chills:  YES/NO: NO  Cough with mucus:  YES/NO: NO  Chest pain: YES/NO: NO  Loss of appetite:  YES/NO: YES; PCP aware. Confused mental state or changes in awareness:  YES/NO: NO     Offered patient enrollment in the Remote Patient Monitoring (RPM) program for in-home monitoring: NA.     Care Transitions Subsequent and Final Call    Subsequent and Final Calls  Do you have any ongoing symptoms?: No  Have your medications changed?: No  Do you have any questions related to your medications?: No  Do you currently have any active services?: Yes  Are you currently active with any services?: Home Health  Do you have any needs or concerns that I can assist you with?: No  Identified Barriers: None  Care Transitions Interventions  No Identified Needs  Other Interventions:             Care Transition Nurse provided contact information for future needs. Plan for follow-up call in 10-14 days based on severity of symptoms and risk factors.   Plan for next call: symptom management-reassess for red flags  follow-up appointment-verify attendance of PCP follow up    Juan Leslie RN

## 2023-03-15 ENCOUNTER — CARE COORDINATION (OUTPATIENT)
Facility: CLINIC | Age: 73
End: 2023-03-15

## 2023-03-15 DIAGNOSIS — I10 ESSENTIAL (PRIMARY) HYPERTENSION: ICD-10-CM

## 2023-03-15 NOTE — CARE COORDINATION
Patient voiced he has an appointment on Thursday and does not want to miss delivery of his tanks. CTN provided patient with contact info for Dipika Stone to make them aware.

## 2023-03-16 ENCOUNTER — TELEPHONE (OUTPATIENT)
Facility: CLINIC | Age: 73
End: 2023-03-16

## 2023-03-16 RX ORDER — FAMOTIDINE 40 MG/1
40 TABLET, FILM COATED ORAL DAILY
Qty: 90 TABLET | Refills: 1 | Status: SHIPPED | OUTPATIENT
Start: 2023-03-16

## 2023-03-16 RX ORDER — PIOGLITAZONEHYDROCHLORIDE 30 MG/1
30 TABLET ORAL DAILY
Qty: 90 TABLET | Refills: 1 | Status: SHIPPED | OUTPATIENT
Start: 2023-03-16

## 2023-03-16 RX ORDER — LISINOPRIL 20 MG/1
20 TABLET ORAL DAILY
Qty: 90 TABLET | Refills: 1 | Status: SHIPPED | OUTPATIENT
Start: 2023-03-16

## 2023-03-16 RX ORDER — AMLODIPINE BESYLATE 10 MG/1
10 TABLET ORAL DAILY
Qty: 90 TABLET | Refills: 1 | Status: SHIPPED | OUTPATIENT
Start: 2023-03-16

## 2023-03-16 RX ORDER — OMEPRAZOLE 20 MG/1
40 CAPSULE, DELAYED RELEASE ORAL DAILY
Qty: 90 CAPSULE | Refills: 1 | Status: SHIPPED | OUTPATIENT
Start: 2023-03-16

## 2023-03-16 NOTE — TELEPHONE ENCOUNTER
Pharmacy Progress Note     Received call from JOSEPH Tim stating this patient was accidentally scheduled for an appointment with her today and he was supposed to be scheduled to see me instead. Called patient to discuss. Patient states he had scheduled the visit to help with changing his sensor. Informed patient that [facility] and thus will unfortunately not be available to see him today. Patient states that his aide knows how to change the sensors and will help him and that he does not need an appointment. Patient advised to call back if he has any further questions about his sensor.     Thank you,  Walt Loredo, PharmD, [address] Tracking Only    Program: Medical Group  CPA in place:  Yes  Time Spent (min): 10

## 2023-03-22 ENCOUNTER — CARE COORDINATION (OUTPATIENT)
Facility: CLINIC | Age: 73
End: 2023-03-22

## 2023-03-22 NOTE — CARE COORDINATION
[facility] Care Transitions Follow Up Call    Patient Current Location:  [facility] Transition Nurse contacted the patient by telephone to follow up after admission on 3/1/23. Verified name and  with patient as identifiers. Patient: Jorge Simental  Patient : 1950   MRN: [id number]  Reason for Admission: Pneumonia  Discharge Date: 20 RARS: No data recorded    Needs to be reviewed by the provider   Additional needs identified to be addressed with provider: No  none             Method of communication with provider: none. Patient voiced he has been unable to reach anyone at [facility] to request replacement tanks. Patient verbalized he has been on hold for 20-30 minutes. Addressed changes since last contact:  none  Did patient attend hospital follow up appointment: Yes. Is follow up appointment scheduled within 7 days of discharge? Yes. Follow Up  Future Appointments   Date Time Provider Dipika Mulligan   3/23/2023 11:30 AM Mario Clarke MD [facility] [place] Sched   2023  9:30 AM MANAS Soliz CNP AMB   3/4/2024  8:30 AM MANAS Soliz CNP     Non-[facility] follow up appointment(s): Omi Rojas NP 3/29/23    Care Transition Nurse reviewed red flags with patient and discussed any barriers to care and/or understanding of plan of care after discharge. Discussed appropriate site of care based on symptoms and resources available to patient including: PCP  Specialist  Home health  CTN . The patient agrees to contact the PCP office for questions related to their healthcare. Advance Care Planning:   Patient voiced he has an AMD. CTN requested patient to take next appt to be scanned into medical chart .       Patients top risk factors for readmission: medical condition-Pneumonia  Interventions to address risk factors: Communication with home health agencies or other community services the patient is currently using-CTN contacted [facility] on 3-way call

## 2023-03-24 ENCOUNTER — CARE COORDINATION (OUTPATIENT)
Facility: CLINIC | Age: 73
End: 2023-03-24

## 2023-03-24 NOTE — CARE COORDINATION
Patient verbalized he did not receive tanks yesterday. Patient verbalized he attempted to reach [facility] with [place]  this morning but received a her voicemail. Patient left a message. CTN performed 3-way call with patient and [facility]. She voiced they did not receive enough empty tanks yesterday to complete patient's order but they are pending delivery today and verified patient's contact info. Patient voiced understanding. CTN explained to patient delivery is based on availability of tanks. Also advised patient to allow [facility] or other representative the opportunity to return call. Patient thanked CTN for her assistance.

## 2023-03-27 ENCOUNTER — CARE COORDINATION (OUTPATIENT)
Facility: CLINIC | Age: 73
End: 2023-03-27

## 2023-03-27 RX ORDER — FLURBIPROFEN SODIUM 0.3 MG/ML
SOLUTION/ DROPS OPHTHALMIC
Qty: 100 EACH | Refills: 1 | Status: SHIPPED | OUTPATIENT
Start: 2023-03-27

## 2023-03-27 NOTE — CARE COORDINATION
Patient contacted [facility] to see if she can help him with his oxygen supplies as RUBEN Galaviz RN tried to help last week,but he still has no portable oxygen tanks. He is sitting at home with 5 empty tanks. [facility] 20 minutes the first call and 31 minutes  the second call. [facility] placed call out to [facility] ,patient rep for Dipika Stone had to leave a voice mail message for her. Patient had to cancel a doctor's appointment due to no portable tank available. [facility] left patients as well as her contact number for follow up.  2:00 Pm 3/27/2023  Patient contacted [facility] at this time voicing he has his 5 portable tanks. [facility] reminded that those tanks are for only going to the doctor,pharmacy to  medications. Patient verbalizing understanding as he said he has his psychiatry appointment tomorrow. ACM reminded him to make sure he discuss his anxiety level and more inpatient with others. Patient again verbalized understanding.

## 2023-03-29 ENCOUNTER — TELEPHONE (OUTPATIENT)
Facility: CLINIC | Age: 73
End: 2023-03-29

## 2023-03-29 DIAGNOSIS — E11.9 TYPE 2 DIABETES MELLITUS WITHOUT COMPLICATION, WITH LONG-TERM CURRENT USE OF INSULIN (HCC): Primary | ICD-10-CM

## 2023-03-29 DIAGNOSIS — Z79.4 TYPE 2 DIABETES MELLITUS WITHOUT COMPLICATION, WITH LONG-TERM CURRENT USE OF INSULIN (HCC): Primary | ICD-10-CM

## 2023-03-30 ENCOUNTER — TELEPHONE (OUTPATIENT)
Facility: CLINIC | Age: 73
End: 2023-03-30

## 2023-03-30 ENCOUNTER — CARE COORDINATION (OUTPATIENT)
Facility: CLINIC | Age: 73
End: 2023-03-30

## 2023-03-30 RX ORDER — FLURBIPROFEN SODIUM 0.3 MG/ML
SOLUTION/ DROPS OPHTHALMIC
Refills: 2 | OUTPATIENT
Start: 2023-03-30

## 2023-03-30 NOTE — CARE COORDINATION
[facility] Care Transitions Follow Up Call    Patient Current Location:  [facility] Transition Nurse contacted the patient by telephone to follow up after admission on 23. Verified name and  with patient as identifiers. Patient: Bob Jones  Patient : 1950   MRN: [id number]  Reason for Admission: Pneumonia  Discharge Date: 20 RARS: No data recorded    Needs to be reviewed by the provider   Additional needs identified to be addressed with provider: No  medications-Patient voiced he is to receive Mirtazapine and Ana 2 sensors today. Method of communication with provider: chart routing. Patient voiced he is doing good. Was seen by psych on 3/29. Mirtazapine 7.5 mg ordered. Patient also verbalized he received small B tanks. Addressed changes since last contact:  medications-Mirtazapine ordered. Did patient attend hospital follow up appointment: Yes. Was follow up appointment scheduled within 7 days of discharge? Yes. Follow Up  Future Appointments   Date Time Provider Dipika Mulligan   2023  9:30 AM MANAS Diaz CNP AMB   3/4/2024  8:30 AM MANAS Diaz CNP BS AMB     Non-[facility] follow up appointment(s): none    Care Transition Nurse reviewed red flags with patient and discussed any barriers to care and/or understanding of plan of care after discharge. Discussed appropriate site of care based on symptoms and resources available to patient including: PCP  When to call 911. The patient agrees to contact the PCP office for questions related to their healthcare. Advance Care Planning:    Patient voiced he has an AMD. CTN requested patient to take next appt to be scanned into medical chart . Patients top risk factors for readmission: medical condition-PNA  Interventions to address risk factors:  CTN reassessed patient for PNA red flags. Patient denied all red flags.      Offered patient enrollment in the Remote

## 2023-03-31 ENCOUNTER — CARE COORDINATION (OUTPATIENT)
Facility: CLINIC | Age: 73
End: 2023-03-31

## 2023-03-31 PROBLEM — I10 HYPERTENSIVE DISORDER: Status: ACTIVE | Noted: 2023-03-31

## 2023-03-31 PROBLEM — I10 ESSENTIAL HYPERTENSION: Status: ACTIVE | Noted: 2023-03-31

## 2023-03-31 RX ORDER — MIRTAZAPINE 7.5 MG/1
1 TABLET, FILM COATED ORAL DAILY
COMMUNITY
Start: 2023-03-29

## 2023-03-31 NOTE — CARE COORDINATION
.Ambulatory Care Coordination Note  3/31/2023    Patient Current Location:  Home: [address] [place]      Patient contacted [facility] voicing he only has 2 portable tanks left and now it's the weekend. ACM asked patient has he been using the portable tanks outside  as they are for going to the doctor appointments. Patient became agitated getting loud that he physical therapist told him to walk out side . Those little tanks only holds about an hour of oxygen. ACM asked who or what doctor is taking care of his oxygen orders as the requirements for more tanks has been meet on the 2 liters of use. Patient again started to yell and get excited that he don't know Debora Velez is doing it he guess. [facility] him informed patient that she will call [place] patient care coordinator for [address] [place] [address]  to find out as to [facility] can send a message or call for updated prescription for more Oxygen tank supplies. Patient said he is going to lay down he is really upset now. ACM took the time to ask him about his psychiatrist appointment this week as he has never gotten this upset before. Patient then voiced he added medication and he did take to Yelitza Milian to make sure that it would not interfere with his current medications. Challenges to be reviewed by the provider   Additional needs identified to be addressed with provider: Yes  Patient is using the portable B tanks up . They only last 1 hour. PT is having him walk outside at least twice daily therefore he is using the oxygen tanks up in less than a week. ACM contacted Dipika Stone they are suggesting a POC ( Portable Oxygen Concentrator) unit which he can carry on his shoulder. Needs an order to be Fax to 21 [phone] For POC tank, with order and office notes. They will schedule a 6 minute walk test to see if he qualifies.   Patient got very agitated, when ACM tried to explain why he can't get the oxygen tanks when he calls it is a process >>>

## 2023-04-03 ENCOUNTER — CARE COORDINATION (OUTPATIENT)
Facility: CLINIC | Age: 73
End: 2023-04-03

## 2023-04-03 NOTE — CARE COORDINATION
.Ambulatory Care Coordination Note  4/3/2023    Patient Current Location:  Home: [address] [place] [id number]     Marialuisa Byrd from [employer] left voice message for [facility] to contact her concerning this patient  Patient contacted [facility] concerning his [address] situation. He wants to change to another agency. [facility] informed patient she has no say so as to which agency he is to use . His Insurance provider has them in their network. He needs to call his outreach medicaid worker Miranda Norris concerning this/. They should know which agency is in network. Patient said okay. [facility] informed patient that a  name Marialuisa Byrd had called left a message for a return call. M is waiting as [facility] has returned the call. Patient voiced he only has one oxygen tank left and he has to pay bills today. ACM reminded that he gets his next 5 tanks on the 6. Method of communication with provider: none. ACM: Lenny Davis RN    Offered patient enrollment in the Remote Patient Monitoring (RPM) program for in-home monitoring: .    Lab Results       None            Care Coordination Interventions    Referral from Primary Care Provider: No  Suggested Interventions and Community Resources  Diabetes Education: In Process (Comment: ED visit 2/17 Hyperglycemia)  Fall Risk Prevention: In Process  Home Health Services: In Process (Comment: D/C 2/21-28 [facility])  Occupational Therapy: In Process (Comment: D/c 2/28 referral in process)  Physical Therapy: In Process (Comment: D/c from Hospital 2/28/23referral in process)  Smoking Cessation:  In Process (Comment: [address] Abuse ED visit 2/15/23)          Goals Addressed    None         Future Appointments   Date Time Provider Dipika Mulligan   6/2/2023  9:30 AM MANAS Diaz - CNP AMA BS AMB   7/12/2023 10:10 AM [facility] NURSE [facility] Sara Sched   7/19/2023  1:15 PM Darryle Shows, MD [facility] Sara Sched   3/4/2024  8:30 AM Dione RUEDA

## 2023-04-04 ENCOUNTER — TELEPHONE (OUTPATIENT)
Facility: CLINIC | Age: 73
End: 2023-04-04

## 2023-04-05 ENCOUNTER — TELEPHONE (OUTPATIENT)
Facility: CLINIC | Age: 73
End: 2023-04-05

## 2023-04-05 ENCOUNTER — CARE COORDINATION (OUTPATIENT)
Facility: CLINIC | Age: 73
End: 2023-04-05

## 2023-04-05 NOTE — CARE COORDINATION
Astrid [place] from [place] [employer] contacted [facility]. Updated on a nurse will be contacting concerning patients oxygen as she is not a nurse. She should be calling by Friday or when [facility] comes back from Vacation after the 4/19 /23. Don't know which nurse would be assigned.

## 2023-04-11 ENCOUNTER — TELEPHONE (OUTPATIENT)
Facility: CLINIC | Age: 73
End: 2023-04-11

## 2023-04-18 ENCOUNTER — TELEPHONE (OUTPATIENT)
Age: 73
End: 2023-04-18

## 2023-04-20 ENCOUNTER — NURSE ONLY (OUTPATIENT)
Facility: CLINIC | Age: 73
End: 2023-04-20

## 2023-04-20 VITALS
SYSTOLIC BLOOD PRESSURE: 110 MMHG | BODY MASS INDEX: 24.4 KG/M2 | DIASTOLIC BLOOD PRESSURE: 73 MMHG | TEMPERATURE: 98.2 F | RESPIRATION RATE: 18 BRPM | WEIGHT: 161 LBS | OXYGEN SATURATION: 100 % | HEART RATE: 88 BPM | HEIGHT: 68 IN

## 2023-04-20 ASSESSMENT — PATIENT HEALTH QUESTIONNAIRE - PHQ9
9. THOUGHTS THAT YOU WOULD BE BETTER OFF DEAD, OR OF HURTING YOURSELF: 0
6. FEELING BAD ABOUT YOURSELF - OR THAT YOU ARE A FAILURE OR HAVE LET YOURSELF OR YOUR FAMILY DOWN: 0
SUM OF ALL RESPONSES TO PHQ QUESTIONS 1-9: 2
7. TROUBLE CONCENTRATING ON THINGS, SUCH AS READING THE NEWSPAPER OR WATCHING TELEVISION: 0
1. LITTLE INTEREST OR PLEASURE IN DOING THINGS: 1
4. FEELING TIRED OR HAVING LITTLE ENERGY: 0
5. POOR APPETITE OR OVEREATING: 0
SUM OF ALL RESPONSES TO PHQ QUESTIONS 1-9: 2
2. FEELING DOWN, DEPRESSED OR HOPELESS: 1
8. MOVING OR SPEAKING SO SLOWLY THAT OTHER PEOPLE COULD HAVE NOTICED. OR THE OPPOSITE, BEING SO FIGETY OR RESTLESS THAT YOU HAVE BEEN MOVING AROUND A LOT MORE THAN USUAL: 0
SUM OF ALL RESPONSES TO PHQ9 QUESTIONS 1 & 2: 2
SUM OF ALL RESPONSES TO PHQ QUESTIONS 1-9: 2
3. TROUBLE FALLING OR STAYING ASLEEP: 0
SUM OF ALL RESPONSES TO PHQ QUESTIONS 1-9: 2
10. IF YOU CHECKED OFF ANY PROBLEMS, HOW DIFFICULT HAVE THESE PROBLEMS MADE IT FOR YOU TO DO YOUR WORK, TAKE CARE OF THINGS AT HOME, OR GET ALONG WITH OTHER PEOPLE: 0

## 2023-04-20 NOTE — PROGRESS NOTES
Reviewed with patient and his caretaker the procedure for shimon the [address] sensor. Patient and caretaker verbalized understanding.  New sensor placed on left arm for patient

## 2023-04-20 NOTE — PROGRESS NOTES
Room 8     Patient presents today for [facility] Sensor change . JENNIFER Villaseñor has been notified .

## 2023-04-25 ENCOUNTER — TELEPHONE (OUTPATIENT)
Facility: CLINIC | Age: 73
End: 2023-04-25

## 2023-04-25 DIAGNOSIS — H91.93 DECREASED HEARING OF BOTH EARS: Primary | ICD-10-CM

## 2023-04-26 ENCOUNTER — CARE COORDINATION (OUTPATIENT)
Facility: CLINIC | Age: 73
End: 2023-04-26

## 2023-04-26 DIAGNOSIS — Z79.4 TYPE 2 DIABETES MELLITUS WITHOUT COMPLICATION, WITH LONG-TERM CURRENT USE OF INSULIN (HCC): ICD-10-CM

## 2023-04-26 DIAGNOSIS — E11.9 TYPE 2 DIABETES MELLITUS WITHOUT COMPLICATION, WITH LONG-TERM CURRENT USE OF INSULIN (HCC): ICD-10-CM

## 2023-05-01 ENCOUNTER — CARE COORDINATION (OUTPATIENT)
Facility: CLINIC | Age: 73
End: 2023-05-01

## 2023-05-01 ENCOUNTER — TELEPHONE (OUTPATIENT)
Facility: CLINIC | Age: 73
End: 2023-05-01

## 2023-05-01 DIAGNOSIS — F20.0 PARANOID SCHIZOPHRENIA, CHRONIC CONDITION WITH ACUTE EXACERBATION (HCC): Primary | ICD-10-CM

## 2023-05-01 DIAGNOSIS — F32.2 SEVERE DEPRESSION (HCC): ICD-10-CM

## 2023-05-01 NOTE — TELEPHONE ENCOUNTER
Spoke to patient in reguards of requesting a referral for intense  Therapy due to patient was seen at Dr. Avila Diehl office on Saturday and was giving a list of Therapist to call due to feeling confused and emotional and no one on the list does  intense therapy .

## 2023-05-01 NOTE — CARE COORDINATION
.Ambulatory Care Coordination Note  2023    Patient Current Location:  Home: [address] [place] [phone]     ACM contacted the patient by telephone. Verified name and  with patient as identifiers. Provided introduction to self, and explanation of the ACM role. Challenges to be reviewed by the provider   Additional needs identified to be addressed with provider: Yes   Patient call  ACM in a anxiety panic mood. Left message on her phone . ACM could not fully understand message returned call ,but patient was on hold with the PCP office. ACM allowed him to complete his call. Patient contacted ACM when finish with PCP office. Patient has change Medicare advantage plans to [facility]. He was told that he needed a new authorization for his Prozac/Fluoxetine 40 mg. His pharmacy Vinay Tong said they faxed over the refill request to the office. Since change in Insurances ACM  check with pharmacy to see if Freestyle sensor needs another script , but had to leave a message. Method of communication with provider: chart routing. ACM: Sasha Roth RN    Offered patient enrollment in the Remote Patient Monitoring (RPM) program for in-home monitoring: NA. Lab Results       None            Care Coordination Interventions    Referral from Primary Care Provider: No  Suggested Interventions and Community Resources  Diabetes Education: In Process (Comment: ED visit  Hyperglycemia)  Fall Risk Prevention: In Process  Home Health Services: In Process (Comment: D/C  [facility])  Occupational Therapy: In Process (Comment: D/c  referral in process)  Physical Therapy: In Process (Comment: D/c from Hospital 23referral in process)  Smoking Cessation:  In Process (Comment: [address] Abuse ED visit 2/15/23)          Goals Addressed    None         Future Appointments   Date Time Provider Dipika Mulligan   2023  9:30 AM MANAS Casas - CNP

## 2023-05-01 NOTE — CARE COORDINATION
.  Challenges to be reviewed by the provider   Additional needs identified to be addressed with provider Yes  Patient contacted [facility] again as MARTHA Vogel returned his call from earlier. [facility] contact [address] again concerning [address] had to leave another  message. Patient do need refill for Fluoxetine /Prozac 40 mg  daily .

## 2023-05-01 NOTE — CARE COORDINATION
.  Challenges to be reviewed by the provider   Additional needs identified to be addressed with provider Yes  [facility] returned call Sensors will be in tomorrow and it will be delivered to patient tomorrow as well. Pharmacy said the patient needs a Prior Authorization for Buspirone 10 mg daily. Was faxed to Doctor office .

## 2023-05-02 RX ORDER — FLUOXETINE HYDROCHLORIDE 40 MG/1
40 CAPSULE ORAL DAILY
Qty: 30 CAPSULE | OUTPATIENT
Start: 2023-05-02

## 2023-05-03 NOTE — TELEPHONE ENCOUNTER
Spoke to patient in reguards of receiving medication from pharmacy pernicious anemia. Patient states yes I did and I want to say Thank You . Patient verbalized understanding .

## 2023-05-05 ENCOUNTER — TELEPHONE (OUTPATIENT)
Facility: CLINIC | Age: 73
End: 2023-05-05

## 2023-05-05 NOTE — TELEPHONE ENCOUNTER
Called to speak to patient and patient aid answered the phone . Gave patient aid number the [employer] referral . Patient aid verbalized understanding.

## 2023-05-05 NOTE — TELEPHONE ENCOUNTER
----- Message from Shannon Galeas sent at 5/5/2023  9:51 AM EDT -----  Subject: Message to Provider    QUESTIONS  Information for Provider? pt said that he is going through some changes   and would like to speak with Can Metzger, please follow up   ---------------------------------------------------------------------------  --------------  [facility]  [phone]; OK to leave message on voicemail  ---------------------------------------------------------------------------  --------------  SCRIPT ANSWERS  Relationship to Patient?  Self

## 2023-05-23 ENCOUNTER — CARE COORDINATION (OUTPATIENT)
Facility: CLINIC | Age: 73
End: 2023-05-23

## 2023-05-23 NOTE — CARE COORDINATION
.Patient: Tigre Nunez discussed during interdisciplinary rounds 2023 to optimize plan of care. Patient with a past medical history of   Past Medical History:   Diagnosis Date    Acute cystitis without hematuria     MCKENZIE (acute kidney injury) ([address]     Arthritis     Arthropathy     Balanitis     Candida infection     recurrent     Chronic pain     COVID-19 vaccine series completed     WITH BOOSTER    Dementia associated with other underlying disease without behavioral disturbance ([address]     Depression     Diabetes mellitus, type II ([address] 2013    Drug abuse ([address]     H/O Heroin addiction     ED (erectile dysfunction)     Fatigue     GERD (gastroesophageal reflux disease)     H/O epididymitis     left    H/O: pneumonia     Hep C w/o coma, chronic ([address] 2015    STATES TREATED    History of BPH     History of hematuria     History of tobacco use     Hypertension     Loss of appetite     LOST 10 LBS IN A MONTH    Loss of balance     Microcytic anemia 2012    Neuropathy     Pancytopenia (HCC)     Paranoid schizophrenia, chronic condition with acute exacerbation ([address]     Prostate cancer ([facility] 2021    PNBx, [place] [address], Dr. Blank Grimes, [facility]    PUD (peptic ulcer disease)     SOB (shortness of breath)     WITH WALKING    Status post partial gastrectomy     Stomach ulcer    Stroke ([address]     BALANCE ISSUES; TIA    Type 2 diabetes mellitus with hyperglycemia, with long-term current use of insulin ([address] 3/24/2020    Uncircumcised male    . In attendance MANAS Powell - CNP, Дмитрий Kunz RN, ACM. Recommendations from the team: continue following, Ambulatory  will continue to follow. Дмитрий Kunz RN    Ambulatory Care Coordination Note  2023    Patient Current Location:  Home: [address] [place] [address] [address] [phone]     ACM contacted the patient by telephone. Verified name and  with patient as identifiers.  Provided introduction to

## 2023-05-30 ENCOUNTER — CARE COORDINATION (OUTPATIENT)
Facility: CLINIC | Age: 73
End: 2023-05-30

## 2023-05-30 NOTE — CARE COORDINATION
.Ambulatory Care Coordination Note  2023    Patient Current Location:  Home: [address] [place] [phone]     ACM contacted the patient by telephone. Verified name and  with patient as identifiers. Provided introduction to self, and explanation of the ACM role. Challenges to be reviewed by the provider   Additional needs identified to be addressed with provider: Yes  Patient [facility]icing [facility] came by  informed him he was not taking 2 medications . Memantine and Amitriptyline  Both medications were discontinued. Dr Kellie Hannah ,Neurologist did not renew after 22 Memantine and 22 Amitriptyline . Not showing up on AVS.  Has eye appointment today 23               Method of communication with provider: chart routing. ACM: Nina Hirsch RN    Offered patient enrollment in the Remote Patient Monitoring (RPM) program for in-home monitoring: NA. Lab Results       None            Care Coordination Interventions    Referral from Primary Care Provider: No  Suggested Interventions and Community Resources  Diabetes Education: In Process (Comment: ED visit  Hyperglycemia)  Fall Risk Prevention: In Process  Home Health Services: In Process (Comment: D/C  [facility])  Occupational Therapy: In Process (Comment: D/c  referral in process)  Physical Therapy: In Process (Comment: D/c from Hospital 23referral in process)  Smoking Cessation:  In Process (Comment: [address] [facility] visit 2/15/23)          Goals Addressed                   This Visit's Progress     Care Coordination Self Management   On track     CC Self Management Goal  Patient Goal (What steps will patient take to achieve goal?): >>>>>  Patient is able to discuss self-management of condition(s):  Pt demonstrates adherence to medications  Pt demonstrates understanding of self-monitoring  Patient is able to identify Red Flags:  Alert to potential adverse drug reactions(s) or side

## 2023-06-02 ENCOUNTER — OFFICE VISIT (OUTPATIENT)
Facility: CLINIC | Age: 73
End: 2023-06-02
Payer: MEDICAID

## 2023-06-02 VITALS
RESPIRATION RATE: 18 BRPM | OXYGEN SATURATION: 100 % | TEMPERATURE: 98.2 F | BODY MASS INDEX: 24.55 KG/M2 | SYSTOLIC BLOOD PRESSURE: 116 MMHG | WEIGHT: 162 LBS | HEIGHT: 68 IN | DIASTOLIC BLOOD PRESSURE: 70 MMHG | HEART RATE: 88 BPM

## 2023-06-02 DIAGNOSIS — E11.42 TYPE 2 DIABETES MELLITUS WITH DIABETIC POLYNEUROPATHY, WITH LONG-TERM CURRENT USE OF INSULIN (HCC): Primary | ICD-10-CM

## 2023-06-02 DIAGNOSIS — Z79.4 TYPE 2 DIABETES MELLITUS WITH DIABETIC POLYNEUROPATHY, WITH LONG-TERM CURRENT USE OF INSULIN (HCC): Primary | ICD-10-CM

## 2023-06-02 DIAGNOSIS — B18.2 CHRONIC HEPATITIS C WITHOUT HEPATIC COMA (HCC): ICD-10-CM

## 2023-06-02 DIAGNOSIS — I10 ESSENTIAL HYPERTENSION WITH GOAL BLOOD PRESSURE LESS THAN 130/80: ICD-10-CM

## 2023-06-02 PROBLEM — R73.9 HYPERGLYCEMIA: Status: RESOLVED | Noted: 2023-02-28 | Resolved: 2023-06-02

## 2023-06-02 PROBLEM — K21.9 GASTROESOPHAGEAL REFLUX DISEASE: Status: RESOLVED | Noted: 2020-03-24 | Resolved: 2023-06-02

## 2023-06-02 PROBLEM — E11.65 HYPERGLYCEMIA DUE TO DIABETES MELLITUS (HCC): Status: RESOLVED | Noted: 2023-02-17 | Resolved: 2023-06-02

## 2023-06-02 PROBLEM — E11.10 DIABETIC ACIDOSIS WITHOUT COMA (HCC): Status: RESOLVED | Noted: 2019-06-19 | Resolved: 2023-06-02

## 2023-06-02 PROCEDURE — 1036F TOBACCO NON-USER: CPT | Performed by: NURSE PRACTITIONER

## 2023-06-02 PROCEDURE — G8427 DOCREV CUR MEDS BY ELIG CLIN: HCPCS | Performed by: NURSE PRACTITIONER

## 2023-06-02 PROCEDURE — 3074F SYST BP LT 130 MM HG: CPT | Performed by: NURSE PRACTITIONER

## 2023-06-02 PROCEDURE — 1123F ACP DISCUSS/DSCN MKR DOCD: CPT | Performed by: NURSE PRACTITIONER

## 2023-06-02 PROCEDURE — 99214 OFFICE O/P EST MOD 30 MIN: CPT | Performed by: NURSE PRACTITIONER

## 2023-06-02 PROCEDURE — 2022F DILAT RTA XM EVC RTNOPTHY: CPT | Performed by: NURSE PRACTITIONER

## 2023-06-02 PROCEDURE — 3017F COLORECTAL CA SCREEN DOC REV: CPT | Performed by: NURSE PRACTITIONER

## 2023-06-02 PROCEDURE — 3078F DIAST BP <80 MM HG: CPT | Performed by: NURSE PRACTITIONER

## 2023-06-02 PROCEDURE — G8420 CALC BMI NORM PARAMETERS: HCPCS | Performed by: NURSE PRACTITIONER

## 2023-06-02 PROCEDURE — 3051F HG A1C>EQUAL 7.0%<8.0%: CPT | Performed by: NURSE PRACTITIONER

## 2023-06-02 ASSESSMENT — PATIENT HEALTH QUESTIONNAIRE - PHQ9
8. MOVING OR SPEAKING SO SLOWLY THAT OTHER PEOPLE COULD HAVE NOTICED. OR THE OPPOSITE, BEING SO FIGETY OR RESTLESS THAT YOU HAVE BEEN MOVING AROUND A LOT MORE THAN USUAL: 0
SUM OF ALL RESPONSES TO PHQ9 QUESTIONS 1 & 2: 2
SUM OF ALL RESPONSES TO PHQ9 QUESTIONS 1 & 2: 2
3. TROUBLE FALLING OR STAYING ASLEEP: 0
SUM OF ALL RESPONSES TO PHQ QUESTIONS 1-9: 2
10. IF YOU CHECKED OFF ANY PROBLEMS, HOW DIFFICULT HAVE THESE PROBLEMS MADE IT FOR YOU TO DO YOUR WORK, TAKE CARE OF THINGS AT HOME, OR GET ALONG WITH OTHER PEOPLE: 0
SUM OF ALL RESPONSES TO PHQ QUESTIONS 1-9: 2
5. POOR APPETITE OR OVEREATING: 0
SUM OF ALL RESPONSES TO PHQ QUESTIONS 1-9: 2
6. FEELING BAD ABOUT YOURSELF - OR THAT YOU ARE A FAILURE OR HAVE LET YOURSELF OR YOUR FAMILY DOWN: 0
2. FEELING DOWN, DEPRESSED OR HOPELESS: 1
1. LITTLE INTEREST OR PLEASURE IN DOING THINGS: 1
SUM OF ALL RESPONSES TO PHQ QUESTIONS 1-9: 2
2. FEELING DOWN, DEPRESSED OR HOPELESS: 1
1. LITTLE INTEREST OR PLEASURE IN DOING THINGS: 1
7. TROUBLE CONCENTRATING ON THINGS, SUCH AS READING THE NEWSPAPER OR WATCHING TELEVISION: 0
4. FEELING TIRED OR HAVING LITTLE ENERGY: 0
SUM OF ALL RESPONSES TO PHQ QUESTIONS 1-9: 2
9. THOUGHTS THAT YOU WOULD BE BETTER OFF DEAD, OR OF HURTING YOURSELF: 0
SUM OF ALL RESPONSES TO PHQ QUESTIONS 1-9: 2

## 2023-06-02 NOTE — PROGRESS NOTES
Josh Her               [phone]      Evelyn Mcgarry is a 68 y.o. male and presents with Follow-up Chronic Condition, Diabetes, and Hypertension       Assessment/Plan:    1. Type 2 diabetes mellitus with diabetic polyneuropathy, with long-term current use of insulin (HCC)  -     Lipid Panel; Future  -     Hemoglobin A1C; Future  -     Microalbumin / Creatinine Urine Ratio; Future  Endorses medication compliance, Follow-up labs today, and Denies signs and symptoms of hyper or hypoglycemia  2. Essential hypertension with goal blood pressure less than 130/80  -     Comprehensive Metabolic Panel; Future  Endorses medication compliance, Follow-up labs today, and Blood pressure stable, continue amlodipine and lisinopril at same dose  3. Chronic hepatitis C without hepatic coma (HCC)  -     HCV RNA by ELISE Ql,Rflx TO Qt; Future    States he is pretty sure he was treated, check labs to assure virus is cleared      Follow up and disposition:   Return in about 4 months (around 10/2/2023) for DM, HTN, HLD, 30min, office. Subjective:    Labs obtained prior to visit? No  Reviewed with patient? N/A    Hypertension:  /70 Comment: feet flat on floor  Pulse 88   Temp 98.2 °F (36.8 °C) (Temporal)   Resp 18   Ht 5' 8\" (1.727 m)   Wt 162 lb (73.5 kg)   SpO2 100%   BMI 24.63 kg/m²    Patient reports taking medications as instructed. Yes   Medication side effects noted no  Headache upon wakening. no   Home BP monitoring: no  Do you experience chest pain/pressure or SOB with exertion? no  Maintain a low Sodium diet? Yes  Lab Results   Component Value Date/Time    BUN 20 11/09/2022 02:07 PM    CREATININE 1.34 11/09/2022 02:07 PM    LABGLOM 56 11/09/2022 02:07 PM    K 4.4 11/09/2022 02:07 PM     Key Anti-Hypertensive Meds            lisinopril (PRINIVIL;ZESTRIL) 20 MG tablet    Sig - Route:  Take 1 tablet by mouth daily - Oral    amLODIPine (NORVASC) 10 MG tablet

## 2023-06-02 NOTE — PROGRESS NOTES
Room 7    When asked if patient has any concerns he would like to address with CHELSEA Rosales patient states no . Did patient bring someone? No     Did the patient have DME equipment? Yes Portable Oxygen Tank and walking stick     Did you take your medication today? Yes       1. \"Have you been to the ER, urgent care clinic since your last visit? Hospitalized since your last visit? \" No     2. \"Have you seen or consulted any other health care providers outside of the [address] [place] [address] since your last visit? \" No     3. For patients aged 39-70: Has the patient had a colonoscopy / FIT/ Cologuard? Yes      If the patient is female:    4. For patients aged 41-77: Has the patient had a mammogram within the past 2 years? N/A      5. For patients aged 21-65: Has the patient had a pap smear? {Cancer Care Gap present? N/A      PHQ-9  4/20/2023   Little interest or pleasure in doing things 1   Little interest or pleasure in doing things -   Feeling down, depressed, or hopeless 1   Trouble falling or staying asleep, or sleeping too much 0   Trouble falling or staying asleep, or sleeping too much -   Feeling tired or having little energy 0   Feeling tired or having little energy -   Poor appetite or overeating 0   Poor appetite, weight loss, or overeating -   Feeling bad about yourself - or that you are a failure or have let yourself or your family down 0   Feeling bad about yourself - or that you are a failure or have let yourself or your family down -   Trouble concentrating on things, such as reading the newspaper or watching television 0   Trouble concentrating on things such as school, work, reading, or watching TV -   Moving or speaking so slowly that other people could have noticed.  Or the opposite - being so fidgety or restless that you have been moving around a lot more than usual 0   Moving or speaking so slowly that other people could have noticed; or the opposite being so fidgety that others notice -

## 2023-06-03 LAB
ALBUMIN SERPL-MCNC: 4.8 G/DL (ref 3.7–4.7)
ALBUMIN/GLOB SERPL: 1.5 {RATIO} (ref 1.2–2.2)
ALP SERPL-CCNC: 106 IU/L (ref 44–121)
ALT SERPL-CCNC: 17 IU/L (ref 0–44)
AST SERPL-CCNC: 19 IU/L (ref 0–40)
BILIRUB SERPL-MCNC: 0.3 MG/DL (ref 0–1.2)
BUN SERPL-MCNC: 26 MG/DL (ref 8–27)
BUN/CREAT SERPL: 19 (ref 10–24)
CALCIUM SERPL-MCNC: 10.2 MG/DL (ref 8.6–10.2)
CHLORIDE SERPL-SCNC: 103 MMOL/L (ref 96–106)
CHOLEST SERPL-MCNC: 135 MG/DL (ref 100–199)
CO2 SERPL-SCNC: 22 MMOL/L (ref 20–29)
CREAT SERPL-MCNC: 1.39 MG/DL (ref 0.76–1.27)
EGFRCR SERPLBLD CKD-EPI 2021: 54 ML/MIN/1.73
GLOBULIN SER CALC-MCNC: 3.1 G/DL (ref 1.5–4.5)
GLUCOSE SERPL-MCNC: 186 MG/DL (ref 70–99)
HBA1C MFR BLD: 9 % (ref 4.8–5.6)
HDLC SERPL-MCNC: 84 MG/DL
LDLC SERPL CALC-MCNC: 39 MG/DL (ref 0–99)
POTASSIUM SERPL-SCNC: 4.7 MMOL/L (ref 3.5–5.2)
PROT SERPL-MCNC: 7.9 G/DL (ref 6–8.5)
SODIUM SERPL-SCNC: 139 MMOL/L (ref 134–144)
TRIGL SERPL-MCNC: 51 MG/DL (ref 0–149)
VLDLC SERPL CALC-MCNC: 12 MG/DL (ref 5–40)

## 2023-06-04 LAB
ALBUMIN/CREAT UR: 19 MG/G CREAT (ref 0–29)
CREAT UR-MCNC: 162.2 MG/DL
MICROALBUMIN UR-MCNC: 30.3 UG/ML

## 2023-06-05 LAB — SPECIMEN STATUS REPORT: NORMAL

## 2023-06-06 ENCOUNTER — CARE COORDINATION (OUTPATIENT)
Facility: CLINIC | Age: 73
End: 2023-06-06

## 2023-06-06 LAB
ALBUMIN SERPL-MCNC: 4.8 G/DL (ref 3.7–4.7)
ALBUMIN/CREAT UR: 19 MG/G CREAT (ref 0–29)
ALBUMIN/GLOB SERPL: 1.5 {RATIO} (ref 1.2–2.2)
ALP SERPL-CCNC: 106 IU/L (ref 44–121)
ALT SERPL-CCNC: 17 IU/L (ref 0–44)
AST SERPL-CCNC: 19 IU/L (ref 0–40)
BILIRUB SERPL-MCNC: 0.3 MG/DL (ref 0–1.2)
BUN SERPL-MCNC: 26 MG/DL (ref 8–27)
BUN/CREAT SERPL: 19 (ref 10–24)
CALCIUM SERPL-MCNC: 10.2 MG/DL (ref 8.6–10.2)
CHLORIDE SERPL-SCNC: 103 MMOL/L (ref 96–106)
CHOLEST SERPL-MCNC: 135 MG/DL (ref 100–199)
CO2 SERPL-SCNC: 22 MMOL/L (ref 20–29)
CREAT SERPL-MCNC: 1.39 MG/DL (ref 0.76–1.27)
CREAT UR-MCNC: 162.2 MG/DL
EGFRCR SERPLBLD CKD-EPI 2021: 54 ML/MIN/1.73
GLOBULIN SER CALC-MCNC: 3.1 G/DL (ref 1.5–4.5)
GLUCOSE SERPL-MCNC: 186 MG/DL (ref 70–99)
HBA1C MFR BLD: 9 % (ref 4.8–5.6)
HDLC SERPL-MCNC: 84 MG/DL
LDLC SERPL CALC-MCNC: 39 MG/DL (ref 0–99)
MICROALBUMIN UR-MCNC: 30.3 UG/ML
POTASSIUM SERPL-SCNC: 4.7 MMOL/L (ref 3.5–5.2)
PROT SERPL-MCNC: 7.9 G/DL (ref 6–8.5)
SODIUM SERPL-SCNC: 139 MMOL/L (ref 134–144)
TRIGL SERPL-MCNC: 51 MG/DL (ref 0–149)
VLDLC SERPL CALC-MCNC: 12 MG/DL (ref 5–40)

## 2023-06-06 NOTE — CARE COORDINATION
carbs)  Taking medications as prescribed ( Insulin oral meds)  Using Freestyle Ana 2 monitoring blood glucose 3 x daily  Exercise as tolerated  5/23/23  Fasting b/s are above 150 daily. Encourage to maintain diet. Exercise more              Patient has Ambulatory Care Manager's contact information for any further questions, concerns, or needs.   Patients upcoming visits:    Future Appointments   Date Time Provider Dipika Mulligan   7/12/2023 10:10 AM [facility] NURSE [facility] Sara Sched   7/19/2023  1:15 PM Florencio Henry MD [facility] Sara Sched   10/3/2023  9:30 AM MANAS Seymour CNP AMB   3/4/2024  8:30 AM MANAS Seymour CNP AMB

## 2023-06-10 LAB
DIAGNOSTIC IMP SPEC-IMP: NORMAL
HCV IGG SERPL QL IA: REACTIVE
HCV RNA SERPL NAA+PROBE-ACNC: NORMAL IU/ML
REF LAB TEST REF RANGE: NORMAL

## 2023-06-28 DIAGNOSIS — E11.9 TYPE 2 DIABETES MELLITUS WITHOUT COMPLICATIONS (HCC): ICD-10-CM

## 2023-06-29 RX ORDER — SITAGLIPTIN 100 MG/1
TABLET, FILM COATED ORAL
Qty: 90 TABLET | Refills: 1 | OUTPATIENT
Start: 2023-06-29

## 2023-07-12 ENCOUNTER — CARE COORDINATION (OUTPATIENT)
Facility: CLINIC | Age: 73
End: 2023-07-12

## 2023-07-12 NOTE — CARE COORDINATION
Patient confused on provider appointments. Thought he had an appointment with neurology Dr Jos Salomon in [facility] today,but it was with Dr Elham Pennington, urology, Nurse visit lab. Patient upset anxiety increasing talking loud. Caregiver with patient, who suggested he contact [facility] [place] [facility] for clarification. ACM reviewed EMR updated patient on his appointment dates and times. ACM noted appointment for a nurse visit 7/14 at Dr Karlie Collins office @ 9:40 am and a doctor visit on 7/19 with Dr Elham Pennington @ 1:15 pm. He has F/U 4 mos with PCP 10/3 @ 9:30 Am and 3/4/24 his AMWV @ 8:30 with PCP Americo Bolanos. Last visit with neurology Dr. Jos Salomon was in 11/22 and was a no show 2/23. Has follow thru with psychiatry Maki Manning NP) Just make an follow up visit while there it maybe 3 mos out but keep the appointment. Both verbalizing understanding. ACM allowed patient to vent his frustrations, he is forgetting more each day . Deny using marijuana as he has in the past.. Caregiver is voicing patient is getting more anxious everyday laying up against the wall sliding down to the floor ,crying then apologizing for his behavior. ACM instructed Caregiver and patient to discuss with [facility] provider at next visit. Medications may need adjusting. Both voiced understanding.

## 2023-07-18 ENCOUNTER — CARE COORDINATION (OUTPATIENT)
Facility: CLINIC | Age: 73
End: 2023-07-18

## 2023-07-18 DIAGNOSIS — Z79.4 TYPE 2 DIABETES MELLITUS WITH DIABETIC POLYNEUROPATHY, WITH LONG-TERM CURRENT USE OF INSULIN (HCC): Primary | ICD-10-CM

## 2023-07-18 DIAGNOSIS — E11.42 TYPE 2 DIABETES MELLITUS WITH DIABETIC POLYNEUROPATHY, WITH LONG-TERM CURRENT USE OF INSULIN (HCC): Primary | ICD-10-CM

## 2023-07-18 NOTE — CARE COORDINATION
.  Challenges to be reviewed by the provider   Additional needs identified to be addressed with provider Yes  medications-   Insurance house call Nurse practitioner  is with patient going over his medications. It is noted :  Message routed to PCP. Patient need refill Sitagliptin/Januvia 100 mg 1 tab every day. (( Please send to [facility])) They do his daily pill pack . Thank you so much.

## 2023-07-19 ENCOUNTER — TELEPHONE (OUTPATIENT)
Facility: CLINIC | Age: 73
End: 2023-07-19

## 2023-07-19 NOTE — TELEPHONE ENCOUNTER
Called to review labs and elevated A1C. States he is taking all his medications as prescribed (has pill pack) but has been eating more sugar. Advised to cut out all sugar, sweets, soda and juices and we will recheck A1C at his next visit. Verified his next visit date and time.   Patient verbalized understanding and is in agreement with this plan of care

## 2023-07-21 ENCOUNTER — TELEPHONE (OUTPATIENT)
Facility: CLINIC | Age: 73
End: 2023-07-21

## 2023-07-21 NOTE — TELEPHONE ENCOUNTER
Called patient in reguards of message stating patient would like to discuss erection issues with nurse. N.P. Hector Sheets has been notified of patient's request. Per N.P. Hector Sheets instructions due to health concerns/ issues patient does not need anything to compromise the patient's health . Patient verbalized understanding.

## 2023-07-21 NOTE — TELEPHONE ENCOUNTER
----- Message from [facility] sent at 7/20/2023  9:46 AM EDT -----  Subject: Message to Provider    QUESTIONS  Information for Provider? patient is asking for nurse to call patient back   regarding issue with not being able to get an erection, has appt on   08/07/23 for this issue, feels he needs some medication to assist   ---------------------------------------------------------------------------  --------------  [address]  [phone]; OK to leave message on voicemail  ---------------------------------------------------------------------------  --------------  SCRIPT ANSWERS  Relationship to Patient?  Self

## 2023-07-25 ENCOUNTER — TELEPHONE (OUTPATIENT)
Facility: CLINIC | Age: 73
End: 2023-07-25

## 2023-07-25 NOTE — TELEPHONE ENCOUNTER
Patient stated that the [facility] you sent him to does not take his insurance, Patient needs a new referral to a new [facility] that takes his insurance. Please give him a call.

## 2023-07-31 DIAGNOSIS — K21.9 GASTRO-ESOPHAGEAL REFLUX DISEASE WITHOUT ESOPHAGITIS: ICD-10-CM

## 2023-07-31 DIAGNOSIS — E11.65 TYPE 2 DIABETES MELLITUS WITH HYPERGLYCEMIA (HCC): ICD-10-CM

## 2023-07-31 RX ORDER — PIOGLITAZONEHYDROCHLORIDE 30 MG/1
TABLET ORAL
Qty: 90 TABLET | Refills: 1 | Status: SHIPPED | OUTPATIENT
Start: 2023-07-31

## 2023-07-31 RX ORDER — FAMOTIDINE 40 MG/1
TABLET, FILM COATED ORAL
Qty: 93 TABLET | Refills: 1 | Status: SHIPPED | OUTPATIENT
Start: 2023-07-31

## 2023-08-03 DIAGNOSIS — E11.42 TYPE 2 DIABETES MELLITUS WITH DIABETIC POLYNEUROPATHY, WITH LONG-TERM CURRENT USE OF INSULIN (HCC): Primary | ICD-10-CM

## 2023-08-03 DIAGNOSIS — Z79.4 TYPE 2 DIABETES MELLITUS WITH DIABETIC POLYNEUROPATHY, WITH LONG-TERM CURRENT USE OF INSULIN (HCC): Primary | ICD-10-CM

## 2023-08-03 RX ORDER — INSULIN GLARGINE 100 [IU]/ML
19 INJECTION, SOLUTION SUBCUTANEOUS NIGHTLY
Qty: 15 ML | Refills: 3 | Status: SHIPPED | OUTPATIENT
Start: 2023-08-03

## 2023-08-03 NOTE — TELEPHONE ENCOUNTER
Called patient in reguards of message stating to call patient to inform patient of referral address and phone number do patient can schedule an appointment. Patient verbalized understanding.

## 2023-08-07 ENCOUNTER — OFFICE VISIT (OUTPATIENT)
Facility: CLINIC | Age: 73
End: 2023-08-07
Payer: MEDICARE

## 2023-08-07 VITALS
RESPIRATION RATE: 18 BRPM | OXYGEN SATURATION: 100 % | DIASTOLIC BLOOD PRESSURE: 67 MMHG | SYSTOLIC BLOOD PRESSURE: 93 MMHG | TEMPERATURE: 98.2 F | HEIGHT: 68 IN | BODY MASS INDEX: 24.1 KG/M2 | WEIGHT: 159 LBS | HEART RATE: 79 BPM

## 2023-08-07 DIAGNOSIS — Z79.4 TYPE 2 DIABETES MELLITUS WITHOUT COMPLICATION, WITH LONG-TERM CURRENT USE OF INSULIN (HCC): ICD-10-CM

## 2023-08-07 DIAGNOSIS — N52.9 ERECTILE DYSFUNCTION, UNSPECIFIED ERECTILE DYSFUNCTION TYPE: Primary | ICD-10-CM

## 2023-08-07 DIAGNOSIS — E11.9 TYPE 2 DIABETES MELLITUS WITHOUT COMPLICATION, WITH LONG-TERM CURRENT USE OF INSULIN (HCC): ICD-10-CM

## 2023-08-07 PROCEDURE — G8420 CALC BMI NORM PARAMETERS: HCPCS | Performed by: NURSE PRACTITIONER

## 2023-08-07 PROCEDURE — 3074F SYST BP LT 130 MM HG: CPT | Performed by: NURSE PRACTITIONER

## 2023-08-07 PROCEDURE — 3017F COLORECTAL CA SCREEN DOC REV: CPT | Performed by: NURSE PRACTITIONER

## 2023-08-07 PROCEDURE — 2022F DILAT RTA XM EVC RTNOPTHY: CPT | Performed by: NURSE PRACTITIONER

## 2023-08-07 PROCEDURE — 1036F TOBACCO NON-USER: CPT | Performed by: NURSE PRACTITIONER

## 2023-08-07 PROCEDURE — 3052F HG A1C>EQUAL 8.0%<EQUAL 9.0%: CPT | Performed by: NURSE PRACTITIONER

## 2023-08-07 PROCEDURE — G8427 DOCREV CUR MEDS BY ELIG CLIN: HCPCS | Performed by: NURSE PRACTITIONER

## 2023-08-07 PROCEDURE — 3078F DIAST BP <80 MM HG: CPT | Performed by: NURSE PRACTITIONER

## 2023-08-07 PROCEDURE — 1123F ACP DISCUSS/DSCN MKR DOCD: CPT | Performed by: NURSE PRACTITIONER

## 2023-08-07 PROCEDURE — 99213 OFFICE O/P EST LOW 20 MIN: CPT | Performed by: NURSE PRACTITIONER

## 2023-08-07 SDOH — ECONOMIC STABILITY: INCOME INSECURITY: HOW HARD IS IT FOR YOU TO PAY FOR THE VERY BASICS LIKE FOOD, HOUSING, MEDICAL CARE, AND HEATING?: VERY HARD

## 2023-08-07 SDOH — ECONOMIC STABILITY: FOOD INSECURITY: WITHIN THE PAST 12 MONTHS, THE FOOD YOU BOUGHT JUST DIDN'T LAST AND YOU DIDN'T HAVE MONEY TO GET MORE.: NEVER TRUE

## 2023-08-07 SDOH — ECONOMIC STABILITY: FOOD INSECURITY: WITHIN THE PAST 12 MONTHS, YOU WORRIED THAT YOUR FOOD WOULD RUN OUT BEFORE YOU GOT MONEY TO BUY MORE.: NEVER TRUE

## 2023-08-07 ASSESSMENT — PATIENT HEALTH QUESTIONNAIRE - PHQ9
SUM OF ALL RESPONSES TO PHQ QUESTIONS 1-9: 7
8. MOVING OR SPEAKING SO SLOWLY THAT OTHER PEOPLE COULD HAVE NOTICED. OR THE OPPOSITE, BEING SO FIGETY OR RESTLESS THAT YOU HAVE BEEN MOVING AROUND A LOT MORE THAN USUAL: 0
10. IF YOU CHECKED OFF ANY PROBLEMS, HOW DIFFICULT HAVE THESE PROBLEMS MADE IT FOR YOU TO DO YOUR WORK, TAKE CARE OF THINGS AT HOME, OR GET ALONG WITH OTHER PEOPLE: 0
6. FEELING BAD ABOUT YOURSELF - OR THAT YOU ARE A FAILURE OR HAVE LET YOURSELF OR YOUR FAMILY DOWN: 1
3. TROUBLE FALLING OR STAYING ASLEEP: 1
SUM OF ALL RESPONSES TO PHQ QUESTIONS 1-9: 7
2. FEELING DOWN, DEPRESSED OR HOPELESS: 1
1. LITTLE INTEREST OR PLEASURE IN DOING THINGS: 1
SUM OF ALL RESPONSES TO PHQ9 QUESTIONS 1 & 2: 2
9. THOUGHTS THAT YOU WOULD BE BETTER OFF DEAD, OR OF HURTING YOURSELF: 0
7. TROUBLE CONCENTRATING ON THINGS, SUCH AS READING THE NEWSPAPER OR WATCHING TELEVISION: 1
4. FEELING TIRED OR HAVING LITTLE ENERGY: 1
5. POOR APPETITE OR OVEREATING: 1

## 2023-08-07 NOTE — PROGRESS NOTES
[address]               [place], [address] [place]               [phone]      Rosa Castro is a 68 y.o. male and presents with Other (Erection problems )       Assessment/Plan:    1. Erectile dysfunction, unspecified erectile dysfunction type  2. Type 2 diabetes mellitus without complication, with long-term current use of insulin (HCC)  -     Continuous Blood Gluc Sensor (FREESTYLE ELIE 2 SENSOR) MISC; Apply to arm every 14 days, check blood glucose four times a day., Disp-2 each, R-5Normal   Visit today to evaluated ED  Advised per the urology note he is not a canidate for PDE5i due to his pulmonary and cardiovascular function  Advised his reason for ED is most likely due to his DM, htn and prostated cancer with radiation therapy  He then stated, OK then well never mind. Will continue to monitor his needs  Patient verbalized understanding and is in agreement with this plan of care      Follow up and disposition:   Return for keep previously scheduled follow up. Subjective:    Labs obtained prior to visit? No  Reviewed with patient? N/A    ED  Spoke with urology, was advised to speak with PCP to see if taking the ED medications was safe for him  Per Dr. Lissy Kirby note he is not a canidate for PDE5i given concerns for pulmonary and cardiovascular health. Advised to ask pcp for clearance for sexual activity  Explained to him all the medical risk factors he has for not being able to obtain an erection. DM, HTN, radiation w/ his prostate cancer. States he will forget about it then    ROS:     Review of Systems  As stated in HPI, otherwise all others negative. The problem list was updated as a part of today's visit.   Patient Active Problem List   Diagnosis    BPH (benign prostatic hyperplasia)    Microcytic anemia    Prostate cancer ([address])    Posterior vitreous detachment, right eye    NS (nuclear sclerosis)    Pain in right foot    Lumbosacral spondylosis without myelopathy    History of

## 2023-08-07 NOTE — PROGRESS NOTES
[address] had concerns including Other (Erection problems ). for today's visit . When asked if patient has any concerns he would like to address with CHELSEA Carrasquillo . Patient states I would like to discuss Erection issues due to radiation. CHELSEA Carrasquillo has been notified  of patient concerns . Did patient bring someone? Yes AID     Did the patient have DME equipment? Yes walking stick and Oxygen tank     Did you take your medication today? Yes       1. \"Have you been to the ER, urgent care clinic since your last visit? Hospitalized since your last visit? \" . NO    2. \"Have you seen or consulted any other health care providers outside of the [address] [place] [address] since your last visit? \" No     3. For patients aged 43-73: Has the patient had a colonoscopy / FIT/ Cologuard? Yes      If the patient is female:    4. For patients aged 43-66: Has the patient had a mammogram within the past 2 years? N/A      5. For patients aged 21-65: Has the patient had a pap smear? {Cancer Care Gap present? N/A    Amb Fall Risk Assessment and TUG Test 8/7/2023   Do you feel unsteady or are you worried about falling?  no   2 or more falls in past year? no   Fall with injury in past year? no   Fall in past 12 months? -   Able to walk?  -   Total Score -          PHQ-9  8/7/2023   Little interest or pleasure in doing things 1   Little interest or pleasure in doing things -   Feeling down, depressed, or hopeless 1   Trouble falling or staying asleep, or sleeping too much 1   Trouble falling or staying asleep, or sleeping too much -   Feeling tired or having little energy 1   Feeling tired or having little energy -   Poor appetite or overeating 1   Poor appetite, weight loss, or overeating -   Feeling bad about yourself - or that you are a failure or have let yourself or your family down 1   Feeling bad about yourself - or that you are a failure or have let yourself or your family down -   Trouble concentrating on things,

## 2023-08-07 NOTE — PATIENT INSTRUCTIONS
Dr [facility]* (Otolaryngology)   ([address])                    [place] location:  [facility], [address] [place] [address]  Phone: [phone]

## 2023-08-29 ENCOUNTER — TELEPHONE (OUTPATIENT)
Facility: CLINIC | Age: 73
End: 2023-08-29

## 2023-08-29 NOTE — TELEPHONE ENCOUNTER
Patient contacted [facility],  upset , anxious ,crying having nightmares for the last 2 weeks. Getting angry with sister and daughter while talking to them then forgetting why he was angry. Noticed that he is more forgetful. Next neurology appointment with Dr Karey Galicia 9/5. Patient calmed down after [facility]  gave him his new ENT appointment date 9/12/23 with Dr Chris Hunter ( [facility]). Instructed him and Diego ERWIN  to make an appointment with his Psychiatric provider Mario Gonzalez NP for guidance on his nightmares, but since he is not sleeping well this maybe why he gets agitated and angry so easily.

## 2023-09-05 ENCOUNTER — OFFICE VISIT (OUTPATIENT)
Age: 73
End: 2023-09-05
Payer: MEDICARE

## 2023-09-05 ENCOUNTER — TELEPHONE (OUTPATIENT)
Facility: CLINIC | Age: 73
End: 2023-09-05

## 2023-09-05 VITALS
HEART RATE: 96 BPM | BODY MASS INDEX: 24.86 KG/M2 | RESPIRATION RATE: 18 BRPM | SYSTOLIC BLOOD PRESSURE: 100 MMHG | OXYGEN SATURATION: 95 % | HEIGHT: 68 IN | DIASTOLIC BLOOD PRESSURE: 64 MMHG | WEIGHT: 164 LBS

## 2023-09-05 DIAGNOSIS — G47.52 REM SLEEP BEHAVIOR DISORDER: ICD-10-CM

## 2023-09-05 DIAGNOSIS — F03.A11 MILD DEMENTIA WITH AGITATION, UNSPECIFIED DEMENTIA TYPE (HCC): Primary | ICD-10-CM

## 2023-09-05 PROCEDURE — 3078F DIAST BP <80 MM HG: CPT | Performed by: STUDENT IN AN ORGANIZED HEALTH CARE EDUCATION/TRAINING PROGRAM

## 2023-09-05 PROCEDURE — 1036F TOBACCO NON-USER: CPT | Performed by: STUDENT IN AN ORGANIZED HEALTH CARE EDUCATION/TRAINING PROGRAM

## 2023-09-05 PROCEDURE — G8427 DOCREV CUR MEDS BY ELIG CLIN: HCPCS | Performed by: STUDENT IN AN ORGANIZED HEALTH CARE EDUCATION/TRAINING PROGRAM

## 2023-09-05 PROCEDURE — 3074F SYST BP LT 130 MM HG: CPT | Performed by: STUDENT IN AN ORGANIZED HEALTH CARE EDUCATION/TRAINING PROGRAM

## 2023-09-05 PROCEDURE — 99214 OFFICE O/P EST MOD 30 MIN: CPT | Performed by: STUDENT IN AN ORGANIZED HEALTH CARE EDUCATION/TRAINING PROGRAM

## 2023-09-05 PROCEDURE — 1123F ACP DISCUSS/DSCN MKR DOCD: CPT | Performed by: STUDENT IN AN ORGANIZED HEALTH CARE EDUCATION/TRAINING PROGRAM

## 2023-09-05 PROCEDURE — 99214 OFFICE O/P EST MOD 30 MIN: CPT

## 2023-09-05 PROCEDURE — 3017F COLORECTAL CA SCREEN DOC REV: CPT | Performed by: STUDENT IN AN ORGANIZED HEALTH CARE EDUCATION/TRAINING PROGRAM

## 2023-09-05 PROCEDURE — G8420 CALC BMI NORM PARAMETERS: HCPCS | Performed by: STUDENT IN AN ORGANIZED HEALTH CARE EDUCATION/TRAINING PROGRAM

## 2023-09-05 RX ORDER — MEMANTINE HYDROCHLORIDE 5 MG/1
5 TABLET ORAL 2 TIMES DAILY
Qty: 60 TABLET | Refills: 5 | Status: SHIPPED | OUTPATIENT
Start: 2023-09-05

## 2023-09-05 NOTE — PROGRESS NOTES
A 68years old male patient here for follow-up evaluation of memory loss. He came today with his caretaker. Last in the clinic in November 2022. Neuropsychological test was reordered; not done. He is not also taking the memantine. Continues to have the forgetfulness. His aide, said that it is mild and sometimes. He needs to write to do things. Takes his medications himself; his medications come in a pack. No problem managing his finances. Might repeat in conversations; his aide mentioned that he stutters. He gets easily agitated; also complains of difficulty sleeping. Mentioned night terrors/bad dreams; mostly about this past.  Wakes up a lot sweating. He does not fall from his bed. He sleeps alone; no witnesses. Might have urine incontinence at night; a couple of incidents. He wears pull-ups. No hallucinations. He takes buspirone, Prozac, and mirtazapine. Initial encounter:  A 67years old male patient here for evaluation of progressive forgetfulness; unknown duration but he says has been going on for a while. He came with a caretaker. According to his caretaker, he forgets appointments. Has difficulty getting his words out and has difficulty finishing a sentence. Has difficulty finding items. He takes his medications himself; he has a pillbox for 1 month and do not forget taking them. He takes care of finances by himself; he might forget paying bills. Sometimes, might forget the pin numbers. He is not currently cooking. No difficulty putting his clothes on. No problem taking shower. Has occasional difficulty sleeping; wakes up a lot to go to the bathroom. He occasionally frustrates. He cries and feels sad. No suicidal ideations. Has anxiety. No hallucinations. He might confuse the day and night. No use of alcohol currently. Denied smoking. He smokes marijuana; past history of heroin abuse. Family history of dementia: Mother, father, and grandmother.   No previous history of

## 2023-09-05 NOTE — TELEPHONE ENCOUNTER
Patient continues to have difficulty in getting in touch with Eulene Schwab NP, Psychiatry provider. Multiple message left at . AC left message on 8/29 and again this morning  9/5 as well. Patient continues to have nightmares ,increase anxiety level. He is asking for another Psychiatry referral please. He is do for refill , going to check with his pharmacy after visit with neurology this morning @ 11:40, Dr Manolo Elam. Please advise.

## 2023-09-07 DIAGNOSIS — I10 ESSENTIAL (PRIMARY) HYPERTENSION: ICD-10-CM

## 2023-09-07 RX ORDER — AMLODIPINE BESYLATE 10 MG/1
TABLET ORAL
Qty: 90 TABLET | Refills: 1 | Status: SHIPPED | OUTPATIENT
Start: 2023-09-07

## 2023-09-08 ENCOUNTER — CARE COORDINATION (OUTPATIENT)
Facility: CLINIC | Age: 73
End: 2023-09-08

## 2023-09-08 SDOH — HEALTH STABILITY: PHYSICAL HEALTH: ON AVERAGE, HOW MANY MINUTES DO YOU ENGAGE IN EXERCISE AT THIS LEVEL?: 30 MIN

## 2023-09-08 SDOH — ECONOMIC STABILITY: HOUSING INSECURITY: IN THE LAST 12 MONTHS, HOW MANY PLACES HAVE YOU LIVED?: 1

## 2023-09-08 SDOH — ECONOMIC STABILITY: INCOME INSECURITY: IN THE LAST 12 MONTHS, WAS THERE A TIME WHEN YOU WERE NOT ABLE TO PAY THE MORTGAGE OR RENT ON TIME?: NO

## 2023-09-08 SDOH — HEALTH STABILITY: PHYSICAL HEALTH: ON AVERAGE, HOW MANY DAYS PER WEEK DO YOU ENGAGE IN MODERATE TO STRENUOUS EXERCISE (LIKE A BRISK WALK)?: 4 DAYS

## 2023-09-08 ASSESSMENT — SOCIAL DETERMINANTS OF HEALTH (SDOH)
HOW OFTEN DO YOU ATTENT MEETINGS OF THE CLUB OR ORGANIZATION YOU BELONG TO?: 1 TO 4 TIMES PER YEAR
HOW OFTEN DO YOU GET TOGETHER WITH FRIENDS OR RELATIVES?: MORE THAN THREE TIMES A WEEK
WITHIN THE LAST YEAR, HAVE TO BEEN RAPED OR FORCED TO HAVE ANY KIND OF SEXUAL ACTIVITY BY YOUR PARTNER OR EX-PARTNER?: NO
WITHIN THE LAST YEAR, HAVE YOU BEEN AFRAID OF YOUR PARTNER OR EX-PARTNER?: NO
HOW HARD IS IT FOR YOU TO PAY FOR THE VERY BASICS LIKE FOOD, HOUSING, MEDICAL CARE, AND HEATING?: NOT HARD AT ALL
DO YOU BELONG TO ANY CLUBS OR ORGANIZATIONS SUCH AS CHURCH GROUPS UNIONS, FRATERNAL OR ATHLETIC GROUPS, OR SCHOOL GROUPS?: NO
IN A TYPICAL WEEK, HOW MANY TIMES DO YOU TALK ON THE PHONE WITH FAMILY, FRIENDS, OR NEIGHBORS?: MORE THAN THREE TIMES A WEEK
HOW OFTEN DO YOU ATTEND CHURCH OR RELIGIOUS SERVICES?: 1 TO 4 TIMES PER YEAR
WITHIN THE LAST YEAR, HAVE YOU BEEN HUMILIATED OR EMOTIONALLY ABUSED IN OTHER WAYS BY YOUR PARTNER OR EX-PARTNER?: NO
WITHIN THE LAST YEAR, HAVE YOU BEEN KICKED, HIT, SLAPPED, OR OTHERWISE PHYSICALLY HURT BY YOUR PARTNER OR EX-PARTNER?: NO

## 2023-09-08 ASSESSMENT — PATIENT HEALTH QUESTIONNAIRE - PHQ9
SUM OF ALL RESPONSES TO PHQ QUESTIONS 1-9: 7
SUM OF ALL RESPONSES TO PHQ9 QUESTIONS 1 & 2: 1
10. IF YOU CHECKED OFF ANY PROBLEMS, HOW DIFFICULT HAVE THESE PROBLEMS MADE IT FOR YOU TO DO YOUR WORK, TAKE CARE OF THINGS AT HOME, OR GET ALONG WITH OTHER PEOPLE: NOT DIFFICULT AT ALL
2. FEELING DOWN, DEPRESSED OR HOPELESS: SEVERAL DAYS
1. LITTLE INTEREST OR PLEASURE IN DOING THINGS: NOT AT ALL
SUM OF ALL RESPONSES TO PHQ QUESTIONS 1-9: 7
SUM OF ALL RESPONSES TO PHQ QUESTIONS 1-9: 7

## 2023-09-08 NOTE — CARE COORDINATION
.Ambulatory Care Coordination Note  2023    Patient Current Location:  Home: [address] [place] [address] [place] [address]    ACM contacted the patient by telephone. Verified name and  with patient as identifiers. Provided introduction to self, and explanation of the ACM role. ACM: Wing Fallon RN    Challenges to be reviewed by the provider   Additional needs identified to be addressed with provider: Yes  Update :  Patient has appointment with ENT 23 @ 11:30  Went to pharmacy to get them to review all medications for refill and update pill packs( [facility])  Has re-started the Namenda as prescribed by Dr Karey Galicia and waiting on call from Sleep study referral concerning night terrors. The  5 names given for Psych advance nurse practitioner only 1 maybe accepting new patient after he sends in new paper work . ACM contacted 2 others [facility] they will be sending out intake paper work for him to follow up and send back and call out to Dr Alton Moya left message. Method of communication with provider: chart routing. Offered patient enrollment in the Remote Patient Monitoring (RPM) program for in-home monitoring: Patient declined. Ambulatory Care Coordination Assessment    Care Coordination Protocol  Week 1 - Initial Assessment     Do you have all of your prescriptions and are they filled?: Yes  Are you able to afford your medications?: Yes  How often do you have trouble taking your medications the way you have been told to take them?: I always take them as prescribed.            Current Housing: Independent Living/Senior Housing                 Suggested Interventions and [employer]                  Future Appointments   Date Time Provider [address]   10/3/2023  9:30 AM MANAS Hauser - CNP AMA BS AMB   2024  1:40 PM [facility] NURSE [facility] Anjali Lacey Sched   2024  2:45 PM Caryn Eaton MD [facility] Sara Sched   3/4/2024  8:30 AM Kalia

## 2023-09-13 ENCOUNTER — CARE COORDINATION (OUTPATIENT)
Facility: CLINIC | Age: 73
End: 2023-09-13

## 2023-09-13 DIAGNOSIS — I10 ESSENTIAL (PRIMARY) HYPERTENSION: ICD-10-CM

## 2023-09-13 DIAGNOSIS — F32.2 SEVERE DEPRESSION (HCC): Primary | ICD-10-CM

## 2023-09-13 RX ORDER — FLUOXETINE HYDROCHLORIDE 40 MG/1
40 CAPSULE ORAL DAILY
Qty: 90 CAPSULE | Refills: 1 | Status: SHIPPED | OUTPATIENT
Start: 2023-09-13

## 2023-09-13 RX ORDER — LISINOPRIL 20 MG/1
20 TABLET ORAL DAILY
Qty: 90 TABLET | Refills: 1 | Status: SHIPPED | OUTPATIENT
Start: 2023-09-13

## 2023-09-13 RX ORDER — MIRTAZAPINE 7.5 MG/1
7.5 TABLET, FILM COATED ORAL
Qty: 90 TABLET | Refills: 1 | Status: SHIPPED | OUTPATIENT
Start: 2023-09-13

## 2023-09-13 RX ORDER — BUSPIRONE HYDROCHLORIDE 10 MG/1
10 TABLET ORAL 2 TIMES DAILY
Qty: 180 TABLET | Refills: 1 | Status: SHIPPED | OUTPATIENT
Start: 2023-09-13

## 2023-09-13 NOTE — CARE COORDINATION
.Ambulatory Care Coordination Note  2023    Patient Current Location:  Home: [place] [address]     Patient contacted [facility] by telephone. Verified name and  with patient as identifiers. He did see ENT on yesterday as planned but his ride home was delayed for a long time he was getting agitated ,angry , very upset. His PCA was talking to the company and she started walking away not listening to him. He got angry and said some words that upset the PCA. She called his sister as to try to calm him down( at that time he did not understand why she called his sister ,but since ACM voiced the reason he understand she just wanted someone to calm him down) because he was so agitated ,but that made him more angry. Patient voiced when the ride finally came he was shaking,and very upset. He came home went to bed it was after 4:00 when he woke up and PCA was gone she did not tell him as she usually do. Last night his sister and daughter called him he did not answer the phone. [facility] allowed patient to continue to vent. [facility] asked patient to listen to [employer] interpretation of his day after his visit to the ENT starting with the late ride home. Patient stopped listened to [employer] as her interpretation was he got rude said things to his PCA that may have hurt her feelings during his escalation of his agitation. No matter what the PCA did he still was upset and angry more because his ride was late. Patient then asked [facility] to contact Armida Landers as he tried to call her and no answer and she is usually there by now. [facility] called number patient gave him and had to leave a message for a return call. Patient voicing he do not have his psychiatric medications>> Mirtazapine/Remeron 7.5 mg  daily,Fluoxetine/ Prozac 40 mg daily and Buspirone /Buspar 10 mg twice a day. [facility] wilver send message to PCP for refills .     Challenges to be reviewed by the provider   Additional needs identified to be addressed with

## 2023-09-20 ENCOUNTER — CARE COORDINATION (OUTPATIENT)
Facility: CLINIC | Age: 73
End: 2023-09-20

## 2023-09-20 NOTE — CARE COORDINATION
.Ambulatory Care Coordination Note  2023    Patient Current Location:  Home: [address] [place], [place] [address]e [address]     ACM contacted the patient by telephone. Verified name and  with patient as identifiers. Provided introduction to self, and explanation of the ACM role. Patient voicing he is doing much better now that him and Becky Luna has talked everything is back to being okay. Sounds better mentally, patient voicing he has had his medications in him and doing better also. Challenges to be reviewed by the provider   Additional needs identified to be addressed with provider: No  none               Method of communication with provider: none. ACM: Kathrine Garcia RN    Offered patient enrollment in the Remote Patient Monitoring (RPM) program for in-home monitoring: NA.     Lab Results       None                 Goals Addressed    None         Future Appointments   Date Time Provider [address]   10/3/2023  9:30 AM MANAS Lujan CNP BS AMB   2024  1:40 PM [facility] NURSE [facility] Maury Brady Sched   2024  2:45 PM Valdo Kennedy MD [facility] Sara Sched   3/4/2024  8:30 AM MANAS Lujan CNP BS AMB   3/5/2024  9:40 AM [facility]

## 2023-09-27 ENCOUNTER — CARE COORDINATION (OUTPATIENT)
Facility: CLINIC | Age: 73
End: 2023-09-27

## 2023-09-27 ENCOUNTER — TELEPHONE (OUTPATIENT)
Facility: CLINIC | Age: 73
End: 2023-09-27

## 2023-09-27 NOTE — CARE COORDINATION
.Ambulatory Care Coordination Note  2023    Patient Current Location:  Home: [address] [place] [address] [place] [address]     ACM contacted the patient by telephone. Verified name and  with patient as identifiers. Provided introduction to self, and explanation of the ACM role. Patient is doing better mentally today. He contacted [address] [place] [address] Monday he was having one of his Anxiety attacks,was concern that no one had contacted him yet to see an provider. Patient voicing he is still not sleeping due to the nightmares. Was told by [address] [place] [address] he will need to stay overnight  he can be a walk in they don't take appointments. Patient refused to go. ACM asked if he received anything from [facility] ,patient said he don't remember. He then called out to his PCA and she said she has not seen anything,but Mr Bhanu Rankin opens his own mail. ACM call [facility] again to see if they sent out an intake letter. They said if they took the information they did ,but will resend again. ACM up date patient on outcome call with [facility] . Instructed to be on the look out complete and send back as they are accepting new patients. Challenges to be reviewed by the provider   Additional needs identified to be addressed with provider: Yes  Update on Psych f/u.  [address] [place] [address] is walk in and patient has to stay overnight for evaluation. He declined. [facility] is sending out a intake form to be completed and fax back. They are accepting new patients. Method of communication with provider: chart routing.     ACM: Chloe Kaur RN      Offered patient enrollment in the Remote Patient Monitoring (RPM) program for in-home monitoring: Patient is not eligible for RPM program.    Lab Results       None                 Goals Addressed    None         Future Appointments   Date Time Provider [address]   10/3/2023  9:30 AM MANAS Benavides - CNP AMA BS AMB   2024  1:40 PM [facility] NURSE

## 2023-10-03 ENCOUNTER — HOSPITAL ENCOUNTER (OUTPATIENT)
Facility: HOSPITAL | Age: 73
Setting detail: SPECIMEN
Discharge: HOME | End: 2023-10-06
Payer: MEDICARE

## 2023-10-03 ENCOUNTER — OFFICE VISIT (OUTPATIENT)
Facility: CLINIC | Age: 73
End: 2023-10-03
Payer: MEDICARE

## 2023-10-03 VITALS
TEMPERATURE: 98.2 F | BODY MASS INDEX: 24.86 KG/M2 | HEIGHT: 68 IN | SYSTOLIC BLOOD PRESSURE: 119 MMHG | OXYGEN SATURATION: 99 % | WEIGHT: 164 LBS | DIASTOLIC BLOOD PRESSURE: 69 MMHG | HEART RATE: 91 BPM | RESPIRATION RATE: 18 BRPM

## 2023-10-03 DIAGNOSIS — E11.42 TYPE 2 DIABETES MELLITUS WITH DIABETIC POLYNEUROPATHY, WITH LONG-TERM CURRENT USE OF INSULIN (HCC): Primary | ICD-10-CM

## 2023-10-03 DIAGNOSIS — Z79.4 TYPE 2 DIABETES MELLITUS WITH DIABETIC POLYNEUROPATHY, WITH LONG-TERM CURRENT USE OF INSULIN (HCC): ICD-10-CM

## 2023-10-03 DIAGNOSIS — I10 ESSENTIAL HYPERTENSION WITH GOAL BLOOD PRESSURE LESS THAN 130/80: ICD-10-CM

## 2023-10-03 DIAGNOSIS — Z79.4 TYPE 2 DIABETES MELLITUS WITH DIABETIC POLYNEUROPATHY, WITH LONG-TERM CURRENT USE OF INSULIN (HCC): Primary | ICD-10-CM

## 2023-10-03 DIAGNOSIS — N47.6 BALANOPOSTHITIS: ICD-10-CM

## 2023-10-03 DIAGNOSIS — E11.42 TYPE 2 DIABETES MELLITUS WITH DIABETIC POLYNEUROPATHY, WITH LONG-TERM CURRENT USE OF INSULIN (HCC): ICD-10-CM

## 2023-10-03 LAB
ALBUMIN SERPL-MCNC: 4.1 G/DL (ref 3.4–5)
ALBUMIN/GLOB SERPL: 1.2 (ref 0.8–1.7)
ALP SERPL-CCNC: 143 U/L (ref 45–117)
ALT SERPL-CCNC: 25 U/L (ref 16–61)
ANION GAP SERPL CALC-SCNC: 6 MMOL/L (ref 3–18)
AST SERPL-CCNC: 13 U/L (ref 10–38)
BILIRUB SERPL-MCNC: 0.3 MG/DL (ref 0.2–1)
BUN SERPL-MCNC: 20 MG/DL (ref 7–18)
BUN/CREAT SERPL: 10 (ref 12–20)
CALCIUM SERPL-MCNC: 9.7 MG/DL (ref 8.5–10.1)
CHLORIDE SERPL-SCNC: 100 MMOL/L (ref 100–111)
CHOLEST SERPL-MCNC: 123 MG/DL
CO2 SERPL-SCNC: 27 MMOL/L (ref 21–32)
CREAT SERPL-MCNC: 1.92 MG/DL (ref 0.6–1.3)
EST. AVERAGE GLUCOSE BLD GHB EST-MCNC: 352 MG/DL
GLOBULIN SER CALC-MCNC: 3.5 G/DL (ref 2–4)
GLUCOSE SERPL-MCNC: 623 MG/DL (ref 74–99)
HBA1C MFR BLD: 13.9 % (ref 4.2–5.6)
HDLC SERPL-MCNC: 66 MG/DL (ref 40–60)
HDLC SERPL: 1.9 (ref 0–5)
LDLC SERPL CALC-MCNC: 46.6 MG/DL (ref 0–100)
LIPID PANEL: ABNORMAL
POTASSIUM SERPL-SCNC: 4.8 MMOL/L (ref 3.5–5.5)
PROT SERPL-MCNC: 7.6 G/DL (ref 6.4–8.2)
SODIUM SERPL-SCNC: 133 MMOL/L (ref 136–145)
TRIGL SERPL-MCNC: 52 MG/DL
VLDLC SERPL CALC-MCNC: 10.4 MG/DL

## 2023-10-03 PROCEDURE — 1036F TOBACCO NON-USER: CPT | Performed by: NURSE PRACTITIONER

## 2023-10-03 PROCEDURE — 3052F HG A1C>EQUAL 8.0%<EQUAL 9.0%: CPT | Performed by: NURSE PRACTITIONER

## 2023-10-03 PROCEDURE — 3078F DIAST BP <80 MM HG: CPT | Performed by: NURSE PRACTITIONER

## 2023-10-03 PROCEDURE — 36415 COLL VENOUS BLD VENIPUNCTURE: CPT

## 2023-10-03 PROCEDURE — 99214 OFFICE O/P EST MOD 30 MIN: CPT | Performed by: NURSE PRACTITIONER

## 2023-10-03 PROCEDURE — 80053 COMPREHEN METABOLIC PANEL: CPT

## 2023-10-03 PROCEDURE — 83036 HEMOGLOBIN GLYCOSYLATED A1C: CPT

## 2023-10-03 PROCEDURE — 1123F ACP DISCUSS/DSCN MKR DOCD: CPT | Performed by: NURSE PRACTITIONER

## 2023-10-03 PROCEDURE — G8484 FLU IMMUNIZE NO ADMIN: HCPCS | Performed by: NURSE PRACTITIONER

## 2023-10-03 PROCEDURE — G8420 CALC BMI NORM PARAMETERS: HCPCS | Performed by: NURSE PRACTITIONER

## 2023-10-03 PROCEDURE — 2022F DILAT RTA XM EVC RTNOPTHY: CPT | Performed by: NURSE PRACTITIONER

## 2023-10-03 PROCEDURE — 80061 LIPID PANEL: CPT

## 2023-10-03 PROCEDURE — 3074F SYST BP LT 130 MM HG: CPT | Performed by: NURSE PRACTITIONER

## 2023-10-03 PROCEDURE — 3017F COLORECTAL CA SCREEN DOC REV: CPT | Performed by: NURSE PRACTITIONER

## 2023-10-03 PROCEDURE — G8427 DOCREV CUR MEDS BY ELIG CLIN: HCPCS | Performed by: NURSE PRACTITIONER

## 2023-10-03 RX ORDER — FLUCONAZOLE 150 MG/1
150 TABLET ORAL ONCE
Qty: 1 TABLET | Refills: 0 | Status: SHIPPED | OUTPATIENT
Start: 2023-10-03 | End: 2023-10-03

## 2023-10-03 RX ORDER — NYSTATIN 100000 U/G
CREAM TOPICAL
Qty: 30 G | Refills: 1 | Status: SHIPPED | OUTPATIENT
Start: 2023-10-03

## 2023-10-03 ASSESSMENT — PATIENT HEALTH QUESTIONNAIRE - PHQ9
3. TROUBLE FALLING OR STAYING ASLEEP: 0
8. MOVING OR SPEAKING SO SLOWLY THAT OTHER PEOPLE COULD HAVE NOTICED. OR THE OPPOSITE, BEING SO FIGETY OR RESTLESS THAT YOU HAVE BEEN MOVING AROUND A LOT MORE THAN USUAL: 0
2. FEELING DOWN, DEPRESSED OR HOPELESS: 0
5. POOR APPETITE OR OVEREATING: 0
7. TROUBLE CONCENTRATING ON THINGS, SUCH AS READING THE NEWSPAPER OR WATCHING TELEVISION: 0
SUM OF ALL RESPONSES TO PHQ QUESTIONS 1-9: 0
SUM OF ALL RESPONSES TO PHQ QUESTIONS 1-9: 0
6. FEELING BAD ABOUT YOURSELF - OR THAT YOU ARE A FAILURE OR HAVE LET YOURSELF OR YOUR FAMILY DOWN: 0
4. FEELING TIRED OR HAVING LITTLE ENERGY: 0
9. THOUGHTS THAT YOU WOULD BE BETTER OFF DEAD, OR OF HURTING YOURSELF: 0
10. IF YOU CHECKED OFF ANY PROBLEMS, HOW DIFFICULT HAVE THESE PROBLEMS MADE IT FOR YOU TO DO YOUR WORK, TAKE CARE OF THINGS AT HOME, OR GET ALONG WITH OTHER PEOPLE: 0
SUM OF ALL RESPONSES TO PHQ9 QUESTIONS 1 & 2: 0
SUM OF ALL RESPONSES TO PHQ QUESTIONS 1-9: 0
1. LITTLE INTEREST OR PLEASURE IN DOING THINGS: 0
SUM OF ALL RESPONSES TO PHQ QUESTIONS 1-9: 0

## 2023-10-03 NOTE — PROGRESS NOTES
[address] [facility], [address]               [phone]      Kenyon Cotton is a 68 y.o. male and presents with Follow-up Chronic Condition, Diabetes, Hypertension, and Cholesterol Problem       Assessment/Plan:    1. Type 2 diabetes mellitus with diabetic polyneuropathy, with long-term current use of insulin (HCC)  -     Hemoglobin A1C; Future  2. Essential hypertension with goal blood pressure less than 130/80  -     Comprehensive Metabolic Panel; Future  -     Lipid Panel; Future  3. Balanoposthitis  -     fluconazole (DIFLUCAN) 150 MG tablet; Take 1 tablet by mouth once for 1 dose, Disp-1 tablet, R-0Normal  -     nystatin (MYCOSTATIN) 802986 UNIT/GM cream; Apply topically 2 times daily. , Disp-30 g, R-1, Normal   Endorses medication compliance  Follow up labs today  Blood pressure stable continue lisinopril and amlodipine at same dose  Has open painful sores on his forskin of the penis, consistent with yeast infection, will treat with oral diflucan and toopical nystatin, he had this in the past, could be due to uncontrolled DM      Follow up and disposition:   Return in about 3 months (around 1/3/2024) for DM, HTN, HLD, 30min, office. Subjective:    Labs obtained prior to visit? No  Reviewed with patient? N/A    DMII-   Patient reports medication compliance Yes  Diabetic diet compliance frequently  Patient monitors blood sugars regularly daily   Home glucose readings: 235, 210   Denies hypoglycemic episodes Yes  Denies polyuria, polydipsia, paraesthesia, vision changes? Yes  Engaging in daily exercise?   Walking TID, about 15-20 min each time     Diabetes Management   Topic Date Due    Diabetic foot exam  03/17/2023    Diabetic retinal exam  07/01/2023    A1C test (Diabetic or Prediabetic)  09/02/2023     Lab Results   Component Value Date/Time    LABA1C 9.0 06/02/2023 10:06 AM   ]  Lab Results   Component Value Date/Time    MALBCR 19 06/02/2023 10:06 AM   ]  Diabetic

## 2023-10-03 NOTE — PROGRESS NOTES
Room [address] Sajan Barrientos had concerns including Follow-up Chronic Condition, Diabetes, Hypertension, and Cholesterol Problem. for today's visit . When asked if patient has any concerns he would like to address with CHELSEA Moya . Patient states when I pee my private area is bleeding and the head of my penis is really swollen. Patient states this happened before ans I has to go to the emergency room . Patient states this started about 2-3 days ago . MYRA Moya has been notified  of patient concerns . Did patient bring someone? Yes Ms. Cortez patients aid     Did the patient have DME equipment? Yes portable oxygen tank and cane     Did you take your medication today? Yes       1. \"Have you been to the ER, urgent care clinic since your last visit? Hospitalized since your last visit? \" . NO    2. \"Have you seen or consulted any other health care providers outside of the [address] [place] [address] since your last visit? \" No     3. For patients aged 43-73: Has the patient had a colonoscopy / FIT/ Cologuard? Yes      If the patient is female:    4. For patients aged 43-66: Has the patient had a mammogram within the past 2 years? N/A       5. For patients aged 21-65: Has the patient had a pap smear? {Cancer Care Gap present? N/A          10/3/2023     9:34 AM   Amb Fall Risk Assessment and TUG Test   Do you feel unsteady or are you worried about falling?  no   2 or more falls in past year? no   Fall with injury in past year?  no              10/3/2023     9:34 AM   PHQ-9    Little interest or pleasure in doing things 0   Feeling down, depressed, or hopeless 0   Trouble falling or staying asleep, or sleeping too much 0   Feeling tired or having little energy 0   Poor appetite or overeating 0   Feeling bad about yourself - or that you are a failure or have let yourself or your family down 0   Trouble concentrating on things, such as reading the newspaper or watching television 0   Moving or speaking so slowly that other

## 2023-10-04 ENCOUNTER — CARE COORDINATION (OUTPATIENT)
Facility: CLINIC | Age: 73
End: 2023-10-04

## 2023-10-04 DIAGNOSIS — Z79.4 TYPE 2 DIABETES MELLITUS WITH DIABETIC POLYNEUROPATHY, WITH LONG-TERM CURRENT USE OF INSULIN (HCC): Primary | ICD-10-CM

## 2023-10-04 DIAGNOSIS — E11.42 TYPE 2 DIABETES MELLITUS WITH DIABETIC POLYNEUROPATHY, WITH LONG-TERM CURRENT USE OF INSULIN (HCC): Primary | ICD-10-CM

## 2023-10-04 NOTE — CARE COORDINATION
.Ambulatory Care Coordination Note  10/4/2023    Patient Current Location:  Home: [address] [place] [address] [place] [address]     ACM contacted the patient by telephone. Verified name and  with patient as identifiers. Provided introduction to self, and explanation of the ACM role. Challenges to be reviewed by the provider   Additional needs identified to be addressed with provider: Yes  1. Patient was educated on diabetes and kidney failure as A1c is up to 13.9  ( 10/3/23) Blood sugar 663 at that blood sugar for the [facility]. 2. This morning while speaking to ACM reader read [address] [facility] early before breakfast 338. Patient has been drinking regular sodas 3-4 daily eating french fries daily with lunch  meal dinner. 3. Lantus is only at 19 units every night, Januvia 100 mg daily and Actos 30 mg daily. Method of communication with provider: chart routing.     ACM: Lois Wong RN    Offered patient enrollment in the Remote Patient Monitoring (RPM) program for in-home monitoring: Patient is not eligible for RPM program.    Lab Results       None                 Goals Addressed                   This Visit's Progress     Care Coordination Self Management   On track     CC Self Management Goal  Patient Goal (What steps will patient take to achieve goal?): >>  Patient is able to discuss self-management of condition(s):  Pt demonstrates adherence to medications  Pt demonstrates understanding of self-monitoring  Patient is able to identify Red Flags:  Alert to potential adverse drug reactions(s) or side effects and actions to take should they arise  Discuss target symptoms and actions to take should they arise  Identify problems that require immediate PCP or specialist visit  Patient demonstrates understanding of access to PCP/Specialist:  Understands about scheduling routine Follow Up appointments   Understands about sick day appointment options for worsening of symptoms/progression (Same Day,

## 2023-10-05 ENCOUNTER — CARE COORDINATION (OUTPATIENT)
Facility: CLINIC | Age: 73
End: 2023-10-05

## 2023-10-05 NOTE — CARE COORDINATION
.  Challenges to be reviewed by the provider   Additional needs identified to be addressed with provider Yes  Patient  contacted ACM to give blood glucose check for this morning Wake up 285 drink water took a walk before breakfast 300 and now when PCA came to make breakfast 320. Ate Chicken nuggets and [place] fries had Gatorade,for dinner last evening. Patient again reminded to decrease starch ( French fries intake)   Damon contacted patient on 10/4 and scheduled a new patient appointment with Charlene Williamson tomorrow 10/6 @ 9:00 am. Personal PCA is bringing him. ACM instructed both to disregard ACM instructions for Diabetes at this time. only listen to PharmD Catina Yan . ACM will only reinforce what Theresatine Clonts instruct him and PCA to do. Make sure to follow her AVS plan of care. Both voiced understanding. ACM will continue to follow for other chronic issues.

## 2023-10-05 NOTE — PROGRESS NOTES
Pharmacy Progress Note - Diabetes Management Initial       Assessment / Plan:   Diabetes Management:  Per ADA guidelines, Pt's A1c is not at goal of < 7%. Patient's most recent A1c was  13.9% on 10/3/23, which has worsened from previous A1c of 9.0% 6/2/23. His BG are high all the time, will increase Lantus to 22 units nightly. Hopefully with this adjustment, the rest of his graph will be visible with Arabella Gene and can more accurately assess what his sugars are doing throughout the day. There was significant data missing through the day as his sugars were so high, that it was not visible on the CGM graphs and infrequent scanning on some days. Encouraged pt to scan first thing in the morning, breakfast, lunch, dinner, bedtime, and if he gets up in the middle of the night for any reason to make sure that he scans enough to have continuity in data. Instructed him to make sure he has lisinopril at home and he doesn't to make sure he gets it filled. Will follow up in person with patient in 2 weeks, but will call patient next week to see if increased dose of Lantus has lowered BG any. Will reassess CGM data in 2 weeks. Medication therapy changes:   - Increase Lantus to 22 units nightly. - Continue all other medications as prescribed. Hyperlipidemia:  Pt currently on low intensity statin (simvastatin 10 mg) and would benefit from moderate intensity statin because of patient's age and having diabetes. Additionally, amlodipine can increase the concentration of simvastatin, which can increase risk of side effects (Risk Rating D). Discussed switching to a different statin that will not interact with amlodipine or other medications with MANAS Cartwright CNP. Recommended pravastatin 40 mg or rosuvastatin 5 ( from calcium/vit D supplement by 2 hours). Pravastatin does not have any interactions with medication list. PCP prefers rosuvastatin. Recommend lipid panel to assess medication change.  Routine order

## 2023-10-06 ENCOUNTER — PHARMACY VISIT (OUTPATIENT)
Facility: CLINIC | Age: 73
End: 2023-10-06

## 2023-10-06 DIAGNOSIS — Z79.4 TYPE 2 DIABETES MELLITUS WITH DIABETIC POLYNEUROPATHY, WITH LONG-TERM CURRENT USE OF INSULIN (HCC): ICD-10-CM

## 2023-10-06 DIAGNOSIS — E11.42 TYPE 2 DIABETES MELLITUS WITH DIABETIC POLYNEUROPATHY, WITH LONG-TERM CURRENT USE OF INSULIN (HCC): ICD-10-CM

## 2023-10-06 RX ORDER — INSULIN GLARGINE 100 [IU]/ML
22 INJECTION, SOLUTION SUBCUTANEOUS NIGHTLY
Qty: 15 ML | Refills: 3 | Status: SHIPPED
Start: 2023-10-06

## 2023-10-06 NOTE — PATIENT INSTRUCTIONS
- Increase Lantus to 22 units at night   - Scan Ana 2 when you get up, breakfast, lunch, dinner, before bed, and if you get up in the middle of the night for any reason  - Avoid sodas and sugary drinks and replace with zero sugar option  - Make sure you have Lisinopril at home     Ute Alves

## 2023-10-09 ENCOUNTER — CARE COORDINATION (OUTPATIENT)
Facility: CLINIC | Age: 73
End: 2023-10-09

## 2023-10-09 DIAGNOSIS — N47.6 BALANOPOSTHITIS: ICD-10-CM

## 2023-10-09 RX ORDER — FLUCONAZOLE 150 MG/1
150 TABLET ORAL ONCE
Qty: 1 TABLET | Refills: 0 | Status: SHIPPED | OUTPATIENT
Start: 2023-10-09 | End: 2023-10-09

## 2023-10-09 NOTE — CARE COORDINATION
Challenges to be reviewed by the provider   Additional needs identified to be addressed with provider Yes  medications-Patient requesting a second dose Diflucan 100 mg oral tab refill as he is still having itchy yeasty burning symptoms when voiding  Monistat cream is helping some for areas around his penile head and foreskin, genital hygiene reviewed prior to applying cream. Blood glucose remains still in the 200 's fasting and before breakfast since increase Lantus to 22 units 10/6.   Thank you

## 2023-10-13 ENCOUNTER — TELEPHONE (OUTPATIENT)
Facility: CLINIC | Age: 73
End: 2023-10-13

## 2023-10-13 ENCOUNTER — SCHEDULED TELEPHONE ENCOUNTER (OUTPATIENT)
Facility: CLINIC | Age: 73
End: 2023-10-13

## 2023-10-13 DIAGNOSIS — E11.42 TYPE 2 DIABETES MELLITUS WITH DIABETIC POLYNEUROPATHY, WITH LONG-TERM CURRENT USE OF INSULIN (HCC): ICD-10-CM

## 2023-10-13 DIAGNOSIS — Z79.4 TYPE 2 DIABETES MELLITUS WITH DIABETIC POLYNEUROPATHY, WITH LONG-TERM CURRENT USE OF INSULIN (HCC): ICD-10-CM

## 2023-10-13 RX ORDER — INSULIN GLARGINE 100 [IU]/ML
25 INJECTION, SOLUTION SUBCUTANEOUS NIGHTLY
Qty: 15 ML | Refills: 3 | Status: SHIPPED
Start: 2023-10-13

## 2023-10-13 NOTE — PROGRESS NOTES
Pharmacy Progress Note - Diabetes Management Telephone Call       Assessment / Plan:   Diabetes Management:  Per ADA guidelines, Pt's A1c is not at goal of < 7%. Patient's most recent A1c was  13.9% on 10/3/23. He endorses adherence to medication and dietary changes discussed at last appointment. BG are still significantly elevated, so instructed pt to increase to 25 units of Lantus nightly (~15% increase), starting tonight, until he sees me next week to bring his sugars down more. Patient agreeable to increase and verbalized understanding of dose adjustment. Hopefully sugars will be controlled enough to be on the CGM data graphs, will follow up with patient in person next Friday to assess CGM data and adjust medications as needed. Medication therapy changes:   - Increase Lantus to 25 units daily      Medications Discontinued During This Encounter   Medication Reason    insulin glargine (LANTUS SOLOSTAR) 100 UNIT/ML injection pen DOSE ADJUSTMENT        Orders Placed This Encounter    insulin glargine (LANTUS SOLOSTAR) 100 UNIT/ML injection pen     Sig: Inject 25 Units into the skin nightly     Dispense:  15 mL     Refill:  3        Patient will return to clinic in 1 week(s) for follow up.         S/O: Mr. Noreen Barnett, a 68 y.o. male referred by MANAS Hancock CNP,  has a past medical history of Acute cystitis without hematuria, MCKENZIE (acute kidney injury) ([address]), Arthritis, Arthropathy, Balanitis, Candida infection, Chronic pain, COVID-19 vaccine series completed, Dementia associated with other underlying disease without behavioral disturbance ([address]), Depression, Diabetes mellitus, type II ([address]), Drug abuse ([address]), ED (erectile dysfunction), Fatigue, GERD (gastroesophageal reflux disease), H/O epididymitis, H/O: pneumonia, Hep C w/o coma, chronic ([address]), History of BPH, History of hematuria, History of tobacco use, Hypertension, Loss of appetite, Loss of balance, Microcytic anemia, Neuropathy,

## 2023-10-13 NOTE — TELEPHONE ENCOUNTER
Pharmacy Progress Note - Telephone Call    Mr. Emmanuel aCm 68 y.o. was contacted via an outbound telephone call today for his telephone appointment at 11:00am to check in his BG control since increasing insulin last appointment. Pt did not answer. Left VM to return call to [phone].        Thank you,    Rowan Barriga, PharmD  Clinical Pharmacist  10/13/23        For Pharmacy Admin Tracking Only    Program: Medical Group  CPA in place:  Yes  Intervention Accepted By: Other: 0  Time Spent (min): 5

## 2023-10-16 ENCOUNTER — CARE COORDINATION (OUTPATIENT)
Facility: CLINIC | Age: 73
End: 2023-10-16

## 2023-10-16 DIAGNOSIS — N47.6 BALANOPOSTHITIS: Primary | ICD-10-CM

## 2023-10-16 RX ORDER — FLUCONAZOLE 150 MG/1
150 TABLET ORAL
Qty: 2 TABLET | Refills: 0 | Status: SHIPPED | OUTPATIENT
Start: 2023-10-16 | End: 2023-10-20 | Stop reason: ALTCHOICE

## 2023-10-16 NOTE — CARE COORDINATION
.Ambulatory Care Coordination Note  10/16/2023    Patient Current Location:  Home: [address] [place] [address] [address] [address]     ACM contacted the patient by telephone. Verified name and  with patient as identifiers. Provided introduction to self, and explanation of the ACM role. Challenges to be reviewed by the provider   Additional needs identified to be addressed with provider: Yes  1. Patient is still in need of Diflucan 100 mg  tab repeat please . Still has burning ,itching irritation around penis due to yeast rash. Using the Monistat cream as prescribed. Please send to his [address]  Thanks               Method of communication with provider: chart routing.     ACM: Lois Wong RN      Offered patient enrollment in the Remote Patient Monitoring (RPM) program for in-home monitoring: Patient is not eligible for RPM program.    Lab Results       None                 Goals Addressed                      This Visit's Progress      Care Coordination Self Management (pt-stated)         CC Self Management Goal  Patient Goal (What steps will patient take to achieve goal?): >>  Patient is able to discuss self-management of condition(s):  Pt demonstrates adherence to medications  Pt demonstrates understanding of self-monitoring  Patient is able to identify Red Flags:  Alert to potential adverse drug reactions(s) or side effects and actions to take should they arise  Discuss target symptoms and actions to take should they arise  Identify problems that require immediate PCP or specialist visit  Patient demonstrates understanding of access to PCP/Specialist:  Understands about scheduling routine Follow Up appointments   Understands about sick day appointment options for worsening of symptoms/progression (Same Day, E Visits)  10/4/23  Ongoing education for DM  10/16/23  Patient doing well with Pharm D Salnoris  Lantus Insulin up to 25 units @ HS        Follows nutrition recommendations

## 2023-10-20 ENCOUNTER — OFFICE VISIT (OUTPATIENT)
Facility: CLINIC | Age: 73
End: 2023-10-20
Payer: MEDICARE

## 2023-10-20 ENCOUNTER — PHARMACY VISIT (OUTPATIENT)
Facility: CLINIC | Age: 73
End: 2023-10-20

## 2023-10-20 ENCOUNTER — HOSPITAL ENCOUNTER (OUTPATIENT)
Facility: HOSPITAL | Age: 73
Setting detail: SPECIMEN
End: 2023-10-20
Payer: MEDICARE

## 2023-10-20 VITALS
OXYGEN SATURATION: 99 % | RESPIRATION RATE: 16 BRPM | HEIGHT: 68 IN | SYSTOLIC BLOOD PRESSURE: 103 MMHG | TEMPERATURE: 97.4 F | DIASTOLIC BLOOD PRESSURE: 52 MMHG | HEART RATE: 77 BPM | BODY MASS INDEX: 25.97 KG/M2

## 2023-10-20 VITALS — BODY MASS INDEX: 25.97 KG/M2 | WEIGHT: 170.8 LBS

## 2023-10-20 DIAGNOSIS — N47.6 BALANOPOSTHITIS: ICD-10-CM

## 2023-10-20 DIAGNOSIS — N50.9 DISORDER OF MALE GENITAL ORGANS, UNSPECIFIED: ICD-10-CM

## 2023-10-20 DIAGNOSIS — E11.42 TYPE 2 DIABETES MELLITUS WITH DIABETIC POLYNEUROPATHY, WITH LONG-TERM CURRENT USE OF INSULIN (HCC): ICD-10-CM

## 2023-10-20 DIAGNOSIS — Z79.4 TYPE 2 DIABETES MELLITUS WITH DIABETIC POLYNEUROPATHY, WITH LONG-TERM CURRENT USE OF INSULIN (HCC): ICD-10-CM

## 2023-10-20 DIAGNOSIS — N47.6 BALANOPOSTHITIS: Primary | ICD-10-CM

## 2023-10-20 LAB
APPEARANCE UR: CLEAR
BILIRUB UR QL: NEGATIVE
COLOR UR: YELLOW
GLUCOSE UR STRIP.AUTO-MCNC: >1000 MG/DL
HGB UR QL STRIP: NEGATIVE
KETONES UR QL STRIP.AUTO: NEGATIVE MG/DL
LEUKOCYTE ESTERASE UR QL STRIP.AUTO: NEGATIVE
NITRITE UR QL STRIP.AUTO: NEGATIVE
PH UR STRIP: 6 (ref 5–8)
PROT UR STRIP-MCNC: NEGATIVE MG/DL
SP GR UR REFRACTOMETRY: 1.03 (ref 1–1.03)
UROBILINOGEN UR QL STRIP.AUTO: 0.2 EU/DL (ref 0.2–1)

## 2023-10-20 PROCEDURE — 3017F COLORECTAL CA SCREEN DOC REV: CPT | Performed by: NURSE PRACTITIONER

## 2023-10-20 PROCEDURE — G8427 DOCREV CUR MEDS BY ELIG CLIN: HCPCS | Performed by: NURSE PRACTITIONER

## 2023-10-20 PROCEDURE — G8484 FLU IMMUNIZE NO ADMIN: HCPCS | Performed by: NURSE PRACTITIONER

## 2023-10-20 PROCEDURE — 3074F SYST BP LT 130 MM HG: CPT | Performed by: NURSE PRACTITIONER

## 2023-10-20 PROCEDURE — 87086 URINE CULTURE/COLONY COUNT: CPT

## 2023-10-20 PROCEDURE — 87077 CULTURE AEROBIC IDENTIFY: CPT

## 2023-10-20 PROCEDURE — 81003 URINALYSIS AUTO W/O SCOPE: CPT

## 2023-10-20 PROCEDURE — 3078F DIAST BP <80 MM HG: CPT | Performed by: NURSE PRACTITIONER

## 2023-10-20 PROCEDURE — 1123F ACP DISCUSS/DSCN MKR DOCD: CPT | Performed by: NURSE PRACTITIONER

## 2023-10-20 PROCEDURE — G8419 CALC BMI OUT NRM PARAM NOF/U: HCPCS | Performed by: NURSE PRACTITIONER

## 2023-10-20 PROCEDURE — 99213 OFFICE O/P EST LOW 20 MIN: CPT | Performed by: NURSE PRACTITIONER

## 2023-10-20 PROCEDURE — 1036F TOBACCO NON-USER: CPT | Performed by: NURSE PRACTITIONER

## 2023-10-20 PROCEDURE — 87186 SC STD MICRODIL/AGAR DIL: CPT

## 2023-10-20 RX ORDER — ROSUVASTATIN CALCIUM 5 MG/1
5 TABLET, COATED ORAL NIGHTLY
Qty: 90 TABLET | Refills: 1 | Status: SHIPPED | OUTPATIENT
Start: 2023-10-20

## 2023-10-20 RX ORDER — INSULIN GLARGINE 100 [IU]/ML
30 INJECTION, SOLUTION SUBCUTANEOUS NIGHTLY
Qty: 15 ML | Refills: 3 | Status: SHIPPED
Start: 2023-10-20

## 2023-10-20 ASSESSMENT — PATIENT HEALTH QUESTIONNAIRE - PHQ9
SUM OF ALL RESPONSES TO PHQ QUESTIONS 1-9: 0
1. LITTLE INTEREST OR PLEASURE IN DOING THINGS: 0
SUM OF ALL RESPONSES TO PHQ9 QUESTIONS 1 & 2: 0
2. FEELING DOWN, DEPRESSED OR HOPELESS: 0
SUM OF ALL RESPONSES TO PHQ QUESTIONS 1-9: 0

## 2023-10-20 NOTE — PROGRESS NOTES
Pharmacy Progress Note - Diabetes Management Follow up       Assessment / Plan:   Diabetes Management:  Per ADA guidelines, Pt's A1c is not at goal of < 7%. Patient's most recent A1c was  13.9% on 10/3/23. Pt's BG has decreased a good amount since last appointment. Previous CGM data was not able to be on the graph because his BG was too high. His average BG went from 316 mg/dL to 260 mg/dL, indicating the increase in Lantus is helping. Will increase Lantus again today to 30 units (20% increase) to continue to lower BG to prevent complications. His BG is spiking at lunch time, he reports he drinks a regular soda for lunch, discussed switching to zero sugar/diet option to prevent large spike and trying not to \"cheat\" as much. Also discussed not adding sugar ontop of his cereal in the mornings since his BG at the visit was 372 mg/dL. The dates in his CGM were incorrect, updated the date and time on his reader. Will call patient to check on BG next week and pt will follow up to assess CGM data in 2 weeks. Medication therapy changes:   - Increase to 30 units of Lantus    Yeast infection:  Pt reports still having yeast infection despite therapy with Diflucan. Contacted PCP to discuss symptoms with her regarding the pt. She states she is able to see him immediately after appointment with me to reassess. Took pt to PCP exam room after appointment for further evaluation. Hyperlipidemia:  Discussed switching to a different statin that will not interact with amlodipine or other medications with MANAS Johnston CNP. Recommended pravastatin 40 mg or rosuvastatin 5 ( from calcium/vit D supplement by 2 hours). PCP prefers rosuvastatin. Patient agreeable to switch to Rosuvastatin 5 mg nightly. Order placed. Duplicate therapy:  Discussed with MANAS Johnston CNP and she said the patient can stop daily vitamin D supplement and continue with just the calcium/vit D.  Patient agreeable to

## 2023-10-20 NOTE — PATIENT INSTRUCTIONS
Increase Lantus to 30 units daily   Replace lunchtime soda with diet/zero sugar option   No extra sugar sprinkled in cereal  Stop vitamin D  Replace simvastatin with rosuvastatin 5 mg

## 2023-10-22 LAB
BACTERIA SPEC CULT: ABNORMAL
BACTERIA SPEC CULT: ABNORMAL
CC UR VC: ABNORMAL
SERVICE CMNT-IMP: ABNORMAL

## 2023-10-25 DIAGNOSIS — E11.42 TYPE 2 DIABETES MELLITUS WITH DIABETIC POLYNEUROPATHY (HCC): ICD-10-CM

## 2023-10-26 ENCOUNTER — TELEPHONE (OUTPATIENT)
Facility: CLINIC | Age: 73
End: 2023-10-26

## 2023-10-26 RX ORDER — SIMVASTATIN 10 MG
10 TABLET ORAL
Qty: 90 TABLET | Refills: 3 | Status: SHIPPED | OUTPATIENT
Start: 2023-10-26 | End: 2023-10-26

## 2023-10-26 NOTE — TELEPHONE ENCOUNTER
Returned call. [phone]  Advised his current statin therapy is crestor.    Note left for facility pharmacist.

## 2023-10-26 NOTE — TELEPHONE ENCOUNTER
Bela Ricketts with [facility] she needs clarification on the patient's Rx's. She needs to know if the patient's is taking the Simvastatin 10 mg and the Rosuvastatin 5 mg.

## 2023-10-27 ENCOUNTER — TELEPHONE (OUTPATIENT)
Facility: CLINIC | Age: 73
End: 2023-10-27

## 2023-10-27 NOTE — TELEPHONE ENCOUNTER
Pharmacy Progress Note - Telephone Call    Mr. Cherelle Rivera 68 y.o. was contacted via an outbound telephone call today regarding telephone follow up to assess BG with new insulin dose to a further increase is needed. Attempted to call patient, however he did not answer, left VM to return call. Will try to call again on Monday. If unable to get in contact with pt via telephone, he has a follow up next Friday in person.      Thank you,    Amy Hernandez, PharmD  Clinical Pharmacist  10/27/23

## 2023-10-30 ENCOUNTER — CARE COORDINATION (OUTPATIENT)
Facility: CLINIC | Age: 73
End: 2023-10-30

## 2023-10-30 ENCOUNTER — SCHEDULED TELEPHONE ENCOUNTER (OUTPATIENT)
Facility: CLINIC | Age: 73
End: 2023-10-30

## 2023-10-30 DIAGNOSIS — E11.42 TYPE 2 DIABETES MELLITUS WITH DIABETIC POLYNEUROPATHY, WITH LONG-TERM CURRENT USE OF INSULIN (HCC): Primary | ICD-10-CM

## 2023-10-30 DIAGNOSIS — Z79.4 TYPE 2 DIABETES MELLITUS WITH DIABETIC POLYNEUROPATHY, WITH LONG-TERM CURRENT USE OF INSULIN (HCC): Primary | ICD-10-CM

## 2023-10-30 NOTE — CARE COORDINATION
.Ambulatory Care Coordination Note  10/30/2023    Patient Current Location:  Home: [address] [place] [id number]     ACM contacted the patient by telephone. Verified name and  with patient as identifiers. Provided introduction to self, and explanation of the ACM role. Patient voiciong he is doing well. PO fluids encourage . Area around penis head has cleared up. Seen Provider on 10/20/23. Has been following PharmD recommendations concerning DM management. Insulin is up ro 30 units Lantus at bedtime. Challenges to be reviewed by the provider   Additional needs identified to be addressed with provider: No  none               Method of communication with provider: none. ACM: Batsheva Muniz RN    Offered patient enrollment in the Remote Patient Monitoring (RPM) program for in-home monitoring: Patient is not eligible for RPM program.    Lab Results       None            Care Coordination Interventions    Referral from Primary Care Provider: No  Suggested Interventions and Community Resources  No Identified Needs  Behavorial Health: In Process (Comment: AC is contacting Psychiatrist accepting new patients. )  Diabetes Education: In Process (Comment: Ongoing management of Dm2 education diet,exercise)  Fall Risk Prevention: In Process (Comment: Using upright rolator walker)  Medi Set or Pill Pack:  In Process (Comment: [address] [facility] reviewed Medication will improve oill pack dispensary for look a like pills)          Goals Addressed                      This Visit's Progress      Care Coordination Self Management (pt-stated)   On track      CC Self Management Goal  Patient Goal (What steps will patient take to achieve goal?): >>  Patient is able to discuss self-management of condition(s):  Pt demonstrates adherence to medications  Pt demonstrates understanding of self-monitoring  Patient is able to identify Red Flags:  Alert to potential adverse drug reactions(s) or side

## 2023-10-30 NOTE — PROGRESS NOTES
Pharmacy Progress Note - Telephone Call    Mr. Damon Rich 68 y.o. was contacted via an outbound telephone call today to check on pt's BG after increased insulin dose last week. He missed the telephone call on Friday because he was getting his flu shot. Recorded immunization in the patient's chart. Today he states he talked with Rafael Pratt RN and she told him he was doing good. He has not had any low BG or any symptoms of hypoglycemia. This morning when he woke up, his BG was 76 mg/dL. Rafael Pratt RN instructed him to eat something, then it went up to 92 mg/dL. He had grits, sausage, toast, and a cup of 2% milk for breakfast. His BG during the call (~noon) was 213 mg/dL. Last night, he states he ate 4 chicken flats, an ear of corn, and ~4 oz of diet cranberry juice for dinner and fell asleep without eating a snack, which may be why his BG was lower this morning. Average glucose: 242 for the last 7 days   12 am - 240  6am - 212  12pm - 286  6pm - 256  12am - 256    He states today was the only morning his BG was in the double digits. Instructed pt to call if he continues to have BG in the 70s in the morning, patient agreeable. He is to continue his 30 units daily until in person appointment on Friday at 1pm. Will assess CGM data at next visit.      Thank you,    Syd Bailey, PharmD  Clinical Pharmacist  10/30/23      For Pharmacy Admin Tracking Only    Program: Medical Group  CPA in place:  Yes  Recommendation Provided To: Patient/Caregiver: 1 via Telephone  Intervention Detail: Adherence Monitorin  Intervention Accepted By: Provider: 1  Gap Closed?: Yes   Time Spent (min): 20

## 2023-11-02 ENCOUNTER — CARE COORDINATION (OUTPATIENT)
Facility: CLINIC | Age: 73
End: 2023-11-02

## 2023-11-02 NOTE — CARE COORDINATION
.  Challenges to be reviewed by the provider   Additional needs identified to be addressed with provider Yes  1. Patient upset as Psych provider Frank Go  NP saw him om 11/1/23 voiced she was going to change his medications ,but did not give him any AVS as he don't know what medications. Patient contacted his Pharmacy ( [facility]) and they have no record of any added or changes in his medications. Patient then attempted to contact [facility] office they said she was not in that office today but will send her a message. ACM attempted to contact no answer at office LVM.

## 2023-11-02 NOTE — PROGRESS NOTES
226, 272, 291   7 days: 228, 246, 262, 304  -Low 4-5 days ago, in the morning was 69 then went to 112-120 on it's own and he thinks he was laying on it, took an hour or 2 to go back up               ROS:  Today, Pt endorses:  - Symptoms of Hyperglycemia: none  - Symptoms of Hypoglycemia: none    Lifestyle modification(s):  - Still trying to follow low carbs and sugars as much as possible   - Cheat with regular soda every 3 days, pours about a half a cup and drinks that     Drug interactions:   No significant drug-drug interactions with diabetes medication expected according to the literature   Amlodipine + Simvastatin - AmLODIPine may increase the serum concentration of Simvastatin. Carefully consider the potential benefits and risks of this combination. Limit the dose of simvastatin to 20 mg daily if coadministering with amlodipine. If coadministering simvastatin and amlodipine, close laboratory and clinical monitoring for signs and symptoms of rhabdomyolysis. Risk Rating D: Consider therapy modification  Januvia + Simvastatin - SITagliptin may enhance the myopathic (rhabdomyolysis) effect of Simvastatin. (Risk Rating C: Monitor)  Pioglitazone/Januvia + Fluoxetine - Selective Serotonin Reuptake Inhibitors may enhance the hypoglycemic effect of Agents with Blood Glucose Lowering Effects. (Risk Rating C: Monitor)  Pioglitazone + Omeprazole - Inhibitors of the Proton Pump (PPIs and PCABs) may enhance the adverse/toxic effect of Thiazolidinediones. Specifically, the risk of osteoporosis or fracture may be increased. (Risk Rating C: Monitor)  Januvia + Lisinopril - Dipeptidyl Peptidase-IV Inhibitors may enhance the adverse/toxic effect of Angiotensin-Converting Enzyme Inhibitors. (Risk Rating C: Monitor)  Lantus + Lisinopril - Angiotensin-Converting Enzyme Inhibitors may enhance the hypoglycemic effect of Agents with Blood Glucose Lowering Effects.  (Risk Rating B: no action needed)    Medication Adherence/Access:  -

## 2023-11-03 ENCOUNTER — PHARMACY VISIT (OUTPATIENT)
Facility: CLINIC | Age: 73
End: 2023-11-03

## 2023-11-03 DIAGNOSIS — Z79.4 TYPE 2 DIABETES MELLITUS WITH DIABETIC POLYNEUROPATHY, WITH LONG-TERM CURRENT USE OF INSULIN (HCC): Primary | ICD-10-CM

## 2023-11-03 DIAGNOSIS — E11.42 TYPE 2 DIABETES MELLITUS WITH DIABETIC POLYNEUROPATHY, WITH LONG-TERM CURRENT USE OF INSULIN (HCC): Primary | ICD-10-CM

## 2023-11-03 RX ORDER — AMITRIPTYLINE HYDROCHLORIDE 10 MG/1
TABLET, FILM COATED ORAL
COMMUNITY

## 2023-11-10 ENCOUNTER — SCHEDULED TELEPHONE ENCOUNTER (OUTPATIENT)
Facility: CLINIC | Age: 73
End: 2023-11-10

## 2023-11-10 DIAGNOSIS — Z79.4 TYPE 2 DIABETES MELLITUS WITH DIABETIC POLYNEUROPATHY, WITH LONG-TERM CURRENT USE OF INSULIN (HCC): ICD-10-CM

## 2023-11-10 DIAGNOSIS — E11.42 TYPE 2 DIABETES MELLITUS WITH DIABETIC POLYNEUROPATHY, WITH LONG-TERM CURRENT USE OF INSULIN (HCC): ICD-10-CM

## 2023-11-10 NOTE — PROGRESS NOTES
Pharmacy Admin Tracking Only    Program: Medical Group  CPA in place:  Yes  Recommendation Provided To: Patient/Caregiver: 1 via Telephone  Intervention Detail: Adherence Monitorin  Intervention Accepted By: Patient/Caregiver: 1  Gap Closed?: No   Time Spent (min): 10

## 2023-11-13 ENCOUNTER — CARE COORDINATION (OUTPATIENT)
Facility: CLINIC | Age: 73
End: 2023-11-13

## 2023-11-13 RX ORDER — HYDROCODONE BITARTRATE AND ACETAMINOPHEN 5; 325 MG/1; MG/1
1 TABLET ORAL EVERY 6 HOURS PRN
COMMUNITY

## 2023-11-13 NOTE — CARE COORDINATION
.Ambulatory Care Coordination Note  2023    Patient Current Location:  Home: [address] [place] [phone]     ACM contacted the patient by telephone. Verified name and  with patient as identifiers. Provided introduction to self, and explanation of the ACM role. Patient sounds happy with himself. He is going out for breakfast with his PCA. He voiced he will eat cheese grits,eggs toast ,coffee. Challenges to be reviewed by the provider   Additional needs identified to be addressed with provider: Yes  1. Patient is doing better with diet and BG management. Increasing activity  2. Psych Increased Remeron to 15 mg and added Elavil 10 to bedtime medications. Patient is sleeping better no more night time terrors  3. Attending all PharmD appointments. Method of communication with provider: chart routing. ACM: Gil Lezama RN    Offered patient enrollment in the Remote Patient Monitoring (RPM) program for in-home monitoring: Yes, but did not enroll at this time. Lab Results       None            Care Coordination Interventions    Referral from Primary Care Provider: No  Suggested Interventions and Community Resources  No Identified Needs  Behavorial Health: In Process (Comment: AC is contacting Psychiatrist accepting new patients. )  Diabetes Education: In Process (Comment: Ongoing management of Dm2 education diet,exercise)  Fall Risk Prevention: In Process (Comment: Using upright rolator walker)  Medi Set or Pill Pack:  In Process (Comment: [address] [facility] reviewed Medication will improve oill pack dispensary for look a like pills)          Goals Addressed                      This Visit's Progress      Care Coordination Self Management (pt-stated)   On track      CC Self Management Goal  Patient Goal (What steps will patient take to achieve goal?): >>  Patient is able to discuss self-management of condition(s):  Pt demonstrates adherence to

## 2023-11-17 ENCOUNTER — PHARMACY VISIT (OUTPATIENT)
Facility: CLINIC | Age: 73
End: 2023-11-17

## 2023-11-17 VITALS — BODY MASS INDEX: 26.46 KG/M2 | WEIGHT: 174 LBS

## 2023-11-17 DIAGNOSIS — Z79.4 TYPE 2 DIABETES MELLITUS WITH DIABETIC POLYNEUROPATHY, WITH LONG-TERM CURRENT USE OF INSULIN (HCC): Primary | ICD-10-CM

## 2023-11-17 DIAGNOSIS — E11.42 TYPE 2 DIABETES MELLITUS WITH DIABETIC POLYNEUROPATHY, WITH LONG-TERM CURRENT USE OF INSULIN (HCC): Primary | ICD-10-CM

## 2023-11-27 ENCOUNTER — CARE COORDINATION (OUTPATIENT)
Facility: CLINIC | Age: 73
End: 2023-11-27

## 2023-11-27 NOTE — CARE COORDINATION
.Ambulatory Care Coordination Note  2023    Patient Current Location:  Home: [address] [place] [address] [address] [address]     ACM contacted the patient by telephone. Verified name and  with patient as identifiers. Provided introduction to self, and explanation of the ACM role. Patient voicing he is doing so well. He feels as good as he looks and looks as good as he feels.  this am getting ready to eat breakfast. Will be at [facility] tomorrow to see Charlene Alvarez @ 1:00 pm. He is really trying to stick with diet he is walking twice a day when the wealthier allows . He has to walk with his Rolatorr walker and oxygen and it is getting cold outside. He is taking his medications on time. He voiced he has no complaints. He spent time over his siter house with family on Thanksgiving ( his 3 daughter,grand kids and great grand kids)       Challenges to be reviewed by the provider   Additional needs identified to be addressed with provider: No  none               Method of communication with provider: none. ACM: Emi Sullivan RN    Offered patient enrollment in the Remote Patient Monitoring (RPM) program for in-home monitoring:  N/A . Lab Results       None            Care Coordination Interventions    Referral from Primary Care Provider: No  Suggested Interventions and Community Resources  No Identified Needs  Behavorial Health: In Process (Comment: ACM is contacting Psychiatrist accepting new patients. )  Diabetes Education: In Process (Comment: Ongoing management of Dm2 education diet,exercise)  Fall Risk Prevention: In Process (Comment: Using upright rolator walker)  Medi Set or Pill Pack:  In Process (Comment: [address] [place] [address] reviewed Medication will improve oill pack dispensary for look a like pills)          Goals Addressed    None         Future Appointments   Date Time Provider [address]   2023  1:00 PM SANIYA [facility] BS AMB   2023 10:00 AM Kim

## 2023-11-28 ENCOUNTER — PHARMACY VISIT (OUTPATIENT)
Facility: CLINIC | Age: 73
End: 2023-11-28

## 2023-11-28 DIAGNOSIS — E11.42 TYPE 2 DIABETES MELLITUS WITH DIABETIC POLYNEUROPATHY, WITH LONG-TERM CURRENT USE OF INSULIN (HCC): Primary | ICD-10-CM

## 2023-11-28 DIAGNOSIS — Z79.4 TYPE 2 DIABETES MELLITUS WITH DIABETIC POLYNEUROPATHY, WITH LONG-TERM CURRENT USE OF INSULIN (HCC): Primary | ICD-10-CM

## 2023-11-28 NOTE — PROGRESS NOTES
Pharmacy Progress Note - Diabetes Management Follow up       Assessment / Plan:   Diabetes Management:  Per ADA guidelines, Pt's A1c is not at goal of < 7%. Patient's most recent A1c was  13.9% on 10/3/23. Patient's having low BG overnight but is high throughout the day. Will keep patient's dose the same, but move Lantus injection to the morning. It is possible that the Lantus is at it's peak while the patient is sleeping and starting to wear off in the late afternoons. Switching to morning may help to control his BG better during the day when the insulin peaks and then not cause him to go low overnight. Instructed him to skip his dose of Lantus tonight and then take tomorrow morning so that he doesn't stack his insulin. Patient's aid was with him and states she will change his calender for him when they get back. Will reassess CGM data in 1 week to see if change of insulin timing eliminates lows overnight. Medication therapy changes:   - Take 30 units of Lantus in the morning    There are no discontinued medications. No orders of the defined types were placed in this encounter. Patient will return to clinic in 1 week(s) for follow up.         S/O: Mr. Devin Hitchcock, a 68 y.o. male referred by MANAS Ortega CNP,  has a past medical history of Acute cystitis without hematuria, MCKENZIE (acute kidney injury) ([address]), Arthritis, Arthropathy, Balanitis, Candida infection, Chronic pain, COVID-19 vaccine series completed, Dementia associated with other underlying disease without behavioral disturbance ([address]), Depression, Diabetes mellitus, type II ([address]), Drug abuse ([address]), ED (erectile dysfunction), Fatigue, GERD (gastroesophageal reflux disease), H/O epididymitis, H/O: pneumonia, Hep C w/o coma, chronic ([address]), History of BPH, History of hematuria, History of tobacco use, Hypertension, Loss of appetite, Loss of balance, Microcytic anemia, Neuropathy, Pancytopenia ([address]), Paranoid schizophrenia, chronic

## 2023-11-29 RX ORDER — OMEPRAZOLE 20 MG/1
CAPSULE, DELAYED RELEASE ORAL
Qty: 180 CAPSULE | Refills: 3 | Status: SHIPPED | OUTPATIENT
Start: 2023-11-29

## 2023-12-06 ENCOUNTER — PHARMACY VISIT (OUTPATIENT)
Facility: CLINIC | Age: 73
End: 2023-12-06

## 2023-12-06 DIAGNOSIS — E11.42 TYPE 2 DIABETES MELLITUS WITH DIABETIC POLYNEUROPATHY, WITH LONG-TERM CURRENT USE OF INSULIN (HCC): Primary | ICD-10-CM

## 2023-12-06 DIAGNOSIS — Z79.4 TYPE 2 DIABETES MELLITUS WITH DIABETIC POLYNEUROPATHY, WITH LONG-TERM CURRENT USE OF INSULIN (HCC): Primary | ICD-10-CM

## 2023-12-06 NOTE — PROGRESS NOTES
Pharmacy Progress Note - Diabetes Management Follow up       Assessment / Plan:   Diabetes Management:  Per ADA guidelines, Pt's A1c is not at goal of < 7%. Patient's most recent A1c was  13.9% on 10/3/23. Patient has not had any lows since switching insulin to morning. Patient will continue to take 30 units in the morning and work on reducing snacking throughout the day to prevent BG from staying elevated throughout the day. Based on the pt's CGM data, it seems that his frequent snacking does not allow his BG to return to normal range before he eats another snack or dinner, causing it be remain high in the afternoon/early evening. He leads into his dinner already on the higher end, causing his BG to go even high when he eats his dinner meal. Instructed pt to reduce the amount of crab cakes he eats per day in half to try to get better control of BG. Will reassess CGM data in 2 weeks. There are no discontinued medications. No orders of the defined types were placed in this encounter. Patient will return to clinic in 2 week(s) for follow up.         S/O: Mr. Padilla Alvarenga, a 68 y.o. male referred by MANAS Gutiérrez CNP,  has a past medical history of Acute cystitis without hematuria, MCKENZIE (acute kidney injury) ([address]), Arthritis, Arthropathy, Balanitis, Candida infection, Chronic pain, COVID-19 vaccine series completed, Dementia associated with other underlying disease without behavioral disturbance ([address]), Depression, Diabetes mellitus, type II ([address]), Drug abuse ([address]), ED (erectile dysfunction), Fatigue, GERD (gastroesophageal reflux disease), H/O epididymitis, H/O: pneumonia, Hep C w/o coma, chronic ([address]), History of BPH, History of hematuria, History of tobacco use, Hypertension, Loss of appetite, Loss of balance, Microcytic anemia, Neuropathy, Pancytopenia ([address]), Paranoid schizophrenia, chronic condition with acute exacerbation ([address]), Prostate cancer ([address]), PUD (peptic ulcer disease),

## 2023-12-11 ENCOUNTER — CARE COORDINATION (OUTPATIENT)
Facility: CLINIC | Age: 73
End: 2023-12-11

## 2023-12-11 NOTE — CARE COORDINATION
.Ambulatory Care Coordination Note  2023    Patient Current Location:  Home: [address] [place] [address] Ave [address]     ACM contacted the patient by telephone. Verified name and  with patient as identifiers. Provided introduction to self, and explanation of the ACM role. Challenges to be reviewed by the provider   Additional needs identified to be addressed with provider: No  none               Method of communication with provider: none. ACM: Blanca Toth RN      Offered patient enrollment in the Remote Patient Monitoring (RPM) program for in-home monitoring:  N/A . Lab Results       None            Care Coordination Interventions    Referral from Primary Care Provider: No  Suggested Interventions and Community Resources  No Identified Needs  Behavorial Health: In Process (Comment: ACM is contacting Psychiatrist accepting new patients. )  Diabetes Education: In Process (Comment: Ongoing management of Dm2 education diet,exercise)  Fall Risk Prevention: In Process (Comment: Using upright rolator walker)  Medi Set or Pill Pack:  In Process (Comment: [address] [place] [address] reviewed Medication will improve oill pack dispensary for look a like pills)          Goals Addressed                      This Visit's Progress      Care Coordination Self Management (pt-stated)   On track      CC Self Management Goal  Patient Goal (What steps will patient take to achieve goal?): >>  Patient is able to discuss self-management of condition(s):  Pt demonstrates adherence to medications  Pt demonstrates understanding of self-monitoring  Patient is able to identify Red Flags:  Alert to potential adverse drug reactions(s) or side effects and actions to take should they arise  Discuss target symptoms and actions to take should they arise  Identify problems that require immediate PCP or specialist visit  Patient demonstrates understanding of access to PCP/Specialist:  Understands about scheduling

## 2023-12-27 ENCOUNTER — PHARMACY VISIT (OUTPATIENT)
Facility: CLINIC | Age: 73
End: 2023-12-27

## 2023-12-27 ENCOUNTER — CARE COORDINATION (OUTPATIENT)
Facility: CLINIC | Age: 73
End: 2023-12-27

## 2023-12-27 DIAGNOSIS — E11.42 TYPE 2 DIABETES MELLITUS WITH DIABETIC POLYNEUROPATHY, WITH LONG-TERM CURRENT USE OF INSULIN (HCC): ICD-10-CM

## 2023-12-27 DIAGNOSIS — Z79.4 TYPE 2 DIABETES MELLITUS WITH DIABETIC POLYNEUROPATHY, WITH LONG-TERM CURRENT USE OF INSULIN (HCC): ICD-10-CM

## 2023-12-27 RX ORDER — INSULIN GLARGINE 100 [IU]/ML
25 INJECTION, SOLUTION SUBCUTANEOUS NIGHTLY
Qty: 15 ML | Refills: 3 | Status: SHIPPED
Start: 2023-12-27

## 2023-12-27 NOTE — CARE COORDINATION
concentrated sweets diet  10/4/23  Patient is not eating a low salt,no concentrated sweets diet. Drinking regular sodas,eating fried foods plenty of potatoes. Education provided on a Diabetic diet. 10/16/23  Has f/u Face to Face with PharmGUSTABO Hernadez 10/20/23  10/30/23  Patient continues to meet with PharmGUSTABO Alvarez for DM management. 11/13  On going  12/27/23  PCA's fix meals        Self-schedules and keeps appointments    On track      10/4/23  Patient was seen by PCP 10/3/23  10/16  Doing well B/G fasting 120 then up to 230 after nap.    10/30/23  Patient attended PCP appointment 10/20 follow up  11/13/23  Doing well. Attending all PharmD appointments face to face and telephonic  12/11  Patient is attending all appointments as scheduled. Next PCP is  in 2024 12/27/23  Patient wants ACM to go over all f/u appointments with him and his siter tomorrow 12/28/23          Take all medications as ordered   On track      Patient meds comes pre-pack from [facility]  10/4/23  Taking all medications  10/30/23  Continue to check BG at odd times. ACM suggested to PCA and patient to write down Check BG before Breakfast,Lunch ,dinner and bedtime. If feeling off check bg or when he first get up if feel he should. . Make sure he eats a snack at bedtime B/G 92 when he first woke up now 76 before he is to eat his breakfast.  11/13  Taking medications as prescribed Prefilled by [facility] pharmacy  12/11/23  On going        To decrease or maintain patient's biometrics:  HgA1c ?7 mg/dL, /80 or less. LDL of less than 100 and/or less than 70 in a patient with CAD. On track      10/3/23  A1C 13.9 (10/3/23)  Lipids ( WNL)  B/P is WNL  10/30/23  Continue to check BG at odd times. ACM suggested to PCA and patient to write down Check BG before Breakfast,Lunch ,dinner and bedtime. If feeling off check bg or when he first get up if feel he should. . Make sure he eats a snack at bedtime B/G 92 when he first woke up now 76 before he

## 2023-12-28 ENCOUNTER — CARE COORDINATION (OUTPATIENT)
Facility: CLINIC | Age: 73
End: 2023-12-28

## 2023-12-28 DIAGNOSIS — Z79.4 TYPE 2 DIABETES MELLITUS WITH DIABETIC POLYNEUROPATHY, WITH LONG-TERM CURRENT USE OF INSULIN (HCC): ICD-10-CM

## 2023-12-28 DIAGNOSIS — E11.42 TYPE 2 DIABETES MELLITUS WITH DIABETIC POLYNEUROPATHY, WITH LONG-TERM CURRENT USE OF INSULIN (HCC): ICD-10-CM

## 2023-12-28 NOTE — CARE COORDINATION
.Ambulatory Care Coordination Note  2023    Patient Current Location:  Home: [place] [id number]     Patient contacted AC contacted  by telephone. ACM verified name and  with patient as identifiers. Patient requesting a list of his up coming providers appointment. AC      gave PCA Diego as requested all up coming appointment to write down for patient as sometimes she is a no show:  1/3/24 PCP @ 9:00 ,PharmD Link Goldmann @ 1:30  but will see patient during his PCP visit. Patient voiced @ 2:20 he has an Psych appointment  [address] [facility] . 1/3/24  1/17/24 Lab nurse visit @ [facility] @ 1:40 and 24 Appointment with Dr Davina Chavez @ 2:40  3/4/24 PCP Jennie Lorenzana @ 8:30  3/5/24 Neurology Dr Michelle Edwards @ 9:40   24 Sleep study Dr Darryl Dick @ 9:30     Patient voicing he has changed health insurance from [facility] to [facility]. Patient instructed to bring new insurance card to all providers office for update. Challenges to be reviewed by the provider   Additional needs identified to be addressed with provider: No  none               Method of communication with provider: none. ACM: Anthony Gooden RN    Offered patient enrollment in the Remote Patient Monitoring (RPM) program for in-home monitoring: Patient declined. Lab Results       None            Care Coordination Interventions    Referral from Primary Care Provider: No  Suggested Interventions and Community Resources  No Identified Needs  Behavorial Health: In Process (Comment: [facility] is contacting Psychiatrist accepting new patients. )  Diabetes Education: In Process (Comment: Ongoing management of Dm2 education diet,exercise)  Fall Risk Prevention: In Process (Comment: Using upright rolator walker)  Medi Set or Pill Pack:  In Process (Comment: [address] [place] reviewed Medication will improve oill pack dispensary for look a like pills)          Goals Addressed    None         Future Appointments   Date Time Provider Department

## 2023-12-29 RX ORDER — SITAGLIPTIN 100 MG/1
100 TABLET, FILM COATED ORAL DAILY
Qty: 90 TABLET | Refills: 1 | Status: SHIPPED | OUTPATIENT
Start: 2023-12-29

## 2024-01-03 ENCOUNTER — SCHEDULED TELEPHONE ENCOUNTER (OUTPATIENT)
Facility: CLINIC | Age: 74
End: 2024-01-03

## 2024-01-03 ENCOUNTER — HOME HEALTH ADMISSION (OUTPATIENT)
Age: 74
End: 2024-01-03

## 2024-01-03 ENCOUNTER — OFFICE VISIT (OUTPATIENT)
Facility: CLINIC | Age: 74
End: 2024-01-03
Payer: MEDICARE

## 2024-01-03 VITALS
HEART RATE: 89 BPM | WEIGHT: 174 LBS | HEIGHT: 68 IN | DIASTOLIC BLOOD PRESSURE: 81 MMHG | TEMPERATURE: 99.3 F | BODY MASS INDEX: 26.37 KG/M2 | SYSTOLIC BLOOD PRESSURE: 128 MMHG | OXYGEN SATURATION: 98 % | RESPIRATION RATE: 16 BRPM

## 2024-01-03 DIAGNOSIS — Z79.4 TYPE 2 DIABETES MELLITUS WITH DIABETIC POLYNEUROPATHY, WITH LONG-TERM CURRENT USE OF INSULIN (HCC): ICD-10-CM

## 2024-01-03 DIAGNOSIS — Z00.00 MEDICARE ANNUAL WELLNESS VISIT, SUBSEQUENT: Primary | ICD-10-CM
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
## 2024-01-03 DIAGNOSIS — E11.42 TYPE 2 DIABETES MELLITUS WITH DIABETIC POLYNEUROPATHY, WITH LONG-TERM CURRENT USE OF INSULIN (HCC): Primary | ICD-10-CM

## 2024-01-03 DIAGNOSIS — Z99.81 DEPENDENCE ON CONTINUOUS SUPPLEMENTAL OXYGEN: ICD-10-CM

## 2024-01-03 DIAGNOSIS — E11.42 TYPE 2 DIABETES MELLITUS WITH DIABETIC POLYNEUROPATHY, WITH LONG-TERM CURRENT USE OF INSULIN (HCC): ICD-10-CM

## 2024-01-03 DIAGNOSIS — Z79.4 TYPE 2 DIABETES MELLITUS WITH DIABETIC POLYNEUROPATHY, WITH LONG-TERM CURRENT USE OF INSULIN (HCC): Primary | ICD-10-CM

## 2024-01-03 DIAGNOSIS — I10 ESSENTIAL HYPERTENSION WITH GOAL BLOOD PRESSURE LESS THAN 130/80: ICD-10-CM

## 2024-01-03 DIAGNOSIS — Z87.891 PERSONAL HISTORY OF TOBACCO USE: ICD-10-CM

## 2024-01-03 PROCEDURE — 3046F HEMOGLOBIN A1C LEVEL >9.0%: CPT | Performed by: NURSE PRACTITIONER

## 2024-01-03 PROCEDURE — G0439 PPPS, SUBSEQ VISIT: HCPCS | Performed by: NURSE PRACTITIONER

## 2024-01-03 PROCEDURE — 1123F ACP DISCUSS/DSCN MKR DOCD: CPT | Performed by: NURSE PRACTITIONER

## 2024-01-03 PROCEDURE — 3074F SYST BP LT 130 MM HG: CPT | Performed by: NURSE PRACTITIONER

## 2024-01-03 PROCEDURE — 3017F COLORECTAL CA SCREEN DOC REV: CPT | Performed by: NURSE PRACTITIONER

## 2024-01-03 PROCEDURE — G8484 FLU IMMUNIZE NO ADMIN: HCPCS | Performed by: NURSE PRACTITIONER

## 2024-01-03 PROCEDURE — 3079F DIAST BP 80-89 MM HG: CPT | Performed by: NURSE PRACTITIONER

## 2024-01-03 ASSESSMENT — LIFESTYLE VARIABLES
HOW MANY STANDARD DRINKS CONTAINING ALCOHOL DO YOU HAVE ON A TYPICAL DAY: PATIENT DOES NOT DRINK
HOW OFTEN DO YOU HAVE A DRINK CONTAINING ALCOHOL: NEVER

## 2024-01-03 ASSESSMENT — PATIENT HEALTH QUESTIONNAIRE - PHQ9
SUM OF ALL RESPONSES TO PHQ9 QUESTIONS 1 & 2: 6
4. FEELING TIRED OR HAVING LITTLE ENERGY: 3
SUM OF ALL RESPONSES TO PHQ QUESTIONS 1-9: 18
6. FEELING BAD ABOUT YOURSELF - OR THAT YOU ARE A FAILURE OR HAVE LET YOURSELF OR YOUR FAMILY DOWN: 0
1. LITTLE INTEREST OR PLEASURE IN DOING THINGS: 3
9. THOUGHTS THAT YOU WOULD BE BETTER OFF DEAD, OR OF HURTING YOURSELF: 0
7. TROUBLE CONCENTRATING ON THINGS, SUCH AS READING THE NEWSPAPER OR WATCHING TELEVISION: 3
3. TROUBLE FALLING OR STAYING ASLEEP: 3
SUM OF ALL RESPONSES TO PHQ QUESTIONS 1-9: 18
10. IF YOU CHECKED OFF ANY PROBLEMS, HOW DIFFICULT HAVE THESE PROBLEMS MADE IT FOR YOU TO DO YOUR WORK, TAKE CARE OF THINGS AT HOME, OR GET ALONG WITH OTHER PEOPLE: 0
8. MOVING OR SPEAKING SO SLOWLY THAT OTHER PEOPLE COULD HAVE NOTICED. OR THE OPPOSITE, BEING SO FIGETY OR RESTLESS THAT YOU HAVE BEEN MOVING AROUND A LOT MORE THAN USUAL: 3
2. FEELING DOWN, DEPRESSED OR HOPELESS: 3
5. POOR APPETITE OR OVEREATING: 0

## 2024-01-03 NOTE — PATIENT INSTRUCTIONS
day. Make sure you take aspirin and not another kind of pain reliever, such as acetaminophen (Tylenol).   When should you call for help?   Call 911 if you have symptoms of a heart attack. These may include:    Chest pain or pressure, or a strange feeling in the chest.     Sweating.     Shortness of breath.     Pain, pressure, or a strange feeling in the back, neck, jaw, or upper belly or in one or both shoulders or arms.     Lightheadedness or sudden weakness.     A fast or irregular heartbeat.   After you call 911, the  may tell you to chew 1 adult-strength or 2 to 4 low-dose aspirin. Wait for an ambulance. Do not try to drive yourself.  Watch closely for changes in your health, and be sure to contact your doctor if you have any problems.  Where can you learn more?  Go to https://www.[employer].net/patientEd and enter F075 to learn more about \"A Healthy Heart: Care Instructions.\"  Current as of: June 25, 2023               Content Version: 13.9  © 5231-9925 [employer].   Care instructions adapted under license by [employer]. If you have questions about a medical condition or this instruction, always ask your healthcare professional. [employer] disclaims any warranty or liability for your use of this information.      Personalized Preventive Plan for Anshul Smiley - 1/3/2024  Medicare offers a range of preventive health benefits. Some of the tests and screenings are paid in full while other may be subject to a deductible, co-insurance, and/or copay.    Some of these benefits include a comprehensive review of your medical history including lifestyle, illnesses that may run in your family, and various assessments and screenings as appropriate.    After reviewing your medical record and screening and assessments performed today your provider may have ordered immunizations, labs, imaging, and/or referrals for you.  A list of these orders (if applicable) as well as your Preventive

## 2024-01-03 NOTE — PROGRESS NOTES
Pharmacy Progress Note - Diabetes Management Follow up - Telephone       Assessment / Plan:   Diabetes Management:  Per ADA guidelines, Pt's A1c is not at goal of < 7%.  Patient's most recent A1c was  13.9% on 10/2/23. Patient is in much better spirits today and seems back to his normal self, compared to last visit. He is not having lows since reducing his insulin. Will continue current regimen and follow up with patient in 2 weeks to assess CGM.     Patient will return to clinic in 2 week(s) for follow up.        S/O: Mr. Anshul Smiley, a 73 y.o. male referred by Anitha Lara, APRN - CNP,  has a past medical history of Acute cystitis without hematuria, MCKENZIE (acute kidney injury) ([facility]), Arthritis, Arthropathy, Balanitis, Candida infection, Chronic pain, COVID-19 vaccine series completed, Dementia associated with other underlying disease without behavioral disturbance ([facility]), Depression, Diabetes mellitus, type II ([facility]), Drug abuse ([facility]), ED (erectile dysfunction), Fatigue, GERD (gastroesophageal reflux disease), H/O epididymitis, H/O: pneumonia, Hep C w/o coma, chronic ([facility]), History of BPH, History of hematuria, History of tobacco use, Hypertension, Loss of appetite, Loss of balance, Microcytic anemia, Neuropathy, Pancytopenia ([facility]), Paranoid schizophrenia, chronic condition with acute exacerbation ([facility]), Prostate cancer ([facility]), PUD (peptic ulcer disease), SOB (shortness of breath), Status post partial gastrectomy, Stroke ([facility]), Type 2 diabetes mellitus with hyperglycemia, with long-term current use of insulin ([facility]), and Uncircumcised male.  Pt was seen today for diabetes management.      In the past, the patient has tried the following:      Medication Dose Reason for discontinuation  Notes   Metformin   Diarrhea and GI upset      Novolog   Therapy change                  Patient denies personal/family history of thyroid cancer, reports history of pancreatitis (> 10 years ago), and reports issues with

## 2024-01-03 NOTE — PROGRESS NOTES
Anshul Smiley is a 73 y.o. male (: 1950) presenting to address:    Chief Complaint   Patient presents with    3 Month Follow-Up     DM, HTN,HLD    Medicare AWV       Vitals:    24 0909   BP: 128/81   Pulse: 89   Resp: 16   Temp: 99.3 °F (37.4 °C)   SpO2: 98%       Coordination of Care Questionaire:   1. \"Have you been to the ER, urgent care clinic since your last visit?  Hospitalized since your last visit?\" No    2. \"Have you seen or consulted any other health care providers outside of the [facility] since your last visit?\" yes     3. For patients aged 45-75: Has the patient had a colonoscopy / FIT/ Cologuard? Yes      If the patient is female:    4. For patients aged 40-74: Has the patient had a mammogram within the past 2 years? N/A      5. For patients aged 21-65: Has the patient had a pap smear? N/A    Advanced Directive:   1. Do you have an Advanced Directive? no    2. Would you like information on Advanced Directives? No

## 2024-01-03 NOTE — PROGRESS NOTES
Medicare Annual Wellness Visit    Anshul Smiley is here for 3 Month Follow-Up (DM, HTN,HLD) and Medicare AWV    Assessment & Plan   Medicare annual wellness visit, subsequent  Completed today  Type 2 diabetes mellitus with diabetic polyneuropathy, with long-term current use of insulin (HCC)  -     Lipid Panel; Future  -     Hemoglobin A1C; Future  -     Microalbumin / Creatinine Urine Ratio; Future  Endorses medication compliance, Follow-up labs today, Endorses symptoms consistent with hypoglycemia, and Diabetes uncontrolled reviewed kierra reader, showing glucoses from 60 to 300, endorses hypoglycemia that resolves with food, management of DM is assisted by Pharm D  Essential hypertension with goal blood pressure less than 130/80  -     Comprehensive Metabolic Panel; Future  -     CBC; Future  Endorses medication compliance, Follow-up labs today, and Blood pressure stable, continue amlodipine and lisinopril at same dose  Personal history of tobacco use  -     CT Lung Screen (Initial/Annual/Baseline); Future  Health maintenance needs addressed  Dependence on continuous supplemental oxygen  Continues to wear O2 at 4 LPM  Fall, initial encounter  -     [facility]  Endorses weakness and falls at home, refer to  for evaluation and treatment  Patient verbalized understanding and is in agreement with this plan of care    Recommendations for Preventive Services Due: see orders and patient instructions/AVS.  Recommended screening schedule for the next 5-10 years is provided to the patient in written form: see Patient Instructions/AVS.     Return in about 3 months (around 4/3/2024) for DM, HTN, HLD, 30min, office.     Subjective   DMII-   Patient reports medication compliance Yes  Diabetic diet compliance frequently  Patient monitors blood sugars regularly  has CGM, minimum 3x a day   Home glucose readings:    Denies hypoglycemic episodes No and resolves with eating  Denies polyuria,

## 2024-01-04 ENCOUNTER — HOME HEALTH ADMISSION (OUTPATIENT)
Age: 74
End: 2024-01-04

## 2024-01-04 ENCOUNTER — CARE COORDINATION (OUTPATIENT)
Facility: CLINIC | Age: 74
End: 2024-01-04

## 2024-01-04 LAB
ALBUMIN SERPL-MCNC: 4.9 G/DL (ref 3.8–4.8)
ALBUMIN/CREAT UR: 53 MG/G CREAT (ref 0–29)
ALBUMIN/GLOB SERPL: 1.8 {RATIO} (ref 1.2–2.2)
ALP SERPL-CCNC: 88 IU/L (ref 44–121)
ALT SERPL-CCNC: 16 IU/L (ref 0–44)
AST SERPL-CCNC: 19 IU/L (ref 0–40)
BASOPHILS # BLD AUTO: 0 X10E3/UL (ref 0–0.2)
BASOPHILS NFR BLD AUTO: 0 %
BILIRUB SERPL-MCNC: 0.2 MG/DL (ref 0–1.2)
BUN SERPL-MCNC: 14 MG/DL (ref 8–27)
BUN/CREAT SERPL: 9 (ref 10–24)
CALCIUM SERPL-MCNC: 9.5 MG/DL (ref 8.6–10.2)
CHLORIDE SERPL-SCNC: 103 MMOL/L (ref 96–106)
CHOLEST SERPL-MCNC: 102 MG/DL (ref 100–199)
CO2 SERPL-SCNC: 23 MMOL/L (ref 20–29)
CREAT SERPL-MCNC: 1.52 MG/DL (ref 0.76–1.27)
CREAT UR-MCNC: 193.8 MG/DL
EGFRCR SERPLBLD CKD-EPI 2021: 48 ML/MIN/1.73
EOSINOPHIL # BLD AUTO: 0 X10E3/UL (ref 0–0.4)
EOSINOPHIL NFR BLD AUTO: 1 %
ERYTHROCYTE [DISTWIDTH] IN BLOOD BY AUTOMATED COUNT: 16.9 % (ref 11.6–15.4)
GLOBULIN SER CALC-MCNC: 2.8 G/DL (ref 1.5–4.5)
GLUCOSE SERPL-MCNC: 136 MG/DL (ref 70–99)
HBA1C MFR BLD: 9 % (ref 4.8–5.6)
HCT VFR BLD AUTO: 34.5 % (ref 37.5–51)
HDLC SERPL-MCNC: 52 MG/DL
HGB BLD-MCNC: 10.2 G/DL (ref 13–17.7)
IMM GRANULOCYTES # BLD AUTO: 0 X10E3/UL (ref 0–0.1)
IMM GRANULOCYTES NFR BLD AUTO: 0 %
LDLC SERPL CALC-MCNC: 37 MG/DL (ref 0–99)
LYMPHOCYTES # BLD AUTO: 1.2 X10E3/UL (ref 0.7–3.1)
LYMPHOCYTES NFR BLD AUTO: 27 %
MCH RBC QN AUTO: 22.2 PG (ref 26.6–33)
MCHC RBC AUTO-ENTMCNC: 29.6 G/DL (ref 31.5–35.7)
MCV RBC AUTO: 75 FL (ref 79–97)
MICROALBUMIN UR-MCNC: 102.9 UG/ML
MONOCYTES # BLD AUTO: 0.5 X10E3/UL (ref 0.1–0.9)
MONOCYTES NFR BLD AUTO: 10 %
NEUTROPHILS # BLD AUTO: 2.7 X10E3/UL (ref 1.4–7)
NEUTROPHILS NFR BLD AUTO: 62 %
PLATELET # BLD AUTO: 158 X10E3/UL (ref 150–450)
POTASSIUM SERPL-SCNC: 4.5 MMOL/L (ref 3.5–5.2)
PROT SERPL-MCNC: 7.7 G/DL (ref 6–8.5)
RBC # BLD AUTO: 4.59 X10E6/UL (ref 4.14–5.8)
SODIUM SERPL-SCNC: 141 MMOL/L (ref 134–144)
SPECIMEN STATUS REPORT: NORMAL
TRIGL SERPL-MCNC: 56 MG/DL (ref 0–149)
VLDLC SERPL CALC-MCNC: 13 MG/DL (ref 5–40)
WBC # BLD AUTO: 4.4 X10E3/UL (ref 3.4–10.8)

## 2024-01-04 NOTE — CARE COORDINATION
.  Challenges to be reviewed by the provider   Additional needs identified to be addressed with provider No  none  Patient wanted ACM to know that he has changed Home Health Agency and has a new PCA her name is Brielle Mccann  with [facility]. [phone]. Patient wanted ACM to make sure she has all of his f/u providers appointments , done  Patient kept appointments with PCP,Psych and PharmD on 1/3/24 sister provided transportation.             .   Goals Addressed                      This Visit's Progress      Care Coordination Self Management (pt-stated)   On track      CC Self Management Goal  Patient Goal (What steps will patient take to achieve goal?): >>  Patient is able to discuss self-management of condition(s):  Pt demonstrates adherence to medications  Pt demonstrates understanding of self-monitoring  Patient is able to identify Red Flags:  Alert to potential adverse drug reactions(s) or side effects and actions to take should they arise  Discuss target symptoms and actions to take should they arise  Identify problems that require immediate PCP or specialist visit  Patient demonstrates understanding of access to PCP/Specialist:  Understands about scheduling routine Follow Up appointments   Understands about sick day appointment options for worsening of symptoms/progression (Same Day, E Visits)  10/4/23  Ongoing education for DM  10/16/23  Patient doing well with Pharm GUSTABO Alvarez  Lantus Insulin up to 25 units @ HS  10/30/23  Lantus increased to 30 units by Promise Alvarez  11/13  Ongoing doing well. Going out with PCA and her sister that is in town for breakfast  12/27/23  Patient is attending appointments with Promise Villa  Taking his medications as prescribed  Eating meals fixed by PCA  1/4/24  New PCA started 1/4/24 Brielle Mccann        Self-schedules and keeps appointments    On track      10/4/23  Patient was seen by PCP 10/3/23  10/16  Doing well B/G fasting 120 then up to 230 after

## 2024-01-08 ENCOUNTER — TELEPHONE (OUTPATIENT)
Facility: CLINIC | Age: 74
End: 2024-01-08
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
## 2024-01-08 DIAGNOSIS — Z99.81 DEPENDENCE ON CONTINUOUS SUPPLEMENTAL OXYGEN: ICD-10-CM

## 2024-01-08 NOTE — TELEPHONE ENCOUNTER
----- Message from Yanira Lockhart RN sent at 1/4/2024 10:56 AM EST -----  Regarding: Home health Referral  Thank you for the Referral      Unfortunately, we are out of network with the patients insurance. We are unable to accept the referral at this time.      Respectfully,    Yanira Lockhart MSN-Ed RN

## 2024-01-11 ENCOUNTER — CARE COORDINATION (OUTPATIENT)
Facility: CLINIC | Age: 74
End: 2024-01-11

## 2024-01-11 SDOH — ECONOMIC STABILITY: FOOD INSECURITY: WITHIN THE PAST 12 MONTHS, YOU WORRIED THAT YOUR FOOD WOULD RUN OUT BEFORE YOU GOT MONEY TO BUY MORE.: NEVER TRUE

## 2024-01-11 SDOH — ECONOMIC STABILITY: FOOD INSECURITY: WITHIN THE PAST 12 MONTHS, THE FOOD YOU BOUGHT JUST DIDN'T LAST AND YOU DIDN'T HAVE MONEY TO GET MORE.: NEVER TRUE

## 2024-01-11 NOTE — CARE COORDINATION
.Ambulatory Care Coordination Note  2024    Patient Current Location:  Home: [address]  [facility] [id number]     ACM contacted the patient by telephone. Verified name and  with patient as identifiers. Provided introduction to self, and explanation of the ACM role.     Challenges to be reviewed by the provider   Additional needs identified to be addressed with provider: Yes  1. Patient had mechanical fall on 1/10/24 >> Bent down and fell backwards on bottom. Assisted back to standing position by PCA and a  male witness to fall.  Denies any injuries did not hit head . ( Was out side of the [employer])  2. Has lung scan scheduled for Sat  PCA will be transporting.  3. Continues to f/u with PharmD Daisy.  Continues to use Oxygen, ambulate out side x 2 daily with Rolator for exercise.               Method of communication with provider: chart routing.    ACM: Alejandra Haynes RN    Offered patient enrollment in the Remote Patient Monitoring (RPM) program for in-home monitoring: Patient declined.    Lab Results       None            Care Coordination Interventions    Referral from Primary Care Provider: No  Suggested Interventions and Community Resources  No Identified Needs  Behavorial Health: In Process (Comment: ACM is contacting Psychiatrist accepting new patients. )  Diabetes Education: In Process (Comment: Ongoing management of Dm2 education diet,exercise)  Fall Risk Prevention: In Process (Comment: Using upright rolator walker)  Medi Set or Pill Pack: In Process (Comment: [facility] reviewed Medication will improve oill pack dispensary for look a like pills)          Goals Addressed    None         Future Appointments   Date Time Provider Department Center   2024  1:00 PM Daisy Alvarez RPH AMA BS AMB   2024  1:40 PM [facility] NURSE [facility] Sched   2024  2:45 PM Curt Johnson MD [facility] Sched   3/4/2024  8:30 AM Anitha Lara,

## 2024-01-17 ENCOUNTER — TELEPHONE (OUTPATIENT)
Facility: CLINIC | Age: 74
End: 2024-01-17

## 2024-01-17 NOTE — TELEPHONE ENCOUNTER
Pharmacy Progress Note - Telephone Call    Mr. Anshul Smiley 73 y.o. was contacted via an outbound telephone call today to reschedule their missed appointment with PharmD today per referral from Anitha Lara APRN - CNP.  Patient had a different appointment today at the same time and needs to reschedule. Patient rescheduled for Friday at 10am.     Thank you,    Daisy Alvarez PharmD  Clinical Pharmacist  01/17/24      For Pharmacy Admin Tracking Only    Program: Medical Group  CPA in place:  Yes  Recommendation Provided To: Patient/Caregiver: 1 via Telephone  Intervention Detail: Scheduled Appointment  Intervention Accepted By: Patient/Caregiver: 1  Gap Closed?: No   Time Spent (min): 5

## 2024-01-19 ENCOUNTER — TELEPHONE (OUTPATIENT)
Facility: CLINIC | Age: 74
End: 2024-01-19

## 2024-01-19 NOTE — TELEPHONE ENCOUNTER
Pharmacy Progress Note - Telephone Call    Mr. Anshul Smiley 73 y.o. was contacted via an outbound telephone call today to reschedule their missed appointment with PharmD today at 10am per referral from Anitha Lara APRN - CNP.  Patient rescheduled for Tuesday 1/23/24 at 2:30pm at [facility]. He states that his new aid needs trained on how to change his sensor for him.     Thank you,    Daisy Alvarez, PharmD  Clinical Pharmacist  01/19/24      For Pharmacy Admin Tracking Only    Program: Medical Group  CPA in place:  Yes  Recommendation Provided To: Patient/Caregiver: 1 via Telephone  Intervention Detail: Scheduled Appointment  Intervention Accepted By: Patient/Caregiver: 1  Gap Closed?: No   Time Spent (min): 5  e

## 2024-01-22 NOTE — PROGRESS NOTES
10/03/2023    CREATININE 1.39 (H) 06/02/2023    GFRAA 39.5 (L) 05/10/2022    GFRAA 58.9 (L) 03/14/2022    GFRAA 55.0 03/11/2022    LABGLOM 48 (L) 01/03/2024    LABGLOM 36 (L) 10/03/2023    LABGLOM 54 (L) 06/02/2023        Calculated estimated creatinine clearance: estimated creatinine clearance is 42 mL/min (A) (based on SCr of 1.52 mg/dL (H)).     ASCVD Risk: patient is currently on Crestor 5 mg daily.  The ASCVD Risk score (Geovani LEMONS, et al., 2019) failed to calculate for the following reasons:    The valid total cholesterol range is 130 to 320 mg/dL    Unable to determine if patient is Non-     Wt Readings from Last 3 Encounters:   01/03/24 78.9 kg (174 lb)   11/17/23 78.9 kg (174 lb)   10/20/23 77.5 kg (170 lb 12.8 oz)       Screenings/Prevention Parameters:  -Diabetic Eye and Foot Exams:      Diabetes Management   Topic Date Due    Diabetic foot exam  03/17/2023     -Microalbumin / Creatinine ratio:       Lab Results   Component Value Date/Time    MICROALBUR 37.8 11/09/2022 02:07 PM    LABCREA 193.8 01/03/2024 12:00 AM     -Immunizations:      Immunization History   Administered Date(s) Administered    COVID-19, MODERNA BLUE border, Primary or Immunocompromised, (age 12y+), IM, 100 mcg/0.5mL 04/23/2021, 05/21/2021, 11/10/2021    Influenza, FLUAD, (age 65 y+), Adjuvanted, 0.5mL 09/21/2021, 10/27/2023    Influenza, FLUBLOK, (age 18 y+), PF, 0.5mL 10/20/2020    Influenza, High Dose (Fluzone 65 yrs and older) 10/19/2022    Pneumococcal Vaccine 10/18/2016    Pneumococcal, PPSV23, PNEUMOVAX 23, (age 2y+), SC/IM, 0.5mL 08/09/2013         Future Appointments   Date Time Provider Department Center   1/24/2024  2:45 PM Curt Johnson MD [facility] Sched   2/7/2024  1:30 PM Daisy Alvarez, [facility] AMA BS AMB   3/4/2024  8:30 AM Anitha Lara APRN - [facility] AMA BS AMB   3/5/2024  9:40 AM Ronal Weston MD [facility] BS AMB   4/29/2024  9:30 AM Estelle Jack DO [facility] BS AMB        Patient

## 2024-01-23 ENCOUNTER — PHARMACY VISIT (OUTPATIENT)
Facility: CLINIC | Age: 74
End: 2024-01-23

## 2024-01-23 DIAGNOSIS — Z79.4 TYPE 2 DIABETES MELLITUS WITH DIABETIC POLYNEUROPATHY, WITH LONG-TERM CURRENT USE OF INSULIN (HCC): Primary | ICD-10-CM

## 2024-01-23 DIAGNOSIS — E11.42 TYPE 2 DIABETES MELLITUS WITH DIABETIC POLYNEUROPATHY, WITH LONG-TERM CURRENT USE OF INSULIN (HCC): Primary | ICD-10-CM

## 2024-01-23 NOTE — PATIENT INSTRUCTIONS
Reduce Lantus to 20 units in the morning.     A1c improved from 13.9% to 9.0%!!! YAY, SO PROUD OF YOU! :)

## 2024-01-24 RX ORDER — INSULIN GLARGINE 100 [IU]/ML
20 INJECTION, SOLUTION SUBCUTANEOUS NIGHTLY
Qty: 15 ML | Refills: 3 | Status: SHIPPED
Start: 2024-01-24

## 2024-01-25 ENCOUNTER — CARE COORDINATION (OUTPATIENT)
Facility: CLINIC | Age: 74
End: 2024-01-25

## 2024-01-25 NOTE — CARE COORDINATION
Diabetic diet.  10/16/23  Has f/u Face to Face with Charlene Villa 10/20/23  10/30/23  Patient continues to meet with Charlene Alvarez for DM management.  11/13  On going  12/27/23  PCA's fix meals  1/25/24  Patient voicing he is maintaining ADA diet slips up with a regular Ginger ale now and then        COMPLETED: Self-schedules and keeps appointments    On track      10/4/23  Patient was seen by PCP 10/3/23  10/16  Doing well B/G fasting 120 then up to 230 after nap.    10/30/23  Patient attended PCP appointment 10/20 follow up  11/13/23  Doing well. Attending all PharmD appointments face to face and telephonic  12/11  Patient is attending all appointments as scheduled. Next PCP is  in 2024 12/27/23  Patient wants ACM to go over all f/u appointments with him and his siter tomorrow 12/28/23 1/4/24  Patient kept all 3 appointments that was scheduled for 1/3/24 ( Psych with Brittni Cochran,PCP SAURABH Lara and PharmD )  Patient has upcoming appointments still   1/25/24  Patient has kept all provider appointments         COMPLETED: Take all medications as ordered   On track      Patient meds comes pre-pack from [facility]  10/4/23  Taking all medications  10/30/23  Continue to check BG at odd times. ACM suggested to PCA and patient to write down Check BG before Breakfast,Lunch ,dinner and bedtime.  If feeling off check bg or when he first get up if feel he should.. Make sure he eats a snack at bedtime B/G 92 when he first woke up now 76 before he is to eat his breakfast.  11/13  [facility]  12/11/23  On going  1/25/24  Taking all medications as prescribed.        COMPLETED: To decrease or maintain patient's biometrics:  HgA1c ?7 mg/dL, /80 or less. LDL of less than 100 and/or less than 70 in a patient with CAD.   On track      10/3/23  A1C 13.9 (10/3/23)  Lipids ( WNL)  B/P is WNL  10/30/23  Continue to check BG at odd times. ACM suggested to PCA and patient

## 2024-01-30 DIAGNOSIS — K21.9 GASTRO-ESOPHAGEAL REFLUX DISEASE WITHOUT ESOPHAGITIS: ICD-10-CM

## 2024-01-30 DIAGNOSIS — I10 ESSENTIAL (PRIMARY) HYPERTENSION: ICD-10-CM

## 2024-01-30 DIAGNOSIS — E11.65 TYPE 2 DIABETES MELLITUS WITH HYPERGLYCEMIA (HCC): ICD-10-CM

## 2024-01-30 RX ORDER — PIOGLITAZONEHYDROCHLORIDE 30 MG/1
TABLET ORAL
Qty: 90 TABLET | Refills: 1 | Status: SHIPPED | OUTPATIENT
Start: 2024-01-30

## 2024-01-30 RX ORDER — AMLODIPINE BESYLATE 10 MG/1
TABLET ORAL
Qty: 90 TABLET | Refills: 1 | Status: SHIPPED | OUTPATIENT
Start: 2024-01-30

## 2024-01-30 RX ORDER — FAMOTIDINE 40 MG/1
TABLET, FILM COATED ORAL
Qty: 93 TABLET | Refills: 1 | Status: SHIPPED | OUTPATIENT
Start: 2024-01-30

## 2024-02-07 ENCOUNTER — PHARMACY VISIT (OUTPATIENT)
Facility: CLINIC | Age: 74
End: 2024-02-07

## 2024-02-07 DIAGNOSIS — E11.42 TYPE 2 DIABETES MELLITUS WITH DIABETIC POLYNEUROPATHY, WITH LONG-TERM CURRENT USE OF INSULIN (HCC): ICD-10-CM

## 2024-02-07 DIAGNOSIS — Z79.4 TYPE 2 DIABETES MELLITUS WITH DIABETIC POLYNEUROPATHY, WITH LONG-TERM CURRENT USE OF INSULIN (HCC): ICD-10-CM

## 2024-02-07 RX ORDER — INSULIN GLARGINE 100 [IU]/ML
18 INJECTION, SOLUTION SUBCUTANEOUS NIGHTLY
Qty: 15 ML | Refills: 3 | Status: SHIPPED
Start: 2024-02-07

## 2024-02-07 NOTE — PROGRESS NOTES
MM MISC Use 1 new pen needle each time to inject insulin subcutaneously 4 times daily    Continuous Blood Gluc  (FREESTYLE ELIE 2 READER) WADE 1 each by Does not apply route Over 24 hours    Calcium Carbonate-Vitamin D 600-3.125 MG-MCG TABS Take by mouth daily     No current facility-administered medications for this visit.         Allergies:  Allergies   Allergen Reactions    Gabapentin Other (See Comments), Swelling and Anaphylaxis     Blurred Vision  Other reaction(s): Sweating, Tremors    Ibuprofen Anaphylaxis     Other reaction(s): Tremors, Sweating    Nsaids Anaphylaxis    Diazepam Swelling    Diphenhydramine Swelling     Tongue swelling, throat swelling    Duloxetine Hcl Other (See Comments)    Fentanyl Other (See Comments) and Swelling     Blurred vision  Other reaction(s): Unknown    Metformin Diarrhea    Penicillins Swelling     Other reaction(s): Unknown    Prednisone          Relevant Vitals/Labs:  Lab Results   Component Value Date    LABA1C 9.0 (H) 01/03/2024    LABA1C 13.9 (H) 10/03/2023    LABA1C 9.0 (H) 06/02/2023      Hemoglobin A1C, POC   Date Value Ref Range Status   06/23/2020 6.4 % Final        Lab Results   Component Value Date/Time    GLUCOSE 136 01/03/2024 12:00 AM     01/03/2024 12:00 AM    K 4.5 01/03/2024 12:00 AM     01/03/2024 12:00 AM    CO2 23 01/03/2024 12:00 AM    BUN 14 01/03/2024 12:00 AM    GFRAA 39.5 05/10/2022 09:14 AM    LABGLOM 48 01/03/2024 12:00 AM    GLOB 3.5 10/03/2023 10:23 AM    ALT 16 01/03/2024 12:00 AM    AST 19 01/03/2024 12:00 AM       Lab Results   Component Value Date    CHOL 102 01/03/2024    CHOL 123 10/03/2023    CHOL 135 06/02/2023     Lab Results   Component Value Date    TRIG 56 01/03/2024    TRIG 52 10/03/2023    TRIG 51 06/02/2023     Lab Results   Component Value Date    HDL 52 01/03/2024    HDL 66 (H) 10/03/2023    HDL 84 06/02/2023     Lab Results   Component Value Date    LDLCALC 37 01/03/2024    LDLCALC 46.6 10/03/2023    LDLCALC

## 2024-02-07 NOTE — PATIENT INSTRUCTIONS
Reduce Lantus to 18 units in the morning   Replace sugar with Splenda/Equal/Sweet'n Low in coffee in the morning

## 2024-02-14 NOTE — PROGRESS NOTES
Pharmacy Progress Note - Diabetes Management Follow up       Assessment / Plan:   Diabetes Management:  Per ADA guidelines, Pt's A1c is not at goal of < 7%.  Patient's most recent A1c was  9.0% on 1/3/24, which has significantly improved from previous A1c of 13.9% on 10/3/23. Patient is not having as frequent low BG during the day. He was supposed to be taking the Lantus in the morning, however forgot and was taking it at night. Once he switched back to taking it in the morning, he did not have any issues with low BG. Will continue patient at current dose and reassess CGM data in 4 weeks.    Medication therapy changes:   - Continue Lantus 18 units in the morning.    Patient will return to clinic in 4 week(s) for follow up.        S/O: Mr. Anshul Smiley, a 74 y.o. male referred by Anitha Lara APRN - CNP,  has a past medical history of Acute cystitis without hematuria, MCKENZIE (acute kidney injury) ([facility]), Arthritis, Arthropathy, Balanitis, Candida infection, Chronic pain, COVID-19 vaccine series completed, Dementia associated with other underlying disease without behavioral disturbance ([facility]), Depression, Diabetes mellitus, type II ([facility]), Drug abuse ([facility]), ED (erectile dysfunction), Fatigue, GERD (gastroesophageal reflux disease), H/O epididymitis, H/O: pneumonia, Hep C w/o coma, chronic ([facility]), History of BPH, History of hematuria, History of tobacco use, Hypertension, Loss of appetite, Loss of balance, Microcytic anemia, Neuropathy, Pancytopenia ([facility]), Paranoid schizophrenia, chronic condition with acute exacerbation ([facility]), Prostate cancer ([facility]), PUD (peptic ulcer disease), SOB (shortness of breath), Status post partial gastrectomy, Stroke ([facility]), Type 2 diabetes mellitus with hyperglycemia, with long-term current use of insulin ([facility]), and Uncircumcised male.  Pt was seen today for diabetes management.      In the past, the patient has tried the following:      Medication Dose Reason for discontinuation  Notes

## 2024-02-21 ENCOUNTER — PHARMACY VISIT (OUTPATIENT)
Facility: CLINIC | Age: 74
End: 2024-02-21

## 2024-02-21 DIAGNOSIS — Z79.4 TYPE 2 DIABETES MELLITUS WITH DIABETIC POLYNEUROPATHY, WITH LONG-TERM CURRENT USE OF INSULIN (HCC): Primary | ICD-10-CM

## 2024-02-21 DIAGNOSIS — E11.42 TYPE 2 DIABETES MELLITUS WITH DIABETIC POLYNEUROPATHY, WITH LONG-TERM CURRENT USE OF INSULIN (HCC): Primary | ICD-10-CM

## 2024-02-21 RX ORDER — TRAZODONE HYDROCHLORIDE 50 MG/1
50 TABLET ORAL NIGHTLY
COMMUNITY
Start: 2024-01-30

## 2024-02-22 ENCOUNTER — TELEPHONE (OUTPATIENT)
Facility: CLINIC | Age: 74
End: 2024-02-22

## 2024-02-22 NOTE — TELEPHONE ENCOUNTER
Spoke with Chon at [employer]/[address] [place] [address] [facility]  Advised patient no longer needs oxygen. After a 6 minute walk his oxygen saturations were 95%.  I was instructed to send a later stating he no longer has oxygen needs and the equipment will be picked up.  Letter faxed to [phone] as instructed.

## 2024-02-23 ENCOUNTER — CARE COORDINATION (OUTPATIENT)
Facility: CLINIC | Age: 74
End: 2024-02-23

## 2024-02-23 NOTE — CARE COORDINATION
.  Challenges to be reviewed by the provider   Additional needs identified to be addressed with provider Yes  1. Patient contacted ACM concerned about his increased memory loss ,stuttering and forgetfulness >> ACM instructed to keep f/u with neurology Trista on 3/5/24 @ 9:30   2. Patient concerned with his increased depression ,stuttering, over thinking and anxiety >>> ACM instructed to contact Psych Brittni Cochran for a F/U visit .  3. Patient concern with eyes blurry >>> ACM instructed to F/U with Opthalmology Dr France>> Appointment 3/29  4. Patient is concern about nightmares getting up and falling>> ACM encourage to call insurance to see if PCA hours can be increased to have someone there all evening.

## 2024-02-26 DIAGNOSIS — Z87.891 PERSONAL HISTORY OF TOBACCO USE: ICD-10-CM

## 2024-03-01 NOTE — PROGRESS NOTES
[address] [place] [address]               [phone]      Anshul Smiley is a 74 y.o. male and presents with No chief complaint on file.       Assessment/Plan:    1. Type 2 diabetes mellitus with diabetic polyneuropathy, with long-term current use of insulin (HCC)  -     Lipid Panel; Future  -     Hemoglobin A1C; Future  -     Microalbumin / Creatinine Urine Ratio; Future  2. Essential hypertension with goal blood pressure less than 130/80  -     Comprehensive Metabolic Panel; Future  -     CBC; Future  3. Severe depression (HCC)  -     mirtazapine (REMERON) 7.5 MG tablet; Take 2 tablets by mouth nightly Increased by psychiatrist, Disp-90 tablet, R-0Adjust Sig   DM uncontrolled but improving, also under the care of Pharm D, labs prior to next visit    Blood pressure controlled, continue amlodipine and lisinopril at same dose    Refills provided    Patient verbalized understanding and is in agreement with this plan of care    Follow up and disposition:   Return in about 3 months (around 6/4/2024) for DM, HTN, HLD, 30min, office, w/labsprior.      Subjective:    Labs obtained prior to visit? No  Reviewed with patient? N/A    DMII-   Patient reports medication compliance Yes  Diabetic diet compliance frequently  Patient monitors blood sugars regularly  Has CGM, sensor replaced today by Pharm D Daisy Quezada    Home glucose readings: 120, 300   Denies hypoglycemic episodes Yes  Denies polyuria, polydipsia, paraesthesia, vision changes? Yes  Engaging in daily exercise? None     Diabetes Management   Topic Date Due    Diabetic foot exam  03/17/2023    A1C test (Diabetic or Prediabetic)  04/03/2024     Lab Results   Component Value Date/Time    LABA1C 9.0 01/03/2024 12:00 AM   ]  Lab Results   Component Value Date/Time    MALBCR 53 01/03/2024 12:00 AM   ]  Diabetic medications:   Key Antihyperglycemic Medications            insulin glargine (LANTUS SOLOSTAR) 100 UNIT/ML injection

## 2024-03-04 ENCOUNTER — OFFICE VISIT (OUTPATIENT)
Facility: CLINIC | Age: 74
End: 2024-03-04
Payer: MEDICARE

## 2024-03-04 VITALS
WEIGHT: 175 LBS | HEART RATE: 77 BPM | OXYGEN SATURATION: 97 % | HEIGHT: 69 IN | TEMPERATURE: 98 F | RESPIRATION RATE: 16 BRPM | BODY MASS INDEX: 25.92 KG/M2 | DIASTOLIC BLOOD PRESSURE: 73 MMHG | SYSTOLIC BLOOD PRESSURE: 111 MMHG

## 2024-03-04 DIAGNOSIS — F32.2 SEVERE DEPRESSION (HCC): ICD-10-CM

## 2024-03-04 DIAGNOSIS — I10 ESSENTIAL HYPERTENSION WITH GOAL BLOOD PRESSURE LESS THAN 130/80: ICD-10-CM

## 2024-03-04 DIAGNOSIS — E11.42 TYPE 2 DIABETES MELLITUS WITH DIABETIC POLYNEUROPATHY, WITH LONG-TERM CURRENT USE OF INSULIN (HCC): Primary | ICD-10-CM

## 2024-03-04 DIAGNOSIS — Z79.4 TYPE 2 DIABETES MELLITUS WITH DIABETIC POLYNEUROPATHY, WITH LONG-TERM CURRENT USE OF INSULIN (HCC): Primary | ICD-10-CM

## 2024-03-04 PROBLEM — Z99.81 DEPENDENCE ON CONTINUOUS SUPPLEMENTAL OXYGEN: Status: RESOLVED | Noted: 2024-01-03 | Resolved: 2024-03-04

## 2024-03-04 PROCEDURE — 3074F SYST BP LT 130 MM HG: CPT | Performed by: NURSE PRACTITIONER

## 2024-03-04 PROCEDURE — 99214 OFFICE O/P EST MOD 30 MIN: CPT | Performed by: NURSE PRACTITIONER

## 2024-03-04 PROCEDURE — 3052F HG A1C>EQUAL 8.0%<EQUAL 9.0%: CPT | Performed by: NURSE PRACTITIONER

## 2024-03-04 PROCEDURE — 3078F DIAST BP <80 MM HG: CPT | Performed by: NURSE PRACTITIONER

## 2024-03-04 PROCEDURE — 1123F ACP DISCUSS/DSCN MKR DOCD: CPT | Performed by: NURSE PRACTITIONER

## 2024-03-04 RX ORDER — INSULIN GLARGINE 100 [IU]/ML
20 INJECTION, SOLUTION SUBCUTANEOUS NIGHTLY
Qty: 15 ML | Refills: 3 | Status: SHIPPED
Start: 2024-03-04 | End: 2024-03-15 | Stop reason: DRUGHIGH

## 2024-03-04 RX ORDER — MIRTAZAPINE 7.5 MG/1
15 TABLET, FILM COATED ORAL NIGHTLY
Qty: 90 TABLET | Refills: 0 | Status: SHIPPED
Start: 2024-03-04

## 2024-03-04 ASSESSMENT — PATIENT HEALTH QUESTIONNAIRE - PHQ9
3. TROUBLE FALLING OR STAYING ASLEEP: NEARLY EVERY DAY
SUM OF ALL RESPONSES TO PHQ QUESTIONS 1-9: 19
SUM OF ALL RESPONSES TO PHQ9 QUESTIONS 1 & 2: 4
1. LITTLE INTEREST OR PLEASURE IN DOING THINGS: MORE THAN HALF THE DAYS
6. FEELING BAD ABOUT YOURSELF - OR THAT YOU ARE A FAILURE OR HAVE LET YOURSELF OR YOUR FAMILY DOWN: NEARLY EVERY DAY
SUM OF ALL RESPONSES TO PHQ QUESTIONS 1-9: 19
10. IF YOU CHECKED OFF ANY PROBLEMS, HOW DIFFICULT HAVE THESE PROBLEMS MADE IT FOR YOU TO DO YOUR WORK, TAKE CARE OF THINGS AT HOME, OR GET ALONG WITH OTHER PEOPLE: SOMEWHAT DIFFICULT
SUM OF ALL RESPONSES TO PHQ QUESTIONS 1-9: 19
2. FEELING DOWN, DEPRESSED OR HOPELESS: MORE THAN HALF THE DAYS
SUM OF ALL RESPONSES TO PHQ QUESTIONS 1-9: 19
7. TROUBLE CONCENTRATING ON THINGS, SUCH AS READING THE NEWSPAPER OR WATCHING TELEVISION: NEARLY EVERY DAY
9. THOUGHTS THAT YOU WOULD BE BETTER OFF DEAD, OR OF HURTING YOURSELF: NOT AT ALL
5. POOR APPETITE OR OVEREATING: NOT AT ALL
4. FEELING TIRED OR HAVING LITTLE ENERGY: NEARLY EVERY DAY
8. MOVING OR SPEAKING SO SLOWLY THAT OTHER PEOPLE COULD HAVE NOTICED. OR THE OPPOSITE, BEING SO FIGETY OR RESTLESS THAT YOU HAVE BEEN MOVING AROUND A LOT MORE THAN USUAL: NEARLY EVERY DAY

## 2024-03-04 NOTE — PATIENT INSTRUCTIONS
Wednesday March 13 at 4pm in [place] with Brittni Cochran    You have a visit with neurology tomorrow

## 2024-03-04 NOTE — PROGRESS NOTES
Anshul Smiley is a 74 y.o. male (: 1950) presenting to address:    Chief Complaint   Patient presents with    Diabetes    Depression     States that he has been very depressed and forgetful lately. Anxiety has been through the roof.       Vitals:    24 0837   BP: 111/73   Pulse: 77   Resp: 16   Temp: 98 °F (36.7 °C)   SpO2: 97%       Coordination of Care Questionaire:   1. \"Have you been to the ER, urgent care clinic since your last visit?  Hospitalized since your last visit?\" No    2. \"Have you seen or consulted any other health care providers outside of the [facility] since your last visit?\" Yes     3. For patients aged 45-75: Has the patient had a colonoscopy / FIT/ Cologuard? N/a      If the patient is female:    4. For patients aged 40-74: Has the patient had a mammogram within the past 2 years? N/A      5. For patients aged 21-65: Has the patient had a pap smear? N/a    Advanced Directive:   1. Do you have an Advanced Directive? No    2. Would you like information on Advanced Directives? No

## 2024-03-04 NOTE — PROGRESS NOTES
Mr. Anshul RUEDA Baljit 74 y.o. male presents to office today for PCP visit. Patient requested for his Ana 2 sensor to be replaced. He had a replacement sensor with him. Removed his old sensor and replaced new sensor on the back of his upper right arm. Activated sensor without issues. He reported that he's been very anxious and stressed out so his BG has been high. Looked at Ana report and average BG has significantly increased since last visit.     Spoke with Anitha Lara APRN - CNP about pt's increase anxiety and increased BG and will increase insulin to 20 units in the morning.         February 2024 March 2024       Thank you,   Daisy Alvarez, PharmD  Clinical Pharmacist   3/4/24    For Pharmacy Admin Tracking Only    Program: Medical Group  CPA in place:  Yes  Recommendation Provided To: Patient/Caregiver: 1 via Telephone  Intervention Detail: Dose Adjustment: 1, reason: Therapy Optimization  Intervention Accepted By: Patient/Caregiver: 1  Gap Closed?: No   Time Spent (min): 10

## 2024-03-05 ENCOUNTER — OFFICE VISIT (OUTPATIENT)
Age: 74
End: 2024-03-05
Payer: MEDICARE

## 2024-03-05 VITALS
BODY MASS INDEX: 26.34 KG/M2 | SYSTOLIC BLOOD PRESSURE: 126 MMHG | WEIGHT: 173.8 LBS | DIASTOLIC BLOOD PRESSURE: 76 MMHG | HEART RATE: 82 BPM | HEIGHT: 68 IN | RESPIRATION RATE: 18 BRPM | OXYGEN SATURATION: 98 %

## 2024-03-05 DIAGNOSIS — F03.A11 MILD DEMENTIA WITH AGITATION, UNSPECIFIED DEMENTIA TYPE (HCC): Primary | ICD-10-CM

## 2024-03-05 PROCEDURE — 3078F DIAST BP <80 MM HG: CPT | Performed by: STUDENT IN AN ORGANIZED HEALTH CARE EDUCATION/TRAINING PROGRAM

## 2024-03-05 PROCEDURE — 99214 OFFICE O/P EST MOD 30 MIN: CPT | Performed by: STUDENT IN AN ORGANIZED HEALTH CARE EDUCATION/TRAINING PROGRAM

## 2024-03-05 PROCEDURE — 3074F SYST BP LT 130 MM HG: CPT | Performed by: STUDENT IN AN ORGANIZED HEALTH CARE EDUCATION/TRAINING PROGRAM

## 2024-03-05 PROCEDURE — 1123F ACP DISCUSS/DSCN MKR DOCD: CPT | Performed by: STUDENT IN AN ORGANIZED HEALTH CARE EDUCATION/TRAINING PROGRAM

## 2024-03-05 RX ORDER — MEMANTINE HYDROCHLORIDE 5 MG/1
5 TABLET ORAL 2 TIMES DAILY
Qty: 60 TABLET | Refills: 5 | Status: SHIPPED | OUTPATIENT
Start: 2024-03-05

## 2024-03-05 NOTE — PROGRESS NOTES
(NORCO) 5-325 MG per tablet Take 1 tablet by mouth every 6 hours as needed.      amitriptyline (ELAVIL) 10 MG tablet 1 tab(s) orally once a day (at bedtime)      rosuvastatin (CRESTOR) 5 MG tablet Take 1 tablet by mouth at bedtime (replacing simvastatin) 90 tablet 1    busPIRone (BUSPAR) 10 MG tablet TAKE ONE TABLET BY MOUTH TWICE DAILY 180 tablet 1    lisinopril (PRINIVIL;ZESTRIL) 20 MG tablet TAKE ONE TABLET BY MOUTH EVERY DAY 90 tablet 1    FLUoxetine (PROZAC) 40 MG capsule Take 1 capsule by mouth daily 90 capsule 1    memantine (NAMENDA) 5 MG tablet Take 1 tablet by mouth 2 times daily 60 tablet 5    B-D UF III MINI PEN NEEDLES 31G X 5 MM MISC Use 1 new pen needle each time to inject insulin subcutaneously 4 times daily 100 each 1    Continuous Blood Gluc  (FREESTYLE ELIE 2 READER) WADE 1 each by Does not apply route Over 24 hours      Calcium Carbonate-Vitamin D 600-3.125 MG-MCG TABS Take by mouth daily       No current facility-administered medications for this visit.          Physical Exam  Constitutional:       Appearance: Normal appearance.   HENT:      Head: Normocephalic and atraumatic.      Mouth/Throat:      Mouth: Mucous membranes are moist.      Pharynx: Oropharynx is clear. No oropharyngeal exudate.   Eyes:      Extraocular Movements: Extraocular movements intact.      Pupils: Pupils are equal, round, and reactive to light.   Pulmonary:      Effort: Pulmonary effort is normal. No respiratory distress.   Musculoskeletal:         General: Normal range of motion.      Cervical back: Normal range of motion and neck supple.      Right lower leg: No edema.      Left lower leg: No edema.   Neurological:   Mental status: Awake, alert, oriented to self,place; oriented to day/month/date/year; knows the president.  Follows simple and complex commands, no neglect, no extinction.  Registration is 3/3; and delayed recall 0/3.  Speech and languge: fluent, coherent,and comprehension intact  CN: VFF, EOMI,

## 2024-03-08 ENCOUNTER — TELEPHONE (OUTPATIENT)
Facility: CLINIC | Age: 74
End: 2024-03-08

## 2024-03-15 ENCOUNTER — PHARMACY VISIT (OUTPATIENT)
Facility: CLINIC | Age: 74
End: 2024-03-15

## 2024-03-15 VITALS — WEIGHT: 168.6 LBS | BODY MASS INDEX: 25.64 KG/M2

## 2024-03-15 DIAGNOSIS — Z79.4 TYPE 2 DIABETES MELLITUS WITH DIABETIC POLYNEUROPATHY, WITH LONG-TERM CURRENT USE OF INSULIN (HCC): ICD-10-CM

## 2024-03-15 DIAGNOSIS — E11.42 TYPE 2 DIABETES MELLITUS WITH DIABETIC POLYNEUROPATHY, WITH LONG-TERM CURRENT USE OF INSULIN (HCC): ICD-10-CM

## 2024-03-15 RX ORDER — TRAZODONE HYDROCHLORIDE 100 MG/1
100 TABLET ORAL NIGHTLY
COMMUNITY
Start: 2024-03-14

## 2024-03-15 RX ORDER — INSULIN GLARGINE 100 [IU]/ML
23 INJECTION, SOLUTION SUBCUTANEOUS DAILY
Qty: 15 ML | Refills: 3 | Status: SHIPPED
Start: 2024-03-15

## 2024-03-15 NOTE — PATIENT INSTRUCTIONS
-Increase Lantus to 23 units in the morning  -Reduce regular soda and potato intake   -Switch to zero sugar or diet soda   -Scan Ana 2 at least 5 times daily   first thing in the morning   breakfast  lunch   dinner  bedtime   -Also scan if you get up in the middle of the night for any reason

## 2024-03-15 NOTE — PROGRESS NOTES
Pharmacy Progress Note - Diabetes Management Follow up       Assessment / Plan:   Diabetes Management:  Per ADA guidelines, Pt's A1c is not at goal of < 7%.  Patient's most recent A1c was  9.0% on 1/3/24, which has significantly improved from previous A1c of 13.9% on 10/3/23.  Patient's BG has been elevated due to changes in diet, eating more potatoes and drinking regular soda. He has increased his dose to 20 units daily, but did not seem to help lower his BG, so further increase is needed. Will increase Lantus to 23 units in the morning and pt will work on improving diet. Discussed switching to diet or zero sugar soda, reducing portions and frequency of potato consumption. Patient was preciously on 30 units prior to decreased appetite from stressors, so it is likely that his insulin may need increased further now that the patient's appetite has returned. There are gaps in the Ana data because the patient is not scanning enough to create continuous data. Instructed pt to monitor at least 5 times per day: morning, breakfast, lunch, dinner, and bedtime. Also discussed that if he gets up in the middle of the night he can check it then. Patient agreeable to plan. Will follow up in 4 weeks to assess CGM data, dietary changes, and insulin adjustment.     Medication therapy changes:   - Increase Lantus to 23 units daily     Medication list update:  Patient is now taking trazodone 100 mg instead of 50 mg. Updated patient's medication list to reflect change in medication dose.     Comments to Anitha Lara APRN - CNP:    - They have not taken the oxygen unit out of his house yet - tried to call them and left messages but has not heard a response yet      Nutrition/Lifestyle Modifications:  - Educated pt on the importance of moderating carbohydrate intake. Reviewed sources of carbohydrates and method to help determine appropriate portion sizes (e.g., Diabetes Plate Method).  - Advised patient to avoid sugar-sweetened

## 2024-03-28 DIAGNOSIS — I10 ESSENTIAL (PRIMARY) HYPERTENSION: ICD-10-CM

## 2024-03-29 RX ORDER — LISINOPRIL 20 MG/1
20 TABLET ORAL DAILY
Qty: 90 TABLET | Refills: 1 | Status: SHIPPED | OUTPATIENT
Start: 2024-03-29

## 2024-03-29 RX ORDER — ROSUVASTATIN CALCIUM 5 MG/1
5 TABLET, COATED ORAL NIGHTLY
Qty: 90 TABLET | Refills: 1 | Status: SHIPPED | OUTPATIENT
Start: 2024-03-29

## 2024-04-02 ENCOUNTER — TELEPHONE (OUTPATIENT)
Facility: CLINIC | Age: 74
End: 2024-04-02

## 2024-04-03 ENCOUNTER — TELEPHONE (OUTPATIENT)
Facility: CLINIC | Age: 74
End: 2024-04-03

## 2024-04-03 NOTE — TELEPHONE ENCOUNTER
----- Message from Annie Vigil sent at 4/3/2024  1:37 PM EDT -----  Subject: Message to Provider    QUESTIONS  Information for Provider? PATIENT IS CALLING BECAUSE AN[employer] TOOK   HIM OFF OF HIS O2 USE AT HOME AND THE UNITS ARE STILL SITTING THERE, NO   ONE HAS COME TO PICK THEM UP. HE DOES NOT KNOW WHO TO CALL TO COME GET   THEM  ---------------------------------------------------------------------------  --------------  CALL BACK INFO  [phone]; OK to leave message on voicemail  ---------------------------------------------------------------------------  --------------  SCRIPT ANSWERS  Relationship to Patient? Self

## 2024-04-03 NOTE — TELEPHONE ENCOUNTER
A rep from [facility], named  Fideliaharsh called to let them know he was unable to remember 3 words at the beginning of the call.

## 2024-04-05 ENCOUNTER — TELEPHONE (OUTPATIENT)
Facility: CLINIC | Age: 74
End: 2024-04-05

## 2024-04-05 NOTE — TELEPHONE ENCOUNTER
Spoke with a representative at [employer] on 4/5/24 at [phone] about having the oxygen picked up from patients home. Representative stated that the patient can call saying that he wants it picked up and will have to fill out a AMA form. Letter also re-faxed on 4/5/24 to company at [phone] with confirmation that it was completed.    Spoke with patient on 4/5/24 at 2430 giving patient the number to [employer] so he can call and have the oxygen picked up.

## 2024-04-09 NOTE — TELEPHONE ENCOUNTER
Eduardo Mcgarry LPN advised patient that she called oxygen company again and requested they  oxygen machinery from patient home. Also Jennifer faxed a letter to oxygen company that oxygen had been discontinued.

## 2024-04-12 ENCOUNTER — TELEPHONE (OUTPATIENT)
Facility: CLINIC | Age: 74
End: 2024-04-12

## 2024-04-12 ENCOUNTER — PHARMACY VISIT (OUTPATIENT)
Facility: CLINIC | Age: 74
End: 2024-04-12

## 2024-04-12 DIAGNOSIS — E11.42 TYPE 2 DIABETES MELLITUS WITH DIABETIC POLYNEUROPATHY, WITH LONG-TERM CURRENT USE OF INSULIN (HCC): Primary | ICD-10-CM

## 2024-04-12 DIAGNOSIS — Z79.4 TYPE 2 DIABETES MELLITUS WITH DIABETIC POLYNEUROPATHY, WITH LONG-TERM CURRENT USE OF INSULIN (HCC): Primary | ICD-10-CM

## 2024-04-12 RX ORDER — BLOOD-GLUCOSE SENSOR
1 EACH MISCELLANEOUS
Qty: 2 EACH | Refills: 11 | Status: SHIPPED | OUTPATIENT
Start: 2024-04-12

## 2024-04-12 RX ORDER — BLOOD-GLUCOSE,RECEIVER,CONT
1 EACH MISCELLANEOUS CONTINUOUS
Qty: 1 EACH | Refills: 0 | Status: SHIPPED | OUTPATIENT
Start: 2024-04-12

## 2024-04-12 RX ORDER — SENNOSIDES 8.6 MG
1300 CAPSULE ORAL 2 TIMES DAILY
COMMUNITY

## 2024-04-12 NOTE — TELEPHONE ENCOUNTER
Called patient to follow up on concern for falls.   States he has fallen three times this week getting out of bed to go to the bathroom. States his legs and arms feel like noodles, he could not get off the floor and laid there for about an hour. He had to crawl to the living room to pull himself up off the floor, states the reciner fell on top of him and he was lying on the floor for about an hour.   Eventually he got enough strength to push himself up.   He was incontinent while lying on the floor.   He fell three times in one day.  He went to the dentist and got weak and dizzy, so the procedure was held. This was on Wednesday, the same day he fell three times.   States the psychiatrist upped his dose of trazodone from 50mg to 100 mg.     Advised to go to ED for further evaluation, states he has a phobia of the ED because of the waiting and his anxiety.   He will call psych Brittni Cochran to see if she wants to make any medication changes that could help him.     Appointment made to see patient on monday

## 2024-04-12 NOTE — PATIENT INSTRUCTIONS
Continue 23 units of Lantus in the morning    Contact psychiatrist to make appointment regarding increased falls since increasing sleep medications

## 2024-04-12 NOTE — PROGRESS NOTES
Pharmacy Progress Note - Diabetes Management Follow up       Assessment / Plan:   Diabetes Management:  Per ADA guidelines, Pt's A1c is not at goal of < 7%.  Patient's most recent A1c was  9.0% on 1/3/24, which has significantly improved from previous A1c of 13.9% on 10/3/23. Patient presents to appointment today with aid, Brielle and sister, Sylvia was on the phone. Patient seemed very overwhelmed, anxious, and stressed during appointment. Patient's reader has not been registering his scans, so LibreView data stops 4/6 which is likely when he changed his sensor. Based on the data shown, his BG has worsened since last visit, with large spikes in BG and post prandial hyperglycemia. Patient reports he took his insulin daily, but his Ana data shows some days where his baseline BG is higher than other days indicating he may have either missed a dose or was not compliant with diabetes diet. No changes made to insulin at this time because though patient reports adherence, he may be forgetting to take insulin and not realize. Sent Rx for Ana 3 sensor and reader to upgrade system to help with adherence and reduce stress/anxiety burden of scanning sensor multiple times daily. Will go back to 2 week in person follow ups and a telephone call between appointments to check in with patient weekly.     Medication therapy changes:   - Continue 23 units of Lantus every morning  - Upgrade to Ana 3 sensor and reader     Recommendations to Anitha Lara APRN - CNP:    - Patient is having falls and increased confusion, recommended him to follow up with you next week for assessment.   - Patient is on 2 medications for acid reflux (famotidine and omeprazole). Recommend assessing to see if both medications are necessary.  - Recommend getting updated lab work to assess electrolytes, kidney function, liver function.     Falls:  Patient reports that he has been having falls recently in the middle of the night, which may be due

## 2024-04-12 NOTE — TELEPHONE ENCOUNTER
Patient stated that he has fallen these last two nights and his legs became very weak.    There is no available slots before the middle of May for your schedule    He has an appt with Daisy today.

## 2024-04-19 ENCOUNTER — PHARMACY VISIT (OUTPATIENT)
Facility: CLINIC | Age: 74
End: 2024-04-19

## 2024-04-19 ENCOUNTER — CLINICAL DOCUMENTATION (OUTPATIENT)
Facility: CLINIC | Age: 74
End: 2024-04-19

## 2024-04-19 DIAGNOSIS — E11.42 TYPE 2 DIABETES MELLITUS WITH DIABETIC POLYNEUROPATHY, WITH LONG-TERM CURRENT USE OF INSULIN (HCC): Primary | ICD-10-CM

## 2024-04-19 DIAGNOSIS — Z79.4 TYPE 2 DIABETES MELLITUS WITH DIABETIC POLYNEUROPATHY, WITH LONG-TERM CURRENT USE OF INSULIN (HCC): Primary | ICD-10-CM

## 2024-04-19 NOTE — PROGRESS NOTES
(Risk Rating C: monitor)  Lantus + Lisinopril - Angiotensin-Converting Enzyme Inhibitors may enhance the hypoglycemic effect of Agents with Blood Glucose Lowering Effects. (Risk Rating B: no action needed)    Medication Adherence/Access:  - Endorses adherence to current regimen?: Yes     Complete current medication regimen includes:  Current Outpatient Medications   Medication Sig    acetaminophen (TYLENOL 8 HOUR ARTHRITIS PAIN) 650 MG extended release tablet Take 2 tablets by mouth in the morning and at bedtime    Continuous Blood Gluc Sensor (FREESTYLE ELIE 3 SENSOR) MISC 1 each by Does not apply route every 14 days Use to monitor blood sugar, change sensor every 14 days    Continuous Blood Gluc  (FREESTYLE ELIE 3 READER) WADE 1 each by Does not apply route continuous Use to monitor BG daily    lisinopril (PRINIVIL;ZESTRIL) 20 MG tablet TAKE ONE TABLET BY MOUTH EVERY DAY    rosuvastatin (CRESTOR) 5 MG tablet TAKE ONE TABLET BY MOUTH AT BEDTIME    traZODone (DESYREL) 100 MG tablet Take 1 tablet by mouth nightly    insulin glargine (LANTUS SOLOSTAR) 100 UNIT/ML injection pen Inject 23 Units into the skin daily    memantine (NAMENDA) 5 MG tablet Take 1 tablet by mouth 2 times daily    mirtazapine (REMERON) 7.5 MG tablet Take 2 tablets by mouth nightly Increased by psychiatrist    pioglitazone (ACTOS) 30 MG tablet TAKE ONE TABLET BY MOUTH EVERY DAY    famotidine (PEPCID) 40 MG tablet TAKE ONE TABLET BY MOUTH EVERY DAY    amLODIPine (NORVASC) 10 MG tablet TAKE ONE TABLET BY MOUTH EVERY DAY    tamsulosin (FLOMAX) 0.4 MG capsule Take 1 capsule by mouth 2 times daily (with meals)    JANUVIA 100 MG tablet TAKE ONE TABLET BY MOUTH EVERY DAY    Continuous Blood Gluc Sensor (FREESTYLE ELIE 2 SENSOR) MISC Change sensor every 14 days, scan at least 5 times a day.    omeprazole (PRILOSEC) 20 MG delayed release capsule TAKE TWO CAPSULES BY MOUTH EVERY DAY    amitriptyline (ELAVIL) 10 MG tablet 1 tab(s) orally once a

## 2024-04-19 NOTE — PROGRESS NOTES
Called Bobbi Cochran's office at [phone] to speak with provider regarding medication list for patient. The office closed at 1pm today. Left a message for call to be returned to office number at [phone] and also provided work cell phone number.     Thank you,  Daisy Alvarez, PharmD  Clinical Pharmacist Specialist  4/19/24    For Pharmacy Admin Tracking Only    Program: Medical Group  CPA in place:  Yes  Time Spent (min): 5

## 2024-04-25 DIAGNOSIS — Z79.4 TYPE 2 DIABETES MELLITUS WITH DIABETIC POLYNEUROPATHY, WITH LONG-TERM CURRENT USE OF INSULIN (HCC): ICD-10-CM

## 2024-04-25 DIAGNOSIS — E11.42 TYPE 2 DIABETES MELLITUS WITH DIABETIC POLYNEUROPATHY, WITH LONG-TERM CURRENT USE OF INSULIN (HCC): ICD-10-CM

## 2024-04-25 RX ORDER — INSULIN GLARGINE 100 [IU]/ML
INJECTION, SOLUTION SUBCUTANEOUS
Qty: 15 ML | Refills: 3 | Status: SHIPPED | OUTPATIENT
Start: 2024-04-25 | End: 2024-04-26 | Stop reason: SDUPTHER

## 2024-04-26 ENCOUNTER — OFFICE VISIT (OUTPATIENT)
Facility: CLINIC | Age: 74
End: 2024-04-26

## 2024-04-26 ENCOUNTER — CLINICAL DOCUMENTATION (OUTPATIENT)
Facility: CLINIC | Age: 74
End: 2024-04-26

## 2024-04-26 ENCOUNTER — PHARMACY VISIT (OUTPATIENT)
Facility: CLINIC | Age: 74
End: 2024-04-26

## 2024-04-26 VITALS
TEMPERATURE: 100.2 F | BODY MASS INDEX: 25.46 KG/M2 | HEIGHT: 68 IN | HEART RATE: 96 BPM | OXYGEN SATURATION: 96 % | WEIGHT: 168 LBS | SYSTOLIC BLOOD PRESSURE: 94 MMHG | RESPIRATION RATE: 16 BRPM | DIASTOLIC BLOOD PRESSURE: 67 MMHG

## 2024-04-26 DIAGNOSIS — E11.42 TYPE 2 DIABETES MELLITUS WITH DIABETIC POLYNEUROPATHY, WITH LONG-TERM CURRENT USE OF INSULIN (HCC): ICD-10-CM

## 2024-04-26 DIAGNOSIS — Z79.4 TYPE 2 DIABETES MELLITUS WITH DIABETIC POLYNEUROPATHY, WITH LONG-TERM CURRENT USE OF INSULIN (HCC): ICD-10-CM

## 2024-04-26 DIAGNOSIS — R29.6 FALLS FREQUENTLY: Primary | ICD-10-CM

## 2024-04-26 RX ORDER — INSULIN GLARGINE 100 [IU]/ML
26 INJECTION, SOLUTION SUBCUTANEOUS DAILY
Qty: 15 ML | Refills: 3 | Status: SHIPPED | OUTPATIENT
Start: 2024-04-26

## 2024-04-26 ASSESSMENT — PATIENT HEALTH QUESTIONNAIRE - PHQ9
SUM OF ALL RESPONSES TO PHQ9 QUESTIONS 1 & 2: 6
SUM OF ALL RESPONSES TO PHQ QUESTIONS 1-9: 21
9. THOUGHTS THAT YOU WOULD BE BETTER OFF DEAD, OR OF HURTING YOURSELF: NOT AT ALL
5. POOR APPETITE OR OVEREATING: NOT AT ALL
4. FEELING TIRED OR HAVING LITTLE ENERGY: NEARLY EVERY DAY
SUM OF ALL RESPONSES TO PHQ QUESTIONS 1-9: 21
SUM OF ALL RESPONSES TO PHQ QUESTIONS 1-9: 21
8. MOVING OR SPEAKING SO SLOWLY THAT OTHER PEOPLE COULD HAVE NOTICED. OR THE OPPOSITE, BEING SO FIGETY OR RESTLESS THAT YOU HAVE BEEN MOVING AROUND A LOT MORE THAN USUAL: NEARLY EVERY DAY
6. FEELING BAD ABOUT YOURSELF - OR THAT YOU ARE A FAILURE OR HAVE LET YOURSELF OR YOUR FAMILY DOWN: NEARLY EVERY DAY
7. TROUBLE CONCENTRATING ON THINGS, SUCH AS READING THE NEWSPAPER OR WATCHING TELEVISION: NEARLY EVERY DAY
10. IF YOU CHECKED OFF ANY PROBLEMS, HOW DIFFICULT HAVE THESE PROBLEMS MADE IT FOR YOU TO DO YOUR WORK, TAKE CARE OF THINGS AT HOME, OR GET ALONG WITH OTHER PEOPLE: EXTREMELY DIFFICULT
1. LITTLE INTEREST OR PLEASURE IN DOING THINGS: NEARLY EVERY DAY
SUM OF ALL RESPONSES TO PHQ QUESTIONS 1-9: 21
2. FEELING DOWN, DEPRESSED OR HOPELESS: NEARLY EVERY DAY
3. TROUBLE FALLING OR STAYING ASLEEP: NEARLY EVERY DAY

## 2024-04-26 NOTE — PROGRESS NOTES
[address] [place] [id number]               [phone]      Anshul Smiley is a 74 y.o. male and presents with Follow-up       Assessment/Plan:    1. Falls frequently     About 2 weeks ago, patient had two falls in the morning  This happened after having his trazodone increased by his psychiatrist  States he has not fallen since then  Reviewed his medications, remeron is discontinued  States he is sleeping better  Demonstrated so leg strengthening exercises he can perform at home  Declines HH PT at this time  Patient verbalized understanding and is in agreement with this plan of care      Follow up and disposition:   Return for keep previously scheduled follow up.      Subjective:    Labs obtained prior to visit? No  Reviewed with patient? N/A    Falls  States his legs start feeling like \"noodles\"  Fell on the floor and took about an hour to get up  This happened about 2 weeks ago  The next day, the same thing happened  Has not had anymore falls  States the falls started after his trazodone was increased for sleep  He cannot get an appointment with the psychiatrist for follow up on discussions on his medication and falls  He is looking into getting a referral to a new psychiatrist    ROS:     Review of Systems  As stated in HPI, otherwise all others negative.       The problem list was updated as a part of today's visit.  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

## 2024-04-26 NOTE — PROGRESS NOTES
\"Have you been to the ER, urgent care clinic since your last visit?  Hospitalized since your last visit?\"    NO    “Have you seen or consulted any other health care providers outside of [facility] since your last visit?”    NO            Click Here for Release of Records Request

## 2024-04-26 NOTE — PROGRESS NOTES
supplemental oxygen, Depression, Diabetes mellitus, type II ([facility]), Drug abuse ([facility]), ED (erectile dysfunction), Fatigue, GERD (gastroesophageal reflux disease), H/O epididymitis, H/O: pneumonia, Hep C w/o coma, chronic ([facility]), History of BPH, History of hematuria, History of tobacco use, Hypertension, Loss of appetite, Loss of balance, Microcytic anemia, Neuropathy, Pancytopenia ([facility]), Paranoid schizophrenia, chronic condition with acute exacerbation ([facility]), Prostate cancer ([facility]), PUD (peptic ulcer disease), SOB (shortness of breath), Status post partial gastrectomy, Stroke ([facility]), Type 2 diabetes mellitus with hyperglycemia, with long-term current use of insulin ([facility]), and Uncircumcised male.  Pt was seen today for diabetes management.      In the past, the patient has tried the following:      Medication Dose Reason for discontinuation  Notes   Metformin   Diarrhea and GI upset      Novolog   Therapy change                  Patient denies personal/family history of thyroid cancer, reports history of pancreatitis (> 10 years ago), and reports issues with recurrent yeast infections (likely due to high BG).     Patient's last A1c was:   Hemoglobin A1C   Date Value Ref Range Status   01/03/2024 9.0 (H) 4.8 - 5.6 % Final     Comment:                 Prediabetes: 5.7 - 6.4           Diabetes: >6.4           Glycemic control for adults with diabetes: <7.0       Hemoglobin A1C, POC   Date Value Ref Range Status   06/23/2020 6.4 % Final       Current anti-hyperglycemic regimen includes:    Key Antihyperglycemic Medications               LANTUS SOLOSTAR 100 UNIT/ML injection pen Inject 19 Units under the skin nightly    pioglitazone (ACTOS) 30 MG tablet TAKE ONE TABLET BY MOUTH EVERY DAY    JANUVIA 100 MG tablet TAKE ONE TABLET BY MOUTH EVERY DAY            Interim update: Pt was last seen by me on 4/19/2024.  Per my prior note: Appointment was supposed to be a telephone encounter, however patient came in person. Patient

## 2024-04-26 NOTE — PROGRESS NOTES
Contacted [facility] ([phone]) to inquire about Ana 3 sensors and reader. She states that they both need a PA. His next cycle is due 4/9/24. Asked her to hold off on filling the Ana 2 sensors because hopefull the Ana 3 sensors/reader will be approved before new cycle.     Also checked to make sure pt has not had amitriptyline or mirtazapine has not been filled, and she confirmed they have not been filled in a while and pt is not currently on.     Attempted to submit to insurance card on file ([employer]), but eligibility check said there was not an account. Will contact pharmacy to verify prescription insurance coverage and submit PA.      Thank you,   Daisy Alvarez, PharmD  Clinical Pharmacist Specialist  4/26/24      For Pharmacy Admin Tracking Only    Program: Medical Group  CPA in place:  Yes  Time Spent (min): 10    
56M no anticoagulation, pw left flank ecchymosis and tenderness sp mechanical fall down 3 steps, concerning for posterior rib fracture, clear bilateral breath sounds. No chest pain, no headache, no cervical midline tenderness. Pain control and CXR, CT AP.

## 2024-04-29 ENCOUNTER — OFFICE VISIT (OUTPATIENT)
Age: 74
End: 2024-04-29
Payer: MEDICARE

## 2024-04-29 VITALS
DIASTOLIC BLOOD PRESSURE: 75 MMHG | HEIGHT: 69 IN | HEART RATE: 98 BPM | OXYGEN SATURATION: 97 % | SYSTOLIC BLOOD PRESSURE: 110 MMHG | WEIGHT: 163.8 LBS | BODY MASS INDEX: 24.26 KG/M2 | TEMPERATURE: 97 F | RESPIRATION RATE: 18 BRPM

## 2024-04-29 DIAGNOSIS — R29.818 SUSPECTED SLEEP APNEA: ICD-10-CM

## 2024-04-29 DIAGNOSIS — R06.83 SNORING: ICD-10-CM

## 2024-04-29 DIAGNOSIS — G47.63 BRUXISM, SLEEP-RELATED: ICD-10-CM

## 2024-04-29 DIAGNOSIS — I10 ESSENTIAL HYPERTENSION WITH GOAL BLOOD PRESSURE LESS THAN 130/80: ICD-10-CM

## 2024-04-29 DIAGNOSIS — Z79.4 TYPE 2 DIABETES MELLITUS WITH DIABETIC POLYNEUROPATHY, WITH LONG-TERM CURRENT USE OF INSULIN (HCC): ICD-10-CM

## 2024-04-29 DIAGNOSIS — E11.42 TYPE 2 DIABETES MELLITUS WITH DIABETIC POLYNEUROPATHY, WITH LONG-TERM CURRENT USE OF INSULIN (HCC): ICD-10-CM

## 2024-04-29 DIAGNOSIS — G47.19 EXCESSIVE DAYTIME SLEEPINESS: ICD-10-CM

## 2024-04-29 DIAGNOSIS — R35.1 NOCTURIA: ICD-10-CM

## 2024-04-29 DIAGNOSIS — D50.9 MICROCYTIC ANEMIA: ICD-10-CM

## 2024-04-29 DIAGNOSIS — G47.52 REM BEHAVIORAL DISORDER: Primary | ICD-10-CM

## 2024-04-29 DIAGNOSIS — G25.81 RESTLESS LEGS SYNDROME (RLS): ICD-10-CM

## 2024-04-29 DIAGNOSIS — F03.A11 MILD DEMENTIA WITH AGITATION, UNSPECIFIED DEMENTIA TYPE (HCC): ICD-10-CM

## 2024-04-29 PROCEDURE — 3078F DIAST BP <80 MM HG: CPT | Performed by: OTOLARYNGOLOGY

## 2024-04-29 PROCEDURE — 99203 OFFICE O/P NEW LOW 30 MIN: CPT | Performed by: OTOLARYNGOLOGY

## 2024-04-29 PROCEDURE — 1123F ACP DISCUSS/DSCN MKR DOCD: CPT | Performed by: OTOLARYNGOLOGY

## 2024-04-29 PROCEDURE — 3074F SYST BP LT 130 MM HG: CPT | Performed by: OTOLARYNGOLOGY

## 2024-04-29 PROCEDURE — 3052F HG A1C>EQUAL 8.0%<EQUAL 9.0%: CPT | Performed by: OTOLARYNGOLOGY

## 2024-04-29 ASSESSMENT — PATIENT HEALTH QUESTIONNAIRE - PHQ9
SUM OF ALL RESPONSES TO PHQ QUESTIONS 1-9: 2
SUM OF ALL RESPONSES TO PHQ QUESTIONS 1-9: 2
2. FEELING DOWN, DEPRESSED OR HOPELESS: MORE THAN HALF THE DAYS
SUM OF ALL RESPONSES TO PHQ QUESTIONS 1-9: 2
SUM OF ALL RESPONSES TO PHQ QUESTIONS 1-9: 2

## 2024-04-29 ASSESSMENT — SLEEP AND FATIGUE QUESTIONNAIRES
HOW LIKELY ARE YOU TO NOD OFF OR FALL ASLEEP WHILE SITTING INACTIVE IN A PUBLIC PLACE: WOULD NEVER DOZE
HOW LIKELY ARE YOU TO NOD OFF OR FALL ASLEEP WHILE LYING DOWN TO REST IN THE AFTERNOON WHEN CIRCUMSTANCES PERMIT: HIGH CHANCE OF DOZING
HOW LIKELY ARE YOU TO NOD OFF OR FALL ASLEEP WHILE SITTING AND TALKING TO SOMEONE: WOULD NEVER DOZE
HOW LIKELY ARE YOU TO NOD OFF OR FALL ASLEEP WHILE SITTING QUIETLY AFTER LUNCH WITHOUT ALCOHOL: HIGH CHANCE OF DOZING
HOW LIKELY ARE YOU TO NOD OFF OR FALL ASLEEP WHILE WATCHING TV: MODERATE CHANCE OF DOZING
HOW LIKELY ARE YOU TO NOD OFF OR FALL ASLEEP WHILE SITTING AND READING: WOULD NEVER DOZE
NECK CIRCUMFERENCE (INCHES): 13
ESS TOTAL SCORE: 11
HOW LIKELY ARE YOU TO NOD OFF OR FALL ASLEEP IN A CAR, WHILE STOPPED FOR A FEW MINUTES IN TRAFFIC: WOULD NEVER DOZE
HOW LIKELY ARE YOU TO NOD OFF OR FALL ASLEEP WHEN YOU ARE A PASSENGER IN A CAR FOR AN HOUR WITHOUT A BREAK: HIGH CHANCE OF DOZING

## 2024-04-29 NOTE — PATIENT INSTRUCTIONS
Please make a follow up appointment to discuss the results of your sleep study. If this is impossible for some reason, please send me a \"My Chart\" message so that I may get back with you in a timely manner.    The [facility] Lab is located in the [facility], adjacent to [facility]. The lab is on the second floor. The direct number to call for sleep study related questions is: [phone].    Please call our clinic back at [phone] or send a message on DEONTICS if you have any questions or concerns or if you are experiencing any of the following:     You have not received a follow up appointment within 30 days prior the recommended follow up time.    If you are not tolerating treatment plan and/or not able to obtain equipment or prescribed medication(s).  if you are experiencing any difficulties with the Durable Medical Equipment  (DME) Company you may be using or is assigned to you.  Two weeks have passed and you have not received an appointment for a scheduled procedure.  Two weeks have passed since you underwent a test and/or procedure and you have not received your results.     If you are using a CPAP/BIPAP, or Home Ventilator Device- Please note the following.  Currently, many DMEs are experiencing supply chain difficulties and orders for equipment may be back logged several weeks.     Your  Durable Medical Equipment (DME ) company is supposed to provide you with replacement filters, tubing and masks. You can either call your DME when you need new supplies or you can arrange for an automatic shipment schedule.    Your need to be seen by our office at lat minimum of every 12 months in order to renew the prescription for these supplies.   Please make note of who your DME company is and their phone number.   Please make sure that you clean your mask and hosing on a regular basis.  Your DME can provide you with additional information regarding proper care and cleaning of your

## 2024-04-29 NOTE — ASSESSMENT & PLAN NOTE
Possible, needs to be evaluated with PSG as discussed below.  Also may have a history of Parkinson's .

## 2024-04-29 NOTE — ASSESSMENT & PLAN NOTE
Monitored by specialist- no acute findings meriting change in the plan, takes Namenda. Needs to continue to f/u with neurology.   Has dementia as was diagnosed in 2022 and possibly has Parkinson's per notes from neurology.

## 2024-04-29 NOTE — ASSESSMENT & PLAN NOTE
Uncontrolled, continue current medications. Last A1C was 9.0 in January, certainly not under control. Needs to f/u with PCP and continue his insulin as previously recommended and also continue his Januvia.

## 2024-04-29 NOTE — PROGRESS NOTES
Anshul Smiley presents today for   Chief Complaint   Patient presents with    Snoring    Sleep Problem       Is someone accompanying this pt? Yes, caretaker     Is the patient using any DME equipment during OV? Yes, cane    -DME Company: N/A    Have you ever had a sleep study done before? no    Depression Screenin/29/2024     9:32 AM   PHQ-9    Feeling down, depressed, or hopeless 2   PHQ-9 Total Score 2        [place] Sleepiness Scale:      2024     9:37 AM   Sleep Medicine   Sitting and reading 0   Watching TV 2   Sitting, inactive in a public place (e.g. a theatre or a meeting) 0   As a passenger in a car for an hour without a break 3   Lying down to rest in the afternoon when circumstances permit 3   Sitting and talking to someone 0   Sitting quietly after a lunch without alcohol 3   In a car, while stopped for a few minutes in traffic 0   [place] Sleepiness Score 11   Neck circumference (Inches) 13       Stop-Ban/29/2024     9:00 AM   STOP-BANG QUESTIONNAIRE   Are you a loud and/or regular snorer? 1   Do you often feel tired or groggy upon awakening or do you awaken with a headache? 1   Have you been observed to gasp or stop breathing during sleep? 0   Are you often tired or fatigued during wake time hours?  0   Do you fall asleep sitting, reading, watching TV or driving? 0   Do you often have problems with memory or concentration? 0   Do you have or are you being treated for high blood pressure? 1   Recent BMI (Calculated) 25.6   Is BMI greater than 35 kg/m2? 0=No   Age older than 50 years old? 1=Yes   Is your neck circumference greater than 17 inches (Male) or 16 inches (Female)? 0   Gender - Male 1=Yes   STOP-Bang Total Score 30.6         Coordination of Care:  1. Have you been to the ER, urgent care clinic since your last visit? Hospitalized since your last visit? no    2. Have you seen or consulted any other health care providers outside of the [facility] since your 
FIXATION      x 2    ORTHOPEDIC SURGERY  2012    Numerous surgeries on rt. foot    OTHER SURGICAL HISTORY      removed testicle    TESTICLE REMOVAL Left 2013    UPPER GASTROINTESTINAL ENDOSCOPY  06/23/2015    [facility], EGD W/ BIOPSY , COLONOSCOPY , Surgeon: Bernard Fitzgerald MD    UROLOGICAL SURGERY  09/08/2021    PNBx, [address], Dr. Johnson, [facility]       Family History   Problem Relation Age of Onset    Hypertension Mother     Cancer Mother     Diabetes Mother     Breast Cancer Daughter     Diabetes Maternal Grandfather     Hypertension Maternal Grandmother     Diabetes Maternal Grandmother     Osteoarthritis Maternal Grandmother      Objective:     Vitals:    Weight BMI   Wt Readings from Last 3 Encounters:   04/29/24 74.3 kg (163 lb 12.8 oz)   04/26/24 76.2 kg (168 lb)   03/15/24 76.5 kg (168 lb 9.6 oz)    Body mass index is 24.19 kg/m².     BP HR SaO2   BP Readings from Last 3 Encounters:   04/29/24 110/75   04/26/24 94/67   03/05/24 126/76    Pulse Readings from Last 3 Encounters:   04/29/24 98   04/26/24 96   03/05/24 82    SpO2 Readings from Last 3 Encounters:   04/29/24 97%   04/26/24 96%   03/05/24 98%          Physical Exam  Constitutional:       Appearance: Normal appearance.   HENT:      Head: Normocephalic and atraumatic.   Eyes:      Extraocular Movements: Extraocular movements intact.      Conjunctiva/sclera: Conjunctivae normal.      Pupils: Pupils are equal, round, and reactive to light.   Cardiovascular:      Rate and Rhythm: Normal rate.      Heart sounds: No murmur heard.     No gallop.   Pulmonary:      Effort: Pulmonary effort is normal.      Breath sounds: No wheezing, rhonchi or rales.   Neurological:      General: No focal deficit present.      Mental Status: He is alert and oriented to person, place, and time. Mental status is at baseline.   Psychiatric:         Mood and Affect: Mood normal.         Behavior: Behavior normal.       The mandibular molar Class :   [x]1 []2 []3        Mallampati I

## 2024-05-03 ENCOUNTER — SCHEDULED TELEPHONE ENCOUNTER (OUTPATIENT)
Facility: CLINIC | Age: 74
End: 2024-05-03

## 2024-05-03 DIAGNOSIS — Z79.4 TYPE 2 DIABETES MELLITUS WITH DIABETIC POLYNEUROPATHY, WITH LONG-TERM CURRENT USE OF INSULIN (HCC): Primary | ICD-10-CM

## 2024-05-03 DIAGNOSIS — E11.42 TYPE 2 DIABETES MELLITUS WITH DIABETIC POLYNEUROPATHY, WITH LONG-TERM CURRENT USE OF INSULIN (HCC): Primary | ICD-10-CM

## 2024-05-03 RX ORDER — BLOOD-GLUCOSE SENSOR
1 EACH MISCELLANEOUS
Qty: 2 EACH | Refills: 11 | Status: SHIPPED | OUTPATIENT
Start: 2024-05-03

## 2024-05-03 RX ORDER — KETOROLAC TROMETHAMINE 30 MG/ML
1 INJECTION, SOLUTION INTRAMUSCULAR; INTRAVENOUS CONTINUOUS
Qty: 1 EACH | Refills: 0 | Status: SHIPPED | OUTPATIENT
Start: 2024-05-03

## 2024-05-03 NOTE — PROGRESS NOTES
Pharmacy Progress Note - Diabetes Management Follow up - Telephone       Assessment / Plan:   Diabetes Management:  Per ADA guidelines, Pt's A1c is not at goal of < 7%.  Patient's most recent A1c was  9.0% on 1/3/24, which has significantly improved from previous A1c of 13.9% on 10/3/23. Patient sounds like he is in better spirits today and reports that he is feeling good today. His Ana 2 sensor is still malfunctioning and not even reading anything at all. Patient needs a new reader, however trying to get the Ana 3 reader approved for patient to allow him to use the upgraded system that does not need to be scanned every 8 hours. Patient is still having issues with his memory, so will help to be able to have continuous data from his LibreView to see full BG reports instead of having some missing information. With his current Ana 2 reader giving errors, he is not able to get scans to keep continuous data. He has a history of low BG overnight, so it is crucial for him to have a reader that works and provides continuous data for patient to correct BG if needed and providers to make appropriate adjustments to medication regimen. He reports that he is thirsty all the time and sometimes sweaty, so it is possible that this could be from his blood sugar being high or the warmer weather. Will contact [facility] and insurance about Ana 3 system. Will follow up in 1 week. Hopefully patient will have new Ana system by then and will be able to provide training.    Contacted [facility] and she states the Ana 3 sensors are saying the refill is too soon and the Ana 3 reader is showing it needs a PA. Attempted to contact [email or web address] multiple times and was connected with someone who could not help the first time and the second time got transferred to the medical side speaking with a woman who was rude, but she provided a fax number [phone] for progress notes. Called back again and spoke with the [facility]

## 2024-05-10 ENCOUNTER — PHARMACY VISIT (OUTPATIENT)
Facility: CLINIC | Age: 74
End: 2024-05-10

## 2024-05-10 ENCOUNTER — TELEPHONE (OUTPATIENT)
Facility: CLINIC | Age: 74
End: 2024-05-10

## 2024-05-10 DIAGNOSIS — Z79.4 TYPE 2 DIABETES MELLITUS WITH DIABETIC POLYNEUROPATHY, WITH LONG-TERM CURRENT USE OF INSULIN (HCC): Primary | ICD-10-CM

## 2024-05-10 DIAGNOSIS — E11.42 TYPE 2 DIABETES MELLITUS WITH DIABETIC POLYNEUROPATHY, WITH LONG-TERM CURRENT USE OF INSULIN (HCC): Primary | ICD-10-CM

## 2024-05-10 RX ORDER — TRAZODONE HYDROCHLORIDE 50 MG/1
50 TABLET ORAL NIGHTLY
COMMUNITY
Start: 2024-05-06

## 2024-05-10 RX ORDER — LANCETS 30 GAUGE
1 EACH MISCELLANEOUS 4 TIMES DAILY
Qty: 100 EACH | Refills: 3 | Status: SHIPPED | OUTPATIENT
Start: 2024-05-10

## 2024-05-10 RX ORDER — PEN NEEDLE, DIABETIC 31 GX5/16"
1 NEEDLE, DISPOSABLE MISCELLANEOUS 4 TIMES DAILY
Qty: 100 EACH | Refills: 3 | Status: SHIPPED | OUTPATIENT
Start: 2024-05-10

## 2024-05-10 RX ORDER — GLUCOSAMINE HCL/CHONDROITIN SU 500-400 MG
CAPSULE ORAL
Qty: 100 STRIP | Refills: 3 | Status: SHIPPED | OUTPATIENT
Start: 2024-05-10

## 2024-05-10 NOTE — PROGRESS NOTES
Glucose Sensor (FREESTYLE ELIE 3 SENSOR) MISC 1 each by Does not apply route every 14 days Use to monitor blood. Apply 1 sensor on the back of the upper arm, change sensor every 14 days    insulin glargine (LANTUS SOLOSTAR) 100 UNIT/ML injection pen Inject 26 Units into the skin daily    acetaminophen (TYLENOL 8 HOUR ARTHRITIS PAIN) 650 MG extended release tablet Take 2 tablets by mouth in the morning and at bedtime    lisinopril (PRINIVIL;ZESTRIL) 20 MG tablet TAKE ONE TABLET BY MOUTH EVERY DAY    rosuvastatin (CRESTOR) 5 MG tablet TAKE ONE TABLET BY MOUTH AT BEDTIME    traZODone (DESYREL) 100 MG tablet Take 1 tablet by mouth nightly    memantine (NAMENDA) 5 MG tablet Take 1 tablet by mouth 2 times daily    pioglitazone (ACTOS) 30 MG tablet TAKE ONE TABLET BY MOUTH EVERY DAY    famotidine (PEPCID) 40 MG tablet TAKE ONE TABLET BY MOUTH EVERY DAY    amLODIPine (NORVASC) 10 MG tablet TAKE ONE TABLET BY MOUTH EVERY DAY    tamsulosin (FLOMAX) 0.4 MG capsule Take 1 capsule by mouth 2 times daily (with meals)    JANUVIA 100 MG tablet TAKE ONE TABLET BY MOUTH EVERY DAY    Continuous Blood Gluc Sensor (FREESTYLE ELIE 2 SENSOR) MISC Change sensor every 14 days, scan at least 5 times a day.    omeprazole (PRILOSEC) 20 MG delayed release capsule TAKE TWO CAPSULES BY MOUTH EVERY DAY    busPIRone (BUSPAR) 10 MG tablet TAKE ONE TABLET BY MOUTH TWICE DAILY    FLUoxetine (PROZAC) 40 MG capsule Take 1 capsule by mouth daily    B-D UF III MINI PEN NEEDLES 31G X 5 MM MISC Use 1 new pen needle each time to inject insulin subcutaneously 4 times daily    Continuous Blood Gluc  (FREESTYLE ELIE 2 READER) WADE 1 each by Does not apply route Over 24 hours    Calcium Carbonate-Vitamin D 600-3.125 MG-MCG TABS Take by mouth daily     No current facility-administered medications for this visit.         Allergies:  Allergies   Allergen Reactions    Gabapentin Other (See Comments), Swelling and Anaphylaxis     Blurred Vision  Other

## 2024-05-10 NOTE — TELEPHONE ENCOUNTER
[facility]- needs prior auth. Stated that she spoke to Daisy earlier and wanted to speak to her again

## 2024-05-13 NOTE — TELEPHONE ENCOUNTER
Returned call to [facility]. Glucometer and supplies shipping out tomorrow. Medicare Part B is saying they do not want to pay for the reader and is staying to submit to Part D, however part D is saying it needs a PA still. Pharmacy states he just got a new reader less than a year ago, which may be why it will not go through.     When spoke with insurance last week and submitted through Part D, a fax was received that the PA was being sent to Part B and they said it was approved.    Will attempt to contact insurance and Ana to request a new reader but have them send a Ana 3 instead of the 2.     Thank you   Daisy Alvarez, PharmD  Clinical Pharmacist Specialist  05/13/24    For Pharmacy Admin Tracking Only    Program: Medical Group  CPA in place:  Yes  Time Spent (min): 5

## 2024-05-15 ENCOUNTER — HOSPITAL ENCOUNTER (OUTPATIENT)
Dept: SLEEP MEDICINE | Facility: HOSPITAL | Age: 74
Discharge: HOME | End: 2024-05-18
Attending: OTOLARYNGOLOGY
Payer: MEDICARE

## 2024-05-15 DIAGNOSIS — G47.52 REM BEHAVIORAL DISORDER: ICD-10-CM

## 2024-05-15 DIAGNOSIS — R29.818 SUSPECTED SLEEP APNEA: ICD-10-CM

## 2024-05-15 DIAGNOSIS — R06.83 SNORING: ICD-10-CM

## 2024-05-15 PROCEDURE — 95810 POLYSOM 6/> YRS 4/> PARAM: CPT

## 2024-05-16 VITALS
HEART RATE: 86 BPM | WEIGHT: 160.38 LBS | SYSTOLIC BLOOD PRESSURE: 113 MMHG | DIASTOLIC BLOOD PRESSURE: 76 MMHG | HEIGHT: 69 IN | BODY MASS INDEX: 23.76 KG/M2

## 2024-05-16 ASSESSMENT — SLEEP AND FATIGUE QUESTIONNAIRES
HOW LIKELY ARE YOU TO NOD OFF OR FALL ASLEEP WHILE SITTING AND TALKING TO SOMEONE: HIGH CHANCE OF DOZING
ESS TOTAL SCORE: 19
HOW LIKELY ARE YOU TO NOD OFF OR FALL ASLEEP WHILE WATCHING TV: WOULD NEVER DOZE
HOW LIKELY ARE YOU TO NOD OFF OR FALL ASLEEP WHILE LYING DOWN TO REST IN THE AFTERNOON WHEN CIRCUMSTANCES PERMIT: HIGH CHANCE OF DOZING
HOW LIKELY ARE YOU TO NOD OFF OR FALL ASLEEP WHILE SITTING INACTIVE IN A PUBLIC PLACE: HIGH CHANCE OF DOZING
HOW LIKELY ARE YOU TO NOD OFF OR FALL ASLEEP IN A CAR, WHILE STOPPED FOR A FEW MINUTES IN TRAFFIC: HIGH CHANCE OF DOZING
HOW LIKELY ARE YOU TO NOD OFF OR FALL ASLEEP WHEN YOU ARE A PASSENGER IN A CAR FOR AN HOUR WITHOUT A BREAK: HIGH CHANCE OF DOZING
HOW LIKELY ARE YOU TO NOD OFF OR FALL ASLEEP WHILE SITTING QUIETLY AFTER LUNCH WITHOUT ALCOHOL: HIGH CHANCE OF DOZING

## 2024-05-17 ENCOUNTER — SCHEDULED TELEPHONE ENCOUNTER (OUTPATIENT)
Facility: CLINIC | Age: 74
End: 2024-05-17

## 2024-05-17 NOTE — PROGRESS NOTES
Outpatient Medications   Medication Sig    blood glucose monitor kit and supplies Use to monitor blood sugar up to 4 times daily. Dispense glucometer covered under patient's insurance    blood glucose monitor strips Test 4 times a day & as needed for symptoms of irregular blood glucose. Dispense test strips that are compatible with glucometer covered on patient's insurance    Lancets MISC 1 each by Does not apply route 4 times daily Dispense lancets that are compatible with glucometer covered under patient's insurance.    Alcohol Swabs (ALCOHOL PREP) PADS 1 each by Does not apply route 4 times daily    traZODone (DESYREL) 50 MG tablet Take 1 tablet by mouth nightly    Continuous Glucose  (FREESTYLE ELIE 3 READER) WADE 1 each by Does not apply route continuous Indications: Type 2 Diabetes, with current insulin use Use to monitor blood sugar.    Continuous Glucose Sensor (FREESTYLE ELIE 3 SENSOR) MISC 1 each by Does not apply route every 14 days Use to monitor blood. Apply 1 sensor on the back of the upper arm, change sensor every 14 days    insulin glargine (LANTUS SOLOSTAR) 100 UNIT/ML injection pen Inject 26 Units into the skin daily    acetaminophen (TYLENOL 8 HOUR ARTHRITIS PAIN) 650 MG extended release tablet Take 2 tablets by mouth in the morning and at bedtime    lisinopril (PRINIVIL;ZESTRIL) 20 MG tablet TAKE ONE TABLET BY MOUTH EVERY DAY    rosuvastatin (CRESTOR) 5 MG tablet TAKE ONE TABLET BY MOUTH AT BEDTIME    memantine (NAMENDA) 5 MG tablet Take 1 tablet by mouth 2 times daily    pioglitazone (ACTOS) 30 MG tablet TAKE ONE TABLET BY MOUTH EVERY DAY    famotidine (PEPCID) 40 MG tablet TAKE ONE TABLET BY MOUTH EVERY DAY    amLODIPine (NORVASC) 10 MG tablet TAKE ONE TABLET BY MOUTH EVERY DAY    tamsulosin (FLOMAX) 0.4 MG capsule Take 1 capsule by mouth 2 times daily (with meals)    JANUVIA 100 MG tablet TAKE ONE TABLET BY MOUTH EVERY DAY    Continuous Blood Gluc Sensor (FREESTYLE ELIE 2 SENSOR)

## 2024-05-24 ENCOUNTER — PHARMACY VISIT (OUTPATIENT)
Facility: CLINIC | Age: 74
End: 2024-05-24

## 2024-05-24 DIAGNOSIS — E11.42 TYPE 2 DIABETES MELLITUS WITH DIABETIC POLYNEUROPATHY, WITH LONG-TERM CURRENT USE OF INSULIN (HCC): Primary | ICD-10-CM

## 2024-05-24 DIAGNOSIS — Z79.4 TYPE 2 DIABETES MELLITUS WITH DIABETIC POLYNEUROPATHY, WITH LONG-TERM CURRENT USE OF INSULIN (HCC): Primary | ICD-10-CM

## 2024-05-24 NOTE — PROGRESS NOTES
1. Pt presents to the clinic with right sided lumbar pain for the past 2 days. Denies any specific trauma or injury, though states she was moving furniture this past week. She has had sciatica in the past. No urinary complaints. No midline vertebral tenderness.  2. OTC analgesics for supportive management.  3. Diazepam, use as directed as needed for muscle spasm. This medication can make you drowsy. Do not drive or drink alcohol while taking this medication.  4. Medrol DosePak, use as directed.  5. Physical modalities - ice/heat.  6. Continue to remain active. Back exercises.  7. If worsening pain, or any concerns, patient fully understands she is to proceed immediately to the nearest emergency department for further evaluation and treatment.  8. Will have patient follow-up with either patient´s primary MD, Dr. Cohen, or with the [facility] in 1-2 weeks for reevaluation.      Final diagnosis: Acute right sided low back pain        Please follow up with:    [facility]  [address], [address]  Phone: [phone]       Pharmacy Progress Note - Diabetes Management Follow up       Assessment / Plan:   Diabetes Management:  Per ADA guidelines, Pt's A1c is not at goal of < 7%.  Patient's most recent A1c was  9.0% on 1/3/24, which has significantly improved from previous A1c of 13.9% on 10/3/23. He has been in much higher spirits the last few appointments since his sleep medication was adjusted. He is more alert, less groggy, and is acting more like himself. Patient will be getting his Ana 3 system next Tuesday (tracking number:   [id number]). He has been performing fingersticks and his BG has been coming down, with BG this morning being in range at 128 mg/dL. He is adherent to his medications. No changes made to regimen at this time. Will follow up with patient next week after his lab appointment to set up Ana 3 system.    Medication therapy changes:   - Lantus 26 units in the morning   - Actos 30 mg daily   - Januvia 100 mg daily     There are no discontinued medications.     No orders of the defined types were placed in this encounter.       Patient will return to clinic in 1 week(s) for follow up.        S/O: Mr. Anshul Smiley, a 74 y.o. male referred by Anitha Lara APRN - CNP,  has a past medical history of Acute cystitis without hematuria, MCKENZIE (acute kidney injury) ([facility]), Arthritis, Arthropathy, Balanitis, Candida infection, Chronic pain, COVID-19 vaccine series completed, Dementia associated with other underlying disease without behavioral disturbance ([facility]), Dependence on continuous supplemental oxygen, Depression, Diabetes mellitus, type II ([facility]), Drug abuse ([facility]), ED (erectile dysfunction), Fatigue, GERD (gastroesophageal reflux disease), H/O epididymitis, H/O: pneumonia, Hep C w/o coma, chronic ([facility]), History of BPH, History of hematuria, History of tobacco use, Hypertension, Loss of appetite, Loss of balance, Microcytic anemia, Neuropathy, Pancytopenia ([facility]), Paranoid schizophrenia, chronic

## 2024-05-28 DIAGNOSIS — Z79.4 TYPE 2 DIABETES MELLITUS WITH DIABETIC POLYNEUROPATHY, WITH LONG-TERM CURRENT USE OF INSULIN (HCC): ICD-10-CM

## 2024-05-28 DIAGNOSIS — E11.42 TYPE 2 DIABETES MELLITUS WITH DIABETIC POLYNEUROPATHY, WITH LONG-TERM CURRENT USE OF INSULIN (HCC): ICD-10-CM

## 2024-05-28 RX ORDER — SITAGLIPTIN 100 MG/1
100 TABLET, FILM COATED ORAL DAILY
Qty: 90 TABLET | Refills: 1 | Status: SHIPPED | OUTPATIENT
Start: 2024-05-28

## 2024-05-29 ENCOUNTER — TELEPHONE (OUTPATIENT)
Facility: CLINIC | Age: 74
End: 2024-05-29

## 2024-05-29 ENCOUNTER — PHARMACY VISIT (OUTPATIENT)
Facility: CLINIC | Age: 74
End: 2024-05-29

## 2024-05-29 ENCOUNTER — HOSPITAL ENCOUNTER (OUTPATIENT)
Facility: HOSPITAL | Age: 74
Setting detail: SPECIMEN
Discharge: HOME | End: 2024-06-01
Payer: MEDICARE

## 2024-05-29 DIAGNOSIS — E11.42 TYPE 2 DIABETES MELLITUS WITH DIABETIC POLYNEUROPATHY, WITH LONG-TERM CURRENT USE OF INSULIN (HCC): Primary | ICD-10-CM

## 2024-05-29 DIAGNOSIS — Z79.4 TYPE 2 DIABETES MELLITUS WITH DIABETIC POLYNEUROPATHY, WITH LONG-TERM CURRENT USE OF INSULIN (HCC): Primary | ICD-10-CM

## 2024-05-29 DIAGNOSIS — Z79.4 TYPE 2 DIABETES MELLITUS WITH DIABETIC POLYNEUROPATHY, WITH LONG-TERM CURRENT USE OF INSULIN (HCC): ICD-10-CM

## 2024-05-29 DIAGNOSIS — I10 ESSENTIAL HYPERTENSION WITH GOAL BLOOD PRESSURE LESS THAN 130/80: ICD-10-CM

## 2024-05-29 DIAGNOSIS — E11.42 TYPE 2 DIABETES MELLITUS WITH DIABETIC POLYNEUROPATHY, WITH LONG-TERM CURRENT USE OF INSULIN (HCC): ICD-10-CM

## 2024-05-29 LAB
ALBUMIN SERPL-MCNC: 3.8 G/DL (ref 3.4–5)
ALBUMIN/GLOB SERPL: 1.1 (ref 0.8–1.7)
ALP SERPL-CCNC: 86 U/L (ref 45–117)
ALT SERPL-CCNC: 19 U/L (ref 16–61)
ANION GAP SERPL CALC-SCNC: 6 MMOL/L (ref 3–18)
AST SERPL-CCNC: 13 U/L (ref 10–38)
BILIRUB SERPL-MCNC: 0.6 MG/DL (ref 0.2–1)
BUN SERPL-MCNC: 25 MG/DL (ref 7–18)
BUN/CREAT SERPL: 13 (ref 12–20)
CALCIUM SERPL-MCNC: 9.4 MG/DL (ref 8.5–10.1)
CHLORIDE SERPL-SCNC: 102 MMOL/L (ref 100–111)
CHOLEST SERPL-MCNC: 104 MG/DL
CO2 SERPL-SCNC: 23 MMOL/L (ref 21–32)
CREAT SERPL-MCNC: 1.9 MG/DL (ref 0.6–1.3)
CREAT UR-MCNC: 326 MG/DL (ref 30–125)
ERYTHROCYTE [DISTWIDTH] IN BLOOD BY AUTOMATED COUNT: 15.2 % (ref 11.6–14.5)
EST. AVERAGE GLUCOSE BLD GHB EST-MCNC: 280 MG/DL
GLOBULIN SER CALC-MCNC: 3.6 G/DL (ref 2–4)
GLUCOSE SERPL-MCNC: 404 MG/DL (ref 74–99)
HBA1C MFR BLD: 11.4 % (ref 4.2–5.6)
HCT VFR BLD AUTO: 34.6 % (ref 36–48)
HDLC SERPL-MCNC: 56 MG/DL (ref 40–60)
HDLC SERPL: 1.9 (ref 0–5)
HGB BLD-MCNC: 10.4 G/DL (ref 13–16)
LDLC SERPL CALC-MCNC: 38 MG/DL (ref 0–100)
LIPID PANEL: NORMAL
MCH RBC QN AUTO: 23.1 PG (ref 24–34)
MCHC RBC AUTO-ENTMCNC: 30.1 G/DL (ref 31–37)
MCV RBC AUTO: 76.9 FL (ref 78–100)
MICROALBUMIN UR-MCNC: 21.8 MG/DL (ref 0–3)
MICROALBUMIN/CREAT UR-RTO: 67 MG/G (ref 0–30)
NRBC # BLD: 0 K/UL (ref 0–0.01)
NRBC BLD-RTO: 0 PER 100 WBC
PLATELET # BLD AUTO: 144 K/UL (ref 135–420)
POTASSIUM SERPL-SCNC: 4.6 MMOL/L (ref 3.5–5.5)
PROT SERPL-MCNC: 7.4 G/DL (ref 6.4–8.2)
RBC # BLD AUTO: 4.5 M/UL (ref 4.35–5.65)
SODIUM SERPL-SCNC: 131 MMOL/L (ref 136–145)
TRIGL SERPL-MCNC: 50 MG/DL
VLDLC SERPL CALC-MCNC: 10 MG/DL
WBC # BLD AUTO: 5.7 K/UL (ref 4.6–13.2)

## 2024-05-29 PROCEDURE — 80053 COMPREHEN METABOLIC PANEL: CPT

## 2024-05-29 PROCEDURE — 80061 LIPID PANEL: CPT

## 2024-05-29 PROCEDURE — 85027 COMPLETE CBC AUTOMATED: CPT

## 2024-05-29 PROCEDURE — 83036 HEMOGLOBIN GLYCOSYLATED A1C: CPT

## 2024-05-29 PROCEDURE — 82570 ASSAY OF URINE CREATININE: CPT

## 2024-05-29 PROCEDURE — 82043 UR ALBUMIN QUANTITATIVE: CPT

## 2024-05-29 PROCEDURE — 36415 COLL VENOUS BLD VENIPUNCTURE: CPT

## 2024-05-29 NOTE — PROGRESS NOTES
all of the patient’s questions were answered.  AVS was handed to the patient. Patient advised to call the office with any additional questions or concerns.    Notifications of recommendations will be sent to Anitha Lara APRN - CNP for review.      Thank you for the consult,    Daisy Alvarez, PharmD  Clinical Pharmacy Specialist  5/29/24        For Pharmacy Admin Tracking Only    Program: Medical Group  CPA in place:  Yes  Recommendation Provided To: Patient/Caregiver: 1 via In person  Intervention Detail: Device Training  Intervention Accepted By: Patient/Caregiver: 1  Gap Closed?: No   Time Spent (min): 30

## 2024-06-05 PROBLEM — G47.31 CENTRAL SLEEP APNEA: Status: ACTIVE | Noted: 2024-06-05

## 2024-06-05 PROBLEM — R06.83 SNORING: Status: ACTIVE | Noted: 2024-06-05

## 2024-06-07 ENCOUNTER — TELEPHONE (OUTPATIENT)
Facility: CLINIC | Age: 74
End: 2024-06-07

## 2024-06-07 ENCOUNTER — CLINICAL DOCUMENTATION (OUTPATIENT)
Age: 74
End: 2024-06-07

## 2024-06-07 ENCOUNTER — SCHEDULED TELEPHONE ENCOUNTER (OUTPATIENT)
Facility: CLINIC | Age: 74
End: 2024-06-07

## 2024-06-07 DIAGNOSIS — Z79.4 TYPE 2 DIABETES MELLITUS WITH DIABETIC POLYNEUROPATHY, WITH LONG-TERM CURRENT USE OF INSULIN (HCC): Primary | ICD-10-CM

## 2024-06-07 DIAGNOSIS — E11.42 TYPE 2 DIABETES MELLITUS WITH DIABETIC POLYNEUROPATHY, WITH LONG-TERM CURRENT USE OF INSULIN (HCC): Primary | ICD-10-CM

## 2024-06-07 PROBLEM — G25.81 RESTLESS LEGS SYNDROME (RLS): Status: ACTIVE | Noted: 2024-06-07

## 2024-06-07 PROBLEM — G47.39 COMPLEX SLEEP APNEA SYNDROME: Status: ACTIVE | Noted: 2024-06-05

## 2024-06-07 NOTE — ASSESSMENT & PLAN NOTE
Monitored by specialist- no acute findings meriting change in the plan, takes Namenda. Needs to continue to f/u with neurology.   Has dementia as was diagnosed in 2022 and possibly has Parkinson's per notes from neurology

## 2024-06-07 NOTE — PATIENT INSTRUCTIONS
device     Safety is strongly encouraged.  You should not drive if sleepy, tired, distracted and/or fatigued.      Chest Xrays and blood work do not require appointments.  They are considered \"Walk-In\" services and can be obtained at either [facility] or [facility].  Blood work is performed at the Laboratory (Lab) from 08:00am - 04:00 pm (if applicable to your visit)

## 2024-06-07 NOTE — PROGRESS NOTES
Pharmacy Progress Note - Diabetes Management Follow up - Telephone       Assessment / Plan:   Diabetes Management:  Per ADA guidelines, Pt's A1c is not at goal of < 7%.  Patient's most recent A1c was  9.0% on 1/3/24, which has significantly improved from previous A1c of 13.9% on 10/3/23. Patient's BG this morning seemed to be appropriate, but BG during appointment read high. His reader was dead and he had to plug in to turn on to get reading. He denies any low BG. Will wait until next week to check CGM data reports, may have to adjust insulin. Patient got a new aid, Stephanie, who will need trained on the "[employer] system as well. Follow up in 1 week.      There are no discontinued medications.     No orders of the defined types were placed in this encounter.       Patient will return to clinic in 1 week(s) for follow up.        S/O: Mr. Anshul Smiley, a 74 y.o. male referred by Anitha Lara, MANAS - CNP,  has a past medical history of Acute cystitis without hematuria, MCKENZIE (acute kidney injury) ([facility]), Arthritis, Arthropathy, Balanitis, Candida infection, Chronic pain, COVID-19 vaccine series completed, Dementia associated with other underlying disease without behavioral disturbance ([facility]), Dependence on continuous supplemental oxygen, Depression, Diabetes mellitus, type II ([facility]), Drug abuse ([facility]), ED (erectile dysfunction), Fatigue, GERD (gastroesophageal reflux disease), H/O epididymitis, H/O: pneumonia, Hep C w/o coma, chronic ([facility]), History of BPH, History of hematuria, History of tobacco use, Hypertension, Loss of appetite, Loss of balance, Microcytic anemia, Neuropathy, Pancytopenia ([facility]), Paranoid schizophrenia, chronic condition with acute exacerbation ([facility]), Prostate cancer ([facility]), PUD (peptic ulcer disease), SOB (shortness of breath), Status post partial gastrectomy, Stroke ([facility]), Type 2 diabetes mellitus with hyperglycemia, with long-term current use of insulin ([facility]), and Uncircumcised male.  Pt was seen

## 2024-06-07 NOTE — ASSESSMENT & PLAN NOTE
Uncontrolled, continue current medications. Last A1C was 11.4 last month (was 9 prior in January) certainly not under control. Needs to f/u with PCP and continue his insulin as previously recommended and also continue his Januvia.

## 2024-06-07 NOTE — PROGRESS NOTES
[facility]   Sleep Medicine     Office Progress Note         [address]  [address]  [facility] [id number] [phone] Fax    Reason for visit/referral: Evaluation for possible obstructive sleep apnea/sleep disordered breathing and possible RBD - follow up PSG  Assessment:   1. JENNIFER (obstructive sleep apnea)  Assessment & Plan:    AHI 17.1, Cental apnea index 8.8.  No complex sleep behaviors noted.  Moderate snoring was heard during the study.  Decreased or no N3 sleep was seen.    Discussed results of the study and I think a trial of CPAP/APAP is a reasonable option and in particular, due to the patient's comorbidities  Orders:  -     DME - DURABLE MEDICAL EQUIPMENT  2. Complex sleep apnea syndrome  Assessment & Plan:    AHI 17.1, Cental apnea index 8.8.  No complex sleep behaviors noted.  Moderate snoring was heard during the study.  Decreased or no N3 sleep was seen.    Discussed results of the study and I think a trial of CPAP/APAP is a reasonable option and in particular, due to the patient's comorbidities  3. Excessive daytime sleepiness  4. Nocturia  5. Essential hypertension with goal blood pressure less than 130/80  Assessment & Plan:   Well-controlled, continue current medications  6. Bruxism, sleep-related  7. Gastroesophageal reflux disease without esophagitis  8. Type 2 diabetes mellitus with diabetic polyneuropathy, with long-term current use of insulin ([facility])  Assessment & Plan:      Uncontrolled, continue current medications. Last A1C was 11.4 last month (was 9 prior in January) certainly not under control. Needs to f/u with PCP and continue his insulin as previously recommended and also continue his Januvia.     9. REM behavioral disorder  Assessment & Plan:    no evidence of RBD on recent PSG.  Also may have a history of Parkinson's . Will need to continue to monitor.  Certainly controlling his obstructive sleep apnea can help.  10. Restless legs

## 2024-06-07 NOTE — ASSESSMENT & PLAN NOTE
will need to monitor this and in particular, after treating his obstructive sleep apnea.  Will revisit in follow-up.

## 2024-06-07 NOTE — TELEPHONE ENCOUNTER
Pharmacy Progress Note - Telephone Call    Mr. Anshul Smiley 74 y.o. was contacted via an outbound telephone call today regarding appointment at 11am. Patient did not answer during appointment time, left VM. Called patient again that afternoon and patient answered. He said he tried to call back but couldn't get through because the  was at lunch. Patient able to have appointment now, see Schedule Telephone Encounter for additional information    Thank you,    Daisy Alvarez PharmD  Clinical Pharmacy Specialist  06/07/24    For Pharmacy Admin Tracking Only    Program: Medical Group  CPA in place:  Yes  Time Spent (min): 5

## 2024-06-07 NOTE — ASSESSMENT & PLAN NOTE
AHI 17.1, Cental apnea index 8.8.  No complex sleep behaviors noted.  Moderate snoring was heard during the study.  Decreased or no N3 sleep was seen.    Discussed results of the study and I think a trial of CPAP/APAP is a reasonable option and in particular, due to the patient's comorbidities

## 2024-06-07 NOTE — ASSESSMENT & PLAN NOTE
no evidence of RBD on recent PSG.  Also may have a history of Parkinson's . Will need to continue to monitor.  Certainly controlling his obstructive sleep apnea can help.

## 2024-06-10 ENCOUNTER — OFFICE VISIT (OUTPATIENT)
Age: 74
End: 2024-06-10
Payer: MEDICARE

## 2024-06-10 VITALS
BODY MASS INDEX: 23.7 KG/M2 | SYSTOLIC BLOOD PRESSURE: 122 MMHG | WEIGHT: 160 LBS | HEIGHT: 69 IN | TEMPERATURE: 97.2 F | DIASTOLIC BLOOD PRESSURE: 82 MMHG | RESPIRATION RATE: 18 BRPM | HEART RATE: 80 BPM | OXYGEN SATURATION: 96 %

## 2024-06-10 DIAGNOSIS — G47.63 BRUXISM, SLEEP-RELATED: ICD-10-CM

## 2024-06-10 DIAGNOSIS — G47.19 EXCESSIVE DAYTIME SLEEPINESS: ICD-10-CM

## 2024-06-10 DIAGNOSIS — F03.A11 MILD DEMENTIA WITH AGITATION, UNSPECIFIED DEMENTIA TYPE (HCC): ICD-10-CM

## 2024-06-10 DIAGNOSIS — E11.42 TYPE 2 DIABETES MELLITUS WITH DIABETIC POLYNEUROPATHY, WITH LONG-TERM CURRENT USE OF INSULIN (HCC): ICD-10-CM

## 2024-06-10 DIAGNOSIS — G47.31 COMPLEX SLEEP APNEA SYNDROME: ICD-10-CM

## 2024-06-10 DIAGNOSIS — G47.33 OSA (OBSTRUCTIVE SLEEP APNEA): Primary | ICD-10-CM

## 2024-06-10 DIAGNOSIS — Z79.4 TYPE 2 DIABETES MELLITUS WITH DIABETIC POLYNEUROPATHY, WITH LONG-TERM CURRENT USE OF INSULIN (HCC): ICD-10-CM

## 2024-06-10 DIAGNOSIS — G47.52 REM BEHAVIORAL DISORDER: ICD-10-CM

## 2024-06-10 DIAGNOSIS — R35.1 NOCTURIA: ICD-10-CM

## 2024-06-10 DIAGNOSIS — K21.9 GASTROESOPHAGEAL REFLUX DISEASE WITHOUT ESOPHAGITIS: ICD-10-CM

## 2024-06-10 DIAGNOSIS — G25.81 RESTLESS LEGS SYNDROME (RLS): ICD-10-CM

## 2024-06-10 DIAGNOSIS — I10 ESSENTIAL HYPERTENSION WITH GOAL BLOOD PRESSURE LESS THAN 130/80: ICD-10-CM

## 2024-06-10 PROCEDURE — 99214 OFFICE O/P EST MOD 30 MIN: CPT | Performed by: OTOLARYNGOLOGY

## 2024-06-10 PROCEDURE — 1123F ACP DISCUSS/DSCN MKR DOCD: CPT | Performed by: OTOLARYNGOLOGY

## 2024-06-10 PROCEDURE — 3046F HEMOGLOBIN A1C LEVEL >9.0%: CPT | Performed by: OTOLARYNGOLOGY

## 2024-06-10 PROCEDURE — 3074F SYST BP LT 130 MM HG: CPT | Performed by: OTOLARYNGOLOGY

## 2024-06-10 PROCEDURE — 3079F DIAST BP 80-89 MM HG: CPT | Performed by: OTOLARYNGOLOGY

## 2024-06-10 ASSESSMENT — PATIENT HEALTH QUESTIONNAIRE - PHQ9
SUM OF ALL RESPONSES TO PHQ QUESTIONS 1-9: 1
2. FEELING DOWN, DEPRESSED OR HOPELESS: SEVERAL DAYS
SUM OF ALL RESPONSES TO PHQ QUESTIONS 1-9: 1
1. LITTLE INTEREST OR PLEASURE IN DOING THINGS: NOT AT ALL
SUM OF ALL RESPONSES TO PHQ9 QUESTIONS 1 & 2: 1

## 2024-06-10 ASSESSMENT — SLEEP AND FATIGUE QUESTIONNAIRES
HOW LIKELY ARE YOU TO NOD OFF OR FALL ASLEEP WHEN YOU ARE A PASSENGER IN A CAR FOR AN HOUR WITHOUT A BREAK: HIGH CHANCE OF DOZING
HOW LIKELY ARE YOU TO NOD OFF OR FALL ASLEEP WHILE SITTING AND READING: HIGH CHANCE OF DOZING
HOW LIKELY ARE YOU TO NOD OFF OR FALL ASLEEP IN A CAR, WHILE STOPPED FOR A FEW MINUTES IN TRAFFIC: WOULD NEVER DOZE
HOW LIKELY ARE YOU TO NOD OFF OR FALL ASLEEP WHILE LYING DOWN TO REST IN THE AFTERNOON WHEN CIRCUMSTANCES PERMIT: SLIGHT CHANCE OF DOZING
HOW LIKELY ARE YOU TO NOD OFF OR FALL ASLEEP WHILE SITTING INACTIVE IN A PUBLIC PLACE: SLIGHT CHANCE OF DOZING
HOW LIKELY ARE YOU TO NOD OFF OR FALL ASLEEP WHILE WATCHING TV: SLIGHT CHANCE OF DOZING
HOW LIKELY ARE YOU TO NOD OFF OR FALL ASLEEP WHILE SITTING QUIETLY AFTER LUNCH WITHOUT ALCOHOL: HIGH CHANCE OF DOZING
ESS TOTAL SCORE: 13
HOW LIKELY ARE YOU TO NOD OFF OR FALL ASLEEP WHILE SITTING AND TALKING TO SOMEONE: SLIGHT CHANCE OF DOZING

## 2024-06-10 NOTE — PROGRESS NOTES
Anshul MOHIT Baljit presents today for   Chief Complaint   Patient presents with    Follow-up    Results       Is someone accompanying this pt? Yes, caregiver     Is the patient using any DME equipment during OV? Yes, cane     -DME Company: N/A    Depression Screenin/10/2024     9:55 AM   PHQ-9    Little interest or pleasure in doing things 0   Feeling down, depressed, or hopeless 1   PHQ-2 Score 1   PHQ-9 Total Score 1        [place] Sleepiness Scale:      6/10/2024     9:57 AM   Sleep Medicine   Sitting and reading 3   Watching TV 1   Sitting, inactive in a public place (e.g. a theatre or a meeting) 1   As a passenger in a car for an hour without a break 3   Lying down to rest in the afternoon when circumstances permit 1   Sitting and talking to someone 1   Sitting quietly after a lunch without alcohol 3   In a car, while stopped for a few minutes in traffic 0   [place] Sleepiness Score 13       Stop-Ban/29/2024     9:00 AM   STOP-BANG QUESTIONNAIRE   Are you a loud and/or regular snorer? 1   Do you often feel tired or groggy upon awakening or do you awaken with a headache? 1   Have you been observed to gasp or stop breathing during sleep? 0   Are you often tired or fatigued during wake time hours?  0   Do you fall asleep sitting, reading, watching TV or driving? 0   Do you often have problems with memory or concentration? 0   Do you have or are you being treated for high blood pressure? 1   Recent BMI (Calculated) 25.6   Is BMI greater than 35 kg/m2? 0=No   Age older than 50 years old? 1=Yes   Is your neck circumference greater than 17 inches (Male) or 16 inches (Female)? 0   Gender - Male 1=Yes   STOP-Bang Total Score 30.6           Coordination of Care:  1. Have you been to the ER, urgent care clinic since your last visit? Hospitalized since your last visit?  no    2. Have you seen or consulted any other health care providers outside of the [facility] since your last visit? Include

## 2024-06-12 ENCOUNTER — PHARMACY VISIT (OUTPATIENT)
Facility: CLINIC | Age: 74
End: 2024-06-12

## 2024-06-12 DIAGNOSIS — Z79.4 TYPE 2 DIABETES MELLITUS WITH DIABETIC POLYNEUROPATHY, WITH LONG-TERM CURRENT USE OF INSULIN (HCC): ICD-10-CM

## 2024-06-12 DIAGNOSIS — E11.42 TYPE 2 DIABETES MELLITUS WITH DIABETIC POLYNEUROPATHY, WITH LONG-TERM CURRENT USE OF INSULIN (HCC): ICD-10-CM

## 2024-06-12 RX ORDER — INSULIN GLARGINE 100 [IU]/ML
30 INJECTION, SOLUTION SUBCUTANEOUS DAILY
Qty: 15 ML | Refills: 3 | Status: SHIPPED | OUTPATIENT
Start: 2024-06-12

## 2024-06-12 NOTE — PROGRESS NOTES
Pharmacy Progress Note - Diabetes Management Follow up       Assessment / Plan:   Diabetes Management:  Per ADA guidelines, Pt's A1c is not at goal of < 7%.  Patient's most recent A1c was  11.4% on 5/29/24, which has worsened from previous A1c of 9.0% on 1/3/24. Patient presents with his new aid, Cheyanne. Patient's average BG was 262 mg/dL per [employer] data. Will increase Lantus to 30 units each morning to help reduce BG. Patient brought the wrong box to appointment, so did not have a replacement sensor, provided patient a sample so his sensor could be replaced in office and his aid be counseled on how to change sensor. Patient's new aid did not have a lot of experience in diabetes, so briefly explained goals, rule of 15, carbohydrates, and blood sugar readings to the aid and provided some reading material for her about diabetes. Follow up in 1 week via telephone with patient to check on the new dose of Lantus.     Medication therapy changes:   - Increase Lantus to 30 units each morning   - Continue all other medications as prescribed     Nutrition/Lifestyle Modifications:  - Educated pt on the importance of moderating carbohydrate intake. Reviewed sources of carbohydrates and method to help determine appropriate portion sizes (e.g., Diabetes Plate Method).  - Continue limiting sugary drinks/food and carbohydrate consumption.     PCP appointment:  Instructed pt to schedule a new appointment with PCP since he was not able to see her this week. Told him to bring his sleep apnea paperwork to appointment to show PCP.       Medications Discontinued During This Encounter   Medication Reason    Continuous Blood Gluc  (FREESTYLE ELIE 2 READER) WADE Therapy completed    Continuous Blood Gluc Sensor (FREESTYLE ELIE 2 SENSOR) MISC Therapy completed    insulin glargine (LANTUS SOLOSTAR) 100 UNIT/ML injection pen DOSE ADJUSTMENT        Orders Placed This Encounter    insulin glargine (LANTUS SOLOSTAR) 100 UNIT/ML

## 2024-06-19 ENCOUNTER — SCHEDULED TELEPHONE ENCOUNTER (OUTPATIENT)
Facility: CLINIC | Age: 74
End: 2024-06-19

## 2024-06-19 ENCOUNTER — OFFICE VISIT (OUTPATIENT)
Facility: CLINIC | Age: 74
End: 2024-06-19
Payer: MEDICARE

## 2024-06-19 VITALS
OXYGEN SATURATION: 98 % | DIASTOLIC BLOOD PRESSURE: 75 MMHG | RESPIRATION RATE: 18 BRPM | HEART RATE: 77 BPM | TEMPERATURE: 98.4 F | SYSTOLIC BLOOD PRESSURE: 116 MMHG

## 2024-06-19 DIAGNOSIS — E11.42 TYPE 2 DIABETES MELLITUS WITH DIABETIC POLYNEUROPATHY, WITH LONG-TERM CURRENT USE OF INSULIN (HCC): Primary | ICD-10-CM

## 2024-06-19 DIAGNOSIS — F32.2 SEVERE DEPRESSION (HCC): ICD-10-CM

## 2024-06-19 DIAGNOSIS — I10 ESSENTIAL HYPERTENSION WITH GOAL BLOOD PRESSURE LESS THAN 130/80: ICD-10-CM

## 2024-06-19 DIAGNOSIS — Z79.4 TYPE 2 DIABETES MELLITUS WITH DIABETIC POLYNEUROPATHY, WITH LONG-TERM CURRENT USE OF INSULIN (HCC): Primary | ICD-10-CM

## 2024-06-19 DIAGNOSIS — F20.0 PARANOID SCHIZOPHRENIA, CHRONIC CONDITION WITH ACUTE EXACERBATION (HCC): ICD-10-CM

## 2024-06-19 PROCEDURE — 1123F ACP DISCUSS/DSCN MKR DOCD: CPT | Performed by: NURSE PRACTITIONER

## 2024-06-19 PROCEDURE — 3078F DIAST BP <80 MM HG: CPT | Performed by: NURSE PRACTITIONER

## 2024-06-19 PROCEDURE — 99214 OFFICE O/P EST MOD 30 MIN: CPT | Performed by: NURSE PRACTITIONER

## 2024-06-19 PROCEDURE — 3074F SYST BP LT 130 MM HG: CPT | Performed by: NURSE PRACTITIONER

## 2024-06-19 PROCEDURE — 3046F HEMOGLOBIN A1C LEVEL >9.0%: CPT | Performed by: NURSE PRACTITIONER

## 2024-06-19 RX ORDER — DULAGLUTIDE 0.75 MG/.5ML
0.75 INJECTION, SOLUTION SUBCUTANEOUS WEEKLY
Qty: 2 ML | Refills: 3 | Status: SHIPPED | OUTPATIENT
Start: 2024-06-19

## 2024-06-19 ASSESSMENT — PATIENT HEALTH QUESTIONNAIRE - PHQ9
SUM OF ALL RESPONSES TO PHQ9 QUESTIONS 1 & 2: 0
6. FEELING BAD ABOUT YOURSELF - OR THAT YOU ARE A FAILURE OR HAVE LET YOURSELF OR YOUR FAMILY DOWN: NOT AT ALL
2. FEELING DOWN, DEPRESSED OR HOPELESS: NOT AT ALL
3. TROUBLE FALLING OR STAYING ASLEEP: NOT AT ALL
SUM OF ALL RESPONSES TO PHQ QUESTIONS 1-9: 0
SUM OF ALL RESPONSES TO PHQ QUESTIONS 1-9: 0
1. LITTLE INTEREST OR PLEASURE IN DOING THINGS: NOT AT ALL
7. TROUBLE CONCENTRATING ON THINGS, SUCH AS READING THE NEWSPAPER OR WATCHING TELEVISION: NOT AT ALL
10. IF YOU CHECKED OFF ANY PROBLEMS, HOW DIFFICULT HAVE THESE PROBLEMS MADE IT FOR YOU TO DO YOUR WORK, TAKE CARE OF THINGS AT HOME, OR GET ALONG WITH OTHER PEOPLE: NOT DIFFICULT AT ALL
4. FEELING TIRED OR HAVING LITTLE ENERGY: NOT AT ALL
8. MOVING OR SPEAKING SO SLOWLY THAT OTHER PEOPLE COULD HAVE NOTICED. OR THE OPPOSITE, BEING SO FIGETY OR RESTLESS THAT YOU HAVE BEEN MOVING AROUND A LOT MORE THAN USUAL: NOT AT ALL
SUM OF ALL RESPONSES TO PHQ QUESTIONS 1-9: 0
SUM OF ALL RESPONSES TO PHQ QUESTIONS 1-9: 0
5. POOR APPETITE OR OVEREATING: NOT AT ALL

## 2024-06-19 NOTE — PROGRESS NOTES
Patient verbalized understanding of the information presented and all of the patient’s questions were answered.  AVS was handed to the patient. Patient advised to call the office with any additional questions or concerns.    Notifications of recommendations will be sent to Anitha Lara APRN - CNP for review.      Thank you for the consult,    Daisy Alvarez, PharmD  Clinical Pharmacy Specialist  6/19/24        For Pharmacy Admin Tracking Only    Program: Medical Group  CPA in place:  Yes  Recommendation Provided To: Patient/Caregiver: 3 via Telephone and In person  Intervention Detail: Device Training, Discontinued Rx: 1, reason: Therapy Complete, and New Rx: 1, reason: Needs Additional Therapy  Intervention Accepted By: Patient/Caregiver: 3  Gap Closed?: No   Time Spent (min): 20

## 2024-06-19 NOTE — PROGRESS NOTES
[address] [place] [address]               [phone]      Anshul Smiley is a 74 y.o. male and presents with Follow-up Chronic Condition, Diabetes, Hypertension, and Cholesterol Problem       Assessment/Plan:    1. Type 2 diabetes mellitus with diabetic polyneuropathy, with long-term current use of insulin (HCC)  -     Lipid Panel; Future  -     Hemoglobin A1C; Future  -     Microalbumin / Creatinine Urine Ratio; Future  2. Essential hypertension with goal blood pressure less than 130/80  -     Comprehensive Metabolic Panel; Future  -     CBC; Future  3. Paranoid schizophrenia, chronic condition with acute exacerbation (HCC)  -     External Referral To Psychiatry  4. Severe depression (HCC)  -     External Referral To Psychiatry  DM uncontrolled, stressed importance of dietary restriction of simple sugars, is also seeing pharm D for DM management  Blood pressure controlled, continue lisinopril and amlodipine at same dose  Current psychiatrist is never available for him, referral to psychiatry placed for ETHOS  Labs prior to next visit  Accompanied today by his two aids Maylin and Alesha  Patient verbalized understanding and is in agreement with this plan of care         Follow up and disposition:   Return in about 3 months (around 9/19/2024) for DM, HTN, 30min, office, w/labsprior.      Subjective:    Labs obtained prior to visit? Yes  Reviewed with patient? yes    DMII-   Patient reports medication compliance Yes  Diabetic diet compliance frequently  Patient monitors blood sugars regularly daily   Home glucose readings: 128, 168, 100, 336   Denies hypoglycemic episodes Yes  Denies polyuria, polydipsia, paraesthesia, vision changes? Yes  Engaging in daily exercise? None     Diabetes Management   Topic Date Due    Diabetic foot exam  03/17/2023    Diabetic retinal exam  05/30/2024     Lab Results   Component Value Date/Time    LABA1C 11.4 05/29/2024 10:40 AM   ]  Lab Results

## 2024-06-19 NOTE — PROGRESS NOTES
Room 7    Anshul Smiley had concerns including Follow-up Chronic Condition, Diabetes, Hypertension, and Cholesterol Problem. for today's visit .     When asked if patient has any concerns he would like to address with CHELSEA Lara pt states I need a therapist . CHELSEA Lara has been notified  of patient concerns .    Did patient bring someone? No    Did the patient have DME equipment? Yes cane     Did you take your medication today? Pt states yes       1. \"Have you been to the ER, urgent care clinic since your last visit?  Hospitalized since your last visit?\" .NO    2. \"Have you seen or consulted any other health care providers outside of the [facility] since your last visit?\" No     3. For patients aged 45-75: Has the patient had a colonoscopy / FIT/ Cologuard?  Yes      If the patient is female:    4. For patients aged 40-74: Has the patient had a mammogram within the past 2 years? N/A      5. For patients aged 21-65: Has the patient had a pap smear? {Cancer Care Gap present? N/A          6/10/2024     9:55 AM   Amb Fall Risk Assessment and TUG Test   Do you feel unsteady or are you worried about falling?  yes   2 or more falls in past year? no   Fall with injury in past year? yes              6/19/2024    11:46 AM   PHQ-9    Little interest or pleasure in doing things 0   Feeling down, depressed, or hopeless 0   Trouble falling or staying asleep, or sleeping too much 0   Feeling tired or having little energy 0   Poor appetite or overeating 0   Feeling bad about yourself - or that you are a failure or have let yourself or your family down 0   Trouble concentrating on things, such as reading the newspaper or watching television 0   Moving or speaking so slowly that other people could have noticed. Or the opposite - being so fidgety or restless that you have been moving around a lot more than usual 0   Thoughts that you would be better off dead, or of hurting yourself in some way 0   PHQ-2 Score 0

## 2024-06-20 ENCOUNTER — CLINICAL DOCUMENTATION (OUTPATIENT)
Age: 74
End: 2024-06-20

## 2024-06-20 NOTE — PROGRESS NOTES
[facility]:    Order sent to [facility]-.

## 2024-06-26 ENCOUNTER — TELEPHONE (OUTPATIENT)
Facility: CLINIC | Age: 74
End: 2024-06-26

## 2024-06-26 ENCOUNTER — PHARMACY VISIT (OUTPATIENT)
Facility: CLINIC | Age: 74
End: 2024-06-26

## 2024-06-26 DIAGNOSIS — E11.42 TYPE 2 DIABETES MELLITUS WITH DIABETIC POLYNEUROPATHY, WITH LONG-TERM CURRENT USE OF INSULIN (HCC): ICD-10-CM

## 2024-06-26 DIAGNOSIS — Z79.4 TYPE 2 DIABETES MELLITUS WITH DIABETIC POLYNEUROPATHY, WITH LONG-TERM CURRENT USE OF INSULIN (HCC): ICD-10-CM

## 2024-06-26 RX ORDER — INSULIN GLARGINE 100 [IU]/ML
25 INJECTION, SOLUTION SUBCUTANEOUS DAILY
Qty: 15 ML | Refills: 3 | Status: SHIPPED
Start: 2024-06-26

## 2024-06-26 NOTE — PATIENT INSTRUCTIONS
Reduce Lantus to 25 mg daily   Stop drinking regular soda and lemonade     Switch to zero sugar drinks  Can add Crystal Light packets to water for flavoring

## 2024-06-26 NOTE — PROGRESS NOTES
covered on patient's insurance    Lancets MISC 1 each by Does not apply route 4 times daily Dispense lancets that are compatible with glucometer covered under patient's insurance.    Alcohol Swabs (ALCOHOL PREP) PADS 1 each by Does not apply route 4 times daily    traZODone (DESYREL) 50 MG tablet Take 1 tablet by mouth nightly    Continuous Glucose  (FREESTYLE ELIE 3 READER) WADE 1 each by Does not apply route continuous Indications: Type 2 Diabetes, with current insulin use Use to monitor blood sugar.    Continuous Glucose Sensor (FREESTYLE ELIE 3 SENSOR) MISC 1 each by Does not apply route every 14 days Use to monitor blood. Apply 1 sensor on the back of the upper arm, change sensor every 14 days    acetaminophen (TYLENOL 8 HOUR ARTHRITIS PAIN) 650 MG extended release tablet Take 2 tablets by mouth in the morning and at bedtime    lisinopril (PRINIVIL;ZESTRIL) 20 MG tablet TAKE ONE TABLET BY MOUTH EVERY DAY    rosuvastatin (CRESTOR) 5 MG tablet TAKE ONE TABLET BY MOUTH AT BEDTIME    memantine (NAMENDA) 5 MG tablet Take 1 tablet by mouth 2 times daily    pioglitazone (ACTOS) 30 MG tablet TAKE ONE TABLET BY MOUTH EVERY DAY    famotidine (PEPCID) 40 MG tablet TAKE ONE TABLET BY MOUTH EVERY DAY    amLODIPine (NORVASC) 10 MG tablet TAKE ONE TABLET BY MOUTH EVERY DAY    tamsulosin (FLOMAX) 0.4 MG capsule Take 1 capsule by mouth 2 times daily (with meals)    omeprazole (PRILOSEC) 20 MG delayed release capsule TAKE TWO CAPSULES BY MOUTH EVERY DAY    busPIRone (BUSPAR) 10 MG tablet TAKE ONE TABLET BY MOUTH TWICE DAILY    FLUoxetine (PROZAC) 40 MG capsule Take 1 capsule by mouth daily    B-D UF III MINI PEN NEEDLES 31G X 5 MM MISC Use 1 new pen needle each time to inject insulin subcutaneously 4 times daily    Calcium Carbonate-Vitamin D 600-3.125 MG-MCG TABS Take by mouth daily     No current facility-administered medications for this visit.     Allergies:  Allergies   Allergen Reactions    Gabapentin Other (See

## 2024-06-27 NOTE — TELEPHONE ENCOUNTER
Spoke with patient in regards of DMV paperwork . Pt aid states my car is down for the moment I will call the office back when my car is working . CHELSEA Lara and Damon Olguin Cma verbalized understanding .

## 2024-06-28 ENCOUNTER — CARE COORDINATION (OUTPATIENT)
Facility: CLINIC | Age: 74
End: 2024-06-28

## 2024-06-28 NOTE — CARE COORDINATION
SW referral received from MISAEL Todd, to assess for dementia, mental health support and resources.Per AC, patient has a caregiver daily from 9-5pm, Tiara and connected to psych nurse for medication management, Bobbi Cochran. MELIZA will complete assessment outreach with patient and family support on 07/01.    Bel Cheatham MSW  Care Management

## 2024-07-01 ENCOUNTER — CARE COORDINATION (OUTPATIENT)
Facility: CLINIC | Age: 74
End: 2024-07-01

## 2024-07-01 DIAGNOSIS — E11.65 TYPE 2 DIABETES MELLITUS WITH HYPERGLYCEMIA (HCC): ICD-10-CM

## 2024-07-01 RX ORDER — PIOGLITAZONEHYDROCHLORIDE 30 MG/1
TABLET ORAL
Qty: 90 TABLET | Refills: 0 | Status: SHIPPED | OUTPATIENT
Start: 2024-07-01

## 2024-07-02 ENCOUNTER — CARE COORDINATION (OUTPATIENT)
Facility: CLINIC | Age: 74
End: 2024-07-02

## 2024-07-02 NOTE — CARE COORDINATION
Initial Contact Social Work Note - Ambulatory  7/2/2024      Date of referral: 06/28/2024  Referral received from: HANANE Zayas   Reason for referral: Mental health/psychiatry resources     Previous  referral: No  If yes, brief summary of outcome:     Two Identifiers Verified: Yes; confirmed with patient and caregiver, Maylin    Insurance Provider: Medicare/Medicaid    Support System:  Community Provider; [facility] 7 days a week  including respite care, sister and 3 daughters.     [place] Status:  Not a      Community Providers: SNAP/Food [place], Medicaid Long-Term Care, and Home Health    ADL Assistance Needed: Dressing, Transferring, and medication support, meal preparation, supervision with daily care.     Housing/Living Concerns or Home Modification Needs: n/a     Transportation Concern: no concerns expressed during outreach    Medication Cost Concern: no concerns expressed during outreach    Medication Adherence Concern: Per caregiver, patient is taking additional Trazadone throughout day. Prescribed Trazadone one time at night. Per caregiver, patient displays behavior when unable to communicate his needs due to dementia.     Financial Concern(s): none expressed during outreach    Income (only if applicable): n/a    Ability to Read/Write: Yes    Advance Care Plan:   not discussed during outreach     Other: Mr. Smiley is a 74 year old male who has caregivers 7 days a week. He has a dementia diagnosis and request to be seen by a psychiatry for assistance and ongoing counseling. He is active with [facility] with SHERRY Cochran. [phone]. Spoke to office who stated patient is unable to be seen due to past due balance. Per chart, he sees Dr. Weston-neurology last seen March 2024. SW will speak with patient regarding balance and research therapist.     Identified Needs:  Therapy/psychiatry resources      Social Work Plan:  Update patient regarding balance and research

## 2024-07-02 NOTE — CARE COORDINATION
Ambulatory Care Coordination Note     2024 11:44 AM     Patient Current Location:  Home: [address]   [address]  [place] [address]     ACM contacted the patient by telephone. Verified name and  with patient as identifiers.         ACM: Gianfranco Todd RN     Challenges to be reviewed by the provider   Additional needs identified to be addressed with provider No  none               Method of communication with provider: phone.    Care Summary Note:   Patient states his BG first thing in the AM is usually 60, states he takes 2-3 glucose tablets before breakfast. Advised to not take as many tablets, but eat breakfast including protein and sugar. Then retake BG at least 2 hours after breakfast. Patient retook BG at time of call and it was 110 - (2.5 hours after breakfast)    Message sent to  to contact patient today as per patient request.     Further / follow up appointments listed below - reviewed upcoming appointments  Further questions answered as needed and patient has ACM contact information  Review of medications with attention to any side effects and determination that patient has sufficient quantity of meds and/or refills.  Assessment done with attention to primary illness and / or condition.  Respiratory and cardiac status shows no issues at present    Offered patient enrollment in the Remote Patient Monitoring (RPM) program for in-home monitoring: Yes, but did not enroll at this time: limited patient ability to navigate RPM/equipment.     Assessments Completed:   Ambulatory Care Coordination Assessment    Care Coordination Protocol  Referral from Primary Care Provider: Yes  Week 1 - Initial Assessment     Do you have all of your prescriptions and are they filled?: Yes  Barriers to medication adherence: None  Are you able to afford your medications?: Yes  How often do you have trouble taking your medications the way you have been told to take them?: I always take them as prescribed.

## 2024-07-03 ENCOUNTER — CARE COORDINATION (OUTPATIENT)
Facility: CLINIC | Age: 74
End: 2024-07-03

## 2024-07-03 NOTE — CARE COORDINATION
LMSW spoke with patient regarding making an appointment at [facility]. At this time, patient is unable to make appointment due to balance. Patient understood and is agreeable to referral to [facility] referral. SW left message with [facility] intake line at [phone] option 1.     SEKOU Enrique  Care Management

## 2024-07-05 ENCOUNTER — SCHEDULED TELEPHONE ENCOUNTER (OUTPATIENT)
Facility: CLINIC | Age: 74
End: 2024-07-05

## 2024-07-05 DIAGNOSIS — E11.42 TYPE 2 DIABETES MELLITUS WITH DIABETIC POLYNEUROPATHY, WITH LONG-TERM CURRENT USE OF INSULIN (HCC): ICD-10-CM

## 2024-07-05 DIAGNOSIS — Z79.4 TYPE 2 DIABETES MELLITUS WITH DIABETIC POLYNEUROPATHY, WITH LONG-TERM CURRENT USE OF INSULIN (HCC): ICD-10-CM

## 2024-07-05 RX ORDER — INSULIN GLARGINE 100 [IU]/ML
20 INJECTION, SOLUTION SUBCUTANEOUS DAILY
Qty: 15 ML | Refills: 3 | Status: SHIPPED
Start: 2024-07-05

## 2024-07-05 NOTE — PROGRESS NOTES
Pharmacy Progress Note - Diabetes Management Follow up - Telephone       Assessment / Plan:   Diabetes Management:  Per ADA guidelines, Pt's A1c is not at goal of < 7%. Patient's most recent A1c was  11.4% on 5/29/24, which has worsened from previous A1c of 9.0% on 1/3/24. Patient reports that his BG has been better, but still having low BG overnight, so will reduce Lantus to 20 units daily. He has not started the Trulicity yet, instructed him to inform me when he gets it. When he gets the Trulicity, he will stop the Januvia and will likely further reduce insulin. Encouraged pt to keep up his soda reduction. Follow up in 1 week to be able to assess CGM data.     Medication therapy changes:   - Reduce Lantus to 20 units daily     Medications Discontinued During This Encounter   Medication Reason    insulin glargine (LANTUS SOLOSTAR) 100 UNIT/ML injection pen DOSE ADJUSTMENT        Orders Placed This Encounter    insulin glargine (LANTUS SOLOSTAR) 100 UNIT/ML injection pen     Sig: Inject 20 Units into the skin daily     Dispense:  15 mL     Refill:  3       Patient will return to clinic in 1 week(s) for follow up.        S/O: Mr. Anshul Smiley, a 74 y.o. male referred by Anitha Lara APRN - CNP,  has a past medical history of Acute cystitis without hematuria, MCKENZIE (acute kidney injury) ([facility]), Arthritis, Arthropathy, Balanitis, Candida infection, Chronic pain, COVID-19 vaccine series completed, Dementia associated with other underlying disease without behavioral disturbance ([facility]), Dependence on continuous supplemental oxygen, Depression, Diabetes mellitus, type II ([facility]), Drug abuse ([facility]), ED (erectile dysfunction), Fatigue, GERD (gastroesophageal reflux disease), H/O epididymitis, H/O: pneumonia, Hep C w/o coma, chronic ([facility]), History of BPH, History of hematuria, History of tobacco use, Hypertension, Loss of appetite, Loss of balance, Microcytic anemia, Neuropathy, Pancytopenia ([facility]), Paranoid

## 2024-07-09 ENCOUNTER — CARE COORDINATION (OUTPATIENT)
Facility: CLINIC | Age: 74
End: 2024-07-09

## 2024-07-09 NOTE — CARE COORDINATION
Ambulatory Care Coordination Note     7/9/2024 11:30 AM     Patient outreach attempt by this ACM today to perform care management follow up . ACM was unable to reach the patient by telephone today; left voice message requesting a return phone call to this ACM.     ACM: Gianfranco Todd RN     Care Summary Note: NA    PCP/Specialist follow up:   Future Appointments         Provider Specialty Dept Phone    7/12/2024 11:00 AM Daisy Alvarez [facility] [phone]    7/24/2024 2:10 PM [facility] NURSE Urology [phone]    7/31/2024 3:00 PM Curt Johnson MD Urology [phone]    9/12/2024 9:15 AM AMA [facility] [phone]    9/20/2024 8:15 AM Anitha Lara APRN - CNP Family [facility] [phone]            Follow Up:   Plan for next AC outreach in approximately 1 week to complete:  - medication review  - advance care planning  - goal progression  - education .

## 2024-07-10 ENCOUNTER — TELEPHONE (OUTPATIENT)
Facility: CLINIC | Age: 74
End: 2024-07-10

## 2024-07-10 NOTE — TELEPHONE ENCOUNTER
Pharmacy Progress Note - Telephone Call    Mr. Anshul Smiley 74 y.o. was contacted via an outbound telephone call today to return call. Patient reports that he got his Trulicity from the pharmacy yesterday but does not know how to inject. Told patient to bring a pen with him to his appointment on Friday and will show him and his aid how to give injection. Patient agreeable to plan.     Thank you,    Daisy Alvarez, PharmD  Clinical Pharmacy Specialist  07/10/24    For Pharmacy Admin Tracking Only    Program: Medical Group  CPA in place:  Yes  Time Spent (min): 5

## 2024-07-12 ENCOUNTER — PHARMACY VISIT (OUTPATIENT)
Facility: CLINIC | Age: 74
End: 2024-07-12

## 2024-07-12 DIAGNOSIS — E11.42 TYPE 2 DIABETES MELLITUS WITH DIABETIC POLYNEUROPATHY, WITH LONG-TERM CURRENT USE OF INSULIN (HCC): Primary | ICD-10-CM

## 2024-07-12 DIAGNOSIS — Z79.4 TYPE 2 DIABETES MELLITUS WITH DIABETIC POLYNEUROPATHY, WITH LONG-TERM CURRENT USE OF INSULIN (HCC): Primary | ICD-10-CM

## 2024-07-12 NOTE — PROGRESS NOTES
DEMETRIO 21.80 2024 10:40 AM     -Immunizations:      Immunization History   Administered Date(s) Administered    COVID-19, MODERNA BLUE border, Primary or Immunocompromised, (age 12y+), IM, 100 mcg/0.5mL 2021, 2021, 11/10/2021    Influenza, FLUAD, (age 65 y+), Adjuvanted, 0.5mL 2021, 10/27/2023    Influenza, FLUBLOK, (age 18 y+), PF, 0.5mL 10/20/2020    Influenza, High Dose (Fluzone 65 yrs and older) 10/19/2022    Pneumococcal Vaccine 10/18/2016    Pneumococcal, PPSV23, PNEUMOVAX 23, (age 2y+), SC/IM, 0.5mL 2013         Future Appointments   Date Time Provider Department Center   2024 11:00 AM Daisy Alvarez RPH AMA BS AMB   2024  2:10 PM [facility] NURSE [facility] Sara Sched   2024  3:00 PM Curt Johnson MD [facility] Sara Sched   2024  9:15 AM AMA LAB AMA BS AMB   2024  8:15 AM Anitha Lara APRN - CNP AMA BS AMB        Patient verbalized understanding of the information presented and all of the patient’s questions were answered.  AVS was handed to the patient. Patient advised to call the office with any additional questions or concerns.    Notifications of recommendations will be sent to Anitha Lara APRN - CNP for review.      Thank you for the consult,    Daisy Alvarez, PharmD  Clinical Pharmacy Specialist  24        For Pharmacy Admin Tracking Only    Program: Medical Group  CPA in place:  Yes  Recommendation Provided To: Patient/Caregiver: 1 via In person  Intervention Detail: Adherence Monitorin  Intervention Accepted By: Patient/Caregiver: 1  Gap Closed?: No   Time Spent (min): 30

## 2024-07-15 ENCOUNTER — CARE COORDINATION (OUTPATIENT)
Facility: CLINIC | Age: 74
End: 2024-07-15

## 2024-07-15 ENCOUNTER — TELEPHONE (OUTPATIENT)
Facility: CLINIC | Age: 74
End: 2024-07-15

## 2024-07-15 NOTE — CARE COORDINATION
Ambulatory Care Coordination Note     7/15/2024 3:36 PM     Patient Current Location:  Home: [phone]  [place] [address]     ACM contacted the patient by telephone. Verified name and  with patient as identifiers.         ACM: Gianfranco Todd RN     Challenges to be reviewed by the provider   Additional needs identified to be addressed with provider No  none               Method of communication with provider: phone.    Care Summary Note:   No notable change in Patient health status from last encounter. No ER visit or IP admission since last encounter. Follow up appointments listed below and questions from Patient / Care Giver answered.  Patient has ACM contact information.  Review of medications with attention to any side effects and determination that patient has sufficient quantity of meds and/or refills.  Assessment done with attention to primary illness and / or condition.  Respiratory and cardiac status shows no issues at present    ACM spoke with  - [facility] Services Board has been contacted - ACM called [facility]B for follow up on patient status - VM message left.    Offered patient enrollment in the Remote Patient Monitoring (RPM) program for in-home monitoring: Yes, but did not enroll at this time: limited patient ability to navigate RPM/equipment.     Assessments Completed:   Ambulatory Care Coordination Assessment    Care Coordination Protocol  Referral from Primary Care Provider: Yes  Week 1 - Initial Assessment     Do you have all of your prescriptions and are they filled?: Yes  Barriers to medication adherence: None  Are you able to afford your medications?: Yes  How often do you have trouble taking your medications the way you have been told to take them?: I always take them as prescribed.        Ability to seek help/take action for Emergent Urgent situations i.e. fire, crime, inclement weather or health crisis.: Needs Assistance  Ability to ambulate to restroom: Needs

## 2024-07-15 NOTE — TELEPHONE ENCOUNTER
Pharmacy Progress Note - Telephone Call    Mr. Anshul Smiley 74 y.o. was contacted via an outbound telephone call to return call about Ana. They have not found the reader. Patient's new aid contacted Ana who told them to contact the insurance to see if they will cover a new reader. They contacted the insurance and was told to send a new prescription and submit an authorization.    Spoke with Ramos at [employer] to inform them of the patient's situation and inquire about the best way to submit this new order for the fastest return. Will submit a new order through [employer] and provide chart notes for them to fill out the PA. If insurance rejects, they will either contact the rep Pamela Stewarth to inform me or message back on the [employer] activity feed.     Current anti-hyperglycemic regimen includes:    Key Antihyperglycemic Medications                    insulin glargine (LANTUS SOLOSTAR) 100 UNIT/ML injection pen Inject 20 Units into the skin daily     pioglitazone (ACTOS) 30 MG tablet TAKE ONE TABLET BY MOUTH EVERY DAY     dulaglutide (TRULICITY) 0.75 MG/0.5ML SOPN SC injection Inject 0.5 mLs into the skin once a week Indications: Type 2 Diabetes, with long term use of insulin Once Trulicity started, discontinue Januvia      *Patient has not started Trulicity yet, going to wait until have replacement reader to monitor BG to make sure patient does not have low BG when transitioned. Pt is still taking Januvia 100 mg daily at this time.     Thank you,    Daisy Alvarez, PharmD  Clinical Pharmacy Specialist  07/15/24    For Pharmacy Admin Tracking Only    Program: Medical Group  CPA in place:  Yes  Recommendation Provided To: Patient/Caregiver: 1 via Telephone  Intervention Detail: New Rx: 1, reason: Needs Additional Therapy  Intervention Accepted By: Patient/Caregiver: 1  Gap Closed?: No   Time Spent (min): 15

## 2024-07-15 NOTE — TELEPHONE ENCOUNTER
Phone call from patient regarding his free stye kierra.  He had an appointment on Friday and  was told if he had any problems to call.  He stated he needs to speak with you as soon as possible.      Call sam [phone]

## 2024-07-17 ENCOUNTER — CARE COORDINATION (OUTPATIENT)
Facility: CLINIC | Age: 74
End: 2024-07-17

## 2024-07-17 ASSESSMENT — PATIENT HEALTH QUESTIONNAIRE - PHQ9
1. LITTLE INTEREST OR PLEASURE IN DOING THINGS: MORE THAN HALF THE DAYS
6. FEELING BAD ABOUT YOURSELF - OR THAT YOU ARE A FAILURE OR HAVE LET YOURSELF OR YOUR FAMILY DOWN: SEVERAL DAYS
10. IF YOU CHECKED OFF ANY PROBLEMS, HOW DIFFICULT HAVE THESE PROBLEMS MADE IT FOR YOU TO DO YOUR WORK, TAKE CARE OF THINGS AT HOME, OR GET ALONG WITH OTHER PEOPLE: SOMEWHAT DIFFICULT
5. POOR APPETITE OR OVEREATING: SEVERAL DAYS
SUM OF ALL RESPONSES TO PHQ QUESTIONS 1-9: 8
4. FEELING TIRED OR HAVING LITTLE ENERGY: SEVERAL DAYS
SUM OF ALL RESPONSES TO PHQ QUESTIONS 1-9: 8
SUM OF ALL RESPONSES TO PHQ QUESTIONS 1-9: 8
SUM OF ALL RESPONSES TO PHQ9 QUESTIONS 1 & 2: 4
7. TROUBLE CONCENTRATING ON THINGS, SUCH AS READING THE NEWSPAPER OR WATCHING TELEVISION: SEVERAL DAYS
9. THOUGHTS THAT YOU WOULD BE BETTER OFF DEAD, OR OF HURTING YOURSELF: NOT AT ALL
SUM OF ALL RESPONSES TO PHQ QUESTIONS 1-9: 8
2. FEELING DOWN, DEPRESSED OR HOPELESS: MORE THAN HALF THE DAYS

## 2024-07-17 NOTE — CARE COORDINATION
Ambulatory Care Coordination Note     2024 2:13 PM     Patient Current Location:  Home: [phone]  [place] [address]     ACM contacted the patient by telephone. Verified name and  with patient as identifiers.         ACM: Gianfranco Todd RN     Challenges to be reviewed by the provider   Additional needs identified to be addressed with provider No  none               Method of communication with provider: phone.    Care Summary Note:   Patient still awaits return call from [facility] Services Board. AC has placed 2 calls to CSB.  No notable change in Patient health status from last encounter. No ER visit or IP admission since last encounter. Follow up appointments listed below and questions from Patient / Care Giver answered.  Patient has ACM contact information.  Review of medications with attention to any side effects and determination that patient has sufficient quantity of meds and/or refills.  Assessment done with attention to primary illness and / or condition.  Respiratory and cardiac status shows no issues at present    Assessments Completed:   No changes since last call    Medications Reviewed:   Patient denies any changes with medications and reports taking all medications as prescribed.    Advance Care Planning:   Not on file; education provided     Care Planning:    Goals Addressed    None          PCP/Specialist follow up:   Future Appointments         Provider Specialty Dept Phone    2024 11:00 AM Daisy Alvarez [facility] [phone]    2024 2:10 PM [facility] NURSE Urology [phone]    2024 3:00 PM Curt Johnson MD Urology [phone]    2024 9:15 AM AMA LAB Family Medicine [phone]    2024 8:15 AM Anitha Lara APRN - CNP Family Medicine [phone]            Follow Up:   Plan for next [facility] outreach in approximately 1 week to complete:  - medication review   - advance care planning   - goal

## 2024-07-18 NOTE — PROGRESS NOTES
Ranges  <5.7              Normal  5.7 - 6.4         Consider Prediabetes  >6.5              Consider Diabetes       Hemoglobin A1C, POC   Date Value Ref Range Status   06/23/2020 6.4 % Final       Current anti-hyperglycemic regimen includes:    Key Antihyperglycemic Medications               insulin glargine (LANTUS SOLOSTAR) 100 UNIT/ML injection pen Inject 20 Units into the skin daily    pioglitazone (ACTOS) 30 MG tablet TAKE ONE TABLET BY MOUTH EVERY DAY    dulaglutide (TRULICITY) 0.75 MG/0.5ML SOPN SC injection Inject 0.5 mLs into the skin once a week Indications: Type 2 Diabetes, with long term use of insulin Once Trulicity started, discontinue Januvia            Interim update: Pt was last seen by me on 7/12/2024.  Per note: Patient brought his Trulicity injection to appointment today and a new sensor. He has a new aid and she needs trained on application of Ana and they both need training on Trulicity injection. Patient reports that he does not have his reader and has not had since last week when his last aid was let go. She was stealing stuff from his home and moving stuff around, so it is possible the reader was stolen or misplaced. Recommended him to check his house really good again to see if he can find it and if not contact [employer] customer service and see if they would be willing to replace it based on this circumstance. Will also reach out to [employer] and Ana representative for any advice or assistance. If patient is not able to get reader replaced, he may need to purchase a new one. No changes made to regimen today since unable to monitor for hypoglycemia, pt will not start Trulicity until he is able to get new sensors. Follow up next week in person to provide training on sensor and Trulicity.     Medication therapy changes:   - Januvia 100 mg daily   - Lantus 20 units in the morning  - Pioglitazone 30 mg daily   - Hold Trulicity at this time    Today:   Patient has not gotten his

## 2024-07-19 ENCOUNTER — PHARMACY VISIT (OUTPATIENT)
Facility: CLINIC | Age: 74
End: 2024-07-19

## 2024-07-19 DIAGNOSIS — Z79.4 TYPE 2 DIABETES MELLITUS WITH DIABETIC POLYNEUROPATHY, WITH LONG-TERM CURRENT USE OF INSULIN (HCC): Primary | ICD-10-CM

## 2024-07-19 DIAGNOSIS — E11.42 TYPE 2 DIABETES MELLITUS WITH DIABETIC POLYNEUROPATHY, WITH LONG-TERM CURRENT USE OF INSULIN (HCC): Primary | ICD-10-CM

## 2024-07-19 RX ORDER — PEN NEEDLE, DIABETIC 31 GX5/16"
1 NEEDLE, DISPOSABLE MISCELLANEOUS 4 TIMES DAILY
Qty: 100 EACH | Refills: 3 | Status: SHIPPED | OUTPATIENT
Start: 2024-07-19

## 2024-07-19 RX ORDER — LANCETS 30 GAUGE
1 EACH MISCELLANEOUS 4 TIMES DAILY
Qty: 100 EACH | Refills: 3 | Status: SHIPPED | OUTPATIENT
Start: 2024-07-19

## 2024-07-19 RX ORDER — GLUCOSAMINE HCL/CHONDROITIN SU 500-400 MG
CAPSULE ORAL
Qty: 100 STRIP | Refills: 3 | Status: SHIPPED | OUTPATIENT
Start: 2024-07-19

## 2024-07-22 ENCOUNTER — CARE COORDINATION (OUTPATIENT)
Facility: CLINIC | Age: 74
End: 2024-07-22

## 2024-07-22 NOTE — CARE COORDINATION
Follow Up Social Work Note - Ambulatory  7/22/2024    Two Identifiers Verified: Yes; confirmed with patient name and date of birth    Identified Needs:  Therapy/psychiatry resources        Social Work Plan:  Update patient regarding balance and research therapist. (Patient undated & understood barrier to care.)   Complete intake assessment with [facility] Services Board. (PENDING)  Connected patient to [place] Care Coordinator (COMPLETED)        LMSW left a second message with [facility] Services Board requesting a call back to assist patient with mental health case management. MELIZA received a return call from Of[facility] who explained patient must call in to complete intake assessment over phone first and then patient can be scheduled for in office appointment.     LMSW contacted [facility] for assigned care coordinator. Representative provided name and number to Tito Choi [phone]. MELIZA left message with Tito requesting a return call to assist patient with ongoing case management and resources. Tito returned call and has worked closely with patient. She is aware patient is seeking a therapist. MELIZA explained patient would benefit from ongoing therapy/counseling and PACT team services via [employer] to assist with medications management. MELIZA provided [employer] phone number. Tito will work with patient to provide ongoing case management & schedule [facility] appointment with patient.     MELIZA updated patient with [facility] and [facility].      SEKOU Enrique  Care Management

## 2024-07-22 NOTE — CARE COORDINATION
Ambulatory Care Coordination Note     2024 2:15 PM     Patient Current Location:  Home: [address]  [place] [address]     ACM contacted the patient by telephone. Verified name and  with patient as identifiers.         ACM: Gianfranco Todd RN     Challenges to be reviewed by the provider   Additional needs identified to be addressed with provider No  none               Method of communication with provider: phone.    Care Summary Note:   Receiving assistance from Diabetic services in regards to BG checks and monitoring, and knee brace.  No notable change in Patient health status from last encounter. No ER visit or IP admission since last encounter. Follow up appointments listed below and questions from Patient / Care Giver answered.  Patient has ACM contact information.    Review of medications with attention to any side effects and determination that patient has sufficient quantity of meds and/or refills.  Assessment done with attention to primary illness and / or condition.  Respiratory and cardiac status shows no issues at present       Assessments Completed:   No changes since last call    Medications Reviewed:   Patient denies any changes with medications and reports taking all medications as prescribed.    Advance Care Planning:   Not on file; education provided     Care Planning:    Goals Addressed                   This Visit's Progress     Attend follow up appointments on schedule   On track     Community Resource Goal   On track     Patient states he would like support with locating a psychiatrist/mental health support in local community.     Barriers: impairment:  cognitive dementia diagnosis  Plan for overcoming my barriers: MSW will provide assistance with identifying appropriate community resources.  Confidence:   Anticipated Goal Completion Date:        Prepare patients and caregivers for end of life decisions (ie. need for hospice, pain management, symptom

## 2024-07-25 ENCOUNTER — TELEPHONE (OUTPATIENT)
Facility: CLINIC | Age: 74
End: 2024-07-25

## 2024-07-25 NOTE — TELEPHONE ENCOUNTER
----- Message from Barbi Casper sent at 7/25/2024  9:53 AM EDT -----  Regarding: ECC Message to Provider  ECC Message to Provider    Relationship to Patient: Self     Additional Information : Patient wanted to talk to Daisy Boone, [facility] and talk about the diabetic of the patient. Wanted to talk the practice as soon as possible.   --------------------------------------------------------------------------------------------------------------------------    Call Back Information: OK to leave message on voicemail  Preferred Call Back Number: Phone [phone]

## 2024-07-25 NOTE — TELEPHONE ENCOUNTER
Pharmacy Progress Note - Telephone Call    Mr. Anshul Smiley 74 y.o. was contacted via an outbound telephone call today to return patient's call. He states that [employer] called him and said his reader was approved by the insurance and will be shipping Monday. Will switch appointment tomorrow to telephone and have patient come in person next week when he has his new Patient Home Monitoring 3 reader to train his new aid Stephanie on application and use.    Thank you,    Daisy Alvarez, PharmD  Clinical Pharmacy Specialist  07/25/24    For Pharmacy Admin Tracking Only    Program: Medical Group  CPA in place:  Yes  Recommendation Provided To: Patient/Caregiver: 0 via Telephone  Intervention Accepted By: Patient/Caregiver: 0  Gap Closed?: No   Time Spent (min): 5

## 2024-07-26 ENCOUNTER — SCHEDULED TELEPHONE ENCOUNTER (OUTPATIENT)
Facility: CLINIC | Age: 74
End: 2024-07-26

## 2024-07-26 DIAGNOSIS — E11.42 TYPE 2 DIABETES MELLITUS WITH DIABETIC POLYNEUROPATHY, WITH LONG-TERM CURRENT USE OF INSULIN (HCC): Primary | ICD-10-CM

## 2024-07-26 DIAGNOSIS — Z79.4 TYPE 2 DIABETES MELLITUS WITH DIABETIC POLYNEUROPATHY, WITH LONG-TERM CURRENT USE OF INSULIN (HCC): Primary | ICD-10-CM

## 2024-07-26 NOTE — PROGRESS NOTES
Pharmacy Progress Note - Diabetes Management Follow up - Telephone       Assessment / Plan:   Diabetes Management:  Per ADA guidelines, Pt's A1c is not at goal of < 7%. Patient's most recent A1c was  11.4% on 5/29/24, which has worsened from previous A1c of 9.0% on 1/3/24. Patient reports he is doing well, not having symptoms of any low or high BG. His reader is due to be in by Monday, so will provide training to aid next week at appointment on Ana 3 reader and sensors and Trulicity injection. Patient will continue current regimen until next appointment. Follow up next Friday.     There are no discontinued medications.     No orders of the defined types were placed in this encounter.       Patient will return to clinic in 1 week(s) for follow up.        S/O: Mr. Anshul Smiley, a 74 y.o. male referred by Anitha Lara APRN - CNP,  has a past medical history of Acute cystitis without hematuria, MCKENZIE (acute kidney injury) ([facility]), Arthritis, Arthropathy, Balanitis, Candida infection, Chronic pain, COVID-19 vaccine series completed, Dementia associated with other underlying disease without behavioral disturbance ([facility]), Dependence on continuous supplemental oxygen, Depression, Diabetes mellitus, type II ([facility]), Drug abuse ([facility]), ED (erectile dysfunction), Fatigue, GERD (gastroesophageal reflux disease), H/O epididymitis, H/O: pneumonia, Hep C w/o coma, chronic ([facility]), History of BPH, History of hematuria, History of tobacco use, Hypertension, Loss of appetite, Loss of balance, Microcytic anemia, Neuropathy, Pancytopenia ([facility]), Paranoid schizophrenia, chronic condition with acute exacerbation ([facility]), Prostate cancer ([facility]), PUD (peptic ulcer disease), SOB (shortness of breath), Status post partial gastrectomy, Stroke ([facility]), Type 2 diabetes mellitus with hyperglycemia, with long-term current use of insulin ([facility]), and Uncircumcised male.  Pt was seen today for diabetes management.      In the past, the patient has

## 2024-08-01 DIAGNOSIS — I10 ESSENTIAL (PRIMARY) HYPERTENSION: ICD-10-CM

## 2024-08-01 RX ORDER — AMLODIPINE BESYLATE 10 MG/1
TABLET ORAL
Qty: 90 TABLET | Refills: 1 | Status: SHIPPED | OUTPATIENT
Start: 2024-08-01

## 2024-08-02 ENCOUNTER — PHARMACY VISIT (OUTPATIENT)
Facility: CLINIC | Age: 74
End: 2024-08-02

## 2024-08-02 VITALS — WEIGHT: 159.6 LBS | BODY MASS INDEX: 23.57 KG/M2

## 2024-08-02 DIAGNOSIS — Z79.4 TYPE 2 DIABETES MELLITUS WITH DIABETIC POLYNEUROPATHY, WITH LONG-TERM CURRENT USE OF INSULIN (HCC): Primary | ICD-10-CM

## 2024-08-02 DIAGNOSIS — E11.42 TYPE 2 DIABETES MELLITUS WITH DIABETIC POLYNEUROPATHY, WITH LONG-TERM CURRENT USE OF INSULIN (HCC): Primary | ICD-10-CM

## 2024-08-02 NOTE — PROGRESS NOTES
Pharmacy Progress Note - Diabetes Management Follow up       Assessment / Plan:   Diabetes Management:  Per ADA guidelines, Pt's A1c is not at goal of < 7%. Patient's most recent A1c was  11.4% on 5/29/24, which has worsened from previous A1c of 9.0% on 1/3/24. Patient has a new temporary aid until his new permanent aid starts next week. He switched to a different company because he was having issues with his aids that he was getting from the company he was using. Patient brought in his glucometer, Ana reader/sensors, and Trulicity today. Set up patient's new reader and placed sensor on the back of the upper right arm, activated to start 60 minute warm up period. Gave patient injection of Trulicity on the left side of the abdomen and showed aid how to give injection. Contacted [facility] who states they removed the Januvia at the last fill so the Januvia is not in his current cycle. Contacted patient via telephone to inform him that Januvia is not in pill packs, so does not have to worry about removing and to reduce Lantus to 15 units since patient is starting Trulicity today. No other changes to other medications at this time. Patient expressed understanding. Follow up in 1 week to train new aid on Trulicity and assess CGM data to ensure insulin dose does not need further adjusted.     Medication therapy changes:   - Trulicity 0.75 mg once weekly on Fridays  - Reduce Lantus to 15 units in the morning   - Pioglitazone 30 mg daily      There are no discontinued medications.     No orders of the defined types were placed in this encounter.       Patient will return to clinic in 1 week(s) for follow up.        S/O: Mr. Anshul Smiley, a 74 y.o. male referred by Anitha Lara APRN - CNP,  has a past medical history of Acute cystitis without hematuria, MCKENZIE (acute kidney injury) (HCC), Arthritis, Arthropathy, Balanitis, Candida infection, Chronic pain, COVID-19 vaccine series completed, Dementia associated

## 2024-08-05 DIAGNOSIS — K21.9 GASTRO-ESOPHAGEAL REFLUX DISEASE WITHOUT ESOPHAGITIS: ICD-10-CM

## 2024-08-05 DIAGNOSIS — I10 ESSENTIAL (PRIMARY) HYPERTENSION: ICD-10-CM

## 2024-08-06 ENCOUNTER — CARE COORDINATION (OUTPATIENT)
Facility: CLINIC | Age: 74
End: 2024-08-06

## 2024-08-06 ENCOUNTER — TELEPHONE (OUTPATIENT)
Facility: CLINIC | Age: 74
End: 2024-08-06

## 2024-08-06 DIAGNOSIS — E11.42 TYPE 2 DIABETES MELLITUS WITH DIABETIC POLYNEUROPATHY, WITH LONG-TERM CURRENT USE OF INSULIN (HCC): ICD-10-CM

## 2024-08-06 DIAGNOSIS — Z79.4 TYPE 2 DIABETES MELLITUS WITH DIABETIC POLYNEUROPATHY, WITH LONG-TERM CURRENT USE OF INSULIN (HCC): ICD-10-CM

## 2024-08-06 DIAGNOSIS — K21.9 GASTRO-ESOPHAGEAL REFLUX DISEASE WITHOUT ESOPHAGITIS: ICD-10-CM

## 2024-08-06 RX ORDER — ROSUVASTATIN CALCIUM 5 MG/1
5 TABLET, COATED ORAL NIGHTLY
Qty: 90 TABLET | Refills: 1 | Status: SHIPPED | OUTPATIENT
Start: 2024-08-06

## 2024-08-06 RX ORDER — LISINOPRIL 20 MG/1
20 TABLET ORAL DAILY
Qty: 90 TABLET | Refills: 1 | Status: SHIPPED | OUTPATIENT
Start: 2024-08-06

## 2024-08-06 RX ORDER — FAMOTIDINE 40 MG/1
TABLET, FILM COATED ORAL
Qty: 93 TABLET | Refills: 1 | OUTPATIENT
Start: 2024-08-06

## 2024-08-06 RX ORDER — FAMOTIDINE 40 MG/1
40 TABLET, FILM COATED ORAL DAILY
Qty: 90 TABLET | Refills: 1 | Status: SHIPPED | OUTPATIENT
Start: 2024-08-06

## 2024-08-06 NOTE — TELEPHONE ENCOUNTER
Called patient in regards of message stating patient blood sugar is spiking to 315. Patient states I would like to know what to do when my sugar spikes. Patient states I have an appointment with the Dr. Villa on Friday . Patient has been advised that Dr. Villa will be notified of patient concerns . Patient verbalized understanding.

## 2024-08-06 NOTE — CARE COORDINATION
Follow Up Social Work Note - Ambulatory  8/6/2024          Two Identifiers Verified: Yes; confirmed with [facility] Care Coordinator, RHIANNON Choi.      Identified Needs:  Therapy/psychiatry resources        Social Work Plan:  Update patient regarding balance and research therapist. (Patient undated & understood barrier to care.)   Complete intake assessment with [facility] Services Board. (PENDING)  Connected patient to [facility] Care Coordinator (COMPLETED)      [facility] follow up with patient community care coordinator, RHIANNON Choi on connecting patient to [facility] and ongoing case management. Mrs. Choi stated she talked with patient and planned to call [facility] tomorrow on 8/7 with patient. Ms. Choi also changed patient PCA agency and patient has a new in home aide.   Monicaartemioezequiel Benavidezanali [phone].     SEKOU Enrique  Care Management

## 2024-08-06 NOTE — TELEPHONE ENCOUNTER
Pharmacy Progress Note - Telephone Call    Mr. Anshul Smiley 74 y.o. was contacted via an outbound telephone call today regarding high blood sugars. Spoke with patient and his new aid Nissa Damon, and she reports that his BG has been high and wants to know if there's something he should be doing to lower it. Patient reports his BG is in the 260s pretty much all day. He is taking Lantus 15 units daily.     Will increase Lantus to 18 units and follow up at appointment on Friday.     Thank you,    Daisy Alvarez, PharmD  Clinical Pharmacy Specialist  08/06/24    For Pharmacy Admin Tracking Only    Program: Medical Group  CPA in place:  Yes  Recommendation Provided To: Patient/Caregiver: 1 via Telephone  Intervention Detail: Dose Adjustment: 1, reason: Therapy Optimization  Intervention Accepted By: Patient/Caregiver: 1  Gap Closed?: No   Time Spent (min): 5

## 2024-08-09 ENCOUNTER — PHARMACY VISIT (OUTPATIENT)
Facility: CLINIC | Age: 74
End: 2024-08-09

## 2024-08-09 VITALS — HEART RATE: 87 BPM | BODY MASS INDEX: 23.6 KG/M2 | OXYGEN SATURATION: 98 % | WEIGHT: 159.8 LBS

## 2024-08-09 DIAGNOSIS — E11.42 TYPE 2 DIABETES MELLITUS WITH DIABETIC POLYNEUROPATHY, WITH LONG-TERM CURRENT USE OF INSULIN (HCC): Primary | ICD-10-CM

## 2024-08-09 DIAGNOSIS — Z79.4 TYPE 2 DIABETES MELLITUS WITH DIABETIC POLYNEUROPATHY, WITH LONG-TERM CURRENT USE OF INSULIN (HCC): Primary | ICD-10-CM

## 2024-08-09 NOTE — PATIENT INSTRUCTIONS
- Continue Lantus 18 units daily   - Continue Trulicity 0.75 mg once weekly  - Continue pioglitazone 30 mg once daily

## 2024-08-09 NOTE — PROGRESS NOTES
Pharmacy Progress Note - Diabetes Management Follow up       Assessment / Plan:   Diabetes Management:  Per ADA guidelines, Pt's A1c is not at goal of < 7%. Patient's most recent A1c was  11.4% on 5/29/24, which has worsened from previous A1c of 9.0% on 1/3/24. Patient presents to appointment today with new aid Nissa. Patient brought his injection of Trulicity for assistance with administration, his aid is not able to give the injection so trained patient on giving injection. Helped patient give injection today, patient administered on right side of abdomen without issues. Patient has been taking 18 units since phone call last week, which seems to be controlling his BG better. His average BG was 183 mg/dL. Patient is still having postprandial hyperglycemia, so discussed reducing carbohydrate content. Patient also was having stressors with transportation and when he gets frustrated, BG increases. Now that patient has new permanent aid, hopefully stressors will reduce and be able to have more consistency which will help with BG control as well. Follow up in 1 week to train Nissa on sensor changing.     Medication therapy changes:   - Continue Lantus 18 units daily   - Continue Trulicity 0.75 mg once weekly  - Continue pioglitazone 30 mg once daily      There are no discontinued medications.     No orders of the defined types were placed in this encounter.       Patient will return to clinic in 1 week(s) for follow up.        S/O: Mr. Anshul Smiley, a 74 y.o. male referred by Anitha Lara APRN - CNP,  has a past medical history of Acute cystitis without hematuria, MCKENZIE (acute kidney injury) ([facility]), Arthritis, Arthropathy, Balanitis, Candida infection, Chronic pain, COVID-19 vaccine series completed, Dementia associated with other underlying disease without behavioral disturbance ([facility]), Dependence on continuous supplemental oxygen, Depression, Diabetes mellitus, type II ([facility]), Drug abuse ([facility]), ED (erectile

## 2024-08-15 ENCOUNTER — CARE COORDINATION (OUTPATIENT)
Facility: CLINIC | Age: 74
End: 2024-08-15

## 2024-08-15 ASSESSMENT — PATIENT HEALTH QUESTIONNAIRE - PHQ9
7. TROUBLE CONCENTRATING ON THINGS, SUCH AS READING THE NEWSPAPER OR WATCHING TELEVISION: SEVERAL DAYS
2. FEELING DOWN, DEPRESSED OR HOPELESS: MORE THAN HALF THE DAYS
10. IF YOU CHECKED OFF ANY PROBLEMS, HOW DIFFICULT HAVE THESE PROBLEMS MADE IT FOR YOU TO DO YOUR WORK, TAKE CARE OF THINGS AT HOME, OR GET ALONG WITH OTHER PEOPLE: SOMEWHAT DIFFICULT
SUM OF ALL RESPONSES TO PHQ QUESTIONS 1-9: 8
SUM OF ALL RESPONSES TO PHQ QUESTIONS 1-9: 8
4. FEELING TIRED OR HAVING LITTLE ENERGY: SEVERAL DAYS
SUM OF ALL RESPONSES TO PHQ QUESTIONS 1-9: 8
8. MOVING OR SPEAKING SO SLOWLY THAT OTHER PEOPLE COULD HAVE NOTICED. OR THE OPPOSITE, BEING SO FIGETY OR RESTLESS THAT YOU HAVE BEEN MOVING AROUND A LOT MORE THAN USUAL: SEVERAL DAYS
SUM OF ALL RESPONSES TO PHQ9 QUESTIONS 1 & 2: 3
1. LITTLE INTEREST OR PLEASURE IN DOING THINGS: SEVERAL DAYS
3. TROUBLE FALLING OR STAYING ASLEEP: SEVERAL DAYS
6. FEELING BAD ABOUT YOURSELF - OR THAT YOU ARE A FAILURE OR HAVE LET YOURSELF OR YOUR FAMILY DOWN: SEVERAL DAYS
SUM OF ALL RESPONSES TO PHQ QUESTIONS 1-9: 8
9. THOUGHTS THAT YOU WOULD BE BETTER OFF DEAD, OR OF HURTING YOURSELF: NOT AT ALL
5. POOR APPETITE OR OVEREATING: NOT AT ALL

## 2024-08-15 NOTE — CARE COORDINATION
Ambulatory Care Coordination Note     8/15/2024 10:12 AM     Patient Current Location:  Home: [phone]  [place] [id number]     ACM contacted the patient by telephone. Verified name and  with patient as identifiers.         ACM: Gianfranco Todd RN     Challenges to be reviewed by the provider   Additional needs identified to be addressed with provider No  none               Method of communication with provider: phone.    Care Summary Note:   Patient had difficulty using CPAP machine when first obtained. In home CG helping with setup and patient to attempt to use it again today. Patient states doing well at present.  No notable change in Patient health status from last encounter. No ER visit or IP admission since last encounter. Follow up appointments listed below and questions from Patient / Care Giver answered.  Patient has ACM contact information.    Medication reconciliation done at this encounter with patient. Shows understanding of medication therapy.  Assessment done with attention to primary illness and / or condition.  Respiratory and cardiac status shows no issues at present     HTN Teaching:        Always take medication as prescribed . Do not skip or stop medication unless specifically instructed to by MD or PCP. If you forget to take a dose, take it as soon as you remember. However, if it is almost time for your next dose, skip the missed dose and go back to your original schedule. Discussed symptoms of HTN - low sodium diet    Assessments Completed:   No changes since last call    Medications Reviewed:   Patient denies any changes with medications and reports taking all medications as prescribed. and Completed during this call    Advance Care Planning:   Not on file; education provided     Care Planning:    Goals Addressed                   This Visit's Progress     Attend follow up appointments on schedule   On track     Prepare patients and caregivers for end of life

## 2024-08-16 ENCOUNTER — CARE COORDINATION (OUTPATIENT)
Facility: CLINIC | Age: 74
End: 2024-08-16

## 2024-08-16 ENCOUNTER — PHARMACY VISIT (OUTPATIENT)
Facility: CLINIC | Age: 74
End: 2024-08-16

## 2024-08-16 VITALS — BODY MASS INDEX: 23.81 KG/M2 | WEIGHT: 161.2 LBS

## 2024-08-16 DIAGNOSIS — E11.42 TYPE 2 DIABETES MELLITUS WITH DIABETIC POLYNEUROPATHY, WITH LONG-TERM CURRENT USE OF INSULIN (HCC): Primary | ICD-10-CM

## 2024-08-16 DIAGNOSIS — Z79.4 TYPE 2 DIABETES MELLITUS WITH DIABETIC POLYNEUROPATHY, WITH LONG-TERM CURRENT USE OF INSULIN (HCC): Primary | ICD-10-CM

## 2024-08-16 NOTE — PROGRESS NOTES
Pharmacy Progress Note - Diabetes Management Follow up       Assessment / Plan:   Diabetes Management:  Per ADA guidelines, Pt's A1c is not at goal of < 7%. Patient's most recent A1c was  11.4% on 5/29/24, which has worsened from previous A1c of 9.0% on 1/3/24. Patient presents to appointment today with aid, Nissa. Patient was in high spirits today and feeling better since starting CPAP machine. According to Cr, patient's BG the last 2 weeks was 166 mg/dL and was in range 68% of the time. Pt reports and improvement in BG since getting a CPAP machine and sleeping better. Pt brought Trulicity injection to appointment today for pt and aid to give injection, injection was pulled away after the first click, so none of the medication was delivered to patient, as it was pulled away. There was a 1.5 mg dose available, consulted with PCP who was agreeable for patient to receive dose for 1 week so he is not off therapy for a week. Pt was given 1.5 mg dose in office for just this week and he will return to normal dose of 0.75 mg next week. Educated patient that he may feel more appetite suppression or potential for side effects with the increased dose and to monitor BG to make sure he doesn't have BG < 70 mg/dL. Pt also brought his sensor with him to be replaced today, demonstrated application and activation of sensor to aid, applied sensor on the back of the upper left arm and activated to initiate 60 minute warm up period. Patient will return in 1 week to oversee administration of Trulicity injection to ensure proper injection.    Medication therapy changes:   - Continue Lantus 18 units daily   - Continue Trulicity 0.75 mg once weekly  - Continue pioglitazone 30 mg once daily      There are no discontinued medications.     No orders of the defined types were placed in this encounter.       Patient will return to clinic in 1 week(s) for follow up.        S/O: Mr. Anshul Smiley, a 74 y.o. male referred by Marco

## 2024-08-16 NOTE — CARE COORDINATION
Follow Up Social Work Note - Ambulatory  8/16/2024    Two Identifiers Verified: Yes; confirmed with [place] Care Coordinator, RHIANNON Choi.      Identified Needs:  Therapy/psychiatry resources        Social Work Plan:  Update patient regarding balance and research therapist. (Patient undated & understood barrier to care.)   Complete intake assessment with [facility]. (PENDING; Not successful. [place] Care Coordinator able to schedule psychiatry appointment with [facility].)  Connected patient to [place] Care Coordinator (COMPLETED)      Tito Choi [phone]    [facility] [place] [facility] spoke with Ms. Choi regarding psychiatry appointment. She was able to schedule patient at [facility] [place] [phone] on 9/29/2024 with Freda Morrell.    Attempted to contact patient for Complex Care Management. No answer, left message and provided contact information. Will attempt contact at a later time.    Bel Cheatham LMSW  Care Management

## 2024-08-23 ENCOUNTER — PHARMACY VISIT (OUTPATIENT)
Facility: CLINIC | Age: 74
End: 2024-08-23

## 2024-08-23 VITALS — BODY MASS INDEX: 23.95 KG/M2 | WEIGHT: 162.2 LBS

## 2024-08-23 DIAGNOSIS — Z79.4 TYPE 2 DIABETES MELLITUS WITH DIABETIC POLYNEUROPATHY, WITH LONG-TERM CURRENT USE OF INSULIN (HCC): Primary | ICD-10-CM

## 2024-08-23 DIAGNOSIS — E11.42 TYPE 2 DIABETES MELLITUS WITH DIABETIC POLYNEUROPATHY, WITH LONG-TERM CURRENT USE OF INSULIN (HCC): Primary | ICD-10-CM

## 2024-08-23 NOTE — PROGRESS NOTES
08/09/24 72.5 kg (159 lb 12.8 oz)       Vitals at visit today:  Wt 73.6 kg (162 lb 3.2 oz)   BMI 23.95 kg/m²        Screenings/Prevention Parameters:  -Diabetic Eye and Foot Exams:      Diabetes Management   Topic Date Due    Diabetic foot exam  03/17/2023    Diabetic retinal exam  05/30/2024    A1C test (Diabetic or Prediabetic)  08/29/2024     -Microalbumin / Creatinine ratio:       Lab Results   Component Value Date/Time    MICROALBUR 21.80 05/29/2024 10:40 AM     -Immunizations:      Immunization History   Administered Date(s) Administered    COVID-19, MODERNA BLUE border, Primary or Immunocompromised, (age 12y+), IM, 100 mcg/0.5mL 04/23/2021, 05/21/2021, 11/10/2021    Influenza, FLUAD, (age 65 y+), IM, Quadv, 0.5mL 09/21/2021, 10/27/2023    Influenza, FLUBLOK, (age 18 y+), Quadv PF, 0.5mL 10/20/2020    Influenza, FLUZONE High Dose, (age 65 y+), IM, Trivalent PF, 0.5mL 10/19/2022    Pneumococcal Vaccine 10/18/2016    Pneumococcal, PPSV23, PNEUMOVAX 23, (age 2y+), SC/IM, 0.5mL 08/09/2013         Future Appointments   Date Time Provider Department Center   8/30/2024 11:00 AM Daisy Alvarez RPH AMA [facility] DEP   9/12/2024  9:15 AM AMA LAB A [facility] DEP   9/20/2024  8:15 AM Anitha Lara APRN - CNP [facility] DEP   12/4/2024  3:00 PM Curt Johnson MD [facility] [place] Sched        Patient verbalized understanding of the information presented and all of the patient’s questions were answered.  AVS was handed to the patient. Patient advised to call the office with any additional questions or concerns.    Notifications of recommendations will be sent to Anitha Lara APRN - CNP for review.      Thank you for the consult,    Daisy Alvarez PharmD  Clinical Pharmacy Specialist  8/23/24        For Pharmacy Admin Tracking Only    Program: Medical Group  CPA in place:  Yes  Recommendation Provided To: Patient/Caregiver: 1 via In person  Intervention Detail: Device Training  Intervention Accepted By:

## 2024-08-27 ENCOUNTER — CARE COORDINATION (OUTPATIENT)
Facility: CLINIC | Age: 74
End: 2024-08-27

## 2024-08-27 NOTE — CARE COORDINATION
Ambulatory Care Coordination Note     2024 11:46 AM     Patient Current Location:  Home: [phone]  [place] [address]     ACM contacted the patient by telephone. Verified name and  with patient as identifiers.         ACM: Gianfranco Todd RN     Challenges to be reviewed by the provider   Additional needs identified to be addressed with provider No  none               Method of communication with provider: phone.    Care Summary Note:   No notable change in Patient health status from last encounter. No ER visit or IP admission since last encounter. Follow up appointments listed below and questions from Patient / Care Giver answered.  Patient has ACM contact information.    Review of medications with attention to any side effects and determination that patient has sufficient quantity of meds and/or refills.  Assessment done with attention to primary illness and / or condition.  Respiratory and cardiac status shows no issues at present      Assessments Completed:   No changes since last call    Medications Reviewed:   Patient denies any changes with medications and reports taking all medications as prescribed. and Completed during this call    Advance Care Planning:   Not on file; education provided     Care Planning:    Goals Addressed                   This Visit's Progress     Attend follow up appointments on schedule   On track     Community Resource Goal   On track     Patient states he would like support with locating a psychiatrist/mental health support in local community.     Barriers: impairment:  cognitive dementia diagnosis  Plan for overcoming my barriers: MSW will provide assistance with identifying appropriate community resources.  Confidence:   Anticipated Goal Completion Date:        Prepare patients and caregivers for end of life decisions (ie. need for hospice, pain management, symptom relief, advance directives etc.)   On track     Take all medications as

## 2024-08-30 ENCOUNTER — PHARMACY VISIT (OUTPATIENT)
Facility: CLINIC | Age: 74
End: 2024-08-30

## 2024-08-30 VITALS — WEIGHT: 160 LBS | BODY MASS INDEX: 23.63 KG/M2

## 2024-08-30 DIAGNOSIS — E11.42 TYPE 2 DIABETES MELLITUS WITH DIABETIC POLYNEUROPATHY, WITH LONG-TERM CURRENT USE OF INSULIN (HCC): Primary | ICD-10-CM

## 2024-08-30 DIAGNOSIS — Z79.4 TYPE 2 DIABETES MELLITUS WITH DIABETIC POLYNEUROPATHY, WITH LONG-TERM CURRENT USE OF INSULIN (HCC): Primary | ICD-10-CM

## 2024-08-30 NOTE — PROGRESS NOTES
pioglitazone (ACTOS) 30 MG tablet TAKE ONE TABLET BY MOUTH EVERY DAY    dulaglutide (TRULICITY) 0.75 MG/0.5ML SOPN SC injection Inject 0.5 mLs into the skin once a week Indications: Type 2 Diabetes, with long term use of insulin Once Trulicity started, discontinue Januvia    traZODone (DESYREL) 50 MG tablet Take 1 tablet by mouth nightly    Continuous Glucose  (FREESTYLE ELIE 3 READER) WADE 1 each by Does not apply route continuous Indications: Type 2 Diabetes, with current insulin use Use to monitor blood sugar.    Continuous Glucose Sensor (FREESTYLE ELIE 3 SENSOR) MISC 1 each by Does not apply route every 14 days Use to monitor blood. Apply 1 sensor on the back of the upper arm, change sensor every 14 days    acetaminophen (TYLENOL 8 HOUR ARTHRITIS PAIN) 650 MG extended release tablet Take 2 tablets by mouth in the morning and at bedtime    memantine (NAMENDA) 5 MG tablet Take 1 tablet by mouth 2 times daily    tamsulosin (FLOMAX) 0.4 MG capsule Take 1 capsule by mouth 2 times daily (with meals)    omeprazole (PRILOSEC) 20 MG delayed release capsule TAKE TWO CAPSULES BY MOUTH EVERY DAY    busPIRone (BUSPAR) 10 MG tablet TAKE ONE TABLET BY MOUTH TWICE DAILY    FLUoxetine (PROZAC) 40 MG capsule Take 1 capsule by mouth daily    B-D UF III MINI PEN NEEDLES 31G X 5 MM MISC Use 1 new pen needle each time to inject insulin subcutaneously 4 times daily    Calcium Carbonate-Vitamin D 600-3.125 MG-MCG TABS Take by mouth daily     No current facility-administered medications for this visit.       Allergies:  Allergies   Allergen Reactions    Gabapentin Other (See Comments), Swelling and Anaphylaxis     Blurred Vision  Other reaction(s): Sweating, Tremors    Ibuprofen Anaphylaxis     Other reaction(s): Tremors, Sweating    Nsaids Anaphylaxis    Diazepam Swelling    Diphenhydramine Swelling     Tongue swelling, throat swelling    Duloxetine Hcl Other (See Comments)    Fentanyl Other (See Comments) and Swelling        Topic Date Due    Diabetic foot exam  03/17/2023    Diabetic retinal exam  05/30/2024    A1C test (Diabetic or Prediabetic)  08/29/2024     -Microalbumin / Creatinine ratio:       Lab Results   Component Value Date/Time    MICROALBUR 21.80 05/29/2024 10:40 AM     -Immunizations:      Immunization History   Administered Date(s) Administered    COVID-19, MODERNA BLUE border, Primary or Immunocompromised, (age 12y+), IM, 100 mcg/0.5mL 04/23/2021, 05/21/2021, 11/10/2021    Influenza, FLUAD, (age 65 y+), IM, Quadv, 0.5mL 09/21/2021, 10/27/2023    Influenza, FLUBLOK, (age 18 y+), Quadv PF, 0.5mL 10/20/2020    Influenza, FLUZONE High Dose, (age 65 y+), IM, Trivalent PF, 0.5mL 10/19/2022    Pneumococcal Vaccine 10/18/2016    Pneumococcal, PPSV23, PNEUMOVAX 23, (age 2y+), SC/IM, 0.5mL 08/09/2013         Future Appointments   Date Time Provider Department Center   9/6/2024 12:00 PM Anitha Lara APRN - CNP [facility]   9/12/2024  9:15 AM AMA LAB [facility]   9/13/2024  1:30 PM Daisy Alvarez RPH AMA [facility]   9/19/2024 10:40 AM Estelle Jack DO [facility]   9/20/2024  8:15 AM Anitha Lara APRN - CNP [facility]   12/4/2024  3:00 PM Curt Johnson MD [facility] [place] Sched        Patient verbalized understanding of the information presented and all of the patient’s questions were answered.  AVS was handed to the patient. Patient advised to call the office with any additional questions or concerns.    Notifications of recommendations will be sent to Anitha Lara APRN - CNP for review.      Thank you for the consult,    Daisy Alvarez PharmD  Clinical Pharmacy Specialist  8/30/24        For Pharmacy Admin Tracking Only    Program: Medical Group  CPA in place:  Yes  Recommendation Provided To: Patient/Caregiver: 1 via In person  Intervention Detail: Device Training  Intervention Accepted By: Patient/Caregiver: 1  Gap Closed?: No   Time Spent (min): 60

## 2024-09-06 ENCOUNTER — NURSE ONLY (OUTPATIENT)
Facility: CLINIC | Age: 74
End: 2024-09-06

## 2024-09-06 DIAGNOSIS — E11.42 TYPE 2 DIABETES MELLITUS WITH DIABETIC POLYNEUROPATHY, WITH LONG-TERM CURRENT USE OF INSULIN (HCC): Primary | ICD-10-CM

## 2024-09-06 DIAGNOSIS — Z79.4 TYPE 2 DIABETES MELLITUS WITH DIABETIC POLYNEUROPATHY, WITH LONG-TERM CURRENT USE OF INSULIN (HCC): Primary | ICD-10-CM

## 2024-09-09 ENCOUNTER — CARE COORDINATION (OUTPATIENT)
Facility: CLINIC | Age: 74
End: 2024-09-09

## 2024-09-12 ENCOUNTER — TELEPHONE (OUTPATIENT)
Facility: CLINIC | Age: 74
End: 2024-09-12

## 2024-09-12 LAB
A/G RATIO: 1.5 RATIO (ref 1.1–2.6)
ALBUMIN: 4.8 G/DL (ref 3.5–5)
ALP BLD-CCNC: 92 U/L (ref 40–125)
ALT SERPL-CCNC: 44 U/L (ref 5–40)
ANION GAP SERPL CALCULATED.3IONS-SCNC: 13 MMOL/L (ref 3–15)
AST SERPL-CCNC: 38 U/L (ref 10–37)
BILIRUB SERPL-MCNC: 0.2 MG/DL (ref 0.2–1.2)
BUN BLDV-MCNC: 24 MG/DL (ref 6–22)
CALCIUM SERPL-MCNC: 10.1 MG/DL (ref 8.4–10.5)
CHLORIDE BLD-SCNC: 108 MMOL/L (ref 98–110)
CHOLESTEROL, TOTAL: 93 MG/DL (ref 110–200)
CHOLESTEROL/HDL RATIO: 1.6 (ref 0–5)
CO2: 22 MMOL/L (ref 20–32)
CREAT SERPL-MCNC: 1.7 MG/DL (ref 0.8–1.6)
CREATININE URINE: 256 MG/DL
ESTIMATED AVERAGE GLUCOSE: 193 MG/DL (ref 91–123)
GFR, ESTIMATED: 41.1 ML/MIN/1.73 SQ.M.
GLOBULIN: 3.2 G/DL (ref 2–4)
GLUCOSE: 135 MG/DL (ref 70–99)
HBA1C MFR BLD: 8.4 % (ref 4.8–5.6)
HCT VFR BLD CALC: 35.7 % (ref 37.8–52.2)
HDLC SERPL-MCNC: 60 MG/DL
HEMOGLOBIN: 10.8 G/DL (ref 12.6–17.1)
LDL CHOLESTEROL: 24 MG/DL (ref 50–99)
LDL/HDL RATIO: 0.4
MCH RBC QN AUTO: 23 PG (ref 26–34)
MCHC RBC AUTO-ENTMCNC: 30 G/DL (ref 31–36)
MCV RBC AUTO: 76 FL (ref 80–95)
MICROALB/CREAT RATIO (UG/MG CREAT.): 30.1 (ref 0–30)
MICROALBUMIN/CREAT 24H UR: 77.1 MG/L (ref 0.1–17)
NON-HDL CHOLESTEROL: 33 MG/DL
PDW BLD-RTO: 16.4 % (ref 10–15.5)
PLATELET # BLD: 156 K/UL (ref 140–440)
PMV BLD AUTO: 10.4 FL (ref 9–13)
POTASSIUM SERPL-SCNC: 5.2 MMOL/L (ref 3.5–5.5)
RBC # BLD: 4.69 M/UL (ref 3.8–5.8)
SODIUM BLD-SCNC: 143 MMOL/L (ref 133–145)
TOTAL PROTEIN: 8 G/DL (ref 6.2–8.1)
TRIGL SERPL-MCNC: 42 MG/DL (ref 40–149)
VLDLC SERPL CALC-MCNC: 8 MG/DL (ref 8–30)
WBC # BLD: 4.4 K/UL (ref 4–11)

## 2024-09-12 NOTE — TELEPHONE ENCOUNTER
Received messaged from Lilly Quinones stating to call patient  in regards of Cpap machine is  not working and patient  wants to know what should he do.Called to inform patient to call Dr. Ray issue per CHELSEA Lara instructions . Patient verbalized understanding and states the discoloration is getting worse . I have discoloration over my arms and the discoloration is spreading to my chest . Patient has been advised that he will receive a call with further instructions.

## 2024-09-13 ENCOUNTER — PHARMACY VISIT (OUTPATIENT)
Facility: CLINIC | Age: 74
End: 2024-09-13

## 2024-09-13 ENCOUNTER — TELEPHONE (OUTPATIENT)
Facility: CLINIC | Age: 74
End: 2024-09-13

## 2024-09-13 ENCOUNTER — CLINICAL DOCUMENTATION (OUTPATIENT)
Facility: CLINIC | Age: 74
End: 2024-09-13

## 2024-09-13 DIAGNOSIS — Z79.4 TYPE 2 DIABETES MELLITUS WITH DIABETIC POLYNEUROPATHY, WITH LONG-TERM CURRENT USE OF INSULIN (HCC): Primary | ICD-10-CM

## 2024-09-13 DIAGNOSIS — E11.42 TYPE 2 DIABETES MELLITUS WITH DIABETIC POLYNEUROPATHY, WITH LONG-TERM CURRENT USE OF INSULIN (HCC): Primary | ICD-10-CM

## 2024-09-13 NOTE — TELEPHONE ENCOUNTER
Giovani Jose,     Anshul Smiley said that he needs to speak to about some issues he have going on. He ask could you please call him. Thanks

## 2024-09-16 ENCOUNTER — TELEPHONE (OUTPATIENT)
Facility: CLINIC | Age: 74
End: 2024-09-16

## 2024-09-17 RX ORDER — FLURBIPROFEN SODIUM 0.3 MG/ML
SOLUTION/ DROPS OPHTHALMIC
Qty: 100 EACH | Refills: 1 | Status: SHIPPED | OUTPATIENT
Start: 2024-09-17

## 2024-09-17 RX ORDER — TRIAMCINOLONE ACETONIDE 1 MG/G
CREAM TOPICAL
Qty: 45 G | Refills: 2 | Status: SHIPPED | OUTPATIENT
Start: 2024-09-17

## 2024-09-18 ENCOUNTER — SCHEDULED TELEPHONE ENCOUNTER (OUTPATIENT)
Facility: CLINIC | Age: 74
End: 2024-09-18

## 2024-09-18 DIAGNOSIS — Z79.4 TYPE 2 DIABETES MELLITUS WITH DIABETIC POLYNEUROPATHY, WITH LONG-TERM CURRENT USE OF INSULIN (HCC): Primary | ICD-10-CM

## 2024-09-18 DIAGNOSIS — E11.42 TYPE 2 DIABETES MELLITUS WITH DIABETIC POLYNEUROPATHY, WITH LONG-TERM CURRENT USE OF INSULIN (HCC): Primary | ICD-10-CM

## 2024-09-19 ENCOUNTER — OFFICE VISIT (OUTPATIENT)
Age: 74
End: 2024-09-19
Payer: MEDICARE

## 2024-09-19 VITALS
BODY MASS INDEX: 23.7 KG/M2 | HEART RATE: 82 BPM | DIASTOLIC BLOOD PRESSURE: 84 MMHG | WEIGHT: 160 LBS | OXYGEN SATURATION: 97 % | SYSTOLIC BLOOD PRESSURE: 134 MMHG | TEMPERATURE: 97.9 F | RESPIRATION RATE: 16 BRPM | HEIGHT: 69 IN

## 2024-09-19 DIAGNOSIS — G47.19 EXCESSIVE DAYTIME SLEEPINESS: ICD-10-CM

## 2024-09-19 DIAGNOSIS — I10 ESSENTIAL HYPERTENSION WITH GOAL BLOOD PRESSURE LESS THAN 130/80: ICD-10-CM

## 2024-09-19 DIAGNOSIS — R35.1 NOCTURIA: ICD-10-CM

## 2024-09-19 DIAGNOSIS — G47.31 COMPLEX SLEEP APNEA SYNDROME: ICD-10-CM

## 2024-09-19 DIAGNOSIS — G47.33 OSA (OBSTRUCTIVE SLEEP APNEA): Primary | ICD-10-CM

## 2024-09-19 DIAGNOSIS — G25.81 RESTLESS LEGS SYNDROME (RLS): ICD-10-CM

## 2024-09-19 DIAGNOSIS — G47.52 REM BEHAVIORAL DISORDER: ICD-10-CM

## 2024-09-19 DIAGNOSIS — G47.63 BRUXISM, SLEEP-RELATED: ICD-10-CM

## 2024-09-19 PROCEDURE — G8427 DOCREV CUR MEDS BY ELIG CLIN: HCPCS | Performed by: OTOLARYNGOLOGY

## 2024-09-19 PROCEDURE — 1123F ACP DISCUSS/DSCN MKR DOCD: CPT | Performed by: OTOLARYNGOLOGY

## 2024-09-19 PROCEDURE — 3079F DIAST BP 80-89 MM HG: CPT | Performed by: OTOLARYNGOLOGY

## 2024-09-19 PROCEDURE — 1036F TOBACCO NON-USER: CPT | Performed by: OTOLARYNGOLOGY

## 2024-09-19 PROCEDURE — 3075F SYST BP GE 130 - 139MM HG: CPT | Performed by: OTOLARYNGOLOGY

## 2024-09-19 PROCEDURE — G8420 CALC BMI NORM PARAMETERS: HCPCS | Performed by: OTOLARYNGOLOGY

## 2024-09-19 PROCEDURE — 99214 OFFICE O/P EST MOD 30 MIN: CPT | Performed by: OTOLARYNGOLOGY

## 2024-09-19 PROCEDURE — 3017F COLORECTAL CA SCREEN DOC REV: CPT | Performed by: OTOLARYNGOLOGY

## 2024-09-19 ASSESSMENT — SLEEP AND FATIGUE QUESTIONNAIRES
HOW LIKELY ARE YOU TO NOD OFF OR FALL ASLEEP WHILE SITTING INACTIVE IN A PUBLIC PLACE: SLIGHT CHANCE OF DOZING
HOW LIKELY ARE YOU TO NOD OFF OR FALL ASLEEP IN A CAR, WHILE STOPPED FOR A FEW MINUTES IN TRAFFIC: WOULD NEVER DOZE
HOW LIKELY ARE YOU TO NOD OFF OR FALL ASLEEP WHEN YOU ARE A PASSENGER IN A CAR FOR AN HOUR WITHOUT A BREAK: MODERATE CHANCE OF DOZING
HOW LIKELY ARE YOU TO NOD OFF OR FALL ASLEEP WHILE SITTING QUIETLY AFTER LUNCH WITHOUT ALCOHOL: HIGH CHANCE OF DOZING
HOW LIKELY ARE YOU TO NOD OFF OR FALL ASLEEP WHILE SITTING AND TALKING TO SOMEONE: MODERATE CHANCE OF DOZING
HOW LIKELY ARE YOU TO NOD OFF OR FALL ASLEEP WHILE SITTING AND READING: SLIGHT CHANCE OF DOZING
HOW LIKELY ARE YOU TO NOD OFF OR FALL ASLEEP WHILE WATCHING TV: SLIGHT CHANCE OF DOZING
HOW LIKELY ARE YOU TO NOD OFF OR FALL ASLEEP WHILE LYING DOWN TO REST IN THE AFTERNOON WHEN CIRCUMSTANCES PERMIT: MODERATE CHANCE OF DOZING
ESS TOTAL SCORE: 12

## 2024-09-19 ASSESSMENT — PATIENT HEALTH QUESTIONNAIRE - PHQ9
SUM OF ALL RESPONSES TO PHQ QUESTIONS 1-9: 1
SUM OF ALL RESPONSES TO PHQ QUESTIONS 1-9: 1
2. FEELING DOWN, DEPRESSED OR HOPELESS: SEVERAL DAYS
SUM OF ALL RESPONSES TO PHQ QUESTIONS 1-9: 1
1. LITTLE INTEREST OR PLEASURE IN DOING THINGS: NOT AT ALL
SUM OF ALL RESPONSES TO PHQ9 QUESTIONS 1 & 2: 1
SUM OF ALL RESPONSES TO PHQ QUESTIONS 1-9: 1

## 2024-09-20 ENCOUNTER — CLINICAL DOCUMENTATION (OUTPATIENT)
Age: 74
End: 2024-09-20

## 2024-09-20 ENCOUNTER — OFFICE VISIT (OUTPATIENT)
Facility: CLINIC | Age: 74
End: 2024-09-20
Payer: MEDICARE

## 2024-09-20 VITALS
BODY MASS INDEX: 23.43 KG/M2 | OXYGEN SATURATION: 98 % | SYSTOLIC BLOOD PRESSURE: 99 MMHG | TEMPERATURE: 98.2 F | RESPIRATION RATE: 18 BRPM | WEIGHT: 158.2 LBS | HEIGHT: 69 IN | HEART RATE: 84 BPM | DIASTOLIC BLOOD PRESSURE: 67 MMHG

## 2024-09-20 DIAGNOSIS — R29.6 FALLS FREQUENTLY: ICD-10-CM

## 2024-09-20 DIAGNOSIS — Z79.4 TYPE 2 DIABETES MELLITUS WITH DIABETIC POLYNEUROPATHY, WITH LONG-TERM CURRENT USE OF INSULIN (HCC): Primary | ICD-10-CM

## 2024-09-20 DIAGNOSIS — E11.42 TYPE 2 DIABETES MELLITUS WITH DIABETIC POLYNEUROPATHY, WITH LONG-TERM CURRENT USE OF INSULIN (HCC): Primary | ICD-10-CM

## 2024-09-20 DIAGNOSIS — I10 ESSENTIAL HYPERTENSION WITH GOAL BLOOD PRESSURE LESS THAN 130/80: ICD-10-CM

## 2024-09-20 DIAGNOSIS — F03.A4 MILD DEMENTIA WITH ANXIETY, UNSPECIFIED DEMENTIA TYPE (HCC): ICD-10-CM

## 2024-09-20 PROCEDURE — 99214 OFFICE O/P EST MOD 30 MIN: CPT | Performed by: NURSE PRACTITIONER

## 2024-09-20 PROCEDURE — 3052F HG A1C>EQUAL 8.0%<EQUAL 9.0%: CPT | Performed by: NURSE PRACTITIONER

## 2024-09-20 PROCEDURE — 1123F ACP DISCUSS/DSCN MKR DOCD: CPT | Performed by: NURSE PRACTITIONER

## 2024-09-20 PROCEDURE — 3074F SYST BP LT 130 MM HG: CPT | Performed by: NURSE PRACTITIONER

## 2024-09-20 PROCEDURE — 3078F DIAST BP <80 MM HG: CPT | Performed by: NURSE PRACTITIONER

## 2024-09-20 SDOH — ECONOMIC STABILITY: FOOD INSECURITY: WITHIN THE PAST 12 MONTHS, THE FOOD YOU BOUGHT JUST DIDN'T LAST AND YOU DIDN'T HAVE MONEY TO GET MORE.: NEVER TRUE

## 2024-09-20 SDOH — ECONOMIC STABILITY: FOOD INSECURITY: WITHIN THE PAST 12 MONTHS, YOU WORRIED THAT YOUR FOOD WOULD RUN OUT BEFORE YOU GOT MONEY TO BUY MORE.: NEVER TRUE

## 2024-09-20 SDOH — ECONOMIC STABILITY: INCOME INSECURITY: HOW HARD IS IT FOR YOU TO PAY FOR THE VERY BASICS LIKE FOOD, HOUSING, MEDICAL CARE, AND HEATING?: NOT HARD AT ALL

## 2024-09-20 ASSESSMENT — PATIENT HEALTH QUESTIONNAIRE - PHQ9
SUM OF ALL RESPONSES TO PHQ QUESTIONS 1-9: 1
2. FEELING DOWN, DEPRESSED OR HOPELESS: NOT AT ALL
1. LITTLE INTEREST OR PLEASURE IN DOING THINGS: SEVERAL DAYS
SUM OF ALL RESPONSES TO PHQ QUESTIONS 1-9: 1
SUM OF ALL RESPONSES TO PHQ9 QUESTIONS 1 & 2: 1
SUM OF ALL RESPONSES TO PHQ QUESTIONS 1-9: 1
SUM OF ALL RESPONSES TO PHQ QUESTIONS 1-9: 1

## 2024-09-26 DIAGNOSIS — E11.65 TYPE 2 DIABETES MELLITUS WITH HYPERGLYCEMIA (HCC): ICD-10-CM

## 2024-09-27 ENCOUNTER — NURSE ONLY (OUTPATIENT)
Facility: CLINIC | Age: 74
End: 2024-09-27

## 2024-09-27 RX ORDER — PIOGLITAZONEHYDROCHLORIDE 30 MG/1
TABLET ORAL
Qty: 90 TABLET | Refills: 1 | Status: SHIPPED | OUTPATIENT
Start: 2024-09-27

## 2024-09-30 DIAGNOSIS — E11.42 TYPE 2 DIABETES MELLITUS WITH DIABETIC POLYNEUROPATHY, WITH LONG-TERM CURRENT USE OF INSULIN (HCC): ICD-10-CM

## 2024-09-30 DIAGNOSIS — Z79.4 TYPE 2 DIABETES MELLITUS WITH DIABETIC POLYNEUROPATHY, WITH LONG-TERM CURRENT USE OF INSULIN (HCC): ICD-10-CM

## 2024-10-01 RX ORDER — CALCIUM CITRATE/VITAMIN D3 200MG-6.25
TABLET ORAL
Qty: 100 STRIP | Refills: 3 | Status: SHIPPED | OUTPATIENT
Start: 2024-10-01

## 2024-10-01 RX ORDER — DULAGLUTIDE 0.75 MG/.5ML
0.75 INJECTION, SOLUTION SUBCUTANEOUS WEEKLY
Qty: 2 ML | Refills: 3 | OUTPATIENT
Start: 2024-10-01

## 2024-10-04 ENCOUNTER — TELEPHONE (OUTPATIENT)
Age: 74
End: 2024-10-04

## 2024-10-04 NOTE — TELEPHONE ENCOUNTER
Spoke with the patient and advised he will need to contact the DME company for instructions on changing out the hose

## 2024-10-07 ENCOUNTER — SCHEDULED TELEPHONE ENCOUNTER (OUTPATIENT)
Facility: CLINIC | Age: 74
End: 2024-10-07

## 2024-10-07 DIAGNOSIS — Z79.4 TYPE 2 DIABETES MELLITUS WITH DIABETIC POLYNEUROPATHY, WITH LONG-TERM CURRENT USE OF INSULIN (HCC): Primary | ICD-10-CM

## 2024-10-07 DIAGNOSIS — E11.42 TYPE 2 DIABETES MELLITUS WITH DIABETIC POLYNEUROPATHY, WITH LONG-TERM CURRENT USE OF INSULIN (HCC): Primary | ICD-10-CM

## 2024-10-07 NOTE — PROGRESS NOTES
Type 2 diabetes mellitus with hyperglycemia, with long-term current use of insulin ([facility]), and Uncircumcised male.  Pt was seen today for diabetes management.      In the past, the patient has tried the following:      Medication Dose Reason for discontinuation  Notes   Metformin   Diarrhea and GI upset      Novolog   Therapy change                  Patient denies personal/family history of thyroid cancer, reports history of pancreatitis (> 10 years ago related to alcohol consumption, no further episodes since and patient no longer consumes alcohol), and reports issues with recurrent yeast infections (likely due to high BG).     Patient's last A1c was:   Hemoglobin A1C   Date Value Ref Range Status   09/12/2024 8.4 (H) 4.8 - 5.6 % Final     Hemoglobin A1C, POC   Date Value Ref Range Status   06/23/2020 6.4 % Final       Current anti-hyperglycemic regimen includes:    Key Antihyperglycemic Medications               pioglitazone (ACTOS) 30 MG tablet TAKE ONE TABLET BY MOUTH EVERY DAY    insulin glargine (LANTUS SOLOSTAR) 100 UNIT/ML injection pen Inject 20 Units into the skin daily            Interim update: Pt was last seen by me on 9/18/2024.  Per note: Pt instructed to hold Trulicity per PCP to see if skin rash may be due to Trulicity. Everything is going well at this point, he will not take Trulicity this Friday. He has not seen any changes to skin but pt still has Trulicity in his system since he takes on Fridays. Pt will continue his current doses at this time and will adjust accordingly if pt continues to be off Trulicity. Discussed with pt that he may see higher numbers than before since being off Trulicity, he expresses understanding. Follow up in 3 weeks.      Today:   Taking Lantus 18 units daily  Taking pioglitazone 30 units daily.  Got sensor changed at office  The skin discoloration has not gone away on his skin since being off Trulicity and seems like it's spreading - not itchy  Been off for about 2

## 2024-10-10 NOTE — PROGRESS NOTES
Pharmacy Progress Note - Diabetes Management Follow up        Assessment / Plan:   Diabetes Management:  Per ADA guidelines, Pt's A1c is not at goal of < 7%. Patient's most recent A1c was 8.4% on 9/12/24, which has significantly improved since previous A1c of 11.4% on 5/29/24. Has a temporary aid today named Zyasia. Pt brought Ana sensor for replacement, placed sensor and activated to initiate 60 minute warm up period. Pt's average BG according to [employer] the last 2 weeks was 150 mg/dL and is in range 77% of the time. His BG is doing good without the Trulicity, will continue to hold Trulicity to see if skin discoloration starts to resolve. No changes made to regimen today, he will continue 18 units of Lantus and pioglitazone daily. Follow up in 1 week via telephone and then in 2 weeks to change sensor.    Medication therapy changes:   - Hold Trulicity  - Continue Lantus 18 units daily   - Continue pioglitazone 30 units daily    Recommendations to Anitha Lara APRN - CNP:    - Patient's discoloration has not started to resolve since stopping Trulicity and states it is starting to spread up his arms and onto his chest. Will continue to hold; recommend referral to dermatology to further assess.      There are no discontinued medications.     No orders of the defined types were placed in this encounter.       Patient will return to clinic in 1 week(s) for follow up.        S/O: Mr. Anshul Smiley, a 74 y.o. male referred by Anitha Lara APRN - CNP,  has a past medical history of Acute cystitis without hematuria, MCKENZIE (acute kidney injury) ([facility]), Arthritis, Arthropathy, Balanitis, Candida infection, Chronic pain, COVID-19 vaccine series completed, Dementia associated with other underlying disease without behavioral disturbance ([facility]), Dependence on continuous supplemental oxygen, Depression, Diabetes mellitus, type II ([facility]), Drug abuse ([facility]), ED (erectile dysfunction), Fatigue, GERD (gastroesophageal

## 2024-10-11 ENCOUNTER — TELEPHONE (OUTPATIENT)
Facility: CLINIC | Age: 74
End: 2024-10-11

## 2024-10-11 ENCOUNTER — PHARMACY VISIT (OUTPATIENT)
Facility: CLINIC | Age: 74
End: 2024-10-11

## 2024-10-11 VITALS — WEIGHT: 159 LBS | BODY MASS INDEX: 23.48 KG/M2

## 2024-10-11 DIAGNOSIS — R21 RASH AND NONSPECIFIC SKIN ERUPTION: Primary | ICD-10-CM

## 2024-10-11 DIAGNOSIS — Z79.4 TYPE 2 DIABETES MELLITUS WITH DIABETIC POLYNEUROPATHY, WITH LONG-TERM CURRENT USE OF INSULIN (HCC): Primary | ICD-10-CM

## 2024-10-11 DIAGNOSIS — E11.42 TYPE 2 DIABETES MELLITUS WITH DIABETIC POLYNEUROPATHY, WITH LONG-TERM CURRENT USE OF INSULIN (HCC): Primary | ICD-10-CM

## 2024-10-11 NOTE — TELEPHONE ENCOUNTER
----- Message from Daisy Alvarez [facility] sent at 10/7/2024  5:20 PM EDT -----  Pt states that his skin discoloration has not resolved since being off Trulicity. Should pt continue to remain off Trulicity?

## 2024-10-11 NOTE — TELEPHONE ENCOUNTER
Called patient about his rash.  States his rash is not getting better and has spread  On his chest, back, arms and legs  Triamcinolone is not helpful, non pruritic  Has been off trulicity for about 4 weeks, rash started when he started trulicity  Patient verbalized understanding and is in agreement with this plan of care

## 2024-10-17 NOTE — PROGRESS NOTES
Pharmacy Admin Tracking Only    Program: Medical Group  CPA in place:  Yes  Recommendation Provided To: Patient/Caregiver: 0 via Telephone  Intervention Accepted By: Patient/Caregiver: 0  Gap Closed?: No   Time Spent (min): 10

## 2024-10-18 ENCOUNTER — SCHEDULED TELEPHONE ENCOUNTER (OUTPATIENT)
Facility: CLINIC | Age: 74
End: 2024-10-18

## 2024-10-18 DIAGNOSIS — E11.42 TYPE 2 DIABETES MELLITUS WITH DIABETIC POLYNEUROPATHY, WITH LONG-TERM CURRENT USE OF INSULIN (HCC): Primary | ICD-10-CM

## 2024-10-18 DIAGNOSIS — Z79.4 TYPE 2 DIABETES MELLITUS WITH DIABETIC POLYNEUROPATHY, WITH LONG-TERM CURRENT USE OF INSULIN (HCC): Primary | ICD-10-CM

## 2024-10-25 ENCOUNTER — PHARMACY VISIT (OUTPATIENT)
Facility: CLINIC | Age: 74
End: 2024-10-25

## 2024-10-25 VITALS — BODY MASS INDEX: 23.81 KG/M2 | WEIGHT: 161.2 LBS

## 2024-10-25 DIAGNOSIS — Z79.4 TYPE 2 DIABETES MELLITUS WITH DIABETIC POLYNEUROPATHY, WITH LONG-TERM CURRENT USE OF INSULIN (HCC): Primary | ICD-10-CM

## 2024-10-25 DIAGNOSIS — E11.42 TYPE 2 DIABETES MELLITUS WITH DIABETIC POLYNEUROPATHY, WITH LONG-TERM CURRENT USE OF INSULIN (HCC): Primary | ICD-10-CM

## 2024-10-25 NOTE — PROGRESS NOTES
Pharmacy Progress Note - Diabetes Management Follow up       Assessment / Plan:   Diabetes Management:  Per ADA guidelines, Pt's A1c is not at goal of < 7%. Patient's most recent A1c was 8.4% on 9/12/24, which has significantly improved since previous A1c of 11.4% on 5/29/24. Patient presents with new permanent aid Cassandra. Changed patient's sensor, placed new sensor on the back of the upper left arm and activated to initiate 60 minute warm up time. Showed aid how to replace as well. Discussed diet, carb counting, and sugar intake monitoring with aid and provided Meal planning and carb counting pamphlet to aid to refer to when cooking and picking meals for patient. His breakfast BG is tending to spike on some days, so it may be the difference between croissants and biscuits, they will look at the nutrition label and see if one has less carbs than the other. Carbohydrate recommendations: 45 grams for breakfast and lunch, up to 60 grams for dinner, and 15-20 grams for a snack. No changes made to regimen today. Follow up in 2 weeks to change sensor.    Medication therapy changes:   - Continue 18 units of Lantus   - Continue pioglitazone 30 mg daily     There are no discontinued medications.     No orders of the defined types were placed in this encounter.       Patient will return to clinic in 2 week(s) for follow up.        S/O: Mr. Anshul Smiley, a 74 y.o. male referred by Anitha Lara APRN - CNP,  has a past medical history of Acute cystitis without hematuria, MCKENZIE (acute kidney injury) ([facility]), Arthritis, Arthropathy, Balanitis, Candida infection, Chronic pain, COVID-19 vaccine series completed, Dementia associated with other underlying disease without behavioral disturbance ([facility]), Dependence on continuous supplemental oxygen, Depression, Diabetes mellitus, type II ([facility]), Drug abuse ([facility]), ED (erectile dysfunction), Fatigue, GERD (gastroesophageal reflux disease), H/O epididymitis, H/O: pneumonia, Hep C w/o

## 2024-11-02 DIAGNOSIS — Z79.4 TYPE 2 DIABETES MELLITUS WITH DIABETIC POLYNEUROPATHY, WITH LONG-TERM CURRENT USE OF INSULIN (HCC): ICD-10-CM

## 2024-11-02 DIAGNOSIS — E11.42 TYPE 2 DIABETES MELLITUS WITH DIABETIC POLYNEUROPATHY, WITH LONG-TERM CURRENT USE OF INSULIN (HCC): ICD-10-CM

## 2024-11-06 RX ORDER — INSULIN GLARGINE 100 [IU]/ML
INJECTION, SOLUTION SUBCUTANEOUS
Qty: 15 ML | Refills: 3 | Status: SHIPPED | OUTPATIENT
Start: 2024-11-06

## 2024-11-06 RX ORDER — FLURBIPROFEN SODIUM 0.3 MG/ML
SOLUTION/ DROPS OPHTHALMIC
Qty: 100 EACH | Refills: 1 | Status: SHIPPED | OUTPATIENT
Start: 2024-11-06

## 2024-11-08 ENCOUNTER — TELEPHONE (OUTPATIENT)
Facility: CLINIC | Age: 74
End: 2024-11-08

## 2024-11-08 NOTE — TELEPHONE ENCOUNTER
Pharmacy Progress Note - Telephone Call    Mr. Anshul RUEDA Baljit 74 y.o. was contacted via an outbound telephone call today to reschedule their missed appointment with PharmD today at 11:30am per referral from Anitha Lara APRN - CNP.  Patient's sensor fell off early and aid was able to replace it for him without issues, his current sensors has 2 days remaining. She is comfortable with replacing sensor when it runs out. Patient rescheduled for next Friday 11/15/24 at 10:30am to assess BG.    Thank you,    Daisy Alvarez, PharmD  Clinical Pharmacist  11/08/24    For Pharmacy Admin Tracking Only    Program: Medical Group  CPA in place:  Yes  Recommendation Provided To: Patient/Caregiver: 1 via Telephone  Intervention Detail: Scheduled Appointment  Intervention Accepted By: Patient/Caregiver: 1  Gap Closed?: No   Time Spent (min): 5

## 2024-11-13 ENCOUNTER — OFFICE VISIT (OUTPATIENT)
Age: 74
End: 2024-11-13
Payer: MEDICARE

## 2024-11-13 VITALS
OXYGEN SATURATION: 98 % | HEIGHT: 69 IN | SYSTOLIC BLOOD PRESSURE: 90 MMHG | BODY MASS INDEX: 24.29 KG/M2 | HEART RATE: 80 BPM | DIASTOLIC BLOOD PRESSURE: 62 MMHG | TEMPERATURE: 98 F | WEIGHT: 164 LBS

## 2024-11-13 DIAGNOSIS — F03.A11 MILD DEMENTIA WITH AGITATION, UNSPECIFIED DEMENTIA TYPE (HCC): Primary | ICD-10-CM

## 2024-11-13 PROCEDURE — 3074F SYST BP LT 130 MM HG: CPT | Performed by: STUDENT IN AN ORGANIZED HEALTH CARE EDUCATION/TRAINING PROGRAM

## 2024-11-13 PROCEDURE — 99215 OFFICE O/P EST HI 40 MIN: CPT | Performed by: STUDENT IN AN ORGANIZED HEALTH CARE EDUCATION/TRAINING PROGRAM

## 2024-11-13 PROCEDURE — 1123F ACP DISCUSS/DSCN MKR DOCD: CPT | Performed by: STUDENT IN AN ORGANIZED HEALTH CARE EDUCATION/TRAINING PROGRAM

## 2024-11-13 PROCEDURE — 3078F DIAST BP <80 MM HG: CPT | Performed by: STUDENT IN AN ORGANIZED HEALTH CARE EDUCATION/TRAINING PROGRAM

## 2024-11-13 RX ORDER — MEMANTINE HYDROCHLORIDE 5 MG/1
5 TABLET ORAL 2 TIMES DAILY
Qty: 60 TABLET | Refills: 6 | Status: SHIPPED | OUTPATIENT
Start: 2024-11-13

## 2024-11-13 NOTE — PROGRESS NOTES
A 74 years old male patient here for follow-up evaluation of memory loss.  He came today with his caretaker who assisted in the history.  He claims the forgetfulness is getting worse.  He does not repeat a lot.  He stutters.  Has no difficulty finding his own items.  Does not forget names.  Takes his medications himself; does not forget.  No problem managing his finances.  Sleeps good at nighttime; he uses CPAP.  He becomes agitated occasionally.  Also has depression and does not think; following with mental health.  No suicidal ideations.  No hallucinations.  Neuropsych testing ordered from the last visit was not done.  He has an order for MRI of the brain which was ordered for dizziness; will be scheduling it.    Initial encounter:  A 72 years old male patient here for evaluation of progressive forgetfulness; unknown duration but he says has been going on for a while.  He came with a caretaker.  According to his caretaker, he forgets appointments.  Has difficulty getting his words out and has difficulty finishing a sentence.  Has difficulty finding items.  He takes his medications himself; he has a pillbox for 1 month and do not forget taking them.  He takes care of finances by himself; he might forget paying bills.  Sometimes, might forget the pin numbers.  He is not currently cooking.  No difficulty putting his clothes on.  No problem taking shower.  Has occasional difficulty sleeping; wakes up a lot to go to the bathroom.  He occasionally frustrates.  He cries and feels sad.  No suicidal ideations.  Has anxiety.  No hallucinations.  He might confuse the day and night.  No use of alcohol currently.  Denied smoking.  He smokes marijuana; past history of heroin abuse.  Family history of dementia: Mother, father, and grandmother.  No previous history of head trauma.  He has prostate cancer; no started on treatment; scheduled for radiotherapy.  He has difficulty walking.  Use a walker.  Legs give out; mainly the

## 2024-11-15 ENCOUNTER — PHARMACY VISIT (OUTPATIENT)
Facility: CLINIC | Age: 74
End: 2024-11-15

## 2024-11-15 VITALS — WEIGHT: 164 LBS | BODY MASS INDEX: 24.22 KG/M2

## 2024-11-15 DIAGNOSIS — E11.42 TYPE 2 DIABETES MELLITUS WITH DIABETIC POLYNEUROPATHY, WITH LONG-TERM CURRENT USE OF INSULIN (HCC): Primary | ICD-10-CM

## 2024-11-15 DIAGNOSIS — Z79.4 TYPE 2 DIABETES MELLITUS WITH DIABETIC POLYNEUROPATHY, WITH LONG-TERM CURRENT USE OF INSULIN (HCC): Primary | ICD-10-CM

## 2024-11-15 NOTE — PROGRESS NOTES
Pharmacy Progress Note - Diabetes Management Follow up       Assessment / Plan:   Diabetes Management:  Per ADA guidelines, Pt's A1c is not at goal of < 7%. Patient's most recent A1c was 8.4% on 9/12/24, which has significantly improved since previous A1c of 11.4% on 5/29/24. Patient presents to appointment today with aid, Cassandra. According to Cr, patient's average BG the last 2 weeks has been 177 mg/dL and in range 57% of the time. Patient's ppBG has been higher since being off Trulicity, causing average to be higher. He has been using regular sugar in his coffee and has not switched to Splenda sugar yet, which is likely contributing to the hyperglycemia in the mornings and likely goes higher on the days he has breakfast sandwiches on croissants or biscuits. Discussed possible restart of Januvia to help control BG, but pt wishes to try to make dietary changes first before restarting Januvia. He will try to make changes to diet to prevent BG from going above 250 mg/dL. Replaced patient's sensor today, placed sensor on the back of the upper right arm and activated sensor to initiate 60 minute warm up period. Follow up in a little over 2 weeks due to the holiday to replace sensor and assess BG.    Medication therapy changes:   - Continue Lantus 20 units in the morning   - Continue pioglitazone 30 mg daily     There are no discontinued medications.     No orders of the defined types were placed in this encounter.       Patient will return to clinic in 2 week(s) for follow up.        S/O: Mr. Anshul Smiley, a 74 y.o. male referred by Anitha Lara APRN - CNP,  has a past medical history of Acute cystitis without hematuria, MCKENZIE (acute kidney injury) ([facility]), Arthritis, Arthropathy, Balanitis, Candida infection, Chronic pain, COVID-19 vaccine series completed, Dementia associated with other underlying disease without behavioral disturbance (HCC), Dependence on continuous supplemental oxygen, Depression,

## 2024-11-21 NOTE — PROGRESS NOTES
Patient attended appointments with his PCP, BEN Dumont on 2/4/21, Nurse Navigator reviewed EMR and will follow up on next scheduled outreach. Requested medication(s) are due for refill today: Yes  Patient has already received a courtesy refill: No  Other reason request has been forwarded to provider: This refill cannot be delegated

## 2024-11-25 ENCOUNTER — TELEPHONE (OUTPATIENT)
Facility: CLINIC | Age: 74
End: 2024-11-25

## 2024-11-25 NOTE — TELEPHONE ENCOUNTER
Pharmacy Progress Note - Telephone Call    Mr. Anshul Smiley 74 y.o. contacted the office today to inquire about when his next appointment is. Informed patient his next appointment is Monday 12/2/24 at 11:30am. Patient states his sensor runs out in 4 days, but PharmD will not be in office on Friday. He would like to wait to come in Monday vs nurse visit to have sensor changed on Friday because his aid, Cassandra, will be out of town on Friday.     Thank you,    Daisy Alvarez, PharmD  Clinical Pharmacy Specialist  11/25/24    For Pharmacy Admin Tracking Only    Program: Medical Group  CPA in place:  Yes  Time Spent (min): 5

## 2024-11-27 DIAGNOSIS — E11.42 TYPE 2 DIABETES MELLITUS WITH DIABETIC POLYNEUROPATHY, WITH LONG-TERM CURRENT USE OF INSULIN (HCC): ICD-10-CM

## 2024-11-27 DIAGNOSIS — Z79.4 TYPE 2 DIABETES MELLITUS WITH DIABETIC POLYNEUROPATHY, WITH LONG-TERM CURRENT USE OF INSULIN (HCC): ICD-10-CM

## 2024-11-29 RX ORDER — UBIQUINOL 100 MG
CAPSULE ORAL
Qty: 100 EACH | Refills: 3 | Status: SHIPPED | OUTPATIENT
Start: 2024-11-29

## 2024-11-29 RX ORDER — GLUCOSAM/CHON-MSM1/C/MANG/BOSW 500-416.6
TABLET ORAL
Qty: 100 EACH | Refills: 3 | Status: SHIPPED | OUTPATIENT
Start: 2024-11-29

## 2024-11-29 NOTE — PROGRESS NOTES
Pharmacy Progress Note - Diabetes Management Follow up       Assessment / Plan:   Diabetes Management:  Per ADA guidelines, Pt's A1c is not at goal of < 7%. Patient's most recent A1c was 8.4% on 9/12/24, which has significantly improved since previous A1c of 11.4% on 5/29/24. Patient presents to appointment with aid, Cassandra, today more unstable than usual when walking and dizzy. According to patient's LibreView, his average BG has been 181 mg/dL and in range 56% of the time. Changed patient's sensor today, applied on the back of his upper right arm. He is tolerating his current regimen and taking medications daily without issues. His average BG is remaining increased since stopping Trulicity, however since patient is experience dizziness and getting testing done to try to determine cause, no changes made to regimen at this time. Patient will continue to work on dietary changes, reducing carbohydrate consumption, try to eat balanced meals. Follow up in 2.5 weeks.    Dizziness:  Patient reports dizziness and is unsteady walking. Checked patient's vital signs to ensure pt dizziness is not stemming from a high or low BP and BP today was at goal. /71 (Site: Left Upper Arm, Position: Sitting, Cuff Size: Small Adult)   Pulse 81   Wt 75.2 kg (165 lb 12.8 oz)   BMI 24.48 kg/m² . He reports multiple falls last week, with his most recent one being yesterday. He started Rexulti last week and his dizziness has worsened and when falls started occurring more frequently. Medication was started by psychiatrist. Informed PCP of issue with patient and recommended for pt to contact psychiatrist and tell them of the dizziness and falls since starting Rexulti.    Discoloration of skin:  Patient is still having some mild discoloration on his chest and upper arms. Discoloration on his lower arms is starting to improve and is not longer a solid discoloration more splotchy dark colors. Patient sees dermatology next week, recommended

## 2024-12-02 ENCOUNTER — PHARMACY VISIT (OUTPATIENT)
Facility: CLINIC | Age: 74
End: 2024-12-02

## 2024-12-02 VITALS
SYSTOLIC BLOOD PRESSURE: 117 MMHG | BODY MASS INDEX: 24.48 KG/M2 | HEART RATE: 81 BPM | DIASTOLIC BLOOD PRESSURE: 71 MMHG | WEIGHT: 165.8 LBS

## 2024-12-02 DIAGNOSIS — Z79.4 TYPE 2 DIABETES MELLITUS WITH DIABETIC POLYNEUROPATHY, WITH LONG-TERM CURRENT USE OF INSULIN (HCC): Primary | ICD-10-CM

## 2024-12-02 DIAGNOSIS — E11.42 TYPE 2 DIABETES MELLITUS WITH DIABETIC POLYNEUROPATHY, WITH LONG-TERM CURRENT USE OF INSULIN (HCC): Primary | ICD-10-CM

## 2024-12-02 RX ORDER — BREXPIPRAZOLE 0.5 MG/1
0.5 TABLET ORAL NIGHTLY
COMMUNITY
Start: 2024-11-20

## 2024-12-02 NOTE — PATIENT INSTRUCTIONS
Call psychiatrist about Rexulti and inform them it's causing dizziness and falls and ask for guidance

## 2024-12-04 PROBLEM — Z80.0 FAMILY HISTORY OF MALIGNANT NEOPLASM OF GASTROINTESTINAL TRACT: Status: ACTIVE | Noted: 2024-12-04

## 2024-12-20 ENCOUNTER — PHARMACY VISIT (OUTPATIENT)
Facility: CLINIC | Age: 74
End: 2024-12-20

## 2024-12-20 VITALS — DIASTOLIC BLOOD PRESSURE: 64 MMHG | SYSTOLIC BLOOD PRESSURE: 95 MMHG | HEART RATE: 83 BPM

## 2024-12-20 DIAGNOSIS — E11.42 TYPE 2 DIABETES MELLITUS WITH DIABETIC POLYNEUROPATHY, WITH LONG-TERM CURRENT USE OF INSULIN (HCC): Primary | ICD-10-CM

## 2024-12-20 DIAGNOSIS — Z79.4 TYPE 2 DIABETES MELLITUS WITH DIABETIC POLYNEUROPATHY, WITH LONG-TERM CURRENT USE OF INSULIN (HCC): Primary | ICD-10-CM

## 2024-12-20 NOTE — PROGRESS NOTES
Pharmacy Progress Note - Diabetes Management Follow up       Assessment / Plan:   Diabetes Management:  Per ADA guidelines, Pt's A1c is not at goal of < 7%. Patient's most recent A1c was 8.4% on 9/12/24, which has significantly improved since previous A1c of 11.4% on 5/29/24. Patient reports to appointment today with aid, Cassandra. He is dizzy and unsteady on his feet. According to Cr, his average BG the last 2 weeks has been 140 mg/dL, however patient has been having low BG. Reduce Lantus to 18 units daily. Patient may benefit from the re-addition of Januvia since patient has been off Trulicity, this may help to reduce severe spikes in BG when he eats. Will discuss this at future visits, as initiation at this point is not idea since patient is having low BG. Patient brought Dexcom G7 sensor and  that was mailed to him, which was not sent or written by PCP. Discussed that patient would be better off continuing to use the sensor and reader he has because he's already used to it and is not having issues. Recommended to contact sender and ensure there is no cost for the sensor/, cancel shipments, and if there's a cost associated if they can send it back. Follow up in 3 weeks.    Medication therapy changes:   - Reduce Lantus to 18 units daily   - Continue pioglitazone 30 mg daily     Dizziness:  Patient reports dizziness during appointment and is unsteady on his feet while walking. Checked patient's vitals - BP 95/64 (Site: Right Upper Arm, Position: Sitting, Cuff Size: Large Adult) Comment: took medications today  Pulse 83 and BG is on the low side. Consulted with PCP and we will have patient reduce amlodipine to 5 mg and split current tablet in half. If is possible his dizziness is being caused by low BP. Provided patient with water and aid got patient some chips to provide patient with some salt to increase BP. Recommended to check BP at home and bring log to next appointment with PCP next week.

## 2024-12-20 NOTE — PATIENT INSTRUCTIONS
Lantus 18 units in the morning    Cut amlodipine pills in half for the next 2 weeks until PCP appointment

## 2024-12-26 ENCOUNTER — CLINICAL DOCUMENTATION (OUTPATIENT)
Facility: CLINIC | Age: 74
End: 2024-12-26

## 2024-12-26 ENCOUNTER — TELEPHONE (OUTPATIENT)
Age: 74
End: 2024-12-26

## 2024-12-26 DIAGNOSIS — K21.9 GASTRO-ESOPHAGEAL REFLUX DISEASE WITHOUT ESOPHAGITIS: ICD-10-CM

## 2024-12-26 NOTE — PROGRESS NOTES
Pharmacy Progress Note - Clinical Documentation     Patient presented to appointment last week with a Dexcom  and sensors that were shipped to him in the mail. Patient thought the order came from the office, however PharmD nor PCP was aware of order and did not authorize the order.     Contacted patient's insurance to inquire about this claim. Spoke with Bridget bejarano at 2:15 pm who states there are 3 claims for DME companies. One was [employer] on 11/24/24 with Anitha Lara's office for CPT code of , one from [facility] [employer] on 11/27/24 with Brielle Mason for CPT code of  and , and one from [employer] for CPT code  (nutritional support). Patient is receiving supplies from [facility] and [facility] with PCP office and these are authorized by PCP.     The claim submitted on 11/27 from [facility] [employer], that is associated with ADS where 2 different CPT codes were under 1 claim, both for CGM systems and claims were high. The first CPT code was  and was paid and processed for a claim of $1,000, the second CPT code was  but was denied and claim was for $3,150. The provider on the order was Brielle Mason [place] She states that the reader was processed under an unknown company. She states the claim amount seemed high and it is not common for 2 codes to be processed on 1 claim to her knowledge and agrees this could be suspected fraud. She provided the Fraud Department voicemail number ([phone]) and recommended to leave a message regarding this issue for further investigation. She states she will also type a report and send it to the patient's , Krystina ([phone]).     Contacted patient to inform him of this information, julissa Trujillo was not there, but spoke with night time julissa Mcnulty who states the box the  and sensors were in did not have the label on it anymore so was not able to

## 2024-12-27 RX ORDER — FAMOTIDINE 40 MG/1
40 TABLET, FILM COATED ORAL DAILY
Qty: 90 TABLET | Refills: 1 | Status: SHIPPED | OUTPATIENT
Start: 2024-12-27

## 2025-01-03 ENCOUNTER — OFFICE VISIT (OUTPATIENT)
Facility: CLINIC | Age: 75
End: 2025-01-03
Payer: COMMERCIAL

## 2025-01-03 ENCOUNTER — HOSPITAL ENCOUNTER (OUTPATIENT)
Facility: HOSPITAL | Age: 75
Setting detail: SPECIMEN
Discharge: HOME | End: 2025-01-06

## 2025-01-03 VITALS
BODY MASS INDEX: 24.26 KG/M2 | OXYGEN SATURATION: 98 % | HEART RATE: 76 BPM | HEIGHT: 69 IN | SYSTOLIC BLOOD PRESSURE: 109 MMHG | WEIGHT: 163.8 LBS | RESPIRATION RATE: 18 BRPM | TEMPERATURE: 98.2 F | DIASTOLIC BLOOD PRESSURE: 73 MMHG

## 2025-01-03 DIAGNOSIS — E11.42 TYPE 2 DIABETES MELLITUS WITH DIABETIC POLYNEUROPATHY, WITH LONG-TERM CURRENT USE OF INSULIN (HCC): ICD-10-CM

## 2025-01-03 DIAGNOSIS — F03.A4 MILD DEMENTIA WITH ANXIETY, UNSPECIFIED DEMENTIA TYPE (HCC): ICD-10-CM

## 2025-01-03 DIAGNOSIS — F32.2 SEVERE DEPRESSION (HCC): ICD-10-CM

## 2025-01-03 DIAGNOSIS — F20.0 PARANOID SCHIZOPHRENIA, CHRONIC CONDITION WITH ACUTE EXACERBATION (HCC): ICD-10-CM

## 2025-01-03 DIAGNOSIS — Z71.89 ADVANCED CARE PLANNING/COUNSELING DISCUSSION: ICD-10-CM

## 2025-01-03 DIAGNOSIS — Z79.4 TYPE 2 DIABETES MELLITUS WITH DIABETIC POLYNEUROPATHY, WITH LONG-TERM CURRENT USE OF INSULIN (HCC): ICD-10-CM

## 2025-01-03 DIAGNOSIS — F19.11 HISTORY OF DRUG ABUSE (HCC): ICD-10-CM

## 2025-01-03 DIAGNOSIS — I10 ESSENTIAL HYPERTENSION WITH GOAL BLOOD PRESSURE LESS THAN 130/80: ICD-10-CM

## 2025-01-03 DIAGNOSIS — C61 PROSTATE CANCER (HCC): ICD-10-CM

## 2025-01-03 DIAGNOSIS — Z00.00 MEDICARE ANNUAL WELLNESS VISIT, SUBSEQUENT: Primary | ICD-10-CM

## 2025-01-03 PROBLEM — G57.50 TARSAL TUNNEL SYNDROME: Status: RESOLVED | Noted: 2020-12-28 | Resolved: 2025-01-03

## 2025-01-03 LAB — LABCORP SPECIMEN COLLECTION: NORMAL

## 2025-01-03 PROCEDURE — 1123F ACP DISCUSS/DSCN MKR DOCD: CPT | Performed by: NURSE PRACTITIONER

## 2025-01-03 PROCEDURE — G0439 PPPS, SUBSEQ VISIT: HCPCS | Performed by: NURSE PRACTITIONER

## 2025-01-03 PROCEDURE — 3078F DIAST BP <80 MM HG: CPT | Performed by: NURSE PRACTITIONER

## 2025-01-03 PROCEDURE — 99001 SPECIMEN HANDLING PT-LAB: CPT

## 2025-01-03 PROCEDURE — 3074F SYST BP LT 130 MM HG: CPT | Performed by: NURSE PRACTITIONER

## 2025-01-03 RX ORDER — AMLODIPINE BESYLATE 5 MG/1
5 TABLET ORAL DAILY
Qty: 90 TABLET | Refills: 0 | Status: SHIPPED | OUTPATIENT
Start: 2025-01-03

## 2025-01-03 ASSESSMENT — PATIENT HEALTH QUESTIONNAIRE - PHQ9
SUM OF ALL RESPONSES TO PHQ QUESTIONS 1-9: 15
9. THOUGHTS THAT YOU WOULD BE BETTER OFF DEAD, OR OF HURTING YOURSELF: NOT AT ALL
1. LITTLE INTEREST OR PLEASURE IN DOING THINGS: NOT AT ALL
8. MOVING OR SPEAKING SO SLOWLY THAT OTHER PEOPLE COULD HAVE NOTICED. OR THE OPPOSITE, BEING SO FIGETY OR RESTLESS THAT YOU HAVE BEEN MOVING AROUND A LOT MORE THAN USUAL: NEARLY EVERY DAY
6. FEELING BAD ABOUT YOURSELF - OR THAT YOU ARE A FAILURE OR HAVE LET YOURSELF OR YOUR FAMILY DOWN: NOT AT ALL
7. TROUBLE CONCENTRATING ON THINGS, SUCH AS READING THE NEWSPAPER OR WATCHING TELEVISION: NEARLY EVERY DAY
3. TROUBLE FALLING OR STAYING ASLEEP: NEARLY EVERY DAY
5. POOR APPETITE OR OVEREATING: NOT AT ALL
SUM OF ALL RESPONSES TO PHQ QUESTIONS 1-9: 15
SUM OF ALL RESPONSES TO PHQ QUESTIONS 1-9: 15
2. FEELING DOWN, DEPRESSED OR HOPELESS: NEARLY EVERY DAY
4. FEELING TIRED OR HAVING LITTLE ENERGY: NEARLY EVERY DAY
10. IF YOU CHECKED OFF ANY PROBLEMS, HOW DIFFICULT HAVE THESE PROBLEMS MADE IT FOR YOU TO DO YOUR WORK, TAKE CARE OF THINGS AT HOME, OR GET ALONG WITH OTHER PEOPLE: EXTREMELY DIFFICULT
SUM OF ALL RESPONSES TO PHQ QUESTIONS 1-9: 15
SUM OF ALL RESPONSES TO PHQ9 QUESTIONS 1 & 2: 3

## 2025-01-03 NOTE — PATIENT INSTRUCTIONS

## 2025-01-03 NOTE — ACP (ADVANCE CARE PLANNING)
Advance Care Planning   General Advance Care Planning (ACP) Conversation    Date of Conversation: 1/3/2025  Conducted with: Patient with Decision Making Capacity  Other persons present: None    Healthcare Decision Maker:    Primary Decision Maker: [place] Smileyna - Child - [phone]    Secondary Decision Maker: Sylvia [place] - Other - [phone]    Today we documented Decision Maker(s) consistent with Legal Next of Kin hierarchy.  Content/Action Overview:  Has NO ACP documents-Information provided  Reviewed DNR/DNI and patient elects Full Code (Attempt Resuscitation)  resuscitation preferences      Length of Voluntary ACP Conversation in minutes:  <16 minutes (Non-Billable)    Anitha Lara, APRN - CNP

## 2025-01-03 NOTE — PROGRESS NOTES
Room 7      Anshul Smiley had concerns including Medicare AWV. for today's visit .     When asked if patient has any concerns he would like to address with CHELSEA Lara  patient states I fell down during anita holiday and the dizziness is getting worse . CHELSEA Lara has been notified  of patient concerns .      Did you take your medication today? Yes       1. \"Have you been to the ER, urgent care clinic since your last visit?  Hospitalized since your last visit?\" .NO    2. \"Have you seen or consulted any other health care providers outside of the [facility] since your last visit?\" No     3. For patients aged 45-75: Has the patient had a colonoscopy / FIT/ Cologuard? Yes      If the patient is female:    4. For patients aged 40-74: Has the patient had a mammogram within the past 2 years? N/A      5. For patients aged 21-65: Has the patient had a pap smear? {Cancer Care Gap present? N/A              1/3/2025    12:30 PM   PHQ-9    Little interest or pleasure in doing things 0   Feeling down, depressed, or hopeless 3   Trouble falling or staying asleep, or sleeping too much 3   Feeling tired or having little energy 3   Poor appetite or overeating 0   Feeling bad about yourself - or that you are a failure or have let yourself or your family down 0   Trouble concentrating on things, such as reading the newspaper or watching television 3   Moving or speaking so slowly that other people could have noticed. Or the opposite - being so fidgety or restless that you have been moving around a lot more than usual 3   Thoughts that you would be better off dead, or of hurting yourself in some way 0   PHQ-2 Score 3   PHQ-9 Total Score 15   If you checked off any problems, how difficult have these problems made it for you to do your work, take care of things at home, or get along with other people? 3          Health Maintenance Due   Topic Date Due    DTaP/Tdap/Td vaccine (1 - Tdap) Never done    Shingles vaccine  162.56

## 2025-01-03 NOTE — PROGRESS NOTES
Medicare Annual Wellness Visit    Anshul Smiley is here for Medicare AW    Assessment & Plan   Medicare annual wellness visit, subsequent  Advanced care planning/counseling discussion  Type 2 diabetes mellitus with diabetic polyneuropathy, with long-term current use of insulin (HCC)  Essential hypertension with goal blood pressure less than 130/80  -     amLODIPine (NORVASC) 5 MG tablet; Take 1 tablet by mouth daily, Disp-90 tablet, R-0Normal  Severe depression (HCC)  Assessment & Plan:   Monitored by specialist- no acute findings meriting change in the plan  Prostate cancer (HCC)  Assessment & Plan:   Monitored by specialist- no acute findings meriting change in the plan  Paranoid schizophrenia, chronic condition with acute exacerbation (HCC)  Assessment & Plan:   Monitored by specialist- no acute findings meriting change in the plan  History of drug abuse (HCC)  Assessment & Plan:   Chronic, at goal (stable), continue current treatment plan  Mild dementia with anxiety, unspecified dementia type (HCC)  Assessment & Plan:   Monitored by specialist- no acute findings meriting change in the plan    MAWV completed, HC decision maker verified, ACP discussion completed  DM borderline controlled based on home glucose readings, labs today  Blood pressure on the low side, he was previously instructed to take 1/2 tab of his amlodipine but he forgot, new order for amlodipine 5mg placed  Labs today   Accompanied by his aid Cassandra  Patient verbalized understanding and is in agreement with this plan of care     Return in about 4 months (around 5/3/2025) for DM, HTN, DMfoot, 30min, office.         Subjective   The following acute and/or chronic problems were also addressed today:    Called [employer]: [phone]   Spoke with Ju   Advised patient is not using the dexcom and [facility] need to stop sending the supplies  Ju states they spoke with him on the phone on 11/26/24, spoke with [employer]

## 2025-01-04 LAB
ALBUMIN SERPL-MCNC: 5.1 G/DL (ref 3.8–4.8)
ALBUMIN/CREAT UR: 46 MG/G CREAT (ref 0–29)
ALP SERPL-CCNC: 77 IU/L (ref 44–121)
ALT SERPL-CCNC: 30 IU/L (ref 0–44)
AST SERPL-CCNC: 25 IU/L (ref 0–40)
BASOPHILS # BLD AUTO: 0 X10E3/UL (ref 0–0.2)
BASOPHILS NFR BLD AUTO: 0 %
BILIRUB SERPL-MCNC: 0.3 MG/DL (ref 0–1.2)
BUN SERPL-MCNC: 23 MG/DL (ref 8–27)
BUN/CREAT SERPL: 16 (ref 10–24)
CALCIUM SERPL-MCNC: 10 MG/DL (ref 8.6–10.2)
CHLORIDE SERPL-SCNC: 106 MMOL/L (ref 96–106)
CHOLEST SERPL-MCNC: 107 MG/DL (ref 100–199)
CO2 SERPL-SCNC: 18 MMOL/L (ref 20–29)
CREAT SERPL-MCNC: 1.43 MG/DL (ref 0.76–1.27)
CREAT UR-MCNC: 180.5 MG/DL
EGFRCR SERPLBLD CKD-EPI 2021: 51 ML/MIN/1.73
EOSINOPHIL # BLD AUTO: 0 X10E3/UL (ref 0–0.4)
EOSINOPHIL NFR BLD AUTO: 0 %
ERYTHROCYTE [DISTWIDTH] IN BLOOD BY AUTOMATED COUNT: 16.4 % (ref 11.6–15.4)
GLOBULIN SER CALC-MCNC: 2.7 G/DL (ref 1.5–4.5)
GLUCOSE SERPL-MCNC: 137 MG/DL (ref 70–99)
HBA1C MFR BLD: 8.3 % (ref 4.8–5.6)
HCT VFR BLD AUTO: 36.5 % (ref 37.5–51)
HDLC SERPL-MCNC: 56 MG/DL
HGB BLD-MCNC: 10.8 G/DL (ref 13–17.7)
IMM GRANULOCYTES # BLD AUTO: 0 X10E3/UL (ref 0–0.1)
IMM GRANULOCYTES NFR BLD AUTO: 0 %
IMP & REVIEW OF LAB RESULTS: NORMAL
LDLC SERPL CALC-MCNC: 38 MG/DL (ref 0–99)
LYMPHOCYTES # BLD AUTO: 1.3 X10E3/UL (ref 0.7–3.1)
LYMPHOCYTES NFR BLD AUTO: 22 %
Lab: NORMAL
MCH RBC QN AUTO: 23.7 PG (ref 26.6–33)
MCHC RBC AUTO-ENTMCNC: 29.6 G/DL (ref 31.5–35.7)
MCV RBC AUTO: 80 FL (ref 79–97)
MICROALBUMIN UR-MCNC: 83.1 UG/ML
MONOCYTES # BLD AUTO: 0.6 X10E3/UL (ref 0.1–0.9)
MONOCYTES NFR BLD AUTO: 10 %
NEUTROPHILS # BLD AUTO: 4 X10E3/UL (ref 1.4–7)
NEUTROPHILS NFR BLD AUTO: 68 %
PLATELET # BLD AUTO: 224 X10E3/UL (ref 150–450)
POTASSIUM SERPL-SCNC: 4.7 MMOL/L (ref 3.5–5.2)
PROT SERPL-MCNC: 7.8 G/DL (ref 6–8.5)
RBC # BLD AUTO: 4.56 X10E6/UL (ref 4.14–5.8)
REPORT: NORMAL
REPORT: NORMAL
SODIUM SERPL-SCNC: 139 MMOL/L (ref 134–144)
SPECIMEN STATUS REPORT: NORMAL
TRIGL SERPL-MCNC: 58 MG/DL (ref 0–149)
VLDLC SERPL CALC-MCNC: 13 MG/DL (ref 5–40)
WBC # BLD AUTO: 6 X10E3/UL (ref 3.4–10.8)

## 2025-01-23 ENCOUNTER — OFFICE VISIT (OUTPATIENT)
Facility: CLINIC | Age: 75
End: 2025-01-23
Payer: COMMERCIAL

## 2025-01-23 VITALS
DIASTOLIC BLOOD PRESSURE: 70 MMHG | HEART RATE: 99 BPM | WEIGHT: 158 LBS | TEMPERATURE: 98.4 F | OXYGEN SATURATION: 97 % | SYSTOLIC BLOOD PRESSURE: 111 MMHG | RESPIRATION RATE: 16 BRPM | HEIGHT: 69 IN | BODY MASS INDEX: 23.4 KG/M2

## 2025-01-23 DIAGNOSIS — E11.42 TYPE 2 DIABETES MELLITUS WITH DIABETIC POLYNEUROPATHY, WITH LONG-TERM CURRENT USE OF INSULIN (HCC): ICD-10-CM

## 2025-01-23 DIAGNOSIS — Z79.4 TYPE 2 DIABETES MELLITUS WITH DIABETIC POLYNEUROPATHY, WITH LONG-TERM CURRENT USE OF INSULIN (HCC): ICD-10-CM

## 2025-01-23 DIAGNOSIS — R29.6 FALLS FREQUENTLY: Primary | ICD-10-CM

## 2025-01-23 DIAGNOSIS — F03.A4 MILD DEMENTIA WITH ANXIETY, UNSPECIFIED DEMENTIA TYPE (HCC): ICD-10-CM

## 2025-01-23 PROBLEM — M79.671 PAIN IN RIGHT FOOT: Status: RESOLVED | Noted: 2022-02-22 | Resolved: 2025-01-23

## 2025-01-23 PROCEDURE — 99214 OFFICE O/P EST MOD 30 MIN: CPT | Performed by: NURSE PRACTITIONER

## 2025-01-23 PROCEDURE — 3078F DIAST BP <80 MM HG: CPT | Performed by: NURSE PRACTITIONER

## 2025-01-23 PROCEDURE — 3052F HG A1C>EQUAL 8.0%<EQUAL 9.0%: CPT | Performed by: NURSE PRACTITIONER

## 2025-01-23 PROCEDURE — 1123F ACP DISCUSS/DSCN MKR DOCD: CPT | Performed by: NURSE PRACTITIONER

## 2025-01-23 PROCEDURE — 3074F SYST BP LT 130 MM HG: CPT | Performed by: NURSE PRACTITIONER

## 2025-01-23 RX ORDER — BREXPIPRAZOLE 1 MG/1
1 TABLET ORAL NIGHTLY
COMMUNITY
Start: 2025-01-17

## 2025-01-23 RX ORDER — BUSPIRONE HYDROCHLORIDE 15 MG/1
15 TABLET ORAL 2 TIMES DAILY
COMMUNITY
Start: 2024-12-31

## 2025-01-23 SDOH — ECONOMIC STABILITY: FOOD INSECURITY: WITHIN THE PAST 12 MONTHS, YOU WORRIED THAT YOUR FOOD WOULD RUN OUT BEFORE YOU GOT MONEY TO BUY MORE.: NEVER TRUE

## 2025-01-23 SDOH — ECONOMIC STABILITY: FOOD INSECURITY: WITHIN THE PAST 12 MONTHS, THE FOOD YOU BOUGHT JUST DIDN'T LAST AND YOU DIDN'T HAVE MONEY TO GET MORE.: NEVER TRUE

## 2025-01-23 ASSESSMENT — PATIENT HEALTH QUESTIONNAIRE - PHQ9
7. TROUBLE CONCENTRATING ON THINGS, SUCH AS READING THE NEWSPAPER OR WATCHING TELEVISION: NEARLY EVERY DAY
10. IF YOU CHECKED OFF ANY PROBLEMS, HOW DIFFICULT HAVE THESE PROBLEMS MADE IT FOR YOU TO DO YOUR WORK, TAKE CARE OF THINGS AT HOME, OR GET ALONG WITH OTHER PEOPLE: EXTREMELY DIFFICULT
5. POOR APPETITE OR OVEREATING: NEARLY EVERY DAY
SUM OF ALL RESPONSES TO PHQ9 QUESTIONS 1 & 2: 6
9. THOUGHTS THAT YOU WOULD BE BETTER OFF DEAD, OR OF HURTING YOURSELF: NOT AT ALL
SUM OF ALL RESPONSES TO PHQ QUESTIONS 1-9: 23
SUM OF ALL RESPONSES TO PHQ QUESTIONS 1-9: 23
1. LITTLE INTEREST OR PLEASURE IN DOING THINGS: NEARLY EVERY DAY
SUM OF ALL RESPONSES TO PHQ QUESTIONS 1-9: 23
4. FEELING TIRED OR HAVING LITTLE ENERGY: NEARLY EVERY DAY
2. FEELING DOWN, DEPRESSED OR HOPELESS: NEARLY EVERY DAY
6. FEELING BAD ABOUT YOURSELF - OR THAT YOU ARE A FAILURE OR HAVE LET YOURSELF OR YOUR FAMILY DOWN: MORE THAN HALF THE DAYS
8. MOVING OR SPEAKING SO SLOWLY THAT OTHER PEOPLE COULD HAVE NOTICED. OR THE OPPOSITE, BEING SO FIGETY OR RESTLESS THAT YOU HAVE BEEN MOVING AROUND A LOT MORE THAN USUAL: NEARLY EVERY DAY
SUM OF ALL RESPONSES TO PHQ QUESTIONS 1-9: 23
3. TROUBLE FALLING OR STAYING ASLEEP: NEARLY EVERY DAY

## 2025-01-23 ASSESSMENT — COLUMBIA-SUICIDE SEVERITY RATING SCALE - C-SSRS
2. HAVE YOU ACTUALLY HAD ANY THOUGHTS OF KILLING YOURSELF?: NO
1. WITHIN THE PAST MONTH, HAVE YOU WISHED YOU WERE DEAD OR WISHED YOU COULD GO TO SLEEP AND NOT WAKE UP?: NO
6. HAVE YOU EVER DONE ANYTHING, STARTED TO DO ANYTHING, OR PREPARED TO DO ANYTHING TO END YOUR LIFE?: NO

## 2025-01-23 NOTE — PROGRESS NOTES
Room 7    Anshul Smiley had concerns including Shortness of Breath. for today's visit .     When asked if patient has any concerns he would like to address with CHELSEA Lara pt states I am really unsteady I get nervous and I fall  . CHELSEA Lara has been notified  of patient concerns .      Did you take your medication today? Pt states yes       1. \"Have you been to the ER, urgent care clinic since your last visit?  Hospitalized since your last visit?\" .NO    2. \"Have you seen or consulted any other health care providers outside of the [facility] since your last visit?\" NO     3. For patients aged 45-75: Has the patient had a colonoscopy / FIT/ Cologuard? Yes      If the patient is female:    4. For patients aged 40-74: Has the patient had a mammogram within the past 2 years? N/A      5. For patients aged 21-65: Has the patient had a pap smear? {Cancer Care Gap present? N/A          1/23/2025     4:14 PM   PHQ-9    Little interest or pleasure in doing things 3   Feeling down, depressed, or hopeless 3   Trouble falling or staying asleep, or sleeping too much 3   Feeling tired or having little energy 3   Poor appetite or overeating 3   Feeling bad about yourself - or that you are a failure or have let yourself or your family down 2   Trouble concentrating on things, such as reading the newspaper or watching television 3   Moving or speaking so slowly that other people could have noticed. Or the opposite - being so fidgety or restless that you have been moving around a lot more than usual 3   Thoughts that you would be better off dead, or of hurting yourself in some way 0   PHQ-2 Score 6   PHQ-9 Total Score 23   If you checked off any problems, how difficult have these problems made it for you to do your work, take care of things at home, or get along with other people? 3          Health Maintenance Due   Topic Date Due    DTaP/Tdap/Td vaccine (1 - Tdap) Never done    Shingles vaccine (1 of 2) Never

## 2025-01-27 DIAGNOSIS — E11.42 TYPE 2 DIABETES MELLITUS WITH DIABETIC POLYNEUROPATHY, WITH LONG-TERM CURRENT USE OF INSULIN (HCC): ICD-10-CM

## 2025-01-27 DIAGNOSIS — Z79.4 TYPE 2 DIABETES MELLITUS WITH DIABETIC POLYNEUROPATHY, WITH LONG-TERM CURRENT USE OF INSULIN (HCC): ICD-10-CM

## 2025-01-28 RX ORDER — CALCIUM CITRATE/VITAMIN D3 200MG-6.25
TABLET ORAL
Qty: 100 STRIP | Refills: 3 | Status: SHIPPED | OUTPATIENT
Start: 2025-01-28

## 2025-01-31 ENCOUNTER — PHARMACY VISIT (OUTPATIENT)
Facility: CLINIC | Age: 75
End: 2025-01-31

## 2025-01-31 VITALS — BODY MASS INDEX: 23.57 KG/M2 | WEIGHT: 159.6 LBS

## 2025-01-31 DIAGNOSIS — Z79.4 TYPE 2 DIABETES MELLITUS WITH DIABETIC POLYNEUROPATHY, WITH LONG-TERM CURRENT USE OF INSULIN (HCC): Primary | ICD-10-CM

## 2025-01-31 DIAGNOSIS — E11.42 TYPE 2 DIABETES MELLITUS WITH DIABETIC POLYNEUROPATHY, WITH LONG-TERM CURRENT USE OF INSULIN (HCC): Primary | ICD-10-CM

## 2025-01-31 NOTE — PROGRESS NOTES
[address] [place] [address]               [phone]      Anshul Smiley is a 74 y.o. male and presents with Shortness of Breath       Assessment/Plan:    Assessment & Plan  Falls frequently       Orders:    External Referral To Home Health    Mild dementia with anxiety, unspecified dementia type ([facility])       Orders:    External Referral To Home Health    Type 2 diabetes mellitus with diabetic polyneuropathy, with long-term current use of insulin ([facility])       Orders:    External Referral To Home Health    Having difficulty at home right now, does not have and aid, hand in heart is assisting with getting him a new aid  Endorses frequent falls and LE weakness, refer to home health for PT/OT as needed  Changed patient CGM sensor for him at todays visit  Patient verbalized understanding and is in agreement with this plan of care        Follow up and disposition:   Return for keep previously scheduled follow up.      Subjective:    Labs obtained prior to visit? No  Reviewed with patient? N/A    Shortness of breath  Home pulse ox is good, but he does not remember the numbers  He does not have an aid right now, he is forgetting his medicine, [facility] to heart is working on getting him a new aid  He gets confused all the time and agitated trying to figure everything out  States his legs feel like rubber bands and then he falls, takes about 15 to 20 minutes to get off the floor  He is falling despite using his walker    ROS:     Review of Systems  As stated in HPI, otherwise all others negative.       The problem list was updated as a part of today's visit.  Patient Active Problem List   Diagnosis    BPH (benign prostatic hyperplasia)    Microcytic anemia    Prostate cancer (HCC)    Posterior vitreous detachment, right eye    NS (nuclear sclerosis)    Lumbosacral spondylosis without myelopathy    History of drug abuse ([facility])    Radiculopathy due to lumbar intervertebral disc disorder

## 2025-02-14 ENCOUNTER — PHARMACY VISIT (OUTPATIENT)
Facility: CLINIC | Age: 75
End: 2025-02-14

## 2025-02-14 VITALS — WEIGHT: 167.4 LBS | BODY MASS INDEX: 24.72 KG/M2

## 2025-02-14 DIAGNOSIS — E11.42 TYPE 2 DIABETES MELLITUS WITH DIABETIC POLYNEUROPATHY, WITH LONG-TERM CURRENT USE OF INSULIN (HCC): Primary | ICD-10-CM

## 2025-02-14 DIAGNOSIS — Z79.4 TYPE 2 DIABETES MELLITUS WITH DIABETIC POLYNEUROPATHY, WITH LONG-TERM CURRENT USE OF INSULIN (HCC): Primary | ICD-10-CM

## 2025-02-14 NOTE — PROGRESS NOTES
Pharmacy Progress Note - Diabetes Management Follow up       Assessment / Plan:   Diabetes Management:  Per ADA guidelines, Pt's A1c is not at goal of < 7%.  Patient's most recent A1c was  8.3% on 1/3/25, which has improved from previous A1c of 8.4% 9/12/24. Patient presents to Amanda mcmillan, as of 1.5 weeks ago. According to his LibreView, his average BG the last 2 weeks has been 239 mg/dL, which is higher than previous visit. Patient denies missed doses of medications. His BG the last few days has been going low because he was sick and not eating as much. He reports appetite seems back to normal today, so will not adjust insulin at this time. Instructed pt to call office if his appetite stays reduced and continues to have low BG. Follow up in 2 weeks.    Medication therapy changes:  Continue Lantus 20 units daily each morning  Continue pioglitazone 30 mg daily.     Duplicate medication:  Per PCP, patient has been getting both tamsulosin and silodosin in his pill packs. Patient was supposed to be taken off tamsulosin but was never cancelled at the pharmacy. Patient was instructed to bring his medications to appointment today, however he did not. Aid states that the capsule has not been removed, instructed patient and aid to remove the capsule from his medications as soon as possible and to call with any issues if they're unsure which capsule to remove.      There are no discontinued medications.     No orders of the defined types were placed in this encounter.       Patient will return to clinic in 2 week(s) for follow up.        S/O: Mr. Anshul Smiley, a 75 y.o. male referred by Anitha Lara APRN - CNP,  has a past medical history of Acute cystitis without hematuria, MCKENZIE (acute kidney injury) ([facility]), Arthritis, Arthropathy, Balanitis, Candida infection, Chronic pain, COVID-19 vaccine series completed, Dementia associated with other underlying disease without behavioral disturbance (HCC), Dependence on

## 2025-02-17 ENCOUNTER — TELEPHONE (OUTPATIENT)
Facility: CLINIC | Age: 75
End: 2025-02-17

## 2025-02-17 NOTE — TELEPHONE ENCOUNTER
Pharmacy Progress Note - Telephone Call    Mr. Anshul Smiley 75 y.o. was contacted via an outbound telephone call today regarding Ana 3 sensor. No available sensors available in the office at this time. Filled out request for sensor replacement for patient through Ana website for patient. Attempted to call patient to inform him of this, however he did not answer. Left a VM informing him that replacement was requested and it should be delivered to his house in the next week or so. Contact office if he has additional questions.     Thank you,    Daisy Alvarez, PharmD  Clinical Pharmacy Specialist  02/17/25    For Pharmacy Admin Tracking Only    Program: Medical Group  CPA in place:  Yes  Time Spent (min): 5

## 2025-02-17 NOTE — TELEPHONE ENCOUNTER
Patient stated that his sensor is not working, he has used the spare.  He does not have another one.Can you supply him with one.

## 2025-02-27 DIAGNOSIS — E11.65 TYPE 2 DIABETES MELLITUS WITH HYPERGLYCEMIA (HCC): ICD-10-CM

## 2025-02-27 DIAGNOSIS — Z79.4 TYPE 2 DIABETES MELLITUS WITH DIABETIC POLYNEUROPATHY, WITH LONG-TERM CURRENT USE OF INSULIN (HCC): ICD-10-CM

## 2025-02-27 DIAGNOSIS — I10 ESSENTIAL (PRIMARY) HYPERTENSION: ICD-10-CM

## 2025-02-27 DIAGNOSIS — E11.42 TYPE 2 DIABETES MELLITUS WITH DIABETIC POLYNEUROPATHY, WITH LONG-TERM CURRENT USE OF INSULIN (HCC): ICD-10-CM

## 2025-02-28 ENCOUNTER — TELEPHONE (OUTPATIENT)
Facility: CLINIC | Age: 75
End: 2025-02-28

## 2025-02-28 ENCOUNTER — PHARMACY VISIT (OUTPATIENT)
Facility: CLINIC | Age: 75
End: 2025-02-28

## 2025-02-28 VITALS
HEART RATE: 86 BPM | BODY MASS INDEX: 24.9 KG/M2 | WEIGHT: 168.6 LBS | SYSTOLIC BLOOD PRESSURE: 113 MMHG | DIASTOLIC BLOOD PRESSURE: 74 MMHG

## 2025-02-28 DIAGNOSIS — Z79.4 TYPE 2 DIABETES MELLITUS WITH HYPERGLYCEMIA, WITH LONG-TERM CURRENT USE OF INSULIN (HCC): Primary | ICD-10-CM

## 2025-02-28 DIAGNOSIS — E11.65 TYPE 2 DIABETES MELLITUS WITH HYPERGLYCEMIA, WITH LONG-TERM CURRENT USE OF INSULIN (HCC): Primary | ICD-10-CM

## 2025-02-28 NOTE — TELEPHONE ENCOUNTER
Patient stated that [facility] keeps sending him sensors what should he do.

## 2025-02-28 NOTE — PROGRESS NOTES
Pharmacy Progress Note - Diabetes Management Follow up       Assessment / Plan:   Diabetes Management:  Per ADA guidelines, Pt's A1c is not at goal of < 7%.  Patient's most recent A1c was  8.3% on 1/3/25, which has improved from previous A1c of 8.4% 9/12/24. Patient presents to appointment today with aid, Amanda. According to Cr, patient's average BG the last 2 weeks has been 153 mg/dL, in range 59%, but had low BG 8%. His BG is going low overnight and sometimes during the day if he misses a meal or eats later than usual. Upon discussion with patient and aid, he has been taking Lantus twice daily, in the morning and evening, which explains why the patient is having low BG overnight. Discussed taking Lantus once daily in the morning. He did not bring his medications with him again today, emphasized importance of bringing medications to review and assess medications to ensure there are no differences in what's getting filled at the pharmacy vs regimen he's supposed to be on. No changes made to regimen at this time. Follow up in 2 weeks.     Medication therapy changes:   Continue Lantus 20 units daily only ONCE each morning  Continue pioglitazone 30 mg daily.     There are no discontinued medications.     No orders of the defined types were placed in this encounter.       Patient will return to clinic in 2 week(s) for follow up.        S/O: Mr. Anshul Smiley, a 75 y.o. male referred by Anitha Lara APRN - CNP,  has a past medical history of Acute cystitis without hematuria, MCKENZIE (acute kidney injury), Arthritis, Arthropathy, Balanitis, Candida infection, Chronic pain, COVID-19 vaccine series completed, Dementia associated with other underlying disease without behavioral disturbance (HCC), Dependence on continuous supplemental oxygen, Depression, Diabetes mellitus, type II ([facility]), Drug abuse ([facility]), ED (erectile dysfunction), Fatigue, GERD (gastroesophageal reflux disease), H/O epididymitis, H/O:

## 2025-03-02 RX ORDER — LISINOPRIL 20 MG/1
20 TABLET ORAL DAILY
Qty: 90 TABLET | Refills: 1 | Status: SHIPPED | OUTPATIENT
Start: 2025-03-02

## 2025-03-02 RX ORDER — UBIQUINOL 100 MG
CAPSULE ORAL
Qty: 100 EACH | Refills: 3 | Status: SHIPPED | OUTPATIENT
Start: 2025-03-02

## 2025-03-02 RX ORDER — GLUCOSAM/CHON-MSM1/C/MANG/BOSW 500-416.6
TABLET ORAL
Qty: 100 EACH | Refills: 3 | Status: SHIPPED | OUTPATIENT
Start: 2025-03-02

## 2025-03-02 RX ORDER — ROSUVASTATIN CALCIUM 5 MG/1
5 TABLET, COATED ORAL NIGHTLY
Qty: 90 TABLET | Refills: 1 | Status: SHIPPED | OUTPATIENT
Start: 2025-03-02

## 2025-03-02 RX ORDER — PIOGLITAZONE 30 MG/1
TABLET ORAL
Qty: 90 TABLET | Refills: 1 | Status: SHIPPED | OUTPATIENT
Start: 2025-03-02

## 2025-03-14 ENCOUNTER — PHARMACY VISIT (OUTPATIENT)
Facility: CLINIC | Age: 75
End: 2025-03-14

## 2025-03-14 VITALS — BODY MASS INDEX: 24.93 KG/M2 | WEIGHT: 168.8 LBS

## 2025-03-14 DIAGNOSIS — E11.65 TYPE 2 DIABETES MELLITUS WITH HYPERGLYCEMIA, WITH LONG-TERM CURRENT USE OF INSULIN (HCC): Primary | ICD-10-CM

## 2025-03-14 DIAGNOSIS — Z79.4 TYPE 2 DIABETES MELLITUS WITH HYPERGLYCEMIA, WITH LONG-TERM CURRENT USE OF INSULIN (HCC): Primary | ICD-10-CM

## 2025-03-14 NOTE — PATIENT INSTRUCTIONS
Lantus 22 units in the morning     Call supplier that is shipping Dexcom and tell them to stop sending shipments  If unable to contact, call office or [phone], option 2 with name of supplier and/or phone number

## 2025-03-14 NOTE — PROGRESS NOTES
Pharmacy Progress Note - Diabetes Management Follow up       Assessment / Plan:   Diabetes Management:  Per ADA guidelines, Pt's A1c is not at goal of < 7%.  Patient's most recent A1c was  8.3% on 1/3/25, which has improved from previous A1c of 8.4% 9/12/24. Patient presents to appointment today with aidAmanda. According to [employer], patient's average BG the last 2 weeks has been 178 mg/dL. His BG went low one night, but pt reports that he didn't eat much the day before and had an early dinner. Patient is no longer having the significant low BG like he was in the past now that he is only taking the Lantus once daily. Overall his baseline is slightly elevated, so will increase Lantus to 22 units daily in the morning. Follow up in 2 weeks to assess CGM data and insulin dose increase.     Medication therapy changes:   - Increase Lantus to 22 units in the morning   - Continue pioglitazone 30 mg daily     Medication Review:  Pt's aid, Amanda, wrote down patient's medications and doses that he takes daily. All the medications on the list were on his medication list. His list did not have any medications included that he was taken off of, there were not duplicate therapies, and the dosages were correct.      There are no discontinued medications.     No orders of the defined types were placed in this encounter.       Patient will return to clinic in 2 week(s) for follow up.        S/O: Mr. Anshul Smiley, a 75 y.o. male referred by Anitha Lara, APRN - CNP,  has a past medical history of Acute cystitis without hematuria, MCKENZIE (acute kidney injury), Arthritis, Arthropathy, Balanitis, Candida infection, Chronic pain, COVID-19 vaccine series completed, Dementia associated with other underlying disease without behavioral disturbance (HCC), Dependence on continuous supplemental oxygen, Depression, Diabetes mellitus, type II ([facility]), Drug abuse (HCC), ED (erectile dysfunction), Fatigue, GERD (gastroesophageal reflux

## 2025-03-24 ENCOUNTER — TELEPHONE (OUTPATIENT)
Facility: CLINIC | Age: 75
End: 2025-03-24

## 2025-03-24 NOTE — TELEPHONE ENCOUNTER
Ms. Back with [facility], sent over a letter of Medical Necessity  for Nutrition Supplies. She is checking on the status of that letter.    MsNeil Back - [phone]  Fax number [phone]

## 2025-03-27 ENCOUNTER — CLINICAL DOCUMENTATION (OUTPATIENT)
Facility: CLINIC | Age: 75
End: 2025-03-27

## 2025-03-27 NOTE — PROGRESS NOTES
Pharmacy Progress Note - Diabetes Management Follow up       Assessment / Plan:   Diabetes Management:  Per ADA guidelines, Pt's A1c is not at goal of < 7%.  Patient's most recent A1c was  8.3% on 1/3/25, which has improved from previous A1c of 8.4% 9/12/24. Patient presents to appointment today with aid, Amanda. According to [employer], his average BG the last 2 weeks has been 189 mg/dL, in range 53% of the time, and no low BG. Increased Lantus to 25 units in the morning to help lower BG. Provided pt number to contact [facility] for refills on Ana 3 plus sensors. Follow up in 2 weeks.    Medication therapy changes:   - Increase Lantus to 25 units in the morning   - Continue pioglitazone 30 mg daily    There are no discontinued medications.     No orders of the defined types were placed in this encounter.       Patient will return to clinic in 2 week(s) for follow up.        S/O: Mr. Anshul Smiley, a 75 y.o. male referred by Anitha Lara APRN - CNP,  has a past medical history of Acute cystitis without hematuria, MCKENZIE (acute kidney injury), Arthritis, Arthropathy, Balanitis, Candida infection, Chronic pain, COVID-19 vaccine series completed, Dementia associated with other underlying disease without behavioral disturbance ([facility]), Dependence on continuous supplemental oxygen, Depression, Diabetes mellitus, type II ([facility]), Drug abuse ([facility]), ED (erectile dysfunction), Fatigue, GERD (gastroesophageal reflux disease), H/O epididymitis, H/O: pneumonia, Hep C w/o coma, chronic ([facility]), History of BPH, History of hematuria, History of tobacco use, Hypertension, Loss of appetite, Loss of balance, Microcytic anemia, Neuropathy, Pancytopenia ([facility]), Paranoid schizophrenia, chronic condition with acute exacerbation ([facility]), Prostate cancer ([facility]), PUD (peptic ulcer disease), SOB (shortness of breath), Status post partial gastrectomy, Stroke ([facility]), Tarsal tunnel syndrome, Type 2 diabetes mellitus with hyperglycemia,

## 2025-03-28 ENCOUNTER — PHARMACY VISIT (OUTPATIENT)
Facility: CLINIC | Age: 75
End: 2025-03-28

## 2025-03-28 VITALS — WEIGHT: 169.6 LBS | BODY MASS INDEX: 25.05 KG/M2

## 2025-03-28 DIAGNOSIS — Z79.4 TYPE 2 DIABETES MELLITUS WITH HYPERGLYCEMIA, WITH LONG-TERM CURRENT USE OF INSULIN (HCC): Primary | ICD-10-CM

## 2025-03-28 DIAGNOSIS — E11.65 TYPE 2 DIABETES MELLITUS WITH HYPERGLYCEMIA, WITH LONG-TERM CURRENT USE OF INSULIN (HCC): Primary | ICD-10-CM

## 2025-03-28 NOTE — PATIENT INSTRUCTIONS
Increase Lantus to 25 units in the morning     [facility] for Ana 3 plus sensors - [phone]    Call neurology to make follow up appointment   [facility]  [phone]  Dr. Ronal Weston

## 2025-04-01 DIAGNOSIS — I10 ESSENTIAL HYPERTENSION WITH GOAL BLOOD PRESSURE LESS THAN 130/80: ICD-10-CM

## 2025-04-03 ENCOUNTER — OFFICE VISIT (OUTPATIENT)
Facility: CLINIC | Age: 75
End: 2025-04-03
Payer: MEDICARE

## 2025-04-03 VITALS
WEIGHT: 167.2 LBS | OXYGEN SATURATION: 97 % | HEART RATE: 98 BPM | RESPIRATION RATE: 16 BRPM | HEIGHT: 69 IN | DIASTOLIC BLOOD PRESSURE: 84 MMHG | BODY MASS INDEX: 24.76 KG/M2 | SYSTOLIC BLOOD PRESSURE: 124 MMHG

## 2025-04-03 DIAGNOSIS — Z79.4 TYPE 2 DIABETES MELLITUS WITH DIABETIC POLYNEUROPATHY, WITH LONG-TERM CURRENT USE OF INSULIN (HCC): Primary | ICD-10-CM

## 2025-04-03 DIAGNOSIS — I10 ESSENTIAL HYPERTENSION WITH GOAL BLOOD PRESSURE LESS THAN 130/80: ICD-10-CM

## 2025-04-03 DIAGNOSIS — E11.42 TYPE 2 DIABETES MELLITUS WITH DIABETIC POLYNEUROPATHY, WITH LONG-TERM CURRENT USE OF INSULIN (HCC): Primary | ICD-10-CM

## 2025-04-03 PROCEDURE — 3052F HG A1C>EQUAL 8.0%<EQUAL 9.0%: CPT | Performed by: NURSE PRACTITIONER

## 2025-04-03 PROCEDURE — 1123F ACP DISCUSS/DSCN MKR DOCD: CPT | Performed by: NURSE PRACTITIONER

## 2025-04-03 PROCEDURE — 99214 OFFICE O/P EST MOD 30 MIN: CPT | Performed by: NURSE PRACTITIONER

## 2025-04-03 PROCEDURE — 3074F SYST BP LT 130 MM HG: CPT | Performed by: NURSE PRACTITIONER

## 2025-04-03 PROCEDURE — 3079F DIAST BP 80-89 MM HG: CPT | Performed by: NURSE PRACTITIONER

## 2025-04-03 RX ORDER — INSULIN GLARGINE 100 [IU]/ML
25 INJECTION, SOLUTION SUBCUTANEOUS DAILY
Qty: 15 ML | Refills: 3
Start: 2025-04-03

## 2025-04-03 RX ORDER — AMLODIPINE BESYLATE 5 MG/1
5 TABLET ORAL DAILY
Qty: 90 TABLET | Refills: 1 | Status: SHIPPED | OUTPATIENT
Start: 2025-04-03

## 2025-04-03 NOTE — PROGRESS NOTES
[address] [place] [address]               [phone]      Anshul Smiley is a 75 y.o. male and presents with Diabetes       Assessment/Plan:    1. Type 2 diabetes mellitus with diabetic polyneuropathy, with long-term current use of insulin ([facility])  Overview:  Decreased sensation to right foot      Orders:  -     insulin glargine (LANTUS SOLOSTAR) 100 UNIT/ML injection pen; Inject 25 Units into the skin daily, Disp-15 mL, R-3This prescription was filled on 11/2/2024. Any refills authorized will be placed on file.NO PRINT  -     Lipid Panel; Future  -     Hemoglobin A1C; Future  -     Albumin/Creatinine Ratio, Urine; Future  2. Essential hypertension with goal blood pressure less than 130/80  -     CBC; Future  -     Comprehensive Metabolic Panel; Future  DM uncontrolled, A1C today 8.6%, he is working closely with the pharm D to manage his DM  Blood pressure controlled, continue lisinopril and amlodipine at same dose  Labs prior to next visit  Patient verbalized understanding and is in agreement with this plan of care         Follow up and disposition:   Return in about 3 months (around 7/3/2025) for DM, HTN, DMfoot, 30min, office, w/labsprior.      Subjective:    Labs obtained prior to visit? No  Reviewed with patient? N/A    DMII- POC A1C today 8.6%   Patient reports medication compliance Yes  Diabetic diet compliance frequently  Patient monitors blood sugars regularly  CGM   Home glucose readings: 125-356   Denies hypoglycemic episodes Yes  Denies polyuria, polydipsia, paraesthesia, vision changes? Yes  Engaging in daily exercise?  Walking, 15min a day as long as weather permits     Diabetes Management   Topic Date Due    Diabetic foot exam  03/17/2023    Diabetic retinal exam  05/30/2024     Lab Results   Component Value Date/Time    LABA1C 8.3 01/03/2025 12:00 AM   ]  Lab Results   Component Value Date/Time    MALBCR 30.1 09/12/2024 09:58 AM   ]  Diabetic medications:

## 2025-04-03 NOTE — PROGRESS NOTES
\"Have you been to the ER, urgent care clinic since your last visit?  Hospitalized since your last visit?\"    NO    “Have you seen or consulted any other health care providers outside our system since your last visit?”    NO      “Have you had a colorectal cancer screening such as a colonoscopy/FIT/Cologuard?    NO    Date of last Colonoscopy: 1/31/2022  No cologuard on file  No FIT/FOBT on file   No flexible sigmoidoscopy on file     “Have you had a diabetic eye exam?”    NO     Date of last diabetic eye exam: 5/30/2023

## 2025-04-10 NOTE — PROGRESS NOTES
Only    Program: Medical Group  CPA in place:  Yes  Recommendation Provided To: Patient/Caregiver: 1 via In person  Intervention Detail: New Rx: 1, reason: Needs Additional Therapy  Intervention Accepted By: Patient/Caregiver: 1  Gap Closed?: No   Time Spent (min): 30

## 2025-04-11 ENCOUNTER — PHARMACY VISIT (OUTPATIENT)
Facility: CLINIC | Age: 75
End: 2025-04-11

## 2025-04-11 VITALS — WEIGHT: 171 LBS | BODY MASS INDEX: 25.25 KG/M2

## 2025-04-11 DIAGNOSIS — Z79.4 TYPE 2 DIABETES MELLITUS WITH DIABETIC POLYNEUROPATHY, WITH LONG-TERM CURRENT USE OF INSULIN (HCC): Primary | ICD-10-CM

## 2025-04-11 DIAGNOSIS — E11.42 TYPE 2 DIABETES MELLITUS WITH DIABETIC POLYNEUROPATHY, WITH LONG-TERM CURRENT USE OF INSULIN (HCC): Primary | ICD-10-CM

## 2025-04-14 NOTE — TELEPHONE ENCOUNTER
Received message / note from  Mrs. Alessandra Dodge stating patient has some concerns in regards of how to inject the medication Trulicity . Informed pharmacist patient pharmacist Ms. Daisy Alvarez of patient concerns . Pharmacist states to place message and route message to her .    opti-vol  Received: Today  Leela LEE [facility] Clinical Support Staff  Pts opti-vol crossed since 1/27, pt does not take any diuretics. Pt takes eliquis, metoprolol succ, ef: 45 %(echo 7/28/24).  Thanks,  ~Leela PADILLA TRANSMISSION: BATTERY VOLTAGE ADEQUATE (6 YRS). AP: 1.6%. : 99.9% + VSRP: <0.1%. ALL AVAILABLE LEAD PARAMETERS WITHIN NORMAL LIMITS. NO SIGNIFICANT HIGH RATE EPISODES. OPTI-VOL FLUID THRESHOLD CROSSED 1/27. PT DOES NOT TAKE ANY DIURETICS. PT TAKES ELIQUIS, METPROLOL SUCC. EF: 45% (ECHO 7/28/24). TASK TO HF TEAM. NORMAL DEVICE FUNCTION. C

## 2025-04-24 NOTE — PROGRESS NOTES
Pharmacy Progress Note - Diabetes Management Follow up       Assessment / Plan:   Diabetes Management:  Per ADA guidelines, Pt's A1c is not at goal of < 7%.  Patient's most recent A1c was  8.3% on 1/3/25, which has improved from previous A1c of 8.4% 9/12/24. According to [employer], patient's average BG the last 2 weeks has been 163 mg/dL, in range 61% of the time, and having 1% low BG. Patient is still having postprandial hyperglycemia, however cannot adjust insulin as it would cause patient to have low BG. He just restarted Januvia 100 mg, will continue the next 2 weeks without any medication changes and encourage dietary changes. Reduce carbohydrates, sugary beverages, and portion sizes. Switch to a different sugar in coffee to prevent large spikes in BG in the morning. Follow up in 2 weeks.    Medication therapy changes:   - Continue Januvia 100 mg tablet once daily in the morning   - Continue Lantus 25 units in the morning   - Continue pioglitazone 30 mg tablet daily  - Switch to a sugar substitute in coffee or reduce the amount of regular sugar     There are no discontinued medications.     No orders of the defined types were placed in this encounter.       Patient will return to clinic in 2 week(s) for follow up.        S/O: Mr. Anshul Smiley, a 75 y.o. male referred by Anitha Lara APRN - CNP,  has a past medical history of Acute cystitis without hematuria, MCKENZIE (acute kidney injury), Arthritis, Arthropathy, Balanitis, Candida infection, Chronic pain, COVID-19 vaccine series completed, Dementia associated with other underlying disease without behavioral disturbance (HCC), Dependence on continuous supplemental oxygen, Depression, Diabetes mellitus, type II ([facility]), Drug abuse ([facility]), ED (erectile dysfunction), Fatigue, GERD (gastroesophageal reflux disease), H/O epididymitis, H/O: pneumonia, Hep C w/o coma, chronic ([facility]), History of BPH, History of hematuria, History of tobacco use, Hypertension, Loss

## 2025-04-25 ENCOUNTER — PHARMACY VISIT (OUTPATIENT)
Facility: CLINIC | Age: 75
End: 2025-04-25

## 2025-04-25 VITALS — BODY MASS INDEX: 24.9 KG/M2 | WEIGHT: 168.6 LBS

## 2025-04-25 DIAGNOSIS — Z79.4 TYPE 2 DIABETES MELLITUS WITH DIABETIC POLYNEUROPATHY, WITH LONG-TERM CURRENT USE OF INSULIN (HCC): Primary | ICD-10-CM

## 2025-04-25 DIAGNOSIS — E11.42 TYPE 2 DIABETES MELLITUS WITH DIABETIC POLYNEUROPATHY, WITH LONG-TERM CURRENT USE OF INSULIN (HCC): Primary | ICD-10-CM

## 2025-04-28 ENCOUNTER — CLINICAL DOCUMENTATION (OUTPATIENT)
Facility: CLINIC | Age: 75
End: 2025-04-28

## 2025-04-28 NOTE — PROGRESS NOTES
Pharmacy Progress Note - Clinical Documentation     Faxed most recent PharmD and PCP chart notes to [facility] per request for continuation of Ana 3 plus sensors. Sent to [phone].    Thank you,  Daisy Alvarez, PharmD  Clinical Pharmacist Specialist  4/28/25       For Pharmacy Admin Tracking Only    Program: Medical Group  CPA in place:  Yes  Recommendation Provided To: Other: 1  Intervention Detail: Benefit Assistance  Intervention Accepted By: Other: 0  Gap Closed?: No   Time Spent (min): 5

## 2025-05-07 ENCOUNTER — OFFICE VISIT (OUTPATIENT)
Age: 75
End: 2025-05-07
Payer: MEDICARE

## 2025-05-07 VITALS
SYSTOLIC BLOOD PRESSURE: 130 MMHG | DIASTOLIC BLOOD PRESSURE: 78 MMHG | RESPIRATION RATE: 18 BRPM | HEART RATE: 94 BPM | HEIGHT: 69 IN | TEMPERATURE: 97.5 F | BODY MASS INDEX: 25.21 KG/M2 | WEIGHT: 170.2 LBS | OXYGEN SATURATION: 97 %

## 2025-05-07 DIAGNOSIS — F03.A11 MILD DEMENTIA WITH AGITATION, UNSPECIFIED DEMENTIA TYPE (HCC): ICD-10-CM

## 2025-05-07 PROCEDURE — 1123F ACP DISCUSS/DSCN MKR DOCD: CPT | Performed by: STUDENT IN AN ORGANIZED HEALTH CARE EDUCATION/TRAINING PROGRAM

## 2025-05-07 PROCEDURE — 99214 OFFICE O/P EST MOD 30 MIN: CPT | Performed by: STUDENT IN AN ORGANIZED HEALTH CARE EDUCATION/TRAINING PROGRAM

## 2025-05-07 PROCEDURE — 3078F DIAST BP <80 MM HG: CPT | Performed by: STUDENT IN AN ORGANIZED HEALTH CARE EDUCATION/TRAINING PROGRAM

## 2025-05-07 PROCEDURE — 3075F SYST BP GE 130 - 139MM HG: CPT | Performed by: STUDENT IN AN ORGANIZED HEALTH CARE EDUCATION/TRAINING PROGRAM

## 2025-05-07 RX ORDER — MEMANTINE HYDROCHLORIDE 5 MG/1
5 TABLET ORAL 2 TIMES DAILY
Qty: 60 TABLET | Refills: 6 | Status: SHIPPED | OUTPATIENT
Start: 2025-05-07

## 2025-05-08 NOTE — PROGRESS NOTES
Pharmacy Progress Note - Diabetes Management Follow up       Assessment / Plan:   Diabetes Management:  Per ADA guidelines, Pt's A1c is not at goal of < 7%.  Patient's most recent A1c was  8.3% on 1/3/25, which has improved from previous A1c of 8.4% 9/12/24. Patient presents to appointment today with aid, Amanda. According to [employer], patient's average BG the last 2 weeks has been 160 mg/dL and in range 66% of the time. Sensor reported patient had low BG but does not recall sensor going off to alert him in the middle of the night and denied sx of low BG overnight or when he woke up. His BG is still increasing with meals, but no longer staying elevated as long and spiking as high since starting Januvia. He has not been drinking soda and seems to be following low carb diet. No changes made to regimen today. Follow up in 3 weeks.    Medication therapy changes:   - Continue Januvia 100 mg tablet once daily in the morning   - Continue Lantus 25 units in the morning   - Continue pioglitazone 30 mg tablet daily     There are no discontinued medications.     No orders of the defined types were placed in this encounter.       Patient will return to clinic in 2 week(s) for follow up.        S/O: Mr. Anshul Smiley, a 75 y.o. male referred by Anitha Lara APRN - CNP,  has a past medical history of Acute cystitis without hematuria, MCKENZIE (acute kidney injury), Arthritis, Arthropathy, Balanitis, Candida infection, Chronic pain, COVID-19 vaccine series completed, Dementia associated with other underlying disease without behavioral disturbance (HCC), Dependence on continuous supplemental oxygen, Depression, Diabetes mellitus, type II ([facility]), Drug abuse ([facility]), ED (erectile dysfunction), Fatigue, GERD (gastroesophageal reflux disease), H/O epididymitis, H/O: pneumonia, Hep C w/o coma, chronic ([facility]), History of BPH, History of hematuria, History of tobacco use, Hypertension, Loss of appetite, Loss of balance, Microcytic

## 2025-05-09 ENCOUNTER — PHARMACY VISIT (OUTPATIENT)
Facility: CLINIC | Age: 75
End: 2025-05-09

## 2025-05-09 VITALS — BODY MASS INDEX: 25.08 KG/M2 | WEIGHT: 169.8 LBS

## 2025-05-09 DIAGNOSIS — Z79.4 TYPE 2 DIABETES MELLITUS WITH DIABETIC POLYNEUROPATHY, WITH LONG-TERM CURRENT USE OF INSULIN (HCC): Primary | ICD-10-CM

## 2025-05-09 DIAGNOSIS — E11.42 TYPE 2 DIABETES MELLITUS WITH DIABETIC POLYNEUROPATHY, WITH LONG-TERM CURRENT USE OF INSULIN (HCC): Primary | ICD-10-CM

## 2025-05-13 ENCOUNTER — TELEPHONE (OUTPATIENT)
Facility: CLINIC | Age: 75
End: 2025-05-13

## 2025-05-14 ENCOUNTER — TELEPHONE (OUTPATIENT)
Facility: CLINIC | Age: 75
End: 2025-05-14

## 2025-05-14 NOTE — TELEPHONE ENCOUNTER
Mickie a prior authorization nurse from [facility] called regarding needing additional information for the request for nutritional support for Mr. Smiley. She stated that she needs to know the Brand if there is a specific brand that is being requested. She also stated she needs to know how many calories are needed per day. As well as needing verification of date of service. The fax number is [phone]

## 2025-05-19 ENCOUNTER — TELEPHONE (OUTPATIENT)
Facility: CLINIC | Age: 75
End: 2025-05-19

## 2025-05-19 NOTE — TELEPHONE ENCOUNTER
Mr. Smiley called again to see about an update of getting his nutrient drinks. He stated he has been waiting for about 3 weeks to get these drinks and stated his home delivery pharmacy still has not gotten the prior authorization approval from his insurance regarding this.

## 2025-05-20 NOTE — TELEPHONE ENCOUNTER
Called [facility], spoke with Yamila, she provided the following information. The patient has been getting Breezers liquid nutritional drinks which contain 250 calories per container. He has been getting 60 orange and 30 strawberry per month.   This is 750 calories a day and about 720ml per day.

## 2025-05-22 DIAGNOSIS — E11.42 TYPE 2 DIABETES MELLITUS WITH DIABETIC POLYNEUROPATHY, WITH LONG-TERM CURRENT USE OF INSULIN (HCC): ICD-10-CM

## 2025-05-22 DIAGNOSIS — Z79.4 TYPE 2 DIABETES MELLITUS WITH DIABETIC POLYNEUROPATHY, WITH LONG-TERM CURRENT USE OF INSULIN (HCC): ICD-10-CM

## 2025-05-22 NOTE — TELEPHONE ENCOUNTER
Requested Prescriptions     Pending Prescriptions Disp Refills    Continuous Glucose Sensor (FREESTYLE ELIE 3 SENSOR) MISC [Pharmacy Med Name: FreeStyle Elie 3 Sensor device]  11     Sig: Apply 1 sensor on the back of the upper arm, change sensor every 14 days     Last seen: 5/9/25  Next seen: 5/30/25    Last filled: 5/3/24 Qty 1 each w/0 refills

## 2025-05-22 NOTE — TELEPHONE ENCOUNTER
Pt called again to see about an update of getting his nutrient drinks. He stated he has been waiting 4 weeks to get these drinks and stated his home delivery pharmacy still has not gotten the prior authorization approval from his insurance regarding this.

## 2025-05-23 RX ORDER — ACYCLOVIR 800 MG/1
TABLET ORAL
Refills: 11 | OUTPATIENT
Start: 2025-05-23

## 2025-05-23 RX ORDER — HYDROCHLOROTHIAZIDE 12.5 MG/1
CAPSULE ORAL
Qty: 2 EACH | Refills: 11 | Status: SHIPPED | OUTPATIENT
Start: 2025-05-23

## 2025-05-23 NOTE — TELEPHONE ENCOUNTER
Called patient to get the name and number of his [employer] . Informed caleb [phone].    I called the given number and it is a wrong [facility].

## 2025-05-27 ENCOUNTER — TELEPHONE (OUTPATIENT)
Facility: CLINIC | Age: 75
End: 2025-05-27

## 2025-05-27 NOTE — TELEPHONE ENCOUNTER
Called mr. Smiley as both of the number are wrong. His aid states she will try to find out the correct number tomorrow.    
Mr. Smiley called about his nutritional drinks. I got two number for his [facility] . Her name is Michell Angeles and her number is [phone] or [phone].  
bed mobility training/gait training/transfer training

## 2025-05-30 ENCOUNTER — TELEPHONE (OUTPATIENT)
Facility: CLINIC | Age: 75
End: 2025-05-30

## 2025-05-30 NOTE — TELEPHONE ENCOUNTER
Pharmacy Progress Note - Telephone Call    Mr. Anshul Smiley 75 y.o. was contacted via an outbound telephone call today regarding missed appointment today. Spoke with patient and he states he thought the appointment was on Wednesday and he was at Urology today getting blood work. Informed him that he has an appointment with the Urology doctor on Wednesday at 2pm and rescheduled PharmD appointment for next Friday at 12pm.    Future Appointments   Date Time Provider Department [place]   6/4/2025  2:00 PM Brandan Curry MD [facility]   6/6/2025 12:00 PM Daisy Alvarez, [facility] AMA [facility] DEP   7/14/2025 11:00 AM AMA LAB AMA [facility] DEP   7/21/2025  3:15 PM Anitha Lara, MANAS - CNP AMA [facility] DEP   11/10/2025 10:40 AM Ronal Weston MD [facility] NEUR BS AMB         Thank you,    Daisy Alvarez, PharmD  Clinical Pharmacy Specialist  05/30/25    For Pharmacy Admin Tracking Only    Program: Medical Group  CPA in place:  Yes  Recommendation Provided To: Patient/Caregiver: 1 via Telephone  Intervention Detail: Scheduled Appointment  Intervention Accepted By: Patient/Caregiver: 1  Gap Closed?: No   Time Spent (min): 5

## 2025-06-05 NOTE — PROGRESS NOTES
Pharmacy Progress Note - Diabetes Management Follow up       Assessment / Plan:   Diabetes Management:  Per ADA guidelines, Pt's A1c is not at goal of < 7%.  Patient's most recent A1c was  8.3% on 1/3/25, which has improved from previous A1c of 8.4% 9/12/24. Patient presents to appointment today with aid, Amanda.     According to Cr, patient's average BG the last 2 weeks has been 160 mg/dL, in range 71%, and no low BG. Patient has been adherent to current medication regimen and is  tolerating without side effects or issues. His BG is continuing to improve. He is having less extreme postprandial spikes than previously and is not having low BG overnight. Continue to work on diet, limiting soda and reducing carbohydrate portions. No changes made to medications today. Follow up in 3 weeks.     Medication therapy changes:   - Continue Januvia 100 mg tablet once daily in the morning   - Continue Lantus 25 units in the morning   - Continue pioglitazone 30 mg tablet daily     There are no discontinued medications.     No orders of the defined types were placed in this encounter.       Patient will return to clinic in 3 week(s) for follow up.        S/O: Mr. Anshul Smiley, a 75 y.o. male referred by Anitha Lara APRN - CNP,  has a past medical history of Acute cystitis without hematuria, MCKENZIE (acute kidney injury), Arthritis, Arthropathy, Balanitis, Candida infection, Chronic pain, COVID-19 vaccine series completed, Dementia associated with other underlying disease without behavioral disturbance ([facility]), Dependence on continuous supplemental oxygen, Depression, Diabetes mellitus, type II ([facility]), Drug abuse ([facility]), ED (erectile dysfunction), Fatigue, GERD (gastroesophageal reflux disease), H/O epididymitis, H/O: pneumonia, Hep C w/o coma, chronic ([facility]), History of BPH, History of hematuria, History of tobacco use, Hypertension, Loss of appetite, Loss of balance, Microcytic anemia, Neuropathy, Pancytopenia ([facility]),

## 2025-06-06 ENCOUNTER — PHARMACY VISIT (OUTPATIENT)
Facility: CLINIC | Age: 75
End: 2025-06-06

## 2025-06-06 VITALS — BODY MASS INDEX: 25.34 KG/M2 | WEIGHT: 171.6 LBS

## 2025-06-06 DIAGNOSIS — Z79.4 TYPE 2 DIABETES MELLITUS WITH DIABETIC POLYNEUROPATHY, WITH LONG-TERM CURRENT USE OF INSULIN (HCC): Primary | ICD-10-CM

## 2025-06-06 DIAGNOSIS — E11.42 TYPE 2 DIABETES MELLITUS WITH DIABETIC POLYNEUROPATHY, WITH LONG-TERM CURRENT USE OF INSULIN (HCC): Primary | ICD-10-CM

## 2025-06-11 ENCOUNTER — TELEPHONE (OUTPATIENT)
Facility: CLINIC | Age: 75
End: 2025-06-11

## 2025-06-16 RX ORDER — PEN NEEDLE, DIABETIC 31 GX3/16"
NEEDLE, DISPOSABLE MISCELLANEOUS
Qty: 100 EACH | Refills: 1 | OUTPATIENT
Start: 2025-06-16

## 2025-06-20 ENCOUNTER — TELEPHONE (OUTPATIENT)
Facility: CLINIC | Age: 75
End: 2025-06-20

## 2025-06-26 DIAGNOSIS — K21.9 GASTRO-ESOPHAGEAL REFLUX DISEASE WITHOUT ESOPHAGITIS: ICD-10-CM

## 2025-06-26 DIAGNOSIS — Z79.4 TYPE 2 DIABETES MELLITUS WITH DIABETIC POLYNEUROPATHY, WITH LONG-TERM CURRENT USE OF INSULIN (HCC): ICD-10-CM

## 2025-06-26 DIAGNOSIS — E11.42 TYPE 2 DIABETES MELLITUS WITH DIABETIC POLYNEUROPATHY, WITH LONG-TERM CURRENT USE OF INSULIN (HCC): ICD-10-CM

## 2025-06-26 RX ORDER — FAMOTIDINE 40 MG/1
40 TABLET, FILM COATED ORAL DAILY
Qty: 90 TABLET | Refills: 1 | Status: SHIPPED | OUTPATIENT
Start: 2025-06-26

## 2025-06-26 RX ORDER — INSULIN GLARGINE 100 [IU]/ML
INJECTION, SOLUTION SUBCUTANEOUS
Qty: 15 ML | Refills: 3 | Status: SHIPPED | OUTPATIENT
Start: 2025-06-26 | End: 2025-06-27 | Stop reason: DRUGHIGH

## 2025-06-26 NOTE — TELEPHONE ENCOUNTER
Requested Prescriptions     Pending Prescriptions Disp Refills    LANTUS SOLOSTAR 100 UNIT/ML injection pen [Pharmacy Med Name: Lantus Solostar U-100 Insulin 100 unit/mL (3 mL) subcutaneous pen] 15 mL 3     Sig: Inject 26 Units into the skin daily    famotidine (PEPCID) 40 MG tablet [Pharmacy Med Name: famotidine 40 mg tablet] 90 tablet 1     Sig: TAKE ONE TABLET BY MOUTH EVERY DAY      Last seen: 4/3/2025  Next seen: 6/27/2025

## 2025-06-27 ENCOUNTER — PHARMACY VISIT (OUTPATIENT)
Facility: CLINIC | Age: 75
End: 2025-06-27

## 2025-06-27 VITALS — BODY MASS INDEX: 25.1 KG/M2 | WEIGHT: 170 LBS

## 2025-06-27 DIAGNOSIS — E11.42 TYPE 2 DIABETES MELLITUS WITH DIABETIC POLYNEUROPATHY, WITH LONG-TERM CURRENT USE OF INSULIN (HCC): Primary | ICD-10-CM

## 2025-06-27 DIAGNOSIS — Z79.4 TYPE 2 DIABETES MELLITUS WITH DIABETIC POLYNEUROPATHY, WITH LONG-TERM CURRENT USE OF INSULIN (HCC): Primary | ICD-10-CM

## 2025-06-27 RX ORDER — BREXPIPRAZOLE 2 MG/1
2 TABLET ORAL NIGHTLY
COMMUNITY
Start: 2025-06-03

## 2025-06-27 RX ORDER — INSULIN GLARGINE 100 [IU]/ML
25 INJECTION, SOLUTION SUBCUTANEOUS DAILY
Qty: 15 ML | Refills: 3
Start: 2025-06-27

## 2025-06-27 NOTE — PATIENT INSTRUCTIONS
Reduce Lantus to 25 units daily in the morning  Continue Januvia 100 mg daily  Continue pioglitazone 30 mg daily

## 2025-06-27 NOTE — PROGRESS NOTES
Pharmacy Progress Note - Diabetes Management Follow up       Assessment / Plan:   Diabetes Management:  Per ADA guidelines, Pt's A1c is not at goal of < 7%.  Patient's most recent A1c was  8.3% on 1/3/25, which has improved from previous A1c of 8.4% 9/12/24. Patient presents to appointment today with aid, Amanda. According to Cr, patient's average BG the last 2 weeks has been 156 mg/dL, in range 68%, and low 1%. His BG is trending low in the early morning and sometimes dipping below 70 mg/dL. Will reduce Lantus to 25 units daily to see if that reduces incidence of low BG in the early morning. Follow up in 3 weeks before PCP appointment.    Medication therapy changes:   Reduce Lantus to 25 units daily in the morning  Continue Januvia 100 mg daily  Continue pioglitazone 30 mg daily    Medication list update:  Psychiatrist increased patient's Rexulti to 2 mg daily at last appointment. He has not started this dose yet, thinks it will arrive in next month's pill pack refill. Updated medication list to reflect this dose change.    Medications Discontinued During This Encounter   Medication Reason    REXULTI 1 MG TABS tablet Therapy completed    LANTUS SOLOSTAR 100 UNIT/ML injection pen DOSE ADJUSTMENT        Orders Placed This Encounter    REXULTI 2 MG TABS tablet     Sig: Take 1 tablet by mouth nightly    insulin glargine (LANTUS SOLOSTAR) 100 UNIT/ML injection pen     Sig: Inject 25 Units into the skin daily     Dispense:  15 mL     Refill:  3        Patient will return to clinic in 3 week(s) for follow up.        S/O: Mr. Anshul Smiley, a 75 y.o. male referred by Anitha Lara, APRN - CNP,  has a past medical history of Acute cystitis without hematuria, MCKENZIE (acute kidney injury), Arthritis, Arthropathy, Balanitis, Candida infection, Chronic pain, COVID-19 vaccine series completed, Dementia associated with other underlying disease without behavioral disturbance (HCC), Dependence on continuous

## 2025-07-17 ENCOUNTER — HOSPITAL ENCOUNTER (OUTPATIENT)
Facility: HOSPITAL | Age: 75
Setting detail: SPECIMEN
Discharge: HOME | End: 2025-07-20
Payer: MEDICARE

## 2025-07-17 DIAGNOSIS — E11.42 TYPE 2 DIABETES MELLITUS WITH DIABETIC POLYNEUROPATHY, WITH LONG-TERM CURRENT USE OF INSULIN (HCC): ICD-10-CM

## 2025-07-17 DIAGNOSIS — Z79.4 TYPE 2 DIABETES MELLITUS WITH DIABETIC POLYNEUROPATHY, WITH LONG-TERM CURRENT USE OF INSULIN (HCC): ICD-10-CM

## 2025-07-17 DIAGNOSIS — I10 ESSENTIAL HYPERTENSION WITH GOAL BLOOD PRESSURE LESS THAN 130/80: ICD-10-CM

## 2025-07-17 LAB
ALBUMIN SERPL-MCNC: 4.2 G/DL (ref 3.4–5)
ALBUMIN/GLOB SERPL: 1.3 (ref 0.8–1.7)
ALP SERPL-CCNC: 85 U/L (ref 45–117)
ALT SERPL-CCNC: 30 U/L (ref 10–50)
ANION GAP SERPL CALC-SCNC: 13 MMOL/L (ref 3–18)
AST SERPL-CCNC: 26 U/L (ref 10–38)
BILIRUB SERPL-MCNC: 0.3 MG/DL (ref 0.2–1)
BUN SERPL-MCNC: 18 MG/DL (ref 6–23)
BUN/CREAT SERPL: 12 (ref 12–20)
CALCIUM SERPL-MCNC: 10.1 MG/DL (ref 8.5–10.1)
CHLORIDE SERPL-SCNC: 105 MMOL/L (ref 98–107)
CHOLEST SERPL-MCNC: 113 MG/DL
CO2 SERPL-SCNC: 24 MMOL/L (ref 21–32)
CREAT SERPL-MCNC: 1.55 MG/DL (ref 0.6–1.3)
ERYTHROCYTE [DISTWIDTH] IN BLOOD BY AUTOMATED COUNT: 17.6 % (ref 11.6–14.5)
EST. AVERAGE GLUCOSE BLD GHB EST-MCNC: 173 MG/DL
GLOBULIN SER CALC-MCNC: 3.3 G/DL (ref 2–4)
GLUCOSE SERPL-MCNC: 229 MG/DL (ref 74–108)
HBA1C MFR BLD: 7.7 % (ref 4.2–5.6)
HCT VFR BLD AUTO: 38.6 % (ref 36–48)
HDLC SERPL-MCNC: 58 MG/DL (ref 40–60)
HDLC SERPL: 1.9 (ref 0–5)
HGB BLD-MCNC: 10.9 G/DL (ref 13–16)
LDLC SERPL CALC-MCNC: 44 MG/DL (ref 0–100)
MCH RBC QN AUTO: 22.2 PG (ref 24–34)
MCHC RBC AUTO-ENTMCNC: 28.2 G/DL (ref 31–37)
MCV RBC AUTO: 78.5 FL (ref 78–100)
NRBC # BLD: 0 K/UL (ref 0–0.01)
NRBC BLD-RTO: 0 PER 100 WBC
PLATELET # BLD AUTO: 200 K/UL (ref 135–420)
POTASSIUM SERPL-SCNC: 4.4 MMOL/L (ref 3.5–5.5)
PROT SERPL-MCNC: 7.5 G/DL (ref 6.4–8.2)
RBC # BLD AUTO: 4.92 M/UL (ref 4.35–5.65)
SODIUM SERPL-SCNC: 142 MMOL/L (ref 136–145)
TRIGL SERPL-MCNC: 57 MG/DL (ref 0–150)
VLDLC SERPL CALC-MCNC: 11 MG/DL
WBC # BLD AUTO: 5.5 K/UL (ref 4.6–13.2)

## 2025-07-17 PROCEDURE — 36415 COLL VENOUS BLD VENIPUNCTURE: CPT

## 2025-07-17 PROCEDURE — 83036 HEMOGLOBIN GLYCOSYLATED A1C: CPT

## 2025-07-17 PROCEDURE — 80061 LIPID PANEL: CPT

## 2025-07-17 PROCEDURE — 85027 COMPLETE CBC AUTOMATED: CPT

## 2025-07-17 PROCEDURE — 80053 COMPREHEN METABOLIC PANEL: CPT

## 2025-07-21 ENCOUNTER — PHARMACY VISIT (OUTPATIENT)
Facility: CLINIC | Age: 75
End: 2025-07-21

## 2025-07-21 VITALS — BODY MASS INDEX: 25.34 KG/M2 | WEIGHT: 171.6 LBS

## 2025-07-21 DIAGNOSIS — E11.42 TYPE 2 DIABETES MELLITUS WITH DIABETIC POLYNEUROPATHY, WITH LONG-TERM CURRENT USE OF INSULIN (HCC): ICD-10-CM

## 2025-07-21 DIAGNOSIS — Z79.4 TYPE 2 DIABETES MELLITUS WITH DIABETIC POLYNEUROPATHY, WITH LONG-TERM CURRENT USE OF INSULIN (HCC): ICD-10-CM

## 2025-07-21 NOTE — PROGRESS NOTES
Pharmacy Progress Note - Diabetes Management Follow up       Assessment / Plan:   Diabetes Management:  Per ADA guidelines, Pt's A1c is not at goal of < 7%.  Patient's most recent A1c was 7.7% on 7/17/25, which has improved from previous A1c of  8.3% on 1/3/25.Patient presents to appointment today with aid, Amanda. Patient has been adherent to current medication regimen and is  tolerating without side effects or issues. According to [employer], patient's average BG the last 2 weeks has been 129 mg/dL, in range 71%, low 8%, and very low 2%. Confirmed that patient is only taking once daily - patient reports taking dose correctly only once daily in the morning. He is having low BG still overnight despite slightly reducing insulin last visit, so further reduced insulin to 22 units daily. Follow up in 3.5 weeks    Medication therapy changes:   Reduce Lantus to 22 units in the morning   Continue Januvia 100 mg daily  Continue pioglitazone 30 mg daily       Nutrition/Lifestyle Modifications:  - Educated pt on the importance of moderating carbohydrate intake. Reviewed sources of carbohydrates and method to help determine appropriate portion sizes (e.g., Diabetes Plate Method).  - Advised patient to avoid sugar-sweetened beverages and replace with water or diet/zero sugar option.  - Recommend ~30 minutes consistent, moderately intensive, exercise/day or ~150 minutes/week. Start small, stay consistent, and increase length and types of exercise, as tolerated.       Medications Discontinued During This Encounter   Medication Reason    insulin glargine (LANTUS SOLOSTAR) 100 UNIT/ML injection pen DOSE ADJUSTMENT        Orders Placed This Encounter    insulin glargine (LANTUS SOLOSTAR) 100 UNIT/ML injection pen     Sig: Inject 22 Units into the skin daily     Dispense:  15 mL     Refill:  3        Patient will return to clinic in 3.5 week(s) for follow up.        S/O: Mr. Anshul Smiley, a 75 y.o. male referred by Marco

## 2025-07-23 RX ORDER — INSULIN GLARGINE 100 [IU]/ML
22 INJECTION, SOLUTION SUBCUTANEOUS DAILY
Qty: 15 ML | Refills: 3 | Status: SHIPPED
Start: 2025-07-23

## 2025-07-30 ENCOUNTER — TELEPHONE (OUTPATIENT)
Facility: CLINIC | Age: 75
End: 2025-07-30

## 2025-07-30 DIAGNOSIS — R35.1 NOCTURIA: ICD-10-CM

## 2025-07-30 DIAGNOSIS — F03.A4 MILD DEMENTIA WITH ANXIETY, UNSPECIFIED DEMENTIA TYPE (HCC): Primary | ICD-10-CM

## 2025-07-30 DIAGNOSIS — M51.16 RADICULOPATHY DUE TO LUMBAR INTERVERTEBRAL DISC DISORDER: ICD-10-CM

## 2025-07-30 DIAGNOSIS — R27.0 ATAXIA: ICD-10-CM

## 2025-07-30 DIAGNOSIS — Z99.89 WALKER AS AMBULATION AID: ICD-10-CM

## 2025-07-30 NOTE — TELEPHONE ENCOUNTER
Patient called to request a prior auth from [facility] for the large sized pull-ups.     P: [phone]

## 2025-07-31 DIAGNOSIS — Z79.4 TYPE 2 DIABETES MELLITUS WITH DIABETIC POLYNEUROPATHY, WITH LONG-TERM CURRENT USE OF INSULIN (HCC): ICD-10-CM

## 2025-07-31 DIAGNOSIS — I10 ESSENTIAL (PRIMARY) HYPERTENSION: ICD-10-CM

## 2025-07-31 DIAGNOSIS — E11.42 TYPE 2 DIABETES MELLITUS WITH DIABETIC POLYNEUROPATHY, WITH LONG-TERM CURRENT USE OF INSULIN (HCC): ICD-10-CM

## 2025-07-31 RX ORDER — LISINOPRIL 20 MG/1
20 TABLET ORAL DAILY
Qty: 90 TABLET | Refills: 1 | Status: SHIPPED | OUTPATIENT
Start: 2025-07-31

## 2025-07-31 RX ORDER — UBIQUINOL 100 MG
CAPSULE ORAL
Qty: 100 EACH | Refills: 3 | Status: SHIPPED | OUTPATIENT
Start: 2025-07-31

## 2025-07-31 RX ORDER — ROSUVASTATIN CALCIUM 5 MG/1
5 TABLET, COATED ORAL NIGHTLY
Qty: 90 TABLET | Refills: 1 | Status: SHIPPED | OUTPATIENT
Start: 2025-07-31

## 2025-07-31 RX ORDER — GLUCOSAM/CHON-MSM1/C/MANG/BOSW 500-416.6
TABLET ORAL
Qty: 100 EACH | Refills: 3 | Status: SHIPPED | OUTPATIENT
Start: 2025-07-31

## 2025-07-31 RX ORDER — CALCIUM CITRATE/VITAMIN D3 200MG-6.25
TABLET ORAL
Qty: 100 EACH | Refills: 3 | Status: SHIPPED | OUTPATIENT
Start: 2025-07-31

## 2025-08-01 PROBLEM — R27.0 ATAXIA: Status: ACTIVE | Noted: 2025-08-01

## 2025-08-01 PROBLEM — Z99.89 WALKER AS AMBULATION AID: Status: ACTIVE | Noted: 2025-08-01

## 2025-08-11 ENCOUNTER — OFFICE VISIT (OUTPATIENT)
Facility: CLINIC | Age: 75
End: 2025-08-11
Payer: MEDICARE

## 2025-08-11 VITALS
DIASTOLIC BLOOD PRESSURE: 86 MMHG | BODY MASS INDEX: 25.48 KG/M2 | HEIGHT: 69 IN | HEART RATE: 91 BPM | OXYGEN SATURATION: 96 % | WEIGHT: 172 LBS | RESPIRATION RATE: 18 BRPM | SYSTOLIC BLOOD PRESSURE: 132 MMHG | TEMPERATURE: 98.1 F

## 2025-08-11 DIAGNOSIS — I10 ESSENTIAL (PRIMARY) HYPERTENSION: ICD-10-CM

## 2025-08-11 DIAGNOSIS — Z79.4 TYPE 2 DIABETES MELLITUS WITH DIABETIC POLYNEUROPATHY, WITH LONG-TERM CURRENT USE OF INSULIN (HCC): ICD-10-CM

## 2025-08-11 DIAGNOSIS — N18.31 TYPE 2 DIABETES MELLITUS WITH STAGE 3A CHRONIC KIDNEY DISEASE, WITH LONG-TERM CURRENT USE OF INSULIN (HCC): Primary | ICD-10-CM

## 2025-08-11 DIAGNOSIS — Z79.4 TYPE 2 DIABETES MELLITUS WITH STAGE 3A CHRONIC KIDNEY DISEASE, WITH LONG-TERM CURRENT USE OF INSULIN (HCC): Primary | ICD-10-CM

## 2025-08-11 DIAGNOSIS — E11.42 TYPE 2 DIABETES MELLITUS WITH DIABETIC POLYNEUROPATHY, WITH LONG-TERM CURRENT USE OF INSULIN (HCC): ICD-10-CM

## 2025-08-11 DIAGNOSIS — E11.22 TYPE 2 DIABETES MELLITUS WITH STAGE 3A CHRONIC KIDNEY DISEASE, WITH LONG-TERM CURRENT USE OF INSULIN (HCC): Primary | ICD-10-CM

## 2025-08-11 PROBLEM — N18.9 CKD (CHRONIC KIDNEY DISEASE): Status: RESOLVED | Noted: 2025-08-11 | Resolved: 2025-08-11

## 2025-08-11 PROBLEM — N18.9 CKD (CHRONIC KIDNEY DISEASE): Status: ACTIVE | Noted: 2025-08-11

## 2025-08-11 PROCEDURE — 1123F ACP DISCUSS/DSCN MKR DOCD: CPT | Performed by: NURSE PRACTITIONER

## 2025-08-11 PROCEDURE — 3075F SYST BP GE 130 - 139MM HG: CPT | Performed by: NURSE PRACTITIONER

## 2025-08-11 PROCEDURE — 3079F DIAST BP 80-89 MM HG: CPT | Performed by: NURSE PRACTITIONER

## 2025-08-11 PROCEDURE — 99214 OFFICE O/P EST MOD 30 MIN: CPT | Performed by: NURSE PRACTITIONER

## 2025-08-11 PROCEDURE — 3051F HG A1C>EQUAL 7.0%<8.0%: CPT | Performed by: NURSE PRACTITIONER

## 2025-08-11 ASSESSMENT — PATIENT HEALTH QUESTIONNAIRE - PHQ9
SUM OF ALL RESPONSES TO PHQ QUESTIONS 1-9: 0
SUM OF ALL RESPONSES TO PHQ QUESTIONS 1-9: 0
2. FEELING DOWN, DEPRESSED OR HOPELESS: NOT AT ALL
SUM OF ALL RESPONSES TO PHQ QUESTIONS 1-9: 0
SUM OF ALL RESPONSES TO PHQ QUESTIONS 1-9: 0
1. LITTLE INTEREST OR PLEASURE IN DOING THINGS: NOT AT ALL

## 2025-08-11 ASSESSMENT — ENCOUNTER SYMPTOMS: SHORTNESS OF BREATH: 1

## 2025-08-15 ENCOUNTER — PHARMACY VISIT (OUTPATIENT)
Facility: CLINIC | Age: 75
End: 2025-08-15

## 2025-08-15 VITALS — BODY MASS INDEX: 25.55 KG/M2 | WEIGHT: 173 LBS

## 2025-08-15 DIAGNOSIS — E11.42 TYPE 2 DIABETES MELLITUS WITH DIABETIC POLYNEUROPATHY, WITH LONG-TERM CURRENT USE OF INSULIN (HCC): Primary | ICD-10-CM

## 2025-08-15 DIAGNOSIS — Z79.4 TYPE 2 DIABETES MELLITUS WITH DIABETIC POLYNEUROPATHY, WITH LONG-TERM CURRENT USE OF INSULIN (HCC): Primary | ICD-10-CM

## 2025-08-15 RX ORDER — INSULIN GLARGINE 100 [IU]/ML
24 INJECTION, SOLUTION SUBCUTANEOUS DAILY
Qty: 15 ML | Refills: 3 | Status: SHIPPED
Start: 2025-08-15

## 2025-08-28 DIAGNOSIS — I10 ESSENTIAL HYPERTENSION WITH GOAL BLOOD PRESSURE LESS THAN 130/80: ICD-10-CM

## 2025-08-28 RX ORDER — AMLODIPINE BESYLATE 5 MG/1
5 TABLET ORAL DAILY
Qty: 90 TABLET | Refills: 1 | OUTPATIENT
Start: 2025-08-28

## 2025-08-29 ENCOUNTER — PHARMACY VISIT (OUTPATIENT)
Facility: CLINIC | Age: 75
End: 2025-08-29

## 2025-08-29 VITALS — BODY MASS INDEX: 25.28 KG/M2 | WEIGHT: 171.2 LBS

## 2025-08-29 DIAGNOSIS — Z79.4 TYPE 2 DIABETES MELLITUS WITH DIABETIC POLYNEUROPATHY, WITH LONG-TERM CURRENT USE OF INSULIN (HCC): ICD-10-CM

## 2025-08-29 DIAGNOSIS — E11.42 TYPE 2 DIABETES MELLITUS WITH DIABETIC POLYNEUROPATHY, WITH LONG-TERM CURRENT USE OF INSULIN (HCC): ICD-10-CM

## 2025-08-29 RX ORDER — DOXYCYCLINE HYCLATE 100 MG
100 TABLET ORAL 2 TIMES DAILY
Qty: 14 TABLET | Refills: 0 | Status: SHIPPED | OUTPATIENT
Start: 2025-08-29 | End: 2025-09-05

## 2025-08-29 RX ORDER — INSULIN GLARGINE 100 [IU]/ML
20 INJECTION, SOLUTION SUBCUTANEOUS DAILY
Qty: 15 ML | Refills: 3 | Status: SHIPPED
Start: 2025-08-29

## (undated) DEVICE — SUTURE VLOC 90 2/0 VL 6 GS-22 VLOCM2105

## (undated) DEVICE — INTENDED FOR TISSUE SEPARATION, AND OTHER PROCEDURES THAT REQUIRE A SHARP SURGICAL BLADE TO PUNCTURE OR CUT.: Brand: BARD-PARKER ® CARBON RIB-BACK BLADES

## (undated) DEVICE — SOL IRR NACL 0.9% 500ML POUR --

## (undated) DEVICE — SOL ANTI-FOG 6ML MEDC -- MEDICHOICE - CONVERT TO 358427

## (undated) DEVICE — SHEET,DRAPE,70X100,STERILE: Brand: MEDLINE

## (undated) DEVICE — KENDALL SCD EXPRESS SLEEVES, KNEE LENGTH, MEDIUM: Brand: KENDALL SCD

## (undated) DEVICE — Device

## (undated) DEVICE — SEAL CANN F/12MM 8.5-13MM DISP -- DA VINCI

## (undated) DEVICE — DERMABOND SKIN ADH 0.7ML -- DERMABOND ADVANCED 12/BX

## (undated) DEVICE — LIGHT HANDLE: Brand: DEVON

## (undated) DEVICE — TIP COVER ACCESSORY

## (undated) DEVICE — DRAPE SURG EQUIP W105XH13XL20IN 3 ARM DISPOSABLE DA VINCI S

## (undated) DEVICE — ANTI-FOG SOLUTION WITH FOAM PAD: Brand: DEVON

## (undated) DEVICE — REM POLYHESIVE ADULT PATIENT RETURN ELECTRODE: Brand: VALLEYLAB

## (undated) DEVICE — OBTRTR BLDELSS 8MM DISP -- DA VINCI - SNGL USE

## (undated) DEVICE — SUTURE VCRL SZ 2-0 L27IN ABSRB UD L26MM SH 1/2 CIR J417H

## (undated) DEVICE — SUTURE MCRYL SZ 4-0 L18IN ABSRB UD L19MM PS-2 3/8 CIR PRIM Y496G

## (undated) DEVICE — INSTRMT SET WND CLSR SUT PASS --

## (undated) DEVICE — STERILE POLYISOPRENE POWDER-FREE SURGICAL GLOVES: Brand: PROTEXIS